# Patient Record
Sex: MALE | Race: WHITE | Employment: OTHER | URBAN - METROPOLITAN AREA
[De-identification: names, ages, dates, MRNs, and addresses within clinical notes are randomized per-mention and may not be internally consistent; named-entity substitution may affect disease eponyms.]

---

## 2017-02-20 RX ORDER — FINASTERIDE 5 MG/1
TABLET, FILM COATED ORAL
Qty: 30 TABLET | Refills: 1 | Status: SHIPPED | OUTPATIENT
Start: 2017-02-20 | End: 2017-05-17

## 2017-02-20 RX ORDER — SIMVASTATIN 20 MG
20 TABLET ORAL NIGHTLY
Qty: 30 TABLET | Refills: 1 | Status: SHIPPED | OUTPATIENT
Start: 2017-02-20 | End: 2017-03-27

## 2017-02-20 RX ORDER — FUROSEMIDE 40 MG/1
TABLET ORAL
Qty: 30 TABLET | Refills: 1 | Status: SHIPPED | OUTPATIENT
Start: 2017-02-20 | End: 2017-05-19

## 2017-02-20 NOTE — TELEPHONE ENCOUNTER
From: Elda He  To: Diann Arrington MD  Sent: 2/20/2017 9:35 AM CST  Subject: Medication Renewal Request    Original authorizing provider: Thersia Gaucher, MD Valeta Lobos would like a refill of the following medications:  simvastatin 20 MG Oral Tab [Ma

## 2017-03-16 ENCOUNTER — HOSPITAL ENCOUNTER (INPATIENT)
Facility: HOSPITAL | Age: 77
LOS: 4 days | Discharge: HOME HEALTH | DRG: 603 | End: 2017-03-20
Attending: EMERGENCY MEDICINE | Admitting: HOSPITALIST
Payer: MEDICARE

## 2017-03-16 ENCOUNTER — TELEPHONE (OUTPATIENT)
Dept: NEPHROLOGY | Facility: CLINIC | Age: 77
End: 2017-03-16

## 2017-03-16 ENCOUNTER — APPOINTMENT (OUTPATIENT)
Dept: ULTRASOUND IMAGING | Facility: HOSPITAL | Age: 77
DRG: 603 | End: 2017-03-16
Attending: EMERGENCY MEDICINE
Payer: MEDICARE

## 2017-03-16 ENCOUNTER — HOSPITAL ENCOUNTER (OUTPATIENT)
Age: 77
Discharge: ANOTHER HOSPITAL | DRG: 603 | End: 2017-03-16
Attending: EMERGENCY MEDICINE
Payer: MEDICARE

## 2017-03-16 VITALS
TEMPERATURE: 99 F | RESPIRATION RATE: 20 BRPM | OXYGEN SATURATION: 95 % | WEIGHT: 245 LBS | BODY MASS INDEX: 36.29 KG/M2 | HEART RATE: 68 BPM | HEIGHT: 69 IN | SYSTOLIC BLOOD PRESSURE: 144 MMHG | DIASTOLIC BLOOD PRESSURE: 75 MMHG

## 2017-03-16 DIAGNOSIS — L03.116 CELLULITIS OF LEFT LOWER EXTREMITY: Primary | ICD-10-CM

## 2017-03-16 LAB
ANION GAP SERPL CALC-SCNC: 9 MMOL/L (ref 0–18)
BASOPHILS # BLD: 0.1 K/UL (ref 0–0.2)
BASOPHILS NFR BLD: 1 %
BUN SERPL-MCNC: 19 MG/DL (ref 8–20)
BUN/CREAT SERPL: 23.5 (ref 10–20)
CALCIUM SERPL-MCNC: 9.5 MG/DL (ref 8.5–10.5)
CHLORIDE SERPL-SCNC: 98 MMOL/L (ref 95–110)
CO2 SERPL-SCNC: 31 MMOL/L (ref 22–32)
CREAT SERPL-MCNC: 0.81 MG/DL (ref 0.5–1.5)
EOSINOPHIL # BLD: 0.3 K/UL (ref 0–0.7)
EOSINOPHIL NFR BLD: 2 %
ERYTHROCYTE [DISTWIDTH] IN BLOOD BY AUTOMATED COUNT: 18.2 % (ref 11–15)
GLUCOSE BLDC GLUCOMTR-MCNC: 112 MG/DL (ref 70–99)
GLUCOSE SERPL-MCNC: 111 MG/DL (ref 70–99)
HCT VFR BLD AUTO: 46.4 % (ref 41–52)
HGB BLD-MCNC: 14.4 G/DL (ref 13.5–17.5)
LYMPHOCYTES # BLD: 2.5 K/UL (ref 1–4)
LYMPHOCYTES NFR BLD: 21 %
MCH RBC QN AUTO: 18.8 PG (ref 27–32)
MCHC RBC AUTO-ENTMCNC: 31 G/DL (ref 32–37)
MCV RBC AUTO: 60.7 FL (ref 80–100)
MONOCYTES # BLD: 1 K/UL (ref 0–1)
MONOCYTES NFR BLD: 8 %
NEUTROPHILS # BLD AUTO: 8.3 K/UL (ref 1.8–7.7)
NEUTROPHILS NFR BLD: 68 %
OSMOLALITY UR CALC.SUM OF ELEC: 289 MOSM/KG (ref 275–295)
PLATELET # BLD AUTO: 192 K/UL (ref 140–400)
PMV BLD AUTO: 9.6 FL (ref 7.4–10.3)
POTASSIUM SERPL-SCNC: 4.2 MMOL/L (ref 3.3–5.1)
RBC # BLD AUTO: 7.64 M/UL (ref 4.5–5.9)
SODIUM SERPL-SCNC: 138 MMOL/L (ref 136–144)
WBC # BLD AUTO: 12.5 K/UL (ref 4–11)

## 2017-03-16 PROCEDURE — 99223 1ST HOSP IP/OBS HIGH 75: CPT | Performed by: HOSPITALIST

## 2017-03-16 PROCEDURE — 93971 EXTREMITY STUDY: CPT

## 2017-03-16 PROCEDURE — 87070 CULTURE OTHR SPECIMN AEROBIC: CPT | Performed by: EMERGENCY MEDICINE

## 2017-03-16 PROCEDURE — 87205 SMEAR GRAM STAIN: CPT | Performed by: EMERGENCY MEDICINE

## 2017-03-16 PROCEDURE — 99215 OFFICE O/P EST HI 40 MIN: CPT

## 2017-03-16 RX ORDER — HEPARIN SODIUM 5000 [USP'U]/ML
5000 INJECTION, SOLUTION INTRAVENOUS; SUBCUTANEOUS EVERY 12 HOURS
Status: DISCONTINUED | OUTPATIENT
Start: 2017-03-16 | End: 2017-03-20

## 2017-03-16 RX ORDER — POLYETHYLENE GLYCOL 3350 17 G/17G
17 POWDER, FOR SOLUTION ORAL DAILY PRN
Status: DISCONTINUED | OUTPATIENT
Start: 2017-03-16 | End: 2017-03-20

## 2017-03-16 RX ORDER — ACETAMINOPHEN 325 MG/1
650 TABLET ORAL EVERY 6 HOURS PRN
Status: DISCONTINUED | OUTPATIENT
Start: 2017-03-16 | End: 2017-03-20

## 2017-03-16 RX ORDER — ATORVASTATIN CALCIUM 20 MG/1
20 TABLET, FILM COATED ORAL NIGHTLY
Status: DISCONTINUED | OUTPATIENT
Start: 2017-03-16 | End: 2017-03-20

## 2017-03-16 RX ORDER — FUROSEMIDE 40 MG/1
40 TABLET ORAL DAILY
Status: DISCONTINUED | OUTPATIENT
Start: 2017-03-17 | End: 2017-03-20

## 2017-03-16 RX ORDER — LISINOPRIL AND HYDROCHLOROTHIAZIDE 12.5; 1 MG/1; MG/1
1 TABLET ORAL
Status: DISCONTINUED | OUTPATIENT
Start: 2017-03-16 | End: 2017-03-16 | Stop reason: SDUPTHER

## 2017-03-16 RX ORDER — FINASTERIDE 5 MG/1
5 TABLET, FILM COATED ORAL DAILY
Status: DISCONTINUED | OUTPATIENT
Start: 2017-03-17 | End: 2017-03-20

## 2017-03-16 RX ORDER — DOCUSATE SODIUM 100 MG/1
100 CAPSULE, LIQUID FILLED ORAL 2 TIMES DAILY
Status: DISCONTINUED | OUTPATIENT
Start: 2017-03-16 | End: 2017-03-20

## 2017-03-16 RX ORDER — SODIUM PHOSPHATE, DIBASIC AND SODIUM PHOSPHATE, MONOBASIC 7; 19 G/133ML; G/133ML
1 ENEMA RECTAL ONCE AS NEEDED
Status: ACTIVE | OUTPATIENT
Start: 2017-03-16 | End: 2017-03-16

## 2017-03-16 RX ORDER — BISACODYL 10 MG
10 SUPPOSITORY, RECTAL RECTAL
Status: DISCONTINUED | OUTPATIENT
Start: 2017-03-16 | End: 2017-03-20

## 2017-03-16 RX ORDER — ONDANSETRON 2 MG/ML
4 INJECTION INTRAMUSCULAR; INTRAVENOUS EVERY 6 HOURS PRN
Status: DISCONTINUED | OUTPATIENT
Start: 2017-03-16 | End: 2017-03-20

## 2017-03-16 NOTE — ED NOTES
Pt states he fell an d scraped his left anterior lower leg approx. 10 days ago and noticed increasing swelling and redness since the injury. Pt states he attempted to use neosporin to affected area for itching, with no relief.  Pt stated he was \"scratching

## 2017-03-16 NOTE — ED INITIAL ASSESSMENT (HPI)
C/o swelling itching Lt leg.  Multiple sores noted on the left leg  Pt claims that he has been scratching his leg for aweek

## 2017-03-16 NOTE — ED PROVIDER NOTES
Patient Seen in: [facility] AND [facility] Emergency Department    History   Patient presents with:  Cellulitis (integumentary, infectious)    Stated Complaint: cellulitis l leg x 1 week sent from [facility]    HPI    Patient has history of swelling to his legs pain, no nausea, no vomiting    Positive for stated complaint: cellulitis l leg x 1 week sent from [address] are as noted in HPI. Constitutional and vital signs reviewed. All other systems reviewed and negative except as noted above. about this  He reports that his father was allergic to penicillin and that he may have had a reaction as a child but he has no memory of this and is not sure if he actually had one and if so what it was.   Based on this it seems unlikely he truly has a peni specified.     Medications Prescribed:  Current Discharge Medication List        Present on Admission  Date Reviewed: 11/8/2016          ICD-10-CM Noted POA    Cellulitis of left lower extremity L03.116 3/16/2017 Unknown

## 2017-03-16 NOTE — TELEPHONE ENCOUNTER
Pt believes he has Severe Left Leg Infection -very sore, very painful, and oozing with liquid. Pls call - aware [facility] is not in office. Thank you.

## 2017-03-16 NOTE — ED PROVIDER NOTES
Patient Seen in: [facility] [address] [place] [address]    History   Patient presents with:  Lower Extremity Injury (musculoskeletal)    Stated Complaint: leg ulcer    HPI    The patient is a 64-year-old male with past history of hypertension, non- occasional      Review of Systems    Positive for stated complaint: leg ulcer  Other systems are as noted in HPI. Constitutional and vital signs reviewed. All other systems reviewed and negative except as noted above.     PSFH elements reviewed from t patient's left lower leg swelling and redness include cellulitis versus unlikely possibility of DVT. 's discussed with the patient that he should go to the emergency room now for laboratory workup as well as evaluation of left lower extremity swelling.

## 2017-03-16 NOTE — H&P
[facility]    PATIENT'S NAME: Jahaira Morrow   ATTENDING PHYSICIAN: Gilmar Tinsley MD   PATIENT ACCOUNT#:   [id number]    LOCATION:  [place] [address]  MEDICAL RECORD #:   [id number]       YOB: 1940  ADMISSION DATE:       03/16/2017 systems is negative. PHYSICAL EXAMINATION:    GENERAL:  Alert and oriented to time, place, and person. No acute distress. VITAL SIGNS:  Temperature 98.2, pulse 63, respiratory rate 22, blood pressure 100/49, pulse oximetry 96% on room air.   HEENT:

## 2017-03-16 NOTE — TELEPHONE ENCOUNTER
Patient contacted. Advised that leg must be looked at today and directed him to go to Urgent Care. He will do as directed. Encounter routed to Dr. Vivi Castillo (on call) to inform.

## 2017-03-16 NOTE — ED INITIAL ASSESSMENT (HPI)
Pt sent over from im care for evaluation of possible cellulitis left left with swelling, redness, chronic discoloration of skin and 2 open wound areas  Pt denies fevers

## 2017-03-17 LAB
ANION GAP SERPL CALC-SCNC: 6 MMOL/L (ref 0–18)
BASOPHILS # BLD: 0.1 K/UL (ref 0–0.2)
BASOPHILS NFR BLD: 1 %
BUN SERPL-MCNC: 18 MG/DL (ref 8–20)
BUN/CREAT SERPL: 25.4 (ref 10–20)
CALCIUM SERPL-MCNC: 8.9 MG/DL (ref 8.5–10.5)
CHLORIDE SERPL-SCNC: 101 MMOL/L (ref 95–110)
CO2 SERPL-SCNC: 32 MMOL/L (ref 22–32)
CREAT SERPL-MCNC: 0.71 MG/DL (ref 0.5–1.5)
EOSINOPHIL # BLD: 0.3 K/UL (ref 0–0.7)
EOSINOPHIL NFR BLD: 3 %
ERYTHROCYTE [DISTWIDTH] IN BLOOD BY AUTOMATED COUNT: 17.7 % (ref 11–15)
GLUCOSE SERPL-MCNC: 98 MG/DL (ref 70–99)
HCT VFR BLD AUTO: 39.9 % (ref 41–52)
HGB BLD-MCNC: 12.6 G/DL (ref 13.5–17.5)
LYMPHOCYTES # BLD: 2.3 K/UL (ref 1–4)
LYMPHOCYTES NFR BLD: 24 %
MCH RBC QN AUTO: 19 PG (ref 27–32)
MCHC RBC AUTO-ENTMCNC: 31.5 G/DL (ref 32–37)
MCV RBC AUTO: 60.3 FL (ref 80–100)
MONOCYTES # BLD: 0.8 K/UL (ref 0–1)
MONOCYTES NFR BLD: 9 %
NEUTROPHILS # BLD AUTO: 6.1 K/UL (ref 1.8–7.7)
NEUTROPHILS NFR BLD: 64 %
OSMOLALITY UR CALC.SUM OF ELEC: 290 MOSM/KG (ref 275–295)
PLATELET # BLD AUTO: 169 K/UL (ref 140–400)
PMV BLD AUTO: 10.8 FL (ref 7.4–10.3)
POTASSIUM SERPL-SCNC: 3.8 MMOL/L (ref 3.3–5.1)
RBC # BLD AUTO: 6.62 M/UL (ref 4.5–5.9)
SODIUM SERPL-SCNC: 139 MMOL/L (ref 136–144)
WBC # BLD AUTO: 9.6 K/UL (ref 4–11)

## 2017-03-17 PROCEDURE — 99233 SBSQ HOSP IP/OBS HIGH 50: CPT | Performed by: HOSPITALIST

## 2017-03-17 RX ORDER — CLINDAMYCIN PHOSPHATE 600 MG/50ML
600 INJECTION INTRAVENOUS EVERY 8 HOURS
Status: DISCONTINUED | OUTPATIENT
Start: 2017-03-17 | End: 2017-03-20

## 2017-03-17 RX ORDER — POTASSIUM CHLORIDE 20 MEQ/1
40 TABLET, EXTENDED RELEASE ORAL ONCE
Status: COMPLETED | OUTPATIENT
Start: 2017-03-17 | End: 2017-03-17

## 2017-03-17 NOTE — WOUND PROGRESS NOTE
WOUND CARE NOTE      PLAN   Recommendations:  Turn schedules  Heels elevated using pillows, heel wedge or heel boots to offload heels  To prevent sliding: decrease head of bed and elevate foot of bed as medical condition tolerates  Glucose control to hel

## 2017-03-17 NOTE — PROGRESS NOTES
[facility]    Progress Note    Rory Gongora Patient Status:  Inpatient    3/6/1940 MRN [id number]   Location [facility] 5SW/SE Attending Krissy Wilson MD   Hosp Day # 1 PCP Tanesha Sutton MD       Subjective:   Rory Gongora is a(n Results  Component Value Date   WBC 9.6 03/17/2017   HGB 12.6* 03/17/2017   HCT 39.9* 03/17/2017    03/17/2017   CREATSERUM 0.71 03/17/2017   BUN 18 03/17/2017    03/17/2017   K 3.8 03/17/2017    03/17/2017   CO2 32 03/17/2017   GLU 98 0

## 2017-03-17 NOTE — CONSULTS
[facility]    Report of Consultation    Michelle Drilling Patient Status:  Inpatient    3/6/1940 MRN [id number]   Location [facility] 5SW/SE Attending Kendall Antoine MD   Hosp Day # 1 PCP Jany Motta MD     Date of Admission:  3/16 (VANCOCIN) 1,250 mg in sodium chloride 0.9 % 500 mL IVPB 15 mg/kg (Adjusted) Intravenous Q24H   finasteride (PROSCAR) tab 5 mg 5 mg Oral Daily   furosemide (LASIX) tab 40 mg 40 mg Oral Daily   metoprolol Tartrate (LOPRESSOR) tab 25 mg 25 mg Oral BID   Ator 12/03/2016   TSH 2.20 06/15/2012   PSA 0.7 11/08/2016         Imaging:  Us Venous Doppler Leg Left - Diag Img (emz=[id number])    3/16/2017  CONCLUSION: No evidence of left lower extremity DVT.             Impression:       77 y/o male with h/o chronic BLE venou

## 2017-03-17 NOTE — PLAN OF CARE
Problem: Patient/Family Goals  Goal: Patient/Family Long Term Goal  Patient’s Long Term Goal: to go home    Interventions:  - See additional Care Plan goals for specific interventions   Outcome: Not Progressing  Admitted today for cellulitis  Goal: Patient strengthening/mobility  - Encourage toileting schedule  Outcome: Progressing  Patient ambulates self to the bathroom. Educated on fall risk prevention strategies. Bed in low and locked position. Encouraged to call for help to the bathroom if needed.      Pr

## 2017-03-17 NOTE — PLAN OF CARE
Problem: Patient/Family Goals  Goal: Patient/Family Long Term Goal  Patient’s Long Term Goal: to go home    Interventions:  - iv antibiotics   - safety  - labs   - wound care  Outcome: Progressing  Patient iv abx given   Goal: Patient/Family [address] to assist with strengthening/mobility  - Encourage toileting schedule   Outcome: Progressing  Safety plan reinforced, patient ambulating self, call light and belongings within reach, frequent rounding done    Problem: SKIN/TISSUE INTEGRITY - ADULT  Goal: I

## 2017-03-17 NOTE — PROGRESS NOTES
Patient med rec not reconciled. RN elizabethged Dr. Jeb Moran at 1436. Also needs to be clarified about iv zosyn in MD notes stated will start zosyn and vanco. Vanco ordered but not zosyn. All needs met.      md called back will reconcile meds and zosyn not to be

## 2017-03-18 LAB — POTASSIUM SERPL-SCNC: 4.2 MMOL/L (ref 3.3–5.1)

## 2017-03-18 PROCEDURE — 99233 SBSQ HOSP IP/OBS HIGH 50: CPT | Performed by: HOSPITALIST

## 2017-03-18 NOTE — PROGRESS NOTES
[facility]    Progress Note    Lanette Landon Patient Status:  Inpatient    3/6/1940 MRN [id number]   Location [facility] 5SW/SE Attending Beltran Lucio MD   Hosp Day # 2 PCP Colleen Harper MD       Subjective:   Lanette Landon is a(n Lab Results  Component Value Date   WBC 9.6 03/17/2017   HGB 12.6* 03/17/2017   HCT 39.9* 03/17/2017    03/17/2017   CREATSERUM 0.71 03/17/2017   BUN 18 03/17/2017    03/17/2017   K 4.2 03/18/2017    03/17/2017   CO2 32 03/17/2017

## 2017-03-18 NOTE — CONSULTS
[facility]    Report of Consultation    Linda Adair Patient Status:  Inpatient    3/6/1940 MRN [id number]   Location [facility] 5SW/SE Attending Kaila Cano MD   Hosp Day # 2 PCP Gisela Yarbrough MD     Date of Admission:  3/16 sodium (COLACE) cap 100 mg 100 mg Oral BID   PEG 3350 (MIRALAX) powder packet 17 g 17 g Oral Daily PRN   magnesium hydroxide (MILK OF MAGNESIA) 400 MG/5ML suspension 30 mL 30 mL Oral Daily PRN   bisacodyl (DULCOLAX) rectal suppository 10 mg 10 mg Rectal Da Results  Component Value Date   WBC 9.6 03/17/2017   HGB 12.6* 03/17/2017   HCT 39.9* 03/17/2017    03/17/2017   CREATSERUM 0.71 03/17/2017   BUN 18 03/17/2017    03/17/2017   K 4.2 03/18/2017    03/17/2017   CO2 32 03/17/2017   GLU 98 0

## 2017-03-18 NOTE — PLAN OF CARE
Problem: PAIN - ADULT  Goal: Verbalizes/displays adequate comfort level or patient’s stated pain goal  INTERVENTIONS:  - Encourage pt to monitor pain and request assistance  - Assess pain using appropriate pain scale  - Administer analgesics based on type dressing/incision, wound bed, drain sites and surrounding tissue  - Implement wound care per orders  - Initiate isolation precautions as appropriate  - Initiate Pressure Ulcer prevention bundle as indicated   Outcome: Salo Funes,

## 2017-03-19 LAB
ANION GAP SERPL CALC-SCNC: 4 MMOL/L (ref 0–18)
BASOPHILS # BLD: 0.1 K/UL (ref 0–0.2)
BASOPHILS NFR BLD: 1 %
BUN SERPL-MCNC: 16 MG/DL (ref 8–20)
BUN/CREAT SERPL: 23.2 (ref 10–20)
CALCIUM SERPL-MCNC: 9.2 MG/DL (ref 8.5–10.5)
CHLORIDE SERPL-SCNC: 100 MMOL/L (ref 95–110)
CO2 SERPL-SCNC: 34 MMOL/L (ref 22–32)
CREAT SERPL-MCNC: 0.69 MG/DL (ref 0.5–1.5)
EOSINOPHIL # BLD: 0.4 K/UL (ref 0–0.7)
EOSINOPHIL NFR BLD: 4 %
ERYTHROCYTE [DISTWIDTH] IN BLOOD BY AUTOMATED COUNT: 17 % (ref 11–15)
GLUCOSE SERPL-MCNC: 101 MG/DL (ref 70–99)
HCT VFR BLD AUTO: 40.8 % (ref 41–52)
HGB BLD-MCNC: 13 G/DL (ref 13.5–17.5)
LYMPHOCYTES # BLD: 2.3 K/UL (ref 1–4)
LYMPHOCYTES NFR BLD: 23 %
MAGNESIUM SERPL-MCNC: 2.1 MG/DL (ref 1.8–2.5)
MCH RBC QN AUTO: 18.9 PG (ref 27–32)
MCHC RBC AUTO-ENTMCNC: 31.7 G/DL (ref 32–37)
MCV RBC AUTO: 59.6 FL (ref 80–100)
MONOCYTES # BLD: 0.9 K/UL (ref 0–1)
MONOCYTES NFR BLD: 9 %
NEUTROPHILS # BLD AUTO: 6 K/UL (ref 1.8–7.7)
NEUTROPHILS NFR BLD: 63 %
OSMOLALITY UR CALC.SUM OF ELEC: 287 MOSM/KG (ref 275–295)
PLATELET # BLD AUTO: 178 K/UL (ref 140–400)
PMV BLD AUTO: 9.8 FL (ref 7.4–10.3)
POTASSIUM SERPL-SCNC: 4.3 MMOL/L (ref 3.3–5.1)
RBC # BLD AUTO: 6.85 M/UL (ref 4.5–5.9)
SODIUM SERPL-SCNC: 138 MMOL/L (ref 136–144)
VANCOMYCIN TROUGH SERPL-MCNC: <3.5 MCG/ML (ref 10–20)
WBC # BLD AUTO: 9.6 K/UL (ref 4–11)

## 2017-03-19 PROCEDURE — 99233 SBSQ HOSP IP/OBS HIGH 50: CPT | Performed by: HOSPITALIST

## 2017-03-19 RX ORDER — 0.9 % SODIUM CHLORIDE 0.9 %
VIAL (ML) INJECTION
Status: COMPLETED
Start: 2017-03-19 | End: 2017-03-19

## 2017-03-19 NOTE — PROGRESS NOTES
[facility]    Progress Note    Maryann Odomtutu Patient Status:  Inpatient    3/6/1940 MRN [id number]   Location [facility] 5SW/SE Attending Bhavna Wood MD   Hosp Day # 3 PCP Gonzalez Jarquin MD       Subjective:   Sharlotte Boxer is a(n Results:       Lab Results  Component Value Date   WBC 9.6 03/19/2017   HGB 13.0* 03/19/2017   HCT 40.8* 03/19/2017    03/19/2017   CREATSERUM 0.69 03/19/2017   BUN 16 03/19/2017    03/19/2017   K 4.3 03/19/2017    03/19/2017

## 2017-03-19 NOTE — PLAN OF CARE
Problem: PAIN - ADULT  Goal: Verbalizes/displays adequate comfort level or patient’s stated pain goal  INTERVENTIONS:  - Encourage pt to monitor pain and request assistance  - Assess pain using appropriate pain scale  - Administer analgesics based on type integrated in the patient’s plan of care  Interventions:  - What would you like us to know as we care for you?   wanting to go home but want the infection to be done  - Provide timely, complete, and accurate information to patient/family  - Incorporate pat

## 2017-03-19 NOTE — PROGRESS NOTES
[facility] dosing service    Follow-up Pharmacokinetic Consult for Vancomycin Dosing     Esmer Reddy is a 76year old male admitted on 3/16 who is being treated for cellulitis. Patient is on day 4 of Vancomycin 1.25 gm IV Q 24 hours.   Goal trough is 10- Radiology:none    Based on the above:    1. Increase Vancomycin at 1.25 gm IVPB Q 18 hours (based on Trough of <3.5 ug/mL, pharmacokinetics, 87 kg weight and renal function)    2. Pharmacy will re-check Vancomycin trough levels prior to 3rd dose.   G

## 2017-03-20 VITALS
WEIGHT: 245 LBS | RESPIRATION RATE: 20 BRPM | TEMPERATURE: 99 F | HEIGHT: 69 IN | HEART RATE: 75 BPM | SYSTOLIC BLOOD PRESSURE: 109 MMHG | OXYGEN SATURATION: 96 % | BODY MASS INDEX: 36.29 KG/M2 | DIASTOLIC BLOOD PRESSURE: 50 MMHG

## 2017-03-20 LAB
ANION GAP SERPL CALC-SCNC: 7 MMOL/L (ref 0–18)
BASOPHILS # BLD: 0.1 K/UL (ref 0–0.2)
BASOPHILS NFR BLD: 1 %
BUN SERPL-MCNC: 13 MG/DL (ref 8–20)
BUN/CREAT SERPL: 21 (ref 10–20)
CALCIUM SERPL-MCNC: 8.9 MG/DL (ref 8.5–10.5)
CHLORIDE SERPL-SCNC: 102 MMOL/L (ref 95–110)
CO2 SERPL-SCNC: 29 MMOL/L (ref 22–32)
CREAT SERPL-MCNC: 0.62 MG/DL (ref 0.5–1.5)
EOSINOPHIL # BLD: 0.4 K/UL (ref 0–0.7)
EOSINOPHIL NFR BLD: 3 %
ERYTHROCYTE [DISTWIDTH] IN BLOOD BY AUTOMATED COUNT: 17.5 % (ref 11–15)
GLUCOSE SERPL-MCNC: 106 MG/DL (ref 70–99)
HCT VFR BLD AUTO: 39.1 % (ref 41–52)
HGB BLD-MCNC: 12.4 G/DL (ref 13.5–17.5)
LYMPHOCYTES # BLD: 1.9 K/UL (ref 1–4)
LYMPHOCYTES NFR BLD: 16 %
MAGNESIUM SERPL-MCNC: 2.1 MG/DL (ref 1.8–2.5)
MCH RBC QN AUTO: 18.9 PG (ref 27–32)
MCHC RBC AUTO-ENTMCNC: 31.8 G/DL (ref 32–37)
MCV RBC AUTO: 59.5 FL (ref 80–100)
MONOCYTES # BLD: 1.1 K/UL (ref 0–1)
MONOCYTES NFR BLD: 10 %
NEUTROPHILS # BLD AUTO: 8.1 K/UL (ref 1.8–7.7)
NEUTROPHILS NFR BLD: 70 %
OSMOLALITY UR CALC.SUM OF ELEC: 287 MOSM/KG (ref 275–295)
PLATELET # BLD AUTO: 163 K/UL (ref 140–400)
PMV BLD AUTO: 9.8 FL (ref 7.4–10.3)
POTASSIUM SERPL-SCNC: 4 MMOL/L (ref 3.3–5.1)
RBC # BLD AUTO: 6.58 M/UL (ref 4.5–5.9)
SODIUM SERPL-SCNC: 138 MMOL/L (ref 136–144)
WBC # BLD AUTO: 11.6 K/UL (ref 4–11)

## 2017-03-20 PROCEDURE — 99239 HOSP IP/OBS DSCHRG MGMT >30: CPT | Performed by: HOSPITALIST

## 2017-03-20 RX ORDER — LEVOFLOXACIN 500 MG/1
500 TABLET, FILM COATED ORAL DAILY
Qty: 5 TABLET | Refills: 0 | Status: SHIPPED | OUTPATIENT
Start: 2017-03-20 | End: 2017-03-29 | Stop reason: ALTCHOICE

## 2017-03-20 RX ORDER — 0.9 % SODIUM CHLORIDE 0.9 %
10 VIAL (ML) INJECTION AS NEEDED
Status: DISCONTINUED | OUTPATIENT
Start: 2017-03-20 | End: 2017-03-20

## 2017-03-20 RX ORDER — CLINDAMYCIN HYDROCHLORIDE 300 MG/1
300 CAPSULE ORAL 3 TIMES DAILY
Qty: 18 CAPSULE | Refills: 0 | Status: SHIPPED | OUTPATIENT
Start: 2017-03-20 | End: 2017-03-26

## 2017-03-20 RX ORDER — POLYETHYLENE GLYCOL 3350 17 G/17G
17 POWDER, FOR SOLUTION ORAL DAILY PRN
Qty: 30 EACH | Refills: 0 | Status: SHIPPED | OUTPATIENT
Start: 2017-03-20 | End: 2020-07-29 | Stop reason: ALTCHOICE

## 2017-03-20 NOTE — HOME CARE LIAISON
MET WITH PTNT TO DISCUSS HOME HEALTH SERVICES AND COVERAGE CRITERIA. PTNT AGREEABLE TO [address] [place] [address]. PTNT GIVEN RESIDENTIAL BROCHURE AND CONTACT INFORMATION. Adriano RN FOR WOUND CARE.   PTNT ADMITTED TO [address] [place] [address] 3/16/17

## 2017-03-20 NOTE — PLAN OF CARE
Problem: Patient/Family Goals  Goal: Patient/Family Long Term Goal  Patient’s Long Term Goal: to go home    Interventions:  - iv antibiotics   - safety  - labs   - wound care   Outcome: Progressing  Pt will probably be discharged today or tomorrow.   Goal: including physical limitations  - Instruct pt to call for assistance with activity based on assessment  - Modify environment to reduce risk of injury  - Provide assistive devices as appropriate  - Consider OT/PT consult to assist with strengthening/mobilit

## 2017-03-20 NOTE — CM/SW NOTE
Met with patient at bedside and asked about discharge plans. Patient states when he is dc'd he will go home with his wife. patient is agreeable for [facility] for education on wound care and dressing changes.  Patient agreeable for [facility]

## 2017-03-20 NOTE — PLAN OF CARE
Problem: Patient/Family Goals  Goal: Patient/Family Long Term Goal  Patient’s Long Term Goal: to go home    Interventions:  - iv antibiotics   - safety  - labs   - wound care   Outcome: Progressing  Compliant with medications/plan of care.  Wound care as or appropriate  - Consider OT/PT consult to assist with strengthening/mobility  - Encourage toileting schedule   Outcome: Progressing  Pt calls appropriately for help. Purposeful hourly rounding by nursing staff.      Problem: SKIN/TISSUE INTEGRITY - ADULT  Go

## 2017-03-20 NOTE — DISCHARGE SUMMARY
[facility]  Discharge Summary    Melisa Sumner Patient Status:  Inpatient    3/6/1940 MRN [id number]   Location [facility] 5SW/SE Attending Kassie Prater MD   [address] Day # 4 PCP Rajani Davis MD     Date of Admission: 3/16/2017    Date of week        Follow up Labs: n/a     History of Present Illness:   Per Dr. Marlon Sheffield ADMISSION:  Left lower extremity cellulitis.     HISTORY OF PRESENT ILLNESS:  The patient is a 18-year-old,  male who noted a small abrasion on the anter home health care services    [id number]  3/20/2017  1:18 PM    Timespent> 30 mins

## 2017-03-20 NOTE — PROGRESS NOTES
INFECTIOUS DISEASE PROGRESS NOTE    [place] Patient Status:  Inpatient    3/6/1940 MRN [id number]   Location [facility] 5SW/SE Attending Dary Farmer MD   Hosp Day # 4 PCP Ata Vernon MD     Subjective:  Patient seen and examined, note d/w Dr [facility]  [phone]

## 2017-03-24 ENCOUNTER — TELEPHONE (OUTPATIENT)
Dept: NEPHROLOGY | Facility: CLINIC | Age: 77
End: 2017-03-24

## 2017-03-27 RX ORDER — SIMVASTATIN 20 MG
20 TABLET ORAL NIGHTLY
Qty: 90 TABLET | Refills: 1 | Status: SHIPPED | OUTPATIENT
Start: 2017-03-27 | End: 2017-04-26

## 2017-03-27 NOTE — TELEPHONE ENCOUNTER
From: Mae Caceres  To: Leda Ramos MD  Sent: 3/26/2017 11:17 PM CDT  Subject: Medication Renewal Request    Original authorizing provider: MD Mae Briseno would like a refill of the following medications:  simvastatin 20 MG Oral Tab [M

## 2017-03-29 ENCOUNTER — OFFICE VISIT (OUTPATIENT)
Dept: NEPHROLOGY | Facility: CLINIC | Age: 77
End: 2017-03-29

## 2017-03-29 VITALS
HEIGHT: 69 IN | WEIGHT: 241 LBS | SYSTOLIC BLOOD PRESSURE: 110 MMHG | DIASTOLIC BLOOD PRESSURE: 68 MMHG | HEART RATE: 60 BPM | BODY MASS INDEX: 35.7 KG/M2

## 2017-03-29 DIAGNOSIS — I10 ESSENTIAL HYPERTENSION: Primary | ICD-10-CM

## 2017-03-29 DIAGNOSIS — E78.00 HYPERCHOLESTEROLEMIA: ICD-10-CM

## 2017-03-29 DIAGNOSIS — L03.116 CELLULITIS OF LEFT LOWER EXTREMITY: ICD-10-CM

## 2017-03-29 PROCEDURE — 99214 OFFICE O/P EST MOD 30 MIN: CPT | Performed by: INTERNAL MEDICINE

## 2017-03-29 PROCEDURE — G0463 HOSPITAL OUTPT CLINIC VISIT: HCPCS | Performed by: INTERNAL MEDICINE

## 2017-03-30 NOTE — PROGRESS NOTES
HPI:    Patient ID: Sharlotte Boxer is a 76year old male.     HPI Comments: 66-year-old male with a history of hypertension, hypercholesterolemia, borderline blood sugars, obesity, DJD, early coronary artery disease, BPH and elevated PSA followed by urology, ch Allergies:  Clarithromycin              Comment:Other reaction(s): Unknown  Penicillins                 Comment:Other reaction(s): Rash   PHYSICAL EXAM:   Physical Exam    Constitutional: He is oriented to person, place, and time.  He appears well-devel

## 2017-04-26 RX ORDER — SIMVASTATIN 20 MG
20 TABLET ORAL NIGHTLY
Qty: 90 TABLET | Refills: 0 | Status: SHIPPED | OUTPATIENT
Start: 2017-04-26 | End: 2017-07-21

## 2017-04-26 NOTE — TELEPHONE ENCOUNTER
LOV 3/29/17. Last lipid panel was done 12/3/16. Prescription refilled per Dr. Param Sood written protocol in his absence.

## 2017-04-26 NOTE — TELEPHONE ENCOUNTER
From: Sharlotte Boxer  To: Reina Vanessa MD  Sent: 4/25/2017 11:40 PM CDT  Subject: Medication Renewal Request    Original authorizing provider: Marnee Lauber, MD Sharlotte Boxer would like a refill of the following medications:  simvastatin 20 MG Oral Tab [M

## 2017-05-09 ENCOUNTER — OFFICE VISIT (OUTPATIENT)
Dept: SURGERY | Facility: CLINIC | Age: 77
End: 2017-05-09

## 2017-05-09 ENCOUNTER — APPOINTMENT (OUTPATIENT)
Dept: LAB | Facility: HOSPITAL | Age: 77
End: 2017-05-09
Attending: UROLOGY
Payer: MEDICARE

## 2017-05-09 VITALS
TEMPERATURE: 98 F | HEIGHT: 69 IN | WEIGHT: 245 LBS | HEART RATE: 56 BPM | SYSTOLIC BLOOD PRESSURE: 110 MMHG | RESPIRATION RATE: 16 BRPM | DIASTOLIC BLOOD PRESSURE: 70 MMHG | BODY MASS INDEX: 36.29 KG/M2

## 2017-05-09 DIAGNOSIS — R97.20 ELEVATED PSA: Primary | ICD-10-CM

## 2017-05-09 DIAGNOSIS — R97.20 ELEVATED PSA: ICD-10-CM

## 2017-05-09 PROCEDURE — 36415 COLL VENOUS BLD VENIPUNCTURE: CPT

## 2017-05-09 PROCEDURE — G0463 HOSPITAL OUTPT CLINIC VISIT: HCPCS | Performed by: UROLOGY

## 2017-05-09 PROCEDURE — 84153 ASSAY OF PSA TOTAL: CPT

## 2017-05-09 PROCEDURE — 99214 OFFICE O/P EST MOD 30 MIN: CPT | Performed by: UROLOGY

## 2017-05-09 NOTE — PROGRESS NOTES
[address] Patient Status:  Outpatient    3/6/1940 MRN [id number]   Location [facility] Attending Erika Arana.  [address] Day #  PCP MD Esmer Schultz is a 76year old male 90 tablet Rfl: 0   METOPROLOL TARTRATE 25 MG Oral Tab TAKE ONE TABLET BY MOUTH TWO TIMES A DAY Disp: 60 tablet Rfl: 5   betamethasone valerate 0.1 % External Cream Apply 1 Application topically 2 (two) times daily.  Disp: 45 g Rfl: 1   PEG 3350 Oral Powd Pa [de-identified]      ASSESSMENT AND PLAN:  Case summary: Patient is a 49-year-old white male that we are following on a routine 6 month basis for BPH with obstruction that includes medical therapy with 5 alpha reductase inhibitors in the form of Proscar with improvemen in complete agreement I answered all the patient's questions spent 30 minutes with patient and well over half time in face-to-face discussion of further evaluation and therapy          Waldo Emanuel MD  5/9/2017

## 2017-05-11 RX ORDER — LISINOPRIL AND HYDROCHLOROTHIAZIDE 12.5; 1 MG/1; MG/1
1 TABLET ORAL
Qty: 30 TABLET | Refills: 5 | Status: SHIPPED | OUTPATIENT
Start: 2017-05-11 | End: 2017-10-30

## 2017-05-11 NOTE — TELEPHONE ENCOUNTER
From: Lavelle Polk  To: Marquis Robert MD  Sent: 5/11/2017 10:15 AM CDT  Subject: Medication Renewal Request    Original authorizing provider: MD Lavelle Lozoya would like a refill of the following medications:  Lisinopril-Hydrochlorothiazid

## 2017-05-18 RX ORDER — FINASTERIDE 5 MG/1
TABLET, FILM COATED ORAL
Qty: 30 TABLET | Refills: 5 | Status: SHIPPED | OUTPATIENT
Start: 2017-05-18 | End: 2017-11-05

## 2017-05-19 RX ORDER — FUROSEMIDE 40 MG/1
TABLET ORAL
Qty: 30 TABLET | Refills: 5 | Status: SHIPPED | OUTPATIENT
Start: 2017-05-19 | End: 2017-11-05

## 2017-05-19 NOTE — TELEPHONE ENCOUNTER
From: Nayeli Burch  To: Herman Pruett MD  Sent: 5/18/2017 11:59 PM CDT  Subject: Medication Renewal Request    Original authorizing provider: MD Nayeli Magaña would like a refill of the following medications:  furosemide 40 MG Oral Tab [Ma

## 2017-07-21 RX ORDER — SIMVASTATIN 20 MG
20 TABLET ORAL NIGHTLY
Qty: 90 TABLET | Refills: 0 | Status: SHIPPED | OUTPATIENT
Start: 2017-07-21 | End: 2017-10-20

## 2017-10-20 DIAGNOSIS — I10 ESSENTIAL HYPERTENSION, BENIGN: Primary | ICD-10-CM

## 2017-10-20 DIAGNOSIS — E78.00 PURE HYPERCHOLESTEROLEMIA: ICD-10-CM

## 2017-10-20 RX ORDER — SIMVASTATIN 20 MG
TABLET ORAL
Qty: 90 TABLET | Refills: 0 | Status: SHIPPED | OUTPATIENT
Start: 2017-10-20 | End: 2018-01-15

## 2017-10-27 ENCOUNTER — TELEPHONE (OUTPATIENT)
Dept: NEPHROLOGY | Facility: CLINIC | Age: 77
End: 2017-10-27

## 2017-10-27 ENCOUNTER — HOSPITAL ENCOUNTER (OUTPATIENT)
Age: 77
Discharge: HOME | End: 2017-10-27
Attending: PEDIATRICS
Payer: MEDICARE

## 2017-10-27 VITALS
BODY MASS INDEX: 37.03 KG/M2 | RESPIRATION RATE: 16 BRPM | OXYGEN SATURATION: 95 % | HEIGHT: 69 IN | TEMPERATURE: 98 F | HEART RATE: 83 BPM | SYSTOLIC BLOOD PRESSURE: 133 MMHG | WEIGHT: 250 LBS | DIASTOLIC BLOOD PRESSURE: 80 MMHG

## 2017-10-27 DIAGNOSIS — L03.115 CELLULITIS OF RIGHT FOOT: Primary | ICD-10-CM

## 2017-10-27 DIAGNOSIS — I87.2 VENOUS STASIS DERMATITIS OF BOTH LOWER EXTREMITIES: ICD-10-CM

## 2017-10-27 PROCEDURE — 99214 OFFICE O/P EST MOD 30 MIN: CPT

## 2017-10-27 PROCEDURE — 99213 OFFICE O/P EST LOW 20 MIN: CPT

## 2017-10-27 RX ORDER — CLINDAMYCIN HYDROCHLORIDE 300 MG/1
300 CAPSULE ORAL 3 TIMES DAILY
Qty: 30 CAPSULE | Refills: 0 | Status: SHIPPED | OUTPATIENT
Start: 2017-10-27 | End: 2017-11-06

## 2017-10-27 NOTE — ED PROVIDER NOTES
Patient Seen in: [facility] In [address] [place] [address]    History   Patient presents with:  Cellulitis (integumentary, infectious)    Stated Complaint: Rt Foot Swelling    HPI    HPI: Fadi Summers is a 76year old male who presents with redness and Father      Per NG: pneumonia ( cause of death)       Smoking status: Former Smoker                                                              Packs/day: 1.50      Years: 20.00        Quit date: 6/1/1977  Smokeless tobacco: Never Used Shola Feliciano, [address] [place] [address]  [phone]    Schedule an appointment as soon as possible for a visit in 3 days      Roselyn Hey, Traci Angelucci, JOHNNY  [address]  [address] [place] [address]  [phone]    Schedule an appointmen

## 2017-10-27 NOTE — ED INITIAL ASSESSMENT (HPI)
Does own toenails and now right foot red swollen painful. Thick un evenly cut toenails with callous on toes and feel large bunion 1st mt region. Discoloration of skin up to mid shin areaUnMobile Digital Media. Faint pedal pulse  Right lower extrem. Warm swollen 2+.

## 2017-10-27 NOTE — TELEPHONE ENCOUNTER
See if Dr. Scott Bo will see pt. Was in ER with infection. Otherwise can refer to wherever his insurance will cover.

## 2017-10-27 NOTE — TELEPHONE ENCOUNTER
Will anyone from Podiatry be able to see patient? If so can you call him to schedul an appointment? Thanks.

## 2017-10-27 NOTE — ED NOTES
Encouraged to see podiatrist for toe nail trims and foot care.  Elevate foot, fill and finish po meds call your pcp and make appointment for follow up care in 3 days go to the emergency department for increased swelling pain redness fever open sore new or w

## 2017-10-30 RX ORDER — LISINOPRIL AND HYDROCHLOROTHIAZIDE 12.5; 1 MG/1; MG/1
TABLET ORAL
Qty: 30 TABLET | Refills: 2 | Status: SHIPPED | OUTPATIENT
Start: 2017-10-30 | End: 2018-01-25

## 2017-11-06 ENCOUNTER — OFFICE VISIT (OUTPATIENT)
Dept: SURGERY | Facility: CLINIC | Age: 77
End: 2017-11-06

## 2017-11-06 ENCOUNTER — APPOINTMENT (OUTPATIENT)
Dept: LAB | Facility: HOSPITAL | Age: 77
End: 2017-11-06
Attending: UROLOGY
Payer: MEDICARE

## 2017-11-06 VITALS
WEIGHT: 250 LBS | HEIGHT: 69 IN | BODY MASS INDEX: 37.03 KG/M2 | DIASTOLIC BLOOD PRESSURE: 68 MMHG | TEMPERATURE: 98 F | SYSTOLIC BLOOD PRESSURE: 130 MMHG

## 2017-11-06 DIAGNOSIS — R97.20 ELEVATED PSA: ICD-10-CM

## 2017-11-06 DIAGNOSIS — R97.20 ELEVATED PSA: Primary | ICD-10-CM

## 2017-11-06 PROCEDURE — 84153 ASSAY OF PSA TOTAL: CPT

## 2017-11-06 PROCEDURE — 36415 COLL VENOUS BLD VENIPUNCTURE: CPT

## 2017-11-06 PROCEDURE — 99214 OFFICE O/P EST MOD 30 MIN: CPT | Performed by: UROLOGY

## 2017-11-06 PROCEDURE — G0463 HOSPITAL OUTPT CLINIC VISIT: HCPCS | Performed by: UROLOGY

## 2017-11-06 RX ORDER — FINASTERIDE 5 MG/1
TABLET, FILM COATED ORAL
Qty: 30 TABLET | Refills: 0 | Status: SHIPPED | OUTPATIENT
Start: 2017-11-06 | End: 2018-01-22

## 2017-11-06 RX ORDER — FUROSEMIDE 40 MG/1
TABLET ORAL
Qty: 30 TABLET | Refills: 0 | Status: SHIPPED | OUTPATIENT
Start: 2017-11-06 | End: 2017-12-30

## 2017-11-06 NOTE — PROGRESS NOTES
[address] Patient Status:  Outpatient    3/6/1940 MRN [id number]   Location [address] [place] [address] Attending Tylor Ross.   [address] Day # 0 PCP Parminder Ritchie MD       James Hardy is a 76year old mal PAIN) 650 MG Oral Tab CR Take  by mouth.  Disp:  Rfl:        LISINOPRIL-HYDROCHLOROTHIAZIDE 10-12.5 MG Oral Tab TAKE ONE TABLET BY MOUTH ONE TIME DAILY  Disp: 30 tablet Rfl: 2   METOPROLOL TARTRATE 25 MG Oral Tab TAKE 1 TABLET BY MOUTH TWO TIMES A DAY Disp: age of [de-identified] and then stop PSAs after that which would mean probably one more PSA in 6 months and then stop      ASSESSMENT AND PLAN:  Case summary: Patient is a 64-year-old white male we are following on a routine six-month basis for BPH with obstruction ino discussion of further evaluation and therapy. As always patient will follow-up in 6 months          Waldo Shaw MD  11/6/2017

## 2017-11-20 ENCOUNTER — LAB ENCOUNTER (OUTPATIENT)
Dept: LAB | Age: 77
End: 2017-11-20
Attending: INTERNAL MEDICINE
Payer: MEDICARE

## 2017-11-20 DIAGNOSIS — E78.00 PURE HYPERCHOLESTEROLEMIA: ICD-10-CM

## 2017-11-20 DIAGNOSIS — I10 ESSENTIAL HYPERTENSION, BENIGN: ICD-10-CM

## 2017-11-20 PROCEDURE — 36415 COLL VENOUS BLD VENIPUNCTURE: CPT

## 2017-11-20 PROCEDURE — 80061 LIPID PANEL: CPT

## 2017-11-20 PROCEDURE — 85025 COMPLETE CBC W/AUTO DIFF WBC: CPT

## 2017-11-20 PROCEDURE — 80053 COMPREHEN METABOLIC PANEL: CPT

## 2017-11-20 PROCEDURE — 81001 URINALYSIS AUTO W/SCOPE: CPT

## 2017-11-22 ENCOUNTER — TELEPHONE (OUTPATIENT)
Dept: NEPHROLOGY | Facility: CLINIC | Age: 77
End: 2017-11-22

## 2017-11-22 ENCOUNTER — APPOINTMENT (OUTPATIENT)
Dept: LAB | Age: 77
End: 2017-11-22
Attending: INTERNAL MEDICINE
Payer: MEDICARE

## 2017-11-22 DIAGNOSIS — R82.90 ABNORMAL URINALYSIS: ICD-10-CM

## 2017-11-22 DIAGNOSIS — R82.90 ABNORMAL URINALYSIS: Primary | ICD-10-CM

## 2017-11-22 PROCEDURE — 87086 URINE CULTURE/COLONY COUNT: CPT

## 2017-11-22 PROCEDURE — 87077 CULTURE AEROBIC IDENTIFY: CPT

## 2017-11-22 PROCEDURE — 87186 SC STD MICRODIL/AGAR DIL: CPT

## 2017-11-22 NOTE — TELEPHONE ENCOUNTER
Spoke with Bhargavi Chase and informed him of below results. He denies symptoms but agrees to return to lab for culture. Order placed and he will follow up as scheduled.

## 2017-11-26 ENCOUNTER — TELEPHONE (OUTPATIENT)
Dept: NEPHROLOGY | Facility: CLINIC | Age: 77
End: 2017-11-26

## 2017-11-27 RX ORDER — NITROFURANTOIN 25; 75 MG/1; MG/1
100 CAPSULE ORAL 2 TIMES DAILY
Qty: 14 CAPSULE | Refills: 0 | Status: SHIPPED | OUTPATIENT
Start: 2017-11-27 | End: 2017-12-04

## 2017-11-27 NOTE — TELEPHONE ENCOUNTER
Contacted pt. Notified him his urine was positive for a UTI. Advised him [facility] will start him on antibiotic to take twice daily for 7 days. Advised him to call office if symptoms do not resolve.  He stated understanding and also has a regular follow up schedu

## 2017-11-28 ENCOUNTER — OFFICE VISIT (OUTPATIENT)
Dept: NEPHROLOGY | Facility: CLINIC | Age: 77
End: 2017-11-28

## 2017-11-28 VITALS
HEIGHT: 69 IN | SYSTOLIC BLOOD PRESSURE: 126 MMHG | HEART RATE: 60 BPM | DIASTOLIC BLOOD PRESSURE: 70 MMHG | BODY MASS INDEX: 38.18 KG/M2 | WEIGHT: 257.81 LBS

## 2017-11-28 DIAGNOSIS — N30.00 ACUTE CYSTITIS WITHOUT HEMATURIA: ICD-10-CM

## 2017-11-28 DIAGNOSIS — E78.00 HYPERCHOLESTEROLEMIA: ICD-10-CM

## 2017-11-28 DIAGNOSIS — I10 ESSENTIAL HYPERTENSION: Primary | ICD-10-CM

## 2017-11-28 PROCEDURE — 99215 OFFICE O/P EST HI 40 MIN: CPT | Performed by: INTERNAL MEDICINE

## 2017-11-28 PROCEDURE — G0463 HOSPITAL OUTPT CLINIC VISIT: HCPCS | Performed by: INTERNAL MEDICINE

## 2017-11-28 RX ORDER — NABUMETONE 750 MG/1
TABLET, FILM COATED ORAL
Refills: 0 | COMMUNITY
Start: 2017-10-31 | End: 2021-08-02 | Stop reason: ALTCHOICE

## 2017-11-30 NOTE — PATIENT INSTRUCTIONS
Let me know if you urinary tract symptoms do not resolve completely. Let me know if you think you are having another attack of gout.

## 2017-11-30 NOTE — PROGRESS NOTES
[facility]  Nephrology Daily Progress Note    Chasity Stanley  [id number]  76year old  Patient presents with:  UTI: follow      HPI:   Chasity Stanley is a 76year old male.   77-year-old male with a history of hypertension, hypercholesterolemia, borderline blo bruising  Integumentary:  Negative for pruritus and rash  Musculoskeletal:  Negative for bone/joint symptoms  Neurological:  Negative for gait disturbance  Psychiatric:  Negative for inappropriate interaction and psychiatric symptoms  Respiratory:  Negativ 4.1   CL 98   CO2 33*   BUN 21*   CREATSERUM 0.78   CA 9.1   ALB 3.7   BUNCREA 26.9*   ANIONGAP 7   OSMOCALC 289   GFRNAA >60   GFRAA >60       Imaging        Medications:    Current Outpatient Prescriptions:   •  Nitrofurantoin Monohyd Macro 100 MG Oral C Other labs on November 20, 2017 were acceptable. Fasting blood sugar 100. Lipid was under adequate control. Last PSA was 0.6. Patient will let me know if urinary tract symptoms do not resolve completely. Hopefully he will lose some weight.   Patient ve

## 2018-01-02 RX ORDER — FUROSEMIDE 40 MG/1
TABLET ORAL
Qty: 30 TABLET | Refills: 0 | OUTPATIENT
Start: 2018-01-02

## 2018-01-02 RX ORDER — FUROSEMIDE 40 MG/1
TABLET ORAL
Qty: 30 TABLET | Refills: 5 | Status: SHIPPED | OUTPATIENT
Start: 2018-01-02 | End: 2018-01-22

## 2018-01-02 NOTE — TELEPHONE ENCOUNTER
From: Vickie Carpenter  Sent: 12/30/2017 12:11 PM CST  Subject: Medication Renewal Request    Vickie Carpenter would like a refill of the following medications:     FUROSEMIDE 40 MG Oral Tab Yas Ruggiero MD]    Preferred pharmacy: [address] [place] [address]/[place], [place] -

## 2018-01-15 RX ORDER — SIMVASTATIN 20 MG
TABLET ORAL
Qty: 90 TABLET | Refills: 1 | Status: SHIPPED | OUTPATIENT
Start: 2018-01-15 | End: 2018-07-11

## 2018-01-22 RX ORDER — FINASTERIDE 5 MG/1
5 TABLET, FILM COATED ORAL
Qty: 30 TABLET | Refills: 5 | Status: SHIPPED | OUTPATIENT
Start: 2018-01-22 | End: 2018-07-03

## 2018-01-22 RX ORDER — FUROSEMIDE 40 MG/1
TABLET ORAL
Qty: 30 TABLET | Refills: 5 | Status: SHIPPED | OUTPATIENT
Start: 2018-01-22 | End: 2018-12-13

## 2018-01-22 NOTE — TELEPHONE ENCOUNTER
From: Michelle Siu  Sent: 1/22/2018 10:17 AM CST  Subject: Medication Renewal Request    Michelle Siu would like a refill of the following medications:     FINASTERIDE 5 MG Oral Tab Aaron Alcantara MD]     furosemide 40 MG Oral Tab Aaron Alcantara MD]    Pre

## 2018-01-25 RX ORDER — LISINOPRIL AND HYDROCHLOROTHIAZIDE 12.5; 1 MG/1; MG/1
TABLET ORAL
Qty: 30 TABLET | Refills: 5 | Status: SHIPPED | OUTPATIENT
Start: 2018-01-25 | End: 2018-07-19

## 2018-02-05 NOTE — PROGRESS NOTES
[facility] dosing service    Initial Pharmacokinetic Consult for Vancomycin Dosing     Felicia Thompson is a 76year old male admitted on 3/16/2017 who is being treated for cellulitis. Pharmacy has been asked to dose Vancomycin by Dr. Rickey Calloway.     He is aller Spot Size: 2 x 2 cm Fluence Units: J/cm2 Fluence #2 (J/Cm2 Or Mj/Cm2): 500 Post-Care Instructions: I reviewed with the patient in detail post-care instructions. Patient should stay away from the sun and wear sun protection until treated areas are fully healed. Mode: MED Location #1: Bilateral Arms/elbows/nails Location #3: Scalp Total Square Area In Cm2 (Required For Proper Billing- Whole Numbers Only Please): 360 Detail Level: Generalized Location #4: Back/Side Consent: Written consent obtained, risks reviewed including but not limited to crusting, scabbing, blistering, scarring, darker or lighter pigmentary change, incidental hair removal, bruising, and/or incomplete removal. Total Pulses (Optional): 183 Device Serial Number (Optional): [id number] Treatment Number: 11 Location #2: Bilateral knees/feet/toes

## 2018-02-23 ENCOUNTER — TELEPHONE (OUTPATIENT)
Dept: NEPHROLOGY | Facility: CLINIC | Age: 78
End: 2018-02-23

## 2018-02-23 NOTE — TELEPHONE ENCOUNTER
Pt wants to know how does he go about having  fill out forms for placard without making an appointment. Going to be expiring soon pt states he has about 4 weeks to get this done.  Please call thank you

## 2018-02-23 NOTE — TELEPHONE ENCOUNTER
Patient contacted. He will drop off the form. He has completed his portion and is asking when completed if we will mail it to the [employer]. Advised we can do this.

## 2018-03-02 ENCOUNTER — TELEPHONE (OUTPATIENT)
Dept: NEPHROLOGY | Facility: CLINIC | Age: 78
End: 2018-03-02

## 2018-05-07 ENCOUNTER — OFFICE VISIT (OUTPATIENT)
Dept: SURGERY | Facility: CLINIC | Age: 78
End: 2018-05-07

## 2018-05-07 ENCOUNTER — APPOINTMENT (OUTPATIENT)
Dept: LAB | Facility: HOSPITAL | Age: 78
End: 2018-05-07
Attending: UROLOGY
Payer: MEDICARE

## 2018-05-07 VITALS
BODY MASS INDEX: 39.4 KG/M2 | WEIGHT: 260 LBS | SYSTOLIC BLOOD PRESSURE: 118 MMHG | DIASTOLIC BLOOD PRESSURE: 62 MMHG | TEMPERATURE: 98 F | HEIGHT: 68 IN

## 2018-05-07 DIAGNOSIS — N30.00 ACUTE CYSTITIS WITHOUT HEMATURIA: ICD-10-CM

## 2018-05-07 DIAGNOSIS — R97.20 ELEVATED PSA: ICD-10-CM

## 2018-05-07 DIAGNOSIS — R97.20 ELEVATED PSA: Primary | ICD-10-CM

## 2018-05-07 PROCEDURE — G0463 HOSPITAL OUTPT CLINIC VISIT: HCPCS | Performed by: UROLOGY

## 2018-05-07 PROCEDURE — 84153 ASSAY OF PSA TOTAL: CPT

## 2018-05-07 PROCEDURE — 36415 COLL VENOUS BLD VENIPUNCTURE: CPT

## 2018-05-07 PROCEDURE — 87186 SC STD MICRODIL/AGAR DIL: CPT

## 2018-05-07 PROCEDURE — 81001 URINALYSIS AUTO W/SCOPE: CPT

## 2018-05-07 PROCEDURE — 99214 OFFICE O/P EST MOD 30 MIN: CPT | Performed by: UROLOGY

## 2018-05-07 PROCEDURE — 87086 URINE CULTURE/COLONY COUNT: CPT

## 2018-05-07 NOTE — PROGRESS NOTES
[address] Patient Status:  Outpatient    3/6/1940 MRN [id number]   Location [facility] Attending Isaiah Garibay.  [address] Day # 0 PCP MD Jackie Medellinynda Josestewart is a [de-identified]year old mal MOUTH TWICE DAILY  Disp: 60 tablet Rfl: 5   LISINOPRIL-HYDROCHLOROTHIAZIDE 10-12.5 MG Oral Tab TAKE ONE TABLET BY MOUTH ONE TIME DAILY  Disp: 30 tablet Rfl: 5   finasteride 5 MG Oral Tab Take 1 tablet (5 mg total) by mouth once daily.  Disp: 30 tablet Rfl: test.  Also urinalysis and culture ordered reason for this was recent positive urine culture November 22, 2017 diagnosed by primary care physician found to be Klebsiella pansensitive except for ampicillin treated with Macrobid proven sensitive no dysuria b treated without clearance and then he had Klebsiella November 2017 by primary care physician treated with [place] proven sensitive because of this we do want to do a urinalysis and culture today although he has no symptoms of UTI.   Patient is now [de-identified] I wou

## 2018-05-08 ENCOUNTER — TELEPHONE (OUTPATIENT)
Dept: SURGERY | Facility: CLINIC | Age: 78
End: 2018-05-08

## 2018-05-08 NOTE — TELEPHONE ENCOUNTER
Patient contacted. Patient is aware of his urinalysis, PSA results and verbalized understanding. Instructed patient to call tomorrow for final urine culture results. Patient agreed, and verbalized understanding.

## 2018-05-08 NOTE — TELEPHONE ENCOUNTER
----- Message from Eldon Montemayor MD sent at 5/7/2018 11:21 AM CDT -----  Please contact patient with a mildly abnormal urinalysis showing small amount of white blood cells await results of culture for further treatment if necessary

## 2018-05-09 ENCOUNTER — TELEPHONE (OUTPATIENT)
Dept: SURGERY | Facility: CLINIC | Age: 78
End: 2018-05-09

## 2018-05-09 RX ORDER — CIPROFLOXACIN 500 MG/1
500 TABLET, FILM COATED ORAL 2 TIMES DAILY
Qty: 20 TABLET | Refills: 0 | Status: SHIPPED | OUTPATIENT
Start: 2018-05-09 | End: 2018-08-31 | Stop reason: ALTCHOICE

## 2018-05-09 NOTE — TELEPHONE ENCOUNTER
Patient contacted. Patient is aware of his urine culture result, Dr. Edvin Bowman message/orders below and verbalized understanding.   Medication sent to patient's [place].

## 2018-05-09 NOTE — TELEPHONE ENCOUNTER
----- Message from Chani Rodriguez MD sent at 5/9/2018  7:07 AM CDT -----  Please contact patient with positive urine culture result.   Appropriate treatment would be Cipro 500 mg 1 p.o. twice daily #20, one refill this prescription can be called into [facility]

## 2018-07-03 DIAGNOSIS — E78.00 PURE HYPERCHOLESTEROLEMIA: Primary | ICD-10-CM

## 2018-07-03 RX ORDER — FINASTERIDE 5 MG/1
5 TABLET, FILM COATED ORAL DAILY
Qty: 30 TABLET | Refills: 0 | Status: SHIPPED | OUTPATIENT
Start: 2018-07-03 | End: 2018-08-22

## 2018-07-11 RX ORDER — SIMVASTATIN 20 MG
TABLET ORAL
Qty: 90 TABLET | Refills: 0 | Status: SHIPPED | OUTPATIENT
Start: 2018-07-11 | End: 2018-09-18

## 2018-07-11 NOTE — TELEPHONE ENCOUNTER
LOV 11/28/17. Last lipid panel was done on 11/20/17. No upcoming appointment. Refill pended and routed to Dr. Ellis Dan to approve. Missed order letter mailed to patient.

## 2018-07-19 RX ORDER — LISINOPRIL AND HYDROCHLOROTHIAZIDE 12.5; 1 MG/1; MG/1
1 TABLET ORAL DAILY
Qty: 30 TABLET | Refills: 0 | Status: SHIPPED | OUTPATIENT
Start: 2018-07-19 | End: 2018-09-18

## 2018-07-19 NOTE — TELEPHONE ENCOUNTER
Contacted pt. He states he's had a lot going on and just hasn't had a chance to get to the lab yet. He will try to go next week and then will schedule appt with MKK.

## 2018-07-19 NOTE — TELEPHONE ENCOUNTER
LOV 11/28/17. No upcoming appointment has been scheduled. Refills pended and routed to Dr. Mirlande Smith to approve.

## 2018-08-01 ENCOUNTER — APPOINTMENT (OUTPATIENT)
Dept: LAB | Age: 78
End: 2018-08-01
Attending: INTERNAL MEDICINE
Payer: MEDICARE

## 2018-08-01 DIAGNOSIS — E78.00 PURE HYPERCHOLESTEROLEMIA: ICD-10-CM

## 2018-08-01 LAB
ALBUMIN SERPL BCP-MCNC: 3.8 G/DL (ref 3.5–4.8)
ALBUMIN/GLOB SERPL: 1.4 {RATIO} (ref 1–2)
ALP SERPL-CCNC: 56 U/L (ref 32–100)
ALT SERPL-CCNC: 14 U/L (ref 17–63)
ANION GAP SERPL CALC-SCNC: 7 MMOL/L (ref 0–18)
AST SERPL-CCNC: 24 U/L (ref 15–41)
BILIRUB SERPL-MCNC: 0.7 MG/DL (ref 0.3–1.2)
BUN SERPL-MCNC: 16 MG/DL (ref 8–20)
BUN/CREAT SERPL: 21.6 (ref 10–20)
CALCIUM SERPL-MCNC: 9.1 MG/DL (ref 8.5–10.5)
CHLORIDE SERPL-SCNC: 100 MMOL/L (ref 95–110)
CHOLEST SERPL-MCNC: 136 MG/DL (ref 110–200)
CO2 SERPL-SCNC: 30 MMOL/L (ref 22–32)
CREAT SERPL-MCNC: 0.74 MG/DL (ref 0.5–1.5)
GLOBULIN PLAS-MCNC: 2.8 G/DL (ref 2.5–3.7)
GLUCOSE SERPL-MCNC: 110 MG/DL (ref 70–99)
HDLC SERPL-MCNC: 33 MG/DL
LDLC SERPL CALC-MCNC: 49 MG/DL (ref 0–99)
NONHDLC SERPL-MCNC: 103 MG/DL
OSMOLALITY UR CALC.SUM OF ELEC: 286 MOSM/KG (ref 275–295)
PATIENT FASTING: YES
POTASSIUM SERPL-SCNC: 3.9 MMOL/L (ref 3.3–5.1)
PROT SERPL-MCNC: 6.6 G/DL (ref 5.9–8.4)
SODIUM SERPL-SCNC: 137 MMOL/L (ref 136–144)
TRIGL SERPL-MCNC: 269 MG/DL (ref 1–149)

## 2018-08-01 PROCEDURE — 36415 COLL VENOUS BLD VENIPUNCTURE: CPT

## 2018-08-01 PROCEDURE — 80061 LIPID PANEL: CPT

## 2018-08-01 PROCEDURE — 80053 COMPREHEN METABOLIC PANEL: CPT

## 2018-08-22 RX ORDER — FINASTERIDE 5 MG/1
TABLET, FILM COATED ORAL
Qty: 30 TABLET | Refills: 0 | Status: SHIPPED | OUTPATIENT
Start: 2018-08-22 | End: 2018-09-19

## 2018-08-22 NOTE — TELEPHONE ENCOUNTER
LOV 11/28/17. Follow up appointment with Dr. Ward Todd is scheduled for 8/31/18. Refill pended for 1 month and routed to Dr. Ward Todd.

## 2018-08-31 ENCOUNTER — OFFICE VISIT (OUTPATIENT)
Dept: NEPHROLOGY | Facility: CLINIC | Age: 78
End: 2018-08-31
Payer: MEDICARE

## 2018-08-31 VITALS
BODY MASS INDEX: 41.2 KG/M2 | HEIGHT: 69 IN | HEART RATE: 65 BPM | SYSTOLIC BLOOD PRESSURE: 111 MMHG | DIASTOLIC BLOOD PRESSURE: 67 MMHG | WEIGHT: 278.19 LBS

## 2018-08-31 DIAGNOSIS — E78.00 HYPERCHOLESTEROLEMIA: ICD-10-CM

## 2018-08-31 DIAGNOSIS — M17.0 PRIMARY OSTEOARTHRITIS OF BOTH KNEES: ICD-10-CM

## 2018-08-31 DIAGNOSIS — I10 ESSENTIAL HYPERTENSION: Primary | ICD-10-CM

## 2018-08-31 PROCEDURE — 99214 OFFICE O/P EST MOD 30 MIN: CPT | Performed by: INTERNAL MEDICINE

## 2018-08-31 PROCEDURE — G0463 HOSPITAL OUTPT CLINIC VISIT: HCPCS | Performed by: INTERNAL MEDICINE

## 2018-09-01 NOTE — PROGRESS NOTES
[facility]  Nephrology Daily Progress Note    Sy Marshall  [id number]  [de-identified]year old  Patient presents with:  Hypertension: follow up      HPI:   Sy Marshall is a [de-identified]year old male.   57-year-old male with a history of hypertension, hypercholesterolemia, slade -       VITALS: WEIGHT ONLY 11/28/2017 5/7/2018 8/31/2018   Weight 257 lbs 13 oz 260 lbs 278 lbs 3 oz       Constitutional: appears well hydrated alert and responsive no acute distress noted  Neck/Thyroid: neck is supple without adenopathy  Lymphatic: no a tablet, Rfl: 5  •  Acetaminophen ER (TYLENOL ARTHRITIS PAIN) 650 MG Oral Tab CR, Take  by mouth., Disp: , Rfl:   •  Nabumetone 750 MG Oral Tab, , Disp: , Rfl: 0  •  betamethasone valerate 0.1 % External Cream, Apply 1 Application topically 2 (two) times da

## 2018-09-18 RX ORDER — SIMVASTATIN 20 MG
TABLET ORAL
Qty: 90 TABLET | Refills: 1 | Status: SHIPPED | OUTPATIENT
Start: 2018-09-18 | End: 2019-03-18

## 2018-09-18 RX ORDER — LISINOPRIL AND HYDROCHLOROTHIAZIDE 12.5; 1 MG/1; MG/1
1 TABLET ORAL DAILY
Qty: 90 TABLET | Refills: 1 | Status: SHIPPED | OUTPATIENT
Start: 2018-09-18 | End: 2019-03-05

## 2018-09-19 RX ORDER — FINASTERIDE 5 MG/1
TABLET, FILM COATED ORAL
Qty: 30 TABLET | Refills: 5 | Status: SHIPPED | OUTPATIENT
Start: 2018-09-19 | End: 2019-03-19

## 2018-09-19 RX ORDER — FINASTERIDE 5 MG/1
TABLET, FILM COATED ORAL
Qty: 30 TABLET | Refills: 0 | Status: SHIPPED | OUTPATIENT
Start: 2018-09-19 | End: 2018-09-19

## 2018-09-19 NOTE — TELEPHONE ENCOUNTER
LOV 8/31/18. Medication refilled per written protocol in Dr. Leon Jenkins absence from the office this week.

## 2018-12-13 RX ORDER — FUROSEMIDE 40 MG/1
TABLET ORAL
Qty: 30 TABLET | Refills: 2 | Status: SHIPPED | OUTPATIENT
Start: 2018-12-13 | End: 2019-03-12

## 2019-02-15 ENCOUNTER — TELEPHONE (OUTPATIENT)
Dept: NEPHROLOGY | Facility: CLINIC | Age: 79
End: 2019-02-15

## 2019-02-15 NOTE — TELEPHONE ENCOUNTER
Contacted pt and notified him of MKK's message below. Offered appt on 2/20 and 2/22 but pt has to take wife to chemo appts on these days. He requested a Tuesday. Scheduled first available Tuesday on 2/26/19.  Pt states he will go to ER if symptoms get bad o

## 2019-02-15 NOTE — TELEPHONE ENCOUNTER
Contacted pt. Stomach pain started 8-10 days ago. It comes and goes; food doesn't seem to aggravate it. Pain is located across the lower half of his abdomen. He describes the pain as an annoying ache. Denies sharp pain.  He has been using Zantac, which [place]

## 2019-02-19 ENCOUNTER — HOSPITAL ENCOUNTER (EMERGENCY)
Facility: HOSPITAL | Age: 79
Discharge: HOME | End: 2019-02-19
Attending: EMERGENCY MEDICINE
Payer: MEDICARE

## 2019-02-19 ENCOUNTER — APPOINTMENT (OUTPATIENT)
Dept: CT IMAGING | Facility: HOSPITAL | Age: 79
End: 2019-02-19
Attending: EMERGENCY MEDICINE
Payer: MEDICARE

## 2019-02-19 VITALS
WEIGHT: 260 LBS | DIASTOLIC BLOOD PRESSURE: 63 MMHG | TEMPERATURE: 99 F | HEIGHT: 69 IN | SYSTOLIC BLOOD PRESSURE: 103 MMHG | OXYGEN SATURATION: 95 % | HEART RATE: 71 BPM | BODY MASS INDEX: 38.51 KG/M2 | RESPIRATION RATE: 18 BRPM

## 2019-02-19 DIAGNOSIS — K29.80 DUODENITIS: Primary | ICD-10-CM

## 2019-02-19 LAB
ALBUMIN SERPL-MCNC: 3.9 G/DL (ref 3.4–5)
ALP LIVER SERPL-CCNC: 63 U/L (ref 45–117)
ALT SERPL-CCNC: 16 U/L (ref 16–61)
ANION GAP SERPL CALC-SCNC: 8 MMOL/L (ref 0–18)
AST SERPL-CCNC: 22 U/L (ref 15–37)
BILIRUB DIRECT SERPL-MCNC: 0.2 MG/DL (ref 0–0.2)
BILIRUB SERPL-MCNC: 0.6 MG/DL (ref 0.1–2)
BILIRUB UR QL: NEGATIVE
BUN BLD-MCNC: 16 MG/DL (ref 7–18)
BUN/CREAT SERPL: 18.6 (ref 10–20)
CALCIUM BLD-MCNC: 9.4 MG/DL (ref 8.5–10.1)
CHLORIDE SERPL-SCNC: 95 MMOL/L (ref 98–107)
CO2 SERPL-SCNC: 32 MMOL/L (ref 21–32)
COLOR UR: YELLOW
CREAT BLD-MCNC: 0.86 MG/DL (ref 0.7–1.3)
GLUCOSE BLD-MCNC: 111 MG/DL (ref 70–99)
GLUCOSE UR-MCNC: NEGATIVE MG/DL
HGB UR QL STRIP.AUTO: NEGATIVE
HYALINE CASTS #/AREA URNS AUTO: 18 /LPF
KETONES UR-MCNC: NEGATIVE MG/DL
M PROTEIN MFR SERPL ELPH: 7.8 G/DL (ref 6.4–8.2)
NITRITE UR QL STRIP.AUTO: NEGATIVE
OSMOLALITY SERPL CALC.SUM OF ELEC: 282 MOSM/KG (ref 275–295)
PH UR: 7 [PH] (ref 5–8)
POTASSIUM SERPL-SCNC: 3.1 MMOL/L (ref 3.5–5.1)
PROT UR-MCNC: 30 MG/DL
RBC #/AREA URNS AUTO: 5 /HPF
SODIUM SERPL-SCNC: 135 MMOL/L (ref 136–145)
SP GR UR STRIP: 1.02 (ref 1–1.03)
TROPONIN I SERPL-MCNC: <0.045 NG/ML (ref ?–0.04)
UROBILINOGEN UR STRIP-ACNC: <2
VIT C UR-MCNC: NEGATIVE MG/DL
WBC #/AREA URNS AUTO: 100 /HPF

## 2019-02-19 PROCEDURE — 80048 BASIC METABOLIC PNL TOTAL CA: CPT | Performed by: EMERGENCY MEDICINE

## 2019-02-19 PROCEDURE — 87086 URINE CULTURE/COLONY COUNT: CPT | Performed by: EMERGENCY MEDICINE

## 2019-02-19 PROCEDURE — 87077 CULTURE AEROBIC IDENTIFY: CPT | Performed by: EMERGENCY MEDICINE

## 2019-02-19 PROCEDURE — 74177 CT ABD & PELVIS W/CONTRAST: CPT | Performed by: EMERGENCY MEDICINE

## 2019-02-19 PROCEDURE — 99285 EMERGENCY DEPT VISIT HI MDM: CPT

## 2019-02-19 PROCEDURE — 80076 HEPATIC FUNCTION PANEL: CPT | Performed by: EMERGENCY MEDICINE

## 2019-02-19 PROCEDURE — 84484 ASSAY OF TROPONIN QUANT: CPT | Performed by: EMERGENCY MEDICINE

## 2019-02-19 PROCEDURE — 93005 ELECTROCARDIOGRAM TRACING: CPT

## 2019-02-19 PROCEDURE — 93010 ELECTROCARDIOGRAM REPORT: CPT | Performed by: EMERGENCY MEDICINE

## 2019-02-19 PROCEDURE — 87186 SC STD MICRODIL/AGAR DIL: CPT | Performed by: EMERGENCY MEDICINE

## 2019-02-19 PROCEDURE — 85060 BLOOD SMEAR INTERPRETATION: CPT | Performed by: EMERGENCY MEDICINE

## 2019-02-19 PROCEDURE — 81001 URINALYSIS AUTO W/SCOPE: CPT | Performed by: EMERGENCY MEDICINE

## 2019-02-19 PROCEDURE — 85025 COMPLETE CBC W/AUTO DIFF WBC: CPT | Performed by: EMERGENCY MEDICINE

## 2019-02-19 PROCEDURE — 36415 COLL VENOUS BLD VENIPUNCTURE: CPT

## 2019-02-19 RX ORDER — OMEPRAZOLE 40 MG/1
40 CAPSULE, DELAYED RELEASE ORAL DAILY
Qty: 30 CAPSULE | Refills: 0 | Status: SHIPPED | OUTPATIENT
Start: 2019-02-19 | End: 2019-03-21

## 2019-02-19 RX ORDER — SUCRALFATE 1 G/1
1 TABLET ORAL
Qty: 40 TABLET | Refills: 0 | Status: SHIPPED | OUTPATIENT
Start: 2019-02-19 | End: 2019-03-01

## 2019-02-20 LAB
BASOPHILS # BLD AUTO: 0.13 X10(3) UL (ref 0–0.2)
BASOPHILS NFR BLD AUTO: 1.1 %
DEPRECATED RDW RBC AUTO: 34.6 FL (ref 35.1–46.3)
EOSINOPHIL # BLD AUTO: 0.14 X10(3) UL (ref 0–0.7)
EOSINOPHIL NFR BLD AUTO: 1.2 %
ERYTHROCYTE [DISTWIDTH] IN BLOOD BY AUTOMATED COUNT: 19.5 % (ref 11–15)
HCT VFR BLD AUTO: 46.7 % (ref 39–53)
HGB BLD-MCNC: 14.4 G/DL (ref 13–17.5)
IMM GRANULOCYTES # BLD AUTO: 0.05 X10(3) UL (ref 0–1)
IMM GRANULOCYTES NFR BLD: 0.4 %
LYMPHOCYTES # BLD AUTO: 2.78 X10(3) UL (ref 1–4)
LYMPHOCYTES NFR BLD AUTO: 24.6 %
MCH RBC QN AUTO: 18.7 PG (ref 26–34)
MCHC RBC AUTO-ENTMCNC: 30.8 G/DL (ref 31–37)
MCV RBC AUTO: 60.6 FL (ref 80–100)
MONOCYTES # BLD AUTO: 1.08 X10(3) UL (ref 0.1–1)
MONOCYTES NFR BLD AUTO: 9.5 %
NEUTROPHILS # BLD AUTO: 7.14 X10 (3) UL (ref 1.5–7.7)
NEUTROPHILS # BLD AUTO: 7.14 X10(3) UL (ref 1.5–7.7)
NEUTROPHILS NFR BLD AUTO: 63.2 %
PLATELET # BLD AUTO: 243 10(3)UL (ref 150–450)
PLATELET MORPHOLOGY: NORMAL
RBC # BLD AUTO: 7.7 X10(6)UL (ref 3.8–5.8)
WBC # BLD AUTO: 11.3 X10(3) UL (ref 4–11)

## 2019-02-20 NOTE — ED PROVIDER NOTES
Patient Seen in: [facility] AND [facility] Emergency Department    History   Patient presents with:  Abdomen/Flank Pain (GI/)    Stated Complaint: stomach pain x10 days    HPI    Patient complains of bilateral lower abdominal pain that began 10 days ago.   Pain 41.7      Smokeless tobacco: Never Used    Alcohol use:  Yes      Alcohol/week: 0.5 oz      Types: 1 Cans of beer per week      Comment: occasional    Drug use: No      Review of Systems    Positive for stated complaint: stomach pain x10 days  Other systems REFLEX - Abnormal; Notable for the following components:    Clarity Urine Hazy (*)     Protein Urine 30  (*)     Leukocyte Esterase Urine Large (*)     Hyaline Casts 18 (*)     WBC Urine 100 (*)     RBC URINE 5 (*)     Bacteria Urine Few (*)     All other on 2/19/2019 at 21:16          Ct Abdomen+pelvis(contrast Only)(cpt=74177)    Result Date: 2/19/2019  CONCLUSION:  Mild inflammation within the second portion of the duodenum May relate to duodenitis, duodenal diverticulitis or mild groove pancreatitis.   D

## 2019-02-21 ENCOUNTER — TELEPHONE (OUTPATIENT)
Dept: GASTROENTEROLOGY | Facility: CLINIC | Age: 79
End: 2019-02-21

## 2019-02-21 NOTE — TELEPHONE ENCOUNTER
We will await Ann's evaluation. The patient's last colonoscopy was in April 2015 with removal of #2 subcentimeter tubular adenomas.

## 2019-02-21 NOTE — TELEPHONE ENCOUNTER
Patient currently 2/10, denies having any current rectal bleeding but did notice blood from hemorrhoid a week ago when he was straining during BM. Last BM was 2 days ago, stool was formed pt didn't strain himself and no rectal bleeding.   No N/V,  fever, c

## 2019-02-21 NOTE — H&P
[address] [facility] [address] Gastroenterology                                                                                                  Clinic History and Physical     Pa disease approximately 30 years ago that was successfully treated.    + Long-standing history of constipation with bowel movements every 2-3 days. Occasional bright red blood on the toilet tissue thought to be related to hemorrhoids.       No anorectal pain (Active prior to today's visit):    Current Outpatient Medications:  Metoprolol Succinate ER 25 MG Oral Tablet 24 Hr Take 25 mg by mouth daily.  Disp:  Rfl:    Sulfamethoxazole-TMP -160 MG Oral Tab per tablet Take 1 tablet by mouth 2 (two) times daily erythema  BEHAVIOR/PSYCH:  negative for psychotic behavior      PHYSICAL EXAM:   Blood pressure 100/65, pulse 80, height 5' 9\" (1.753 m), weight 257 lb (116.6 kg).     Gen: patient appears comfortable and in no acute distress  HEENT: conjunctiva pink, the ago, no excessive NSAIDs or alcohol. Patient is afebrile with mild leukocytosis (11.3), possibly secondary to acute UTI currently being treated with Bactrim. Pancreas demonstrates no ductal dilatation with a WNL hepatic panel.   No overt mass/obstruction n consent: I have discussed the risks (including risk of delayed/missed diagnosis), benefits, and alternatives to upper endoscopy/enteroscopy with the patient [who demonstrated understanding], including but not limited to the risks of bleeding, infection, pa

## 2019-02-22 ENCOUNTER — TELEPHONE (OUTPATIENT)
Dept: NEPHROLOGY | Facility: CLINIC | Age: 79
End: 2019-02-22

## 2019-02-26 ENCOUNTER — OFFICE VISIT (OUTPATIENT)
Dept: NEPHROLOGY | Facility: CLINIC | Age: 79
End: 2019-02-26
Payer: MEDICARE

## 2019-02-26 VITALS — WEIGHT: 257.81 LBS | BODY MASS INDEX: 38.18 KG/M2 | HEIGHT: 69 IN

## 2019-02-26 DIAGNOSIS — I10 ESSENTIAL HYPERTENSION: Primary | ICD-10-CM

## 2019-02-26 DIAGNOSIS — E78.00 HYPERCHOLESTEROLEMIA: ICD-10-CM

## 2019-02-26 DIAGNOSIS — R10.10 PAIN OF UPPER ABDOMEN: ICD-10-CM

## 2019-02-26 PROCEDURE — 99215 OFFICE O/P EST HI 40 MIN: CPT | Performed by: INTERNAL MEDICINE

## 2019-02-26 PROCEDURE — G0463 HOSPITAL OUTPT CLINIC VISIT: HCPCS | Performed by: INTERNAL MEDICINE

## 2019-02-26 RX ORDER — SULFAMETHOXAZOLE AND TRIMETHOPRIM 800; 160 MG/1; MG/1
1 TABLET ORAL 2 TIMES DAILY
Refills: 0 | COMMUNITY
Start: 2019-02-22 | End: 2019-03-05 | Stop reason: ALTCHOICE

## 2019-02-27 NOTE — PROGRESS NOTES
[address] [facility]  Nephrology Daily Progress Note    Chasity Stanley  [id number]  [de-identified]year old  Patient presents with:  ER F/U      HPI:   Chasity Stanley is a [de-identified]year old male.   26-year-old male with a history of hypertension, hypercholesterolemia, borderline blood hermosillo hematuria  Hema/Lymph:  Negative for easy bleeding and easy bruising  Integumentary:  Negative for pruritus and rash  Musculoskeletal:  Negative for bone/joint symptoms  Neurological:  Negative for gait disturbance  Psychiatric:  Negative for inappropriate 111*   NA  135*   K  3.1*   CL  95*   CO2  32.0   BUN  16   CREATSERUM  0.86   CA  9.4   ALB  3.9   BUNCREA  18.6   ANIONGAP  8   OSMOCALC  282   GFRNAA  82   GFRAA  95       Imaging        Medications:    Current Outpatient Medications:   •  Sulfamethoxaz pressure today was actually low. He was really not symptomatic. Repeat was 100/60. He has lost weight and states he has been trying to do. This may account for why his blood pressure has improved. We will therefore discontinue metoprolol.   Check blood

## 2019-02-28 ENCOUNTER — OFFICE VISIT (OUTPATIENT)
Dept: GASTROENTEROLOGY | Facility: CLINIC | Age: 79
End: 2019-02-28
Payer: MEDICARE

## 2019-02-28 VITALS
DIASTOLIC BLOOD PRESSURE: 65 MMHG | SYSTOLIC BLOOD PRESSURE: 100 MMHG | WEIGHT: 257 LBS | BODY MASS INDEX: 38.06 KG/M2 | HEART RATE: 80 BPM | HEIGHT: 69 IN

## 2019-02-28 DIAGNOSIS — R10.10 UPPER ABDOMINAL PAIN: Primary | ICD-10-CM

## 2019-02-28 DIAGNOSIS — K59.00 CONSTIPATION, UNSPECIFIED CONSTIPATION TYPE: ICD-10-CM

## 2019-02-28 DIAGNOSIS — R93.5 ABNORMAL ABDOMINAL CT SCAN: ICD-10-CM

## 2019-02-28 PROCEDURE — 99203 OFFICE O/P NEW LOW 30 MIN: CPT | Performed by: NURSE PRACTITIONER

## 2019-02-28 PROCEDURE — G0463 HOSPITAL OUTPT CLINIC VISIT: HCPCS | Performed by: NURSE PRACTITIONER

## 2019-02-28 RX ORDER — METOPROLOL SUCCINATE 25 MG/1
25 TABLET, EXTENDED RELEASE ORAL DAILY
COMMUNITY
End: 2019-03-05

## 2019-02-28 NOTE — PATIENT INSTRUCTIONS
1.  Keep up good water intake  2. Improve dietary fiber intake and supplement MiraLAX daily for constipation  3. At your earliest convenience, complete blood work at the lab (pancreatic enzyme)  4.   I will follow-up with Dr. Leonides Chauhan for additional r

## 2019-03-05 ENCOUNTER — TELEPHONE (OUTPATIENT)
Dept: NEPHROLOGY | Facility: CLINIC | Age: 79
End: 2019-03-05

## 2019-03-05 NOTE — TELEPHONE ENCOUNTER
Patient contacted. He was told to stop Metoprolol and call with blood pressure readings.  He took the readings at all different times of the day but thought morning readings were the lowest.2/27/19 am 80/55, pm 93/56, 2/28 am 84/50, pm 103/66, 3/2 am 69/46,

## 2019-03-05 NOTE — TELEPHONE ENCOUNTER
Patient contacted. Advised to stop taking Lisinopril Hctz, monitor blood pressure and call in 1 week. He states he does experience some lightheadedness but it goes away after a few minutes.  Not associated with getting up from bed or chair, does happen some

## 2019-03-05 NOTE — TELEPHONE ENCOUNTER
Hold lisinopril HCT. Check blood pressures daily and call in 1 week. Does he feel dizzy or lightheaded? ?

## 2019-03-12 RX ORDER — FUROSEMIDE 40 MG/1
TABLET ORAL
Qty: 30 TABLET | Refills: 5 | Status: SHIPPED | OUTPATIENT
Start: 2019-03-12 | End: 2019-09-04

## 2019-03-13 NOTE — TELEPHONE ENCOUNTER
Pt calling back with BP readings   Without meds    3/6- 104/69     3/7 -105/65    3/8 - 104/64    3/9 - 124/69    3/10 - 135/79    3/11 - 101/63    3/12 - 116/72    3/13 - 120/70

## 2019-03-13 NOTE — TELEPHONE ENCOUNTER
Patient contacted. Dr. Erickson Aas blood pressure advice read to patient.  He is aware to continue present management (no changes in medications or treatment plan)

## 2019-03-18 RX ORDER — SIMVASTATIN 20 MG
TABLET ORAL
Qty: 90 TABLET | Refills: 0 | OUTPATIENT
Start: 2019-03-18

## 2019-03-18 RX ORDER — LISINOPRIL AND HYDROCHLOROTHIAZIDE 12.5; 1 MG/1; MG/1
TABLET ORAL
Qty: 90 TABLET | Refills: 0 | OUTPATIENT
Start: 2019-03-18

## 2019-03-18 NOTE — TELEPHONE ENCOUNTER
LOV 2/26/19. Metoprolol was discontinued at this visit. Lisinopril Hct is not on med list. It has never been refilled by this office. Last lipid panel was done on 8/1/18. Pended and routed to Dr. Fili Garcia.

## 2019-03-18 NOTE — TELEPHONE ENCOUNTER
LMTCB. Metoprolol was marked D/C'd on 2/26/19 by LAMAR as 'therapy completed' and lisinopril-HCTZ was marked D/C'd on 3/5/19 by Enrico Pyle as 'physician directed. '     OV from 2/26/19 says pt's BP was low and therefore LAMAR D/C'd metoprolol.  He was instructed to

## 2019-03-19 RX ORDER — FINASTERIDE 5 MG/1
TABLET, FILM COATED ORAL
Qty: 90 TABLET | Refills: 1 | Status: SHIPPED | OUTPATIENT
Start: 2019-03-19 | End: 2019-09-30

## 2019-03-19 RX ORDER — SIMVASTATIN 20 MG
TABLET ORAL
Qty: 90 TABLET | Refills: 1 | Status: SHIPPED | OUTPATIENT
Start: 2019-03-19 | End: 2019-09-15

## 2019-03-19 NOTE — TELEPHONE ENCOUNTER
Spoke to pt. He confirms that he stopped metoprolol and lisin-HCTZ per MKK's instructions. He had these meds on automatic refills so that's why the requests came through.  He did speak to pharmacy this morning and tell them he's not currently taking these m

## 2019-03-28 ENCOUNTER — PATIENT MESSAGE (OUTPATIENT)
Dept: NEPHROLOGY | Facility: CLINIC | Age: 79
End: 2019-03-28

## 2019-03-28 NOTE — PROGRESS NOTES
My Chart message routed to GI staff to find out about EGD. Patient saw Dominique Loera but has not heard anything after that visit. Please advise.

## 2019-03-28 NOTE — TELEPHONE ENCOUNTER
Blood pressures are creeping up at times. Resume metoprolol tartrate but only 12.5 mg twice daily. Call in a week or 2 with blood pressures and heart rates. Again he saw GI. Is he scheduled for an upper endoscopy? ?

## 2019-03-28 NOTE — TELEPHONE ENCOUNTER
From: Rory Gongora  To: Beny Brown MD  Sent: 3/28/2019 7:37 AM CDT  Subject: Other    Dr Gia Paiz  Here are my latest blood pressure readings  3/58=423/73, 3/76=819/100, 3/73=606/73, 3/51=750/65, 3/45=427/80, 3/83=281/79 & 122/65, 3/97=939/67, 3/47=848/

## 2019-04-01 ENCOUNTER — TELEPHONE (OUTPATIENT)
Dept: GASTROENTEROLOGY | Facility: CLINIC | Age: 79
End: 2019-04-01

## 2019-04-01 NOTE — TELEPHONE ENCOUNTER
Patient notified he has Lipase test to be done, no fasting needed per lab. Patient fully understood all that was stated and had no further questions/concerns.

## 2019-04-02 ENCOUNTER — APPOINTMENT (OUTPATIENT)
Dept: LAB | Age: 79
End: 2019-04-02
Attending: NURSE PRACTITIONER
Payer: MEDICARE

## 2019-04-02 DIAGNOSIS — R93.5 ABNORMAL ABDOMINAL CT SCAN: ICD-10-CM

## 2019-04-02 DIAGNOSIS — R10.10 UPPER ABDOMINAL PAIN: ICD-10-CM

## 2019-04-02 PROCEDURE — 83690 ASSAY OF LIPASE: CPT

## 2019-04-02 PROCEDURE — 36415 COLL VENOUS BLD VENIPUNCTURE: CPT

## 2019-04-08 NOTE — PROGRESS NOTES
Nursing: please extend my apologizes for the delay in getting back w/ the pt. I tried calling the pt earlier today to see how he was feeling but was unable to reach him or leave a message.      Please let the pt know his pancreatic enzymes were normal. I w

## 2019-04-10 ENCOUNTER — TELEPHONE (OUTPATIENT)
Dept: GASTROENTEROLOGY | Facility: CLINIC | Age: 79
End: 2019-04-10

## 2019-04-10 DIAGNOSIS — R93.5 ABNORMAL CT OF THE ABDOMEN: ICD-10-CM

## 2019-04-10 DIAGNOSIS — R10.10 UPPER ABDOMINAL PAIN: Primary | ICD-10-CM

## 2019-04-10 NOTE — TELEPHONE ENCOUNTER
Scheduled for:  EGD - 26664  Provider Name:  Dr. Pablo Shields  Date:  5/28/19  Location:  [facility]  Sedation:  IV  Time:  3:30 pm (pt is aware to arrive at 2:30 pm)  Prep:  NPO after midnight, Prep instructions were given to pt over the phone, pt verbalized understanding

## 2019-04-10 NOTE — TELEPHONE ENCOUNTER
GUILLERMO Amaro routed conversation to [facility] Gi Clinical Staff; Alma Franco Surg/Proc Scheduling 2 days ago      GUILLERMO Amaro 2 days ago         Nursing: please extend my apologizes for the delay in getting back w/ the pt.   I tried calling the pt ear Blood pressures are creeping up at times. Resume metoprolol tartrate but only 12.5 mg twice daily. Call in a week or 2 with blood pressures and heart rates. Again he saw GI. Is he scheduled for an upper endoscopy? ?          Documentation       Mor

## 2019-04-10 NOTE — TELEPHONE ENCOUNTER
Patient was called and notified that [facility] did try to reach out to him but was unable to get a hold of pt, but [facility] wanted the pt to know that his pancreatic enzymes were normal, she and Dr. Melida Lerner reviewed CT scan/lab results and they both agreed to proceed with u

## 2019-04-10 NOTE — TELEPHONE ENCOUNTER
Opened a TE for this and routed to schedulers. [address]- FYI schedulers cant see MySupportAssistant messages so please just route back to RNs --- thank you!

## 2019-04-10 NOTE — TELEPHONE ENCOUNTER
Regarding: RE: Other  Contact: [phone]  ----- Message from GUILLERMO Nettles sent at 4/8/2019  5:16 PM CDT -----       ----- Message from Angelo Amin to Beny Brown MD sent at 3/28/2019  3:32 PM -----   I have not had any response from my rece

## 2019-04-10 NOTE — TELEPHONE ENCOUNTER
Routed to GI Schedulers  GI schedulers- please schedule patient.     Please see OV with [address] from 2/28/19 for orders

## 2019-04-23 RX ORDER — OMEPRAZOLE 40 MG/1
CAPSULE, DELAYED RELEASE ORAL
Qty: 90 CAPSULE | Refills: 1 | Status: SHIPPED | OUTPATIENT
Start: 2019-04-23 | End: 2019-11-03

## 2019-05-23 ENCOUNTER — TELEPHONE (OUTPATIENT)
Dept: GASTROENTEROLOGY | Facility: CLINIC | Age: 79
End: 2019-05-23

## 2019-05-23 ENCOUNTER — PATIENT MESSAGE (OUTPATIENT)
Dept: NEPHROLOGY | Facility: CLINIC | Age: 79
End: 2019-05-23

## 2019-05-23 DIAGNOSIS — R93.5 ABNORMAL CT OF THE ABDOMEN: ICD-10-CM

## 2019-05-23 DIAGNOSIS — R10.10 UPPER ABDOMINAL PAIN: Primary | ICD-10-CM

## 2019-05-23 NOTE — TELEPHONE ENCOUNTER
Can use Aleve up to 2 twice daily. With food. let him know I am not here and if symptoms are bad he should go to urgent care/ER for x-rays.

## 2019-05-23 NOTE — TELEPHONE ENCOUNTER
From: Juju Harris  To: Brianne Agee MD  Sent: 5/23/2019 10:47 AM CDT  Subject: Other    Dr Nelson Hernández I slipped in my home and received a severe ankle sprain.   I did not fall  I am not able to walk on it without pain  Can you subscribe some anti

## 2019-05-23 NOTE — TELEPHONE ENCOUNTER
Rescheduled for:  EGD - 51515  Provider Name:  Dr. Vikki Peck  Date:  FROM - 5/28/19            TO - 7/31/19  Location:  [facility]  Sedation:  IV  Time:  FROM - 3:30 pm         TO - 9:15 am (pt is aware to arrive at 8:15 am)  Prep:  NPO after midnight, Prep instructions

## 2019-07-31 ENCOUNTER — HOSPITAL ENCOUNTER (OUTPATIENT)
Facility: HOSPITAL | Age: 79
Setting detail: HOSPITAL OUTPATIENT SURGERY
Discharge: HOME | End: 2019-07-31
Attending: INTERNAL MEDICINE | Admitting: INTERNAL MEDICINE
Payer: MEDICARE

## 2019-07-31 DIAGNOSIS — R10.10 UPPER ABDOMINAL PAIN: ICD-10-CM

## 2019-07-31 DIAGNOSIS — R93.5 ABNORMAL CT OF THE ABDOMEN: ICD-10-CM

## 2019-07-31 PROCEDURE — G0500 MOD SEDAT ENDO SERVICE >5YRS: HCPCS | Performed by: INTERNAL MEDICINE

## 2019-07-31 PROCEDURE — 43239 EGD BIOPSY SINGLE/MULTIPLE: CPT | Performed by: INTERNAL MEDICINE

## 2019-07-31 PROCEDURE — 0DB68ZX EXCISION OF STOMACH, VIA NATURAL OR ARTIFICIAL OPENING ENDOSCOPIC, DIAGNOSTIC: ICD-10-PCS | Performed by: INTERNAL MEDICINE

## 2019-07-31 RX ORDER — SODIUM CHLORIDE, SODIUM LACTATE, POTASSIUM CHLORIDE, CALCIUM CHLORIDE 600; 310; 30; 20 MG/100ML; MG/100ML; MG/100ML; MG/100ML
INJECTION, SOLUTION INTRAVENOUS CONTINUOUS
Status: DISCONTINUED | OUTPATIENT
Start: 2019-07-31 | End: 2019-07-31

## 2019-07-31 RX ORDER — SODIUM CHLORIDE 0.9 % (FLUSH) 0.9 %
10 SYRINGE (ML) INJECTION AS NEEDED
Status: DISCONTINUED | OUTPATIENT
Start: 2019-07-31 | End: 2019-07-31

## 2019-07-31 RX ORDER — MIDAZOLAM HYDROCHLORIDE 1 MG/ML
1 INJECTION INTRAMUSCULAR; INTRAVENOUS EVERY 5 MIN PRN
Status: DISCONTINUED | OUTPATIENT
Start: 2019-07-31 | End: 2019-07-31

## 2019-07-31 RX ORDER — MIDAZOLAM HYDROCHLORIDE 1 MG/ML
INJECTION INTRAMUSCULAR; INTRAVENOUS
Status: DISCONTINUED | OUTPATIENT
Start: 2019-07-31 | End: 2019-07-31

## 2019-07-31 NOTE — H&P
History & Physical Examination    Patient Name: Navarro Liu  MRN: [id number]  CSN: [id number]  YOB: 1940    Diagnosis: Abnormal CT scan of the duodenum, transient abdominal pain, previous history of chronic peptic ulcer disease secondary to H blood pressure    • High cholesterol    • History of prostate biopsy      Past Surgical History:   Procedure Laterality Date   • ARTHROSCOPY OF JOINT UNLISTED     • TONSILLECTOMY       Family History   Problem Relation Age of Onset   • Pulmonary Disease Fa

## 2019-07-31 NOTE — OPERATIVE REPORT
[facility] Endoscopy Report      Date of Procedure:  07/31/19        Preoperative Diagnosis:  1. Abnormal CT scan of the duodenum  2. Transient abdominal pain  3.   Previous history of chronic peptic ulcer disease secondary to H. pylori (e junction and diaphragmatic impression were at 39 cm. The stomach distended appropriately with insufflated air. There was a large amount of retained vegetable matter and bile-stained fluid in the stomach.   Over 1150 cc of vegetable matter and fluid were s

## 2019-08-01 VITALS
WEIGHT: 255 LBS | SYSTOLIC BLOOD PRESSURE: 120 MMHG | OXYGEN SATURATION: 94 % | HEIGHT: 69 IN | HEART RATE: 75 BPM | DIASTOLIC BLOOD PRESSURE: 63 MMHG | BODY MASS INDEX: 37.77 KG/M2 | RESPIRATION RATE: 16 BRPM

## 2019-08-01 DIAGNOSIS — K30 DELAYED GASTRIC EMPTYING: ICD-10-CM

## 2019-08-01 DIAGNOSIS — K29.80 DUODENITIS: Primary | ICD-10-CM

## 2019-08-01 NOTE — TELEPHONE ENCOUNTER
LOV 2/26/19. RTC in 6 mos (8/2019) No appt scheduled. Refill pended and routed to Dr. Josiane Salter to sign.

## 2019-08-02 RX ORDER — LISINOPRIL AND HYDROCHLOROTHIAZIDE 12.5; 1 MG/1; MG/1
1 TABLET ORAL DAILY
Qty: 30 TABLET | Refills: 0 | Status: SHIPPED | OUTPATIENT
Start: 2019-08-02 | End: 2019-08-29

## 2019-08-07 ENCOUNTER — HOSPITAL ENCOUNTER (OUTPATIENT)
Dept: CT IMAGING | Facility: HOSPITAL | Age: 79
Discharge: HOME | End: 2019-08-07
Attending: INTERNAL MEDICINE
Payer: MEDICARE

## 2019-08-07 DIAGNOSIS — K29.80 DUODENITIS: ICD-10-CM

## 2019-08-07 DIAGNOSIS — K30 DELAYED GASTRIC EMPTYING: ICD-10-CM

## 2019-08-07 LAB — CREAT BLD-MCNC: 0.8 MG/DL (ref 0.7–1.3)

## 2019-08-07 PROCEDURE — 74160 CT ABDOMEN W/CONTRAST: CPT | Performed by: INTERNAL MEDICINE

## 2019-08-07 PROCEDURE — 82565 ASSAY OF CREATININE: CPT

## 2019-08-15 NOTE — TELEPHONE ENCOUNTER
Per chart patient has read instructions: This month do cbc, cmp, lipid panel, U/A and see me in the office. Last read by Liberty Hatchet at 8:27 PM on 8/9/2019.

## 2019-08-29 DIAGNOSIS — I10 ESSENTIAL HYPERTENSION: ICD-10-CM

## 2019-08-29 DIAGNOSIS — E78.00 PURE HYPERCHOLESTEROLEMIA: Primary | ICD-10-CM

## 2019-08-29 RX ORDER — LISINOPRIL AND HYDROCHLOROTHIAZIDE 12.5; 1 MG/1; MG/1
1 TABLET ORAL DAILY
Qty: 30 TABLET | Refills: 0 | Status: SHIPPED | OUTPATIENT
Start: 2019-08-29 | End: 2019-09-30

## 2019-08-29 NOTE — TELEPHONE ENCOUNTER
LOV 2/26/19. RTC in 6 mos (8/2019) No appt scheduled. Refill pended and routed to Dr. Josiane Salter to approve.

## 2019-08-30 NOTE — TELEPHONE ENCOUNTER
Patient contacted. Advised of refill and lab orders. Instructed to fast 12 hours for lab work. Patient will call to make a follow up appointment with Dr. Gia Paiz as requested. Orders entered in [facility]

## 2019-09-04 RX ORDER — FUROSEMIDE 40 MG/1
TABLET ORAL
Qty: 30 TABLET | Refills: 0 | Status: ON HOLD | OUTPATIENT
Start: 2019-09-04 | End: 2019-10-15

## 2019-09-04 NOTE — TELEPHONE ENCOUNTER
LOV 2/26/19. RTC in 6 mos (8/2019) No f/u scheduled. Refill pended and routed to Dr. Wilian Acosta to approve.

## 2019-09-06 NOTE — TELEPHONE ENCOUNTER
Future Appointments   Date Time Provider [place]   9/12/2019  8:45 AM [facility] MRI RM1 (1.5T) [facility] MRI EM [facility]     Pt contacted. Informed of fasting labs and schedule f/u appointment with Dr. Royal Casillas.  Pt verbalized understanding and will have them done

## 2019-09-12 ENCOUNTER — LAB ENCOUNTER (OUTPATIENT)
Dept: LAB | Age: 79
End: 2019-09-12
Attending: INTERNAL MEDICINE
Payer: MEDICARE

## 2019-09-12 ENCOUNTER — HOSPITAL ENCOUNTER (OUTPATIENT)
Dept: MRI IMAGING | Facility: HOSPITAL | Age: 79
Discharge: HOME | End: 2019-09-12
Attending: INTERNAL MEDICINE
Payer: MEDICARE

## 2019-09-12 DIAGNOSIS — R93.2 ABNORMAL CT OF LIVER: ICD-10-CM

## 2019-09-12 DIAGNOSIS — I10 ESSENTIAL HYPERTENSION: ICD-10-CM

## 2019-09-12 DIAGNOSIS — E78.00 PURE HYPERCHOLESTEROLEMIA: ICD-10-CM

## 2019-09-12 LAB
ALBUMIN SERPL-MCNC: 3.8 G/DL (ref 3.4–5)
ALBUMIN/GLOB SERPL: 1.1 {RATIO} (ref 1–2)
ALP LIVER SERPL-CCNC: 83 U/L (ref 45–117)
ALT SERPL-CCNC: 17 U/L (ref 16–61)
ANION GAP SERPL CALC-SCNC: 4 MMOL/L (ref 0–18)
AST SERPL-CCNC: 23 U/L (ref 15–37)
BASOPHILS # BLD AUTO: 0.13 X10(3) UL (ref 0–0.2)
BASOPHILS NFR BLD AUTO: 1.4 %
BILIRUB SERPL-MCNC: 0.4 MG/DL (ref 0.1–2)
BILIRUB UR QL: NEGATIVE
BUN BLD-MCNC: 15 MG/DL (ref 7–18)
BUN/CREAT SERPL: 18.3 (ref 10–20)
CALCIUM BLD-MCNC: 9.4 MG/DL (ref 8.5–10.1)
CHLORIDE SERPL-SCNC: 102 MMOL/L (ref 98–112)
CHOLEST SMN-MCNC: 133 MG/DL (ref ?–200)
CO2 SERPL-SCNC: 34 MMOL/L (ref 21–32)
COLOR UR: YELLOW
CREAT BLD-MCNC: 0.82 MG/DL (ref 0.7–1.3)
DEPRECATED RDW RBC AUTO: 39.2 FL (ref 35.1–46.3)
EOSINOPHIL # BLD AUTO: 0.16 X10(3) UL (ref 0–0.7)
EOSINOPHIL NFR BLD AUTO: 1.7 %
ERYTHROCYTE [DISTWIDTH] IN BLOOD BY AUTOMATED COUNT: 20.8 % (ref 11–15)
GLOBULIN PLAS-MCNC: 3.6 G/DL (ref 2.8–4.4)
GLUCOSE BLD-MCNC: 103 MG/DL (ref 70–99)
GLUCOSE UR-MCNC: NEGATIVE MG/DL
HCT VFR BLD AUTO: 46 % (ref 39–53)
HDLC SERPL-MCNC: 31 MG/DL (ref 40–59)
HGB BLD-MCNC: 14 G/DL (ref 13–17.5)
HGB UR QL STRIP.AUTO: NEGATIVE
IMM GRANULOCYTES # BLD AUTO: 0.02 X10(3) UL (ref 0–1)
IMM GRANULOCYTES NFR BLD: 0.2 %
KETONES UR-MCNC: NEGATIVE MG/DL
LDLC SERPL CALC-MCNC: 44 MG/DL (ref ?–100)
LYMPHOCYTES # BLD AUTO: 1.97 X10(3) UL (ref 1–4)
LYMPHOCYTES NFR BLD AUTO: 20.9 %
M PROTEIN MFR SERPL ELPH: 7.4 G/DL (ref 6.4–8.2)
MCH RBC QN AUTO: 18.6 PG (ref 26–34)
MCHC RBC AUTO-ENTMCNC: 30.4 G/DL (ref 31–37)
MCV RBC AUTO: 61.3 FL (ref 80–100)
MONOCYTES # BLD AUTO: 0.81 X10(3) UL (ref 0.1–1)
MONOCYTES NFR BLD AUTO: 8.6 %
NEUTROPHILS # BLD AUTO: 6.32 X10 (3) UL (ref 1.5–7.7)
NEUTROPHILS # BLD AUTO: 6.32 X10(3) UL (ref 1.5–7.7)
NEUTROPHILS NFR BLD AUTO: 67.2 %
NITRITE UR QL STRIP.AUTO: NEGATIVE
NONHDLC SERPL-MCNC: 102 MG/DL (ref ?–130)
OSMOLALITY SERPL CALC.SUM OF ELEC: 291 MOSM/KG (ref 275–295)
PATIENT FASTING: YES
PATIENT FASTING: YES
PH UR: 6 [PH] (ref 5–8)
PLATELET # BLD AUTO: 246 10(3)UL (ref 150–450)
POTASSIUM SERPL-SCNC: 4 MMOL/L (ref 3.5–5.1)
PROT UR-MCNC: NEGATIVE MG/DL
RBC # BLD AUTO: 7.51 X10(6)UL (ref 3.8–5.8)
RBC #/AREA URNS AUTO: 5 /HPF
SODIUM SERPL-SCNC: 140 MMOL/L (ref 136–145)
SP GR UR STRIP: 1.03 (ref 1–1.03)
TRIGL SERPL-MCNC: 290 MG/DL (ref 30–149)
UROBILINOGEN UR STRIP-ACNC: <2
VIT C UR-MCNC: NEGATIVE MG/DL
VLDLC SERPL CALC-MCNC: 58 MG/DL (ref 0–30)
WBC # BLD AUTO: 9.4 X10(3) UL (ref 4–11)
WBC #/AREA URNS AUTO: 69 /HPF

## 2019-09-12 PROCEDURE — A9575 INJ GADOTERATE MEGLUMI 0.1ML: HCPCS | Performed by: INTERNAL MEDICINE

## 2019-09-12 PROCEDURE — 74183 MRI ABD W/O CNTR FLWD CNTR: CPT | Performed by: INTERNAL MEDICINE

## 2019-09-12 PROCEDURE — 85060 BLOOD SMEAR INTERPRETATION: CPT

## 2019-09-12 PROCEDURE — 80061 LIPID PANEL: CPT

## 2019-09-12 PROCEDURE — 85025 COMPLETE CBC W/AUTO DIFF WBC: CPT

## 2019-09-12 PROCEDURE — 81001 URINALYSIS AUTO W/SCOPE: CPT

## 2019-09-12 PROCEDURE — 36415 COLL VENOUS BLD VENIPUNCTURE: CPT

## 2019-09-12 PROCEDURE — 80053 COMPREHEN METABOLIC PANEL: CPT

## 2019-09-15 ENCOUNTER — TELEPHONE (OUTPATIENT)
Dept: NEPHROLOGY | Facility: CLINIC | Age: 79
End: 2019-09-15

## 2019-09-15 DIAGNOSIS — N39.0 URINARY TRACT INFECTION WITHOUT HEMATURIA, SITE UNSPECIFIED: Primary | ICD-10-CM

## 2019-09-15 NOTE — TELEPHONE ENCOUNTER
Labs look good except urinalysis suggests UTI. Any symptom? .  Do urine C&S. Also see soon for six-month follow-up.

## 2019-09-16 RX ORDER — SIMVASTATIN 20 MG
TABLET ORAL
Qty: 90 TABLET | Refills: 0 | Status: SHIPPED | OUTPATIENT
Start: 2019-09-16 | End: 2019-12-14

## 2019-09-16 NOTE — TELEPHONE ENCOUNTER
Patient contacted. Results message read. Denies any symptoms of UTI and will do Urine C&S. Patient is aware that he needs to schedule a follow up visit. He will call back at another time. Order entered in [place].

## 2019-09-23 NOTE — TELEPHONE ENCOUNTER
LOV 2/26/19. There are no follow up appointments scheduled at this time. Routed to [address] [place].

## 2019-09-28 ENCOUNTER — APPOINTMENT (OUTPATIENT)
Dept: LAB | Age: 79
End: 2019-09-28
Attending: INTERNAL MEDICINE
Payer: MEDICARE

## 2019-09-28 DIAGNOSIS — N39.0 URINARY TRACT INFECTION WITHOUT HEMATURIA, SITE UNSPECIFIED: ICD-10-CM

## 2019-09-28 PROCEDURE — 87077 CULTURE AEROBIC IDENTIFY: CPT

## 2019-09-28 PROCEDURE — 87086 URINE CULTURE/COLONY COUNT: CPT

## 2019-09-28 PROCEDURE — 87186 SC STD MICRODIL/AGAR DIL: CPT

## 2019-09-30 ENCOUNTER — TELEPHONE (OUTPATIENT)
Dept: NEPHROLOGY | Facility: CLINIC | Age: 79
End: 2019-09-30

## 2019-09-30 RX ORDER — LISINOPRIL AND HYDROCHLOROTHIAZIDE 12.5; 1 MG/1; MG/1
TABLET ORAL
Qty: 90 TABLET | Refills: 1 | Status: SHIPPED | OUTPATIENT
Start: 2019-09-30 | End: 2020-03-24

## 2019-09-30 RX ORDER — CIPROFLOXACIN 250 MG/1
250 TABLET, FILM COATED ORAL 2 TIMES DAILY
Qty: 20 TABLET | Refills: 0 | Status: SHIPPED | OUTPATIENT
Start: 2019-09-30 | End: 2019-10-10

## 2019-09-30 RX ORDER — FINASTERIDE 5 MG/1
TABLET, FILM COATED ORAL
Qty: 90 TABLET | Refills: 1 | Status: SHIPPED | OUTPATIENT
Start: 2019-09-30 | End: 2020-03-24

## 2019-09-30 NOTE — TELEPHONE ENCOUNTER
Patient contacted. Results of urine C&S read to patient. He is aware to start Cipro as Dr. Ellis Dan advised. Prescription sent to [facility] verified with patient. He is aware to inform Dr. Ellis Dan if not better.

## 2019-10-16 ENCOUNTER — APPOINTMENT (OUTPATIENT)
Dept: GENERAL RADIOLOGY | Facility: HOSPITAL | Age: 79
End: 2019-10-16
Attending: EMERGENCY MEDICINE
Payer: MEDICARE

## 2019-10-16 ENCOUNTER — APPOINTMENT (OUTPATIENT)
Dept: CT IMAGING | Facility: HOSPITAL | Age: 79
End: 2019-10-16
Attending: EMERGENCY MEDICINE
Payer: MEDICARE

## 2019-10-16 ENCOUNTER — HOSPITAL ENCOUNTER (OUTPATIENT)
Facility: HOSPITAL | Age: 79
Setting detail: OBSERVATION
Discharge: HOME HEALTH | End: 2019-10-19
Attending: EMERGENCY MEDICINE | Admitting: HOSPITALIST
Payer: MEDICARE

## 2019-10-16 DIAGNOSIS — M54.9 INTRACTABLE BACK PAIN: Primary | ICD-10-CM

## 2019-10-16 PROBLEM — M54.16 LUMBAR RADICULOPATHY: Status: ACTIVE | Noted: 2019-10-16

## 2019-10-16 PROCEDURE — 99220 INITIAL OBSERVATION CARE,LEVL III: CPT | Performed by: HOSPITALIST

## 2019-10-16 PROCEDURE — 74176 CT ABD & PELVIS W/O CONTRAST: CPT | Performed by: EMERGENCY MEDICINE

## 2019-10-16 RX ORDER — MORPHINE SULFATE 4 MG/ML
4 INJECTION, SOLUTION INTRAMUSCULAR; INTRAVENOUS EVERY 2 HOUR PRN
Status: DISCONTINUED | OUTPATIENT
Start: 2019-10-16 | End: 2019-10-19

## 2019-10-16 RX ORDER — ONDANSETRON 2 MG/ML
4 INJECTION INTRAMUSCULAR; INTRAVENOUS EVERY 6 HOURS PRN
Status: DISCONTINUED | OUTPATIENT
Start: 2019-10-16 | End: 2019-10-19

## 2019-10-16 RX ORDER — PANTOPRAZOLE SODIUM 40 MG/1
40 TABLET, DELAYED RELEASE ORAL
Status: DISCONTINUED | OUTPATIENT
Start: 2019-10-17 | End: 2019-10-19

## 2019-10-16 RX ORDER — FUROSEMIDE 40 MG/1
40 TABLET ORAL DAILY
Status: DISCONTINUED | OUTPATIENT
Start: 2019-10-16 | End: 2019-10-19

## 2019-10-16 RX ORDER — HYDROCODONE BITARTRATE AND ACETAMINOPHEN 5; 325 MG/1; MG/1
1 TABLET ORAL EVERY 4 HOURS PRN
Status: DISCONTINUED | OUTPATIENT
Start: 2019-10-16 | End: 2019-10-19

## 2019-10-16 RX ORDER — CYCLOBENZAPRINE HCL 5 MG
5 TABLET ORAL 3 TIMES DAILY PRN
Status: DISCONTINUED | OUTPATIENT
Start: 2019-10-16 | End: 2019-10-19

## 2019-10-16 RX ORDER — POLYETHYLENE GLYCOL 3350 17 G/17G
17 POWDER, FOR SOLUTION ORAL DAILY PRN
Status: DISCONTINUED | OUTPATIENT
Start: 2019-10-16 | End: 2019-10-19

## 2019-10-16 RX ORDER — MORPHINE SULFATE 4 MG/ML
4 INJECTION, SOLUTION INTRAMUSCULAR; INTRAVENOUS ONCE
Status: DISCONTINUED | OUTPATIENT
Start: 2019-10-16 | End: 2019-10-16

## 2019-10-16 RX ORDER — FUROSEMIDE 40 MG/1
TABLET ORAL
Qty: 30 TABLET | Refills: 0 | Status: SHIPPED | OUTPATIENT
Start: 2019-10-16 | End: 2019-12-26

## 2019-10-16 RX ORDER — MORPHINE SULFATE 2 MG/ML
2 INJECTION, SOLUTION INTRAMUSCULAR; INTRAVENOUS EVERY 2 HOUR PRN
Status: DISCONTINUED | OUTPATIENT
Start: 2019-10-16 | End: 2019-10-19

## 2019-10-16 RX ORDER — MORPHINE SULFATE 2 MG/ML
1 INJECTION, SOLUTION INTRAMUSCULAR; INTRAVENOUS EVERY 2 HOUR PRN
Status: DISCONTINUED | OUTPATIENT
Start: 2019-10-16 | End: 2019-10-19

## 2019-10-16 RX ORDER — ACETAMINOPHEN 325 MG/1
650 TABLET ORAL EVERY 4 HOURS PRN
Status: DISCONTINUED | OUTPATIENT
Start: 2019-10-16 | End: 2019-10-19

## 2019-10-16 RX ORDER — GABAPENTIN 100 MG/1
100 CAPSULE ORAL 3 TIMES DAILY
Status: DISCONTINUED | OUTPATIENT
Start: 2019-10-16 | End: 2019-10-19

## 2019-10-16 RX ORDER — POTASSIUM CHLORIDE 20 MEQ/1
40 TABLET, EXTENDED RELEASE ORAL ONCE
Status: COMPLETED | OUTPATIENT
Start: 2019-10-16 | End: 2019-10-16

## 2019-10-16 RX ORDER — LISINOPRIL AND HYDROCHLOROTHIAZIDE 12.5; 1 MG/1; MG/1
1 TABLET ORAL
Status: DISCONTINUED | OUTPATIENT
Start: 2019-10-17 | End: 2019-10-16

## 2019-10-16 RX ORDER — KETOROLAC TROMETHAMINE 15 MG/ML
15 INJECTION, SOLUTION INTRAMUSCULAR; INTRAVENOUS ONCE
Status: COMPLETED | OUTPATIENT
Start: 2019-10-16 | End: 2019-10-16

## 2019-10-16 RX ORDER — SODIUM CHLORIDE 0.9 % (FLUSH) 0.9 %
3 SYRINGE (ML) INJECTION AS NEEDED
Status: DISCONTINUED | OUTPATIENT
Start: 2019-10-16 | End: 2019-10-19

## 2019-10-16 RX ORDER — HYDROCODONE BITARTRATE AND ACETAMINOPHEN 5; 325 MG/1; MG/1
2 TABLET ORAL EVERY 4 HOURS PRN
Status: DISCONTINUED | OUTPATIENT
Start: 2019-10-16 | End: 2019-10-19

## 2019-10-16 RX ORDER — MORPHINE SULFATE 4 MG/ML
4 INJECTION, SOLUTION INTRAMUSCULAR; INTRAVENOUS ONCE
Status: COMPLETED | OUTPATIENT
Start: 2019-10-16 | End: 2019-10-16

## 2019-10-16 RX ORDER — FINASTERIDE 5 MG/1
5 TABLET, FILM COATED ORAL
Status: DISCONTINUED | OUTPATIENT
Start: 2019-10-16 | End: 2019-10-19

## 2019-10-16 NOTE — ED NOTES
Orders for admission, patient is aware of plan and ready to go upstairs.  Any questions, please call ED GERARD Garrett at extension [phone]

## 2019-10-16 NOTE — ED INITIAL ASSESSMENT (HPI)
Pt to ED via EMS from home c/o left hip, gluteal pain radiating down to left knee x 4 days. Pt states he has been unable to ambulate due to pain. Denies trauma.

## 2019-10-16 NOTE — H&P
[facility]    PATIENT'S NAME: Maria Antonia Anup ARGUELLO   ATTENDING PHYSICIAN: La Negrete MD   PATIENT ACCOUNT#:   [id number]    LOCATION:  [place] [address]  MEDICAL RECORD #:   [id number]       YOB: 1940  ADMISSION DATE:       10/16/2019 100% on room air. HEENT:  Atraumatic. Oropharynx clear. Dry mucous membranes. Normal hard and soft palate. Eyes:  Anicteric sclerae. Pupils equal, round, reactive. Ears and nose normal.  NECK:  Supple. No lymphadenopathy. Trachea midline.   Full ra

## 2019-10-16 NOTE — TELEPHONE ENCOUNTER
LOV 2/26/19. RTC in 6 mos (8/26/19) F/U scheduled for 10/22/19. Currently in [address] [place] [address]. Refill pended and routed to Dr. Ugo Rivas to approve.

## 2019-10-17 ENCOUNTER — APPOINTMENT (OUTPATIENT)
Dept: MRI IMAGING | Facility: HOSPITAL | Age: 79
End: 2019-10-17
Attending: HOSPITALIST
Payer: MEDICARE

## 2019-10-17 PROCEDURE — 72148 MRI LUMBAR SPINE W/O DYE: CPT | Performed by: HOSPITALIST

## 2019-10-17 PROCEDURE — 99226 SUBSEQUENT OBSERVATION CARE: CPT | Performed by: HOSPITALIST

## 2019-10-17 RX ORDER — POTASSIUM CHLORIDE 20 MEQ/1
40 TABLET, EXTENDED RELEASE ORAL ONCE
Status: COMPLETED | OUTPATIENT
Start: 2019-10-17 | End: 2019-10-17

## 2019-10-17 NOTE — HOME CARE LIAISON
Met with patient at the bedside. Patient is agreeable to [facility]. Residential brochure provided with contact information. All questions addressed and answered.

## 2019-10-17 NOTE — PROGRESS NOTES
[facility] - [facility]  Report of Consultation    Simon Velazquez Patient Status:  Observation    3/6/1940 MRN [id number]   Location [facility] 5SW/SE Attending Rachael Saba MD   Hosp Day # 0 PCP Mireya James MD     Date of Admission:  8 drugs.     Allergies:    Aspirin                 DIZZINESS  Clarithromycin              Comment:Other reaction(s): Unknown  Penicillins                 Comment:Other reaction(s): Rash    Medications:    Current Facility-Administered Medications:   •  [place] joannr, answers questions appropriately   Head: normocephalic, atraumatic  Eyes: anicteric; no injection  Ears: no obvious deformities noted   Nose: externally grossly within normal limits, no unusual discharge or rhinorrhea   Throat: lips grossly within to discussion plan.  Greater than 50% of the time was spent with counseling (nature of discussion centered around pain, therapy, and treatment options), face to face time, time spent reviewing data, obtaining patient information and discussing the care with

## 2019-10-17 NOTE — PLAN OF CARE
Problem: Patient Centered Care  Goal: Patient preferences are identified and integrated in the patient's plan of care  Description  Interventions:  - What would you like us to know as we care for you?  \"I live at home with my wife\"  - Provide timely, co injury  Description  INTERVENTIONS:  - Assess pt frequently for physical needs  - Identify cognitive and physical deficits and behaviors that affect risk of falls.   - [place] fall precautions as indicated by assessment.  - Educate pt/family on patient sa

## 2019-10-17 NOTE — PLAN OF CARE
Problem: Patient Centered Care  Goal: Patient preferences are identified and integrated in the patient's plan of care  Description  Interventions:  - What would you like us to know as we care for you?  \"I live at home with my wife\"  - Provide timely, co - FALL  Goal: Free from fall injury  Description  INTERVENTIONS:  - Assess pt frequently for physical needs  - Identify cognitive and physical deficits and behaviors that affect risk of falls. - [place] fall precautions as indicated by assessment.   - Ed

## 2019-10-17 NOTE — CM/SW NOTE
10/18 - 1022am:  RN informed SW that anticipated discharge today. Harper from [facility] notified.     ----------------  10/17 - 1051am:  Pt has been screened per chart review and during nursing rounds. Pt is from home w/ wife.  Per RN, pt is the main caregiver for

## 2019-10-17 NOTE — PHYSICAL THERAPY NOTE
PT order received and chart reviewed. Plan for MRI today and noted that pain service plan for possible CLAUDIA. Will follow up after and pending result. Nursing made aware.

## 2019-10-18 ENCOUNTER — APPOINTMENT (OUTPATIENT)
Dept: GENERAL RADIOLOGY | Facility: HOSPITAL | Age: 79
End: 2019-10-18
Attending: ANESTHESIOLOGY
Payer: MEDICARE

## 2019-10-18 DIAGNOSIS — M54.50 LUMBAR PAIN: Primary | ICD-10-CM

## 2019-10-18 PROCEDURE — 3E0R33Z INTRODUCTION OF ANTI-INFLAMMATORY INTO SPINAL CANAL, PERCUTANEOUS APPROACH: ICD-10-PCS | Performed by: ANESTHESIOLOGY

## 2019-10-18 RX ORDER — METHYLPREDNISOLONE ACETATE 80 MG/ML
INJECTION, SUSPENSION INTRA-ARTICULAR; INTRALESIONAL; INTRAMUSCULAR; SOFT TISSUE AS NEEDED
Status: DISCONTINUED | OUTPATIENT
Start: 2019-10-18 | End: 2019-10-18 | Stop reason: HOSPADM

## 2019-10-18 NOTE — PROGRESS NOTES
[facility] - [facility]    Progress Note    Lilian Gist Patient Status:  Observation    3/6/1940 MRN [id number]   Location [facility] 5SW/SE Attending Yash Mayberry MD   Hosp Day # 0 PCP Og Polk MD       SUBJECTIVE:    Still with without diverticulitis. 2.5 cm hypodense lesion within the right hepatic lobe is better characterized on MRI liver performed 9/12/2019 and has been characterized as focal fatty change.   Patient is due for follow-up MRI liver study in 6-12 months to shakeel Lumbar radiculopathy      Plan:     Intractable lumbar radiculopathy  - MRI pending  - pain service consulted   - possible CLAUDIA based on MRI results  - pt/ot    HTN  - Cont home meds     HL  - cont outpt follow up    Prophylaxis:   DVT with SCDs    Dispo:

## 2019-10-18 NOTE — PROGRESS NOTES
[facility]  Inpatient Pain Management Progress Note      Patient name: Luci Both 78year old male  : 3/6/1940  MRN: [id number]    Diagnosis: Intractable back pain  (primary encounter diagnosis)    Reason for Consult: left leg pain lisinopril-hydrochlorothiazide (ZESTORETIC 10/12. 5) combination tablet ([facility] only)   Oral Daily     Continuous Infusions:  PRN Meds:. Normal Saline Flush, morphINE sulfate **OR** morphINE sulfate **OR** morphINE sulfate, acetaminophen **OR** HYDROcodone-acet of DJD along the endplates. L3-L4:   Desiccated disc with mild collapse. Marrow changes of DJD adjacent to the endplates. No evidence of herniated disc, central spinal or neural foraminal stenosis. Moderate facet arthrosis.   L4-L5:   Desiccated disc wi

## 2019-10-18 NOTE — PHYSICAL THERAPY NOTE
PHYSICAL THERAPY EVALUATION - INPATIENT     Room Number: 567/567-A  Evaluation Date: 10/18/2019  Type of Evaluation: Initial   Physician Order: PT Eval and Treat    Presenting Problem: severe back pain w/L leg pain to knee, inability to ambulate  Reason f while in hospital    Patient will benefit from continued IP PT services to address these deficits in preparation for discharge.     DISCHARGE RECOMMENDATIONS  PT Discharge Recommendations: 24 hour care/supervision;Home with home health PT;Outpatient PT(prog but does use a sc at times for left knee pain and knee \"locks up\" so he has the cane for this but does not use it regularly. He and his wife share household duties but she has limited endurance and uses supplemental O2.     SUBJECTIVE  \"I don't want to w the patient currently need. ..   -   Moving to and from a bed to a chair (including a wheelchair)?: None   -   Need to walk in hospital room?: A Little   -   Climbing 3-5 steps with a railing?: A Little     AM-PAC Score:  Raw Score: 22   Approx Degree of Im

## 2019-10-18 NOTE — OPERATIVE REPORT
[facility]  Procedure Report    Beny Guardado [id number]     3/6/1940 MRN [id number]   Location [place] [address] PCP Juliet Cazares MD      Date of Procedure:  10/18/2019     Pain Procedure Start Time:  13:10  Pain Proced

## 2019-10-19 VITALS
TEMPERATURE: 98 F | SYSTOLIC BLOOD PRESSURE: 135 MMHG | RESPIRATION RATE: 18 BRPM | OXYGEN SATURATION: 95 % | HEIGHT: 69 IN | HEART RATE: 62 BPM | BODY MASS INDEX: 37.55 KG/M2 | WEIGHT: 253.5 LBS | DIASTOLIC BLOOD PRESSURE: 66 MMHG

## 2019-10-19 PROCEDURE — 99217 OBSERVATION CARE DISCHARGE: CPT | Performed by: HOSPITALIST

## 2019-10-19 RX ORDER — HYDROCODONE BITARTRATE AND ACETAMINOPHEN 5; 325 MG/1; MG/1
1 TABLET ORAL EVERY 4 HOURS PRN
Qty: 20 TABLET | Refills: 0 | Status: SHIPPED | OUTPATIENT
Start: 2019-10-19 | End: 2019-11-20 | Stop reason: ALTCHOICE

## 2019-10-19 RX ORDER — GABAPENTIN 100 MG/1
100 CAPSULE ORAL 3 TIMES DAILY
Qty: 30 CAPSULE | Refills: 0 | Status: SHIPPED | OUTPATIENT
Start: 2019-10-19 | End: 2019-11-15

## 2019-10-19 NOTE — CM/SW NOTE
CM notified Clairtoy Jason from [facility] [place] of d/c for today. / to remain available for support and/or discharge planning.      Skinny Mike RN    Ext [phone]

## 2019-10-19 NOTE — CONSULTS
[facility]  Anesthesiology Pain Management Progress Note      Patient name: Valerio Home 78year old male  : 3/6/1940  MRN: [id number]    Diagnosis: [unfilled]    Reason for Consult: LBP    Current hospital day: Hospital Day: 4    Pain Score

## 2019-10-19 NOTE — PLAN OF CARE
Problem: Patient Centered Care  Goal: Patient preferences are identified and integrated in the patient's plan of care  Description  Interventions:  - What would you like us to know as we care for you?  \"I live at home with my wife\"  - Provide timely, co

## 2019-10-22 ENCOUNTER — TELEPHONE (OUTPATIENT)
Dept: NEPHROLOGY | Facility: CLINIC | Age: 79
End: 2019-10-22

## 2019-10-22 NOTE — TELEPHONE ENCOUNTER
Tami/[facility] physical therapist states that she did PT evaluation today. Pt is declining PT and OT. Pt is doing very well. No need to call unless questions.

## 2019-10-23 ENCOUNTER — TELEPHONE (OUTPATIENT)
Dept: NEPHROLOGY | Facility: CLINIC | Age: 79
End: 2019-10-23

## 2019-11-04 RX ORDER — OMEPRAZOLE 40 MG/1
CAPSULE, DELAYED RELEASE ORAL
Qty: 90 CAPSULE | Refills: 0 | Status: SHIPPED | OUTPATIENT
Start: 2019-11-04 | End: 2020-01-27

## 2019-11-15 ENCOUNTER — OFFICE VISIT (OUTPATIENT)
Dept: NEPHROLOGY | Facility: CLINIC | Age: 79
End: 2019-11-15
Payer: MEDICARE

## 2019-11-15 VITALS
HEIGHT: 69 IN | BODY MASS INDEX: 36.88 KG/M2 | SYSTOLIC BLOOD PRESSURE: 115 MMHG | WEIGHT: 249 LBS | HEART RATE: 82 BPM | DIASTOLIC BLOOD PRESSURE: 61 MMHG

## 2019-11-15 DIAGNOSIS — E78.00 PURE HYPERCHOLESTEROLEMIA: ICD-10-CM

## 2019-11-15 DIAGNOSIS — I10 ESSENTIAL HYPERTENSION: Primary | ICD-10-CM

## 2019-11-15 DIAGNOSIS — M54.16 LUMBAR RADICULOPATHY: ICD-10-CM

## 2019-11-15 PROCEDURE — 1111F DSCHRG MED/CURRENT MED MERGE: CPT | Performed by: INTERNAL MEDICINE

## 2019-11-15 PROCEDURE — 99214 OFFICE O/P EST MOD 30 MIN: CPT | Performed by: INTERNAL MEDICINE

## 2019-11-15 PROCEDURE — G0463 HOSPITAL OUTPT CLINIC VISIT: HCPCS | Performed by: INTERNAL MEDICINE

## 2019-11-15 RX ORDER — GABAPENTIN 100 MG/1
100 CAPSULE ORAL 3 TIMES DAILY
Qty: 90 CAPSULE | Refills: 2 | Status: SHIPPED | OUTPATIENT
Start: 2019-11-15 | End: 2020-02-12

## 2019-11-16 NOTE — PROGRESS NOTES
[facility]  Nephrology Daily Progress Note    Liberty Hatchet  [id number]  78year old  Patient presents with:  Hospital F/U      HPI:   Liberty Hatchet is a 78year old male.   49-year-old male with a history of hypertension, hypercholesterolemia, borderlin Resp 18 - -   Temp 97.5 - -   SpO2 95 - -   Weight - - 249 lbs   Height - - 69   BODY MASS INDEX - 36.77 -       VITALS: WEIGHT ONLY 7/27/2019 10/16/2019 11/15/2019   Weight 255 lbs 253 lbs 8 oz 249 lbs       Constitutional: appears well hydrated alert a (three) times daily. , Disp: 90 capsule, Rfl: 2  •  HYDROcodone-acetaminophen 5-325 MG Oral Tab, Take 1 tablet by mouth every 4 (four) hours as needed. , Disp: 20 tablet, Rfl: 0  •  FUROSEMIDE 40 MG Oral Tab, TAKE ONE TABLET BY MOUTH ONE TIME DAILY.  (hold if Hypercholesterolemia     Cellulitis of left lower extremity     Intractable back pain     Lumbar radiculopathy      His left lumbar radiculopathy is much better status post epidural injection. He will be seen in the pain clinic for follow-up next week.   JOHN

## 2019-11-20 ENCOUNTER — OFFICE VISIT (OUTPATIENT)
Dept: PAIN CLINIC | Facility: HOSPITAL | Age: 79
End: 2019-11-20
Attending: ANESTHESIOLOGY
Payer: MEDICARE

## 2019-11-20 VITALS — SYSTOLIC BLOOD PRESSURE: 114 MMHG | DIASTOLIC BLOOD PRESSURE: 68 MMHG | HEART RATE: 84 BPM

## 2019-11-20 DIAGNOSIS — M54.9 INTRACTABLE BACK PAIN: Primary | ICD-10-CM

## 2019-11-20 PROCEDURE — 99201 HC OUTPT EVAL AND MGNT NEW PT LEVEL 1: CPT

## 2019-11-20 NOTE — PROGRESS NOTES
Patient presents to [facility] ambulatory with cane as a new patient. He was seen in the hospital by pain management and had a lumbar epidural.  He reports 100% pain relief. He is not in any pain today. He took tylenol this morning.   MD Orr in to see dione

## 2019-11-20 NOTE — CHRONIC PAIN
Initial Consultation Note      HISTORY OF PRESENT ILLNESS:  Filemon Tyler is a 78year old old male referred to the pain clinic by Juanita Loomis for LBP which began months ago. The pain radiates into the lower extrmity. No specific inciting event recalled.  Rayo Salvador Comment:Other reaction(s): Unknown  Penicillins                 Comment:Other reaction(s): Rash    SURGICAL HISTORY:  Past Surgical History:   Procedure Laterality Date   • ARTHROSCOPY OF JOINT UNLISTED     • ESOPHAGOGASTRODUODENOSCOPY (EGD) N/A Activity      Alcohol use:  Yes        Alcohol/week: 1.0 standard drinks        Types: 1 Standard drinks or equivalent per week      Drug use: No      Sexual activity: Not on file    Lifestyle      Physical activity:        Days per week: Not on file Flexors 5/5 5/5   Wrist Extensors 5/5 5/5   Intrinsic Hand 5/5 5/5    5/5 5/5     LOWER EXTREMITY      LEFT RIGHT   Iliopsoas 5/5 5/5   Quadriceps 5/5 5/5   Foot DF 5/5 5/5   Foot EHL 5/5 5/5   Gastrocnemius 5/5 5/5     PULSES      LEFT RIGHT   Radial

## 2019-12-03 ENCOUNTER — TELEPHONE (OUTPATIENT)
Dept: NEPHROLOGY | Facility: CLINIC | Age: 79
End: 2019-12-03

## 2019-12-03 NOTE — TELEPHONE ENCOUNTER
Spoke to pharmacist to find out if they know if covered alternative to betamethasone valerate cream. Pharm tech states betamethasone diproprionate cream may be covered.

## 2019-12-03 NOTE — TELEPHONE ENCOUNTER
Current Outpatient Medications   Medication Sig Dispense Refill   • betamethasone valerate 0.1 % External Cream Apply 1 Application topically 2 (two) times daily. 45 g 1     Per pharmacy - this med is not covered under Part D Law, status rejected.   Amos Lacey

## 2019-12-04 RX ORDER — BETAMETHASONE DIPROPIONATE 0.5 MG/G
1 CREAM TOPICAL 2 TIMES DAILY
Qty: 45 G | Refills: 1 | Status: SHIPPED | OUTPATIENT
Start: 2019-12-04 | End: 2020-07-29 | Stop reason: ALTCHOICE

## 2019-12-04 NOTE — TELEPHONE ENCOUNTER
It's $54 for betamethasone valerate. Do you want to send rx for betamethasone diproprionate or keep rx as is?

## 2019-12-17 RX ORDER — SIMVASTATIN 20 MG
TABLET ORAL
Qty: 90 TABLET | Refills: 1 | Status: SHIPPED | OUTPATIENT
Start: 2019-12-17 | End: 2020-06-12

## 2019-12-26 RX ORDER — FUROSEMIDE 40 MG/1
TABLET ORAL
Qty: 30 TABLET | Refills: 5 | Status: SHIPPED | OUTPATIENT
Start: 2019-12-26 | End: 2020-06-18

## 2020-01-27 RX ORDER — OMEPRAZOLE 40 MG/1
CAPSULE, DELAYED RELEASE ORAL
Qty: 90 CAPSULE | Refills: 1 | Status: SHIPPED | OUTPATIENT
Start: 2020-01-27 | End: 2020-07-27

## 2020-02-12 RX ORDER — GABAPENTIN 100 MG/1
CAPSULE ORAL
Qty: 270 CAPSULE | Refills: 0 | Status: SHIPPED | OUTPATIENT
Start: 2020-02-12 | End: 2020-07-27

## 2020-03-24 RX ORDER — LISINOPRIL AND HYDROCHLOROTHIAZIDE 12.5; 1 MG/1; MG/1
TABLET ORAL
Qty: 90 TABLET | Refills: 0 | Status: SHIPPED | OUTPATIENT
Start: 2020-03-24 | End: 2020-06-29

## 2020-03-24 RX ORDER — FINASTERIDE 5 MG/1
TABLET, FILM COATED ORAL
Qty: 90 TABLET | Refills: 0 | Status: SHIPPED | OUTPATIENT
Start: 2020-03-24 | End: 2020-07-14

## 2020-03-29 ENCOUNTER — PATIENT MESSAGE (OUTPATIENT)
Dept: NEPHROLOGY | Facility: CLINIC | Age: 80
End: 2020-03-29

## 2020-03-30 RX ORDER — HYDROCHLOROTHIAZIDE 12.5 MG/1
12.5 TABLET ORAL DAILY
Qty: 90 TABLET | Refills: 0 | Status: SHIPPED | OUTPATIENT
Start: 2020-03-30 | End: 2021-08-02 | Stop reason: ALTCHOICE

## 2020-03-30 RX ORDER — HYDROCHLOROTHIAZIDE 12.5 MG/1
12.5 CAPSULE, GELATIN COATED ORAL DAILY
Refills: 0 | Status: CANCELLED | OUTPATIENT
Start: 2020-03-30

## 2020-03-30 RX ORDER — LISINOPRIL 10 MG/1
10 TABLET ORAL DAILY
Qty: 90 TABLET | Refills: 0 | Status: SHIPPED | OUTPATIENT
Start: 2020-03-30 | End: 2020-07-01

## 2020-03-30 NOTE — TELEPHONE ENCOUNTER
From: Filemon Tyler  To: Rodrigo Samano MD  Sent: 3/29/2020 11:39 PM CDT  Subject: Prescription Question    Dr.  My pharmacist could not fill my prescription for Lisinopril/Hctz 10-12.5 mg tab. They said this drug was on back order and not available now.

## 2020-04-18 NOTE — DISCHARGE SUMMARY
[facility]    Discharge Summary    Gorge Dasilva Patient Status:  Observation    3/6/1940 MRN [id number]   Location [facility] 5SW/SE Attending No att. providers found   Hosp Day # 0 PCP Gio Garcia MD     Date of Admission: pt/ot     HTN  - Cont home meds                  HL  - cont outpt follow up       Discharge Condition: Stable    Discharge Medications:      Discharge Medications      CONTINUE taking these medications      Instructions Prescription details   cyclobenzapri

## 2020-06-12 DIAGNOSIS — E78.00 PURE HYPERCHOLESTEROLEMIA: ICD-10-CM

## 2020-06-12 DIAGNOSIS — I10 ESSENTIAL HYPERTENSION: Primary | ICD-10-CM

## 2020-06-12 RX ORDER — SIMVASTATIN 20 MG
TABLET ORAL
Qty: 90 TABLET | Refills: 0 | Status: SHIPPED | OUTPATIENT
Start: 2020-06-12 | End: 2020-09-10

## 2020-06-12 NOTE — TELEPHONE ENCOUNTER
Last seen 11/15/19. Return to clinic in 6 mos (5/2020) Last lipid panel was done on 9/12/19. Refill pended and routed to Dr. Jay Randolph.

## 2020-06-18 RX ORDER — FUROSEMIDE 40 MG/1
TABLET ORAL
Qty: 30 TABLET | Refills: 0 | Status: SHIPPED | OUTPATIENT
Start: 2020-06-18 | End: 2020-07-23

## 2020-06-18 NOTE — TELEPHONE ENCOUNTER
Last seen 11/15/19. Return to clinic in 6 mos (5/2020) My Chart message sent to patient on 6/12/2020 was read (do labs and make an appointment) Refill(s) pended and routed to Dr. Mirlande Smith.

## 2020-06-29 RX ORDER — LISINOPRIL AND HYDROCHLOROTHIAZIDE 12.5; 1 MG/1; MG/1
TABLET ORAL
Qty: 90 TABLET | Refills: 0 | Status: SHIPPED | OUTPATIENT
Start: 2020-06-29 | End: 2021-08-02 | Stop reason: ALTCHOICE

## 2020-06-29 NOTE — TELEPHONE ENCOUNTER
LOV 11/15/19  No F/U scheduled  See [employer] message from 3/29/20  Patient was prescribed separate prescriptions for lisinopril and hydrochlorothiazide because the combination pill was on back order. Emailed patient to clarify.  Will await response prior

## 2020-06-29 NOTE — TELEPHONE ENCOUNTER
LOV 11/15/19  No F/U scheduled  Separate fills for lisinopril and hydrochlorothiazide were given in march due to pharmacy back order. Confirmed with patient he is now asking for the combination pill again. Pending.

## 2020-07-01 RX ORDER — LISINOPRIL 10 MG/1
TABLET ORAL
Qty: 90 TABLET | Refills: 0 | Status: SHIPPED | OUTPATIENT
Start: 2020-07-01 | End: 2020-09-28

## 2020-07-01 NOTE — TELEPHONE ENCOUNTER
Last seen 11/15/19. Return to clinic in 6 mos (5/2020) No follow up scheduled. Refill(s) pended and routed to Dr. Eliza Quinn.

## 2020-07-08 ENCOUNTER — LAB ENCOUNTER (OUTPATIENT)
Dept: LAB | Age: 80
End: 2020-07-08
Attending: INTERNAL MEDICINE
Payer: MEDICARE

## 2020-07-08 DIAGNOSIS — E78.00 PURE HYPERCHOLESTEROLEMIA: ICD-10-CM

## 2020-07-08 DIAGNOSIS — I10 ESSENTIAL HYPERTENSION: ICD-10-CM

## 2020-07-08 LAB
ALBUMIN SERPL-MCNC: 3.5 G/DL (ref 3.4–5)
ALBUMIN/GLOB SERPL: 1 {RATIO} (ref 1–2)
ALP LIVER SERPL-CCNC: 63 U/L (ref 45–117)
ALT SERPL-CCNC: 20 U/L (ref 16–61)
ANION GAP SERPL CALC-SCNC: 5 MMOL/L (ref 0–18)
AST SERPL-CCNC: 29 U/L (ref 15–37)
BASOPHILS # BLD AUTO: 0.12 X10(3) UL (ref 0–0.2)
BASOPHILS NFR BLD AUTO: 1.5 %
BILIRUB SERPL-MCNC: 0.6 MG/DL (ref 0.1–2)
BUN BLD-MCNC: 18 MG/DL (ref 7–18)
BUN/CREAT SERPL: 22.8 (ref 10–20)
CALCIUM BLD-MCNC: 9.4 MG/DL (ref 8.5–10.1)
CHLORIDE SERPL-SCNC: 99 MMOL/L (ref 98–112)
CHOLEST SMN-MCNC: 127 MG/DL (ref ?–200)
CO2 SERPL-SCNC: 35 MMOL/L (ref 21–32)
CREAT BLD-MCNC: 0.79 MG/DL (ref 0.7–1.3)
DEPRECATED RDW RBC AUTO: 38.2 FL (ref 35.1–46.3)
EOSINOPHIL # BLD AUTO: 0.12 X10(3) UL (ref 0–0.7)
EOSINOPHIL NFR BLD AUTO: 1.5 %
ERYTHROCYTE [DISTWIDTH] IN BLOOD BY AUTOMATED COUNT: 20.1 % (ref 11–15)
GLOBULIN PLAS-MCNC: 3.6 G/DL (ref 2.8–4.4)
GLUCOSE BLD-MCNC: 104 MG/DL (ref 70–99)
HCT VFR BLD AUTO: 45.7 % (ref 39–53)
HDLC SERPL-MCNC: 27 MG/DL (ref 40–59)
HGB BLD-MCNC: 14 G/DL (ref 13–17.5)
IMM GRANULOCYTES # BLD AUTO: 0.02 X10(3) UL (ref 0–1)
IMM GRANULOCYTES NFR BLD: 0.3 %
LDLC SERPL CALC-MCNC: 33 MG/DL (ref ?–100)
LYMPHOCYTES # BLD AUTO: 2.42 X10(3) UL (ref 1–4)
LYMPHOCYTES NFR BLD AUTO: 30.6 %
M PROTEIN MFR SERPL ELPH: 7.1 G/DL (ref 6.4–8.2)
MCH RBC QN AUTO: 19 PG (ref 26–34)
MCHC RBC AUTO-ENTMCNC: 30.6 G/DL (ref 31–37)
MCV RBC AUTO: 61.9 FL (ref 80–100)
MONOCYTES # BLD AUTO: 0.79 X10(3) UL (ref 0.1–1)
MONOCYTES NFR BLD AUTO: 10 %
NEUTROPHILS # BLD AUTO: 4.43 X10 (3) UL (ref 1.5–7.7)
NEUTROPHILS # BLD AUTO: 4.43 X10(3) UL (ref 1.5–7.7)
NEUTROPHILS NFR BLD AUTO: 56.1 %
NONHDLC SERPL-MCNC: 100 MG/DL (ref ?–130)
OSMOLALITY SERPL CALC.SUM OF ELEC: 290 MOSM/KG (ref 275–295)
PATIENT FASTING Y/N/NP: YES
PATIENT FASTING Y/N/NP: YES
PLATELET # BLD AUTO: 219 10(3)UL (ref 150–450)
POTASSIUM SERPL-SCNC: 3.8 MMOL/L (ref 3.5–5.1)
RBC # BLD AUTO: 7.38 X10(6)UL (ref 3.8–5.8)
SODIUM SERPL-SCNC: 139 MMOL/L (ref 136–145)
TRIGL SERPL-MCNC: 337 MG/DL (ref 30–149)
VLDLC SERPL CALC-MCNC: 67 MG/DL (ref 0–30)
WBC # BLD AUTO: 7.9 X10(3) UL (ref 4–11)

## 2020-07-08 PROCEDURE — 80061 LIPID PANEL: CPT

## 2020-07-08 PROCEDURE — 36415 COLL VENOUS BLD VENIPUNCTURE: CPT

## 2020-07-08 PROCEDURE — 85060 BLOOD SMEAR INTERPRETATION: CPT

## 2020-07-08 PROCEDURE — 80053 COMPREHEN METABOLIC PANEL: CPT

## 2020-07-08 PROCEDURE — 85025 COMPLETE CBC W/AUTO DIFF WBC: CPT

## 2020-07-14 RX ORDER — FINASTERIDE 5 MG/1
5 TABLET, FILM COATED ORAL DAILY
Qty: 90 TABLET | Refills: 1 | Status: SHIPPED | OUTPATIENT
Start: 2020-07-14 | End: 2021-01-11

## 2020-07-14 NOTE — TELEPHONE ENCOUNTER
Last seen 11/15/19. Return to clinic in 6 mos (5/2020) Followup scheduled for 7/31/2020. Refill(s) pended and routed to Dr. Savannah To.

## 2020-07-15 RX ORDER — FINASTERIDE 5 MG/1
TABLET, FILM COATED ORAL
Qty: 90 TABLET | Refills: 0 | Status: SHIPPED | OUTPATIENT
Start: 2020-07-15 | End: 2022-01-04

## 2020-07-23 RX ORDER — FUROSEMIDE 40 MG/1
TABLET ORAL
Qty: 30 TABLET | Refills: 0 | Status: SHIPPED | OUTPATIENT
Start: 2020-07-23 | End: 2020-08-20

## 2020-07-23 NOTE — TELEPHONE ENCOUNTER
Last seen 11/15/19. Return to clinic in 6 mos (5/2020) Followup scheduled for 7/31/2020. Refill(s) pended and routed to Dr. Eliza Quinn.

## 2020-07-27 RX ORDER — OMEPRAZOLE 40 MG/1
CAPSULE, DELAYED RELEASE ORAL
Qty: 90 CAPSULE | Refills: 1 | Status: SHIPPED | OUTPATIENT
Start: 2020-07-27 | End: 2021-01-14

## 2020-07-27 RX ORDER — GABAPENTIN 100 MG/1
CAPSULE ORAL
Qty: 270 CAPSULE | Refills: 1 | Status: SHIPPED | OUTPATIENT
Start: 2020-07-27 | End: 2021-01-14

## 2020-07-29 ENCOUNTER — OFFICE VISIT (OUTPATIENT)
Dept: NEPHROLOGY | Facility: CLINIC | Age: 80
End: 2020-07-29
Payer: MEDICARE

## 2020-07-29 VITALS
HEART RATE: 75 BPM | BODY MASS INDEX: 40.97 KG/M2 | DIASTOLIC BLOOD PRESSURE: 55 MMHG | TEMPERATURE: 99 F | HEIGHT: 69 IN | SYSTOLIC BLOOD PRESSURE: 117 MMHG | WEIGHT: 276.63 LBS

## 2020-07-29 DIAGNOSIS — E78.00 PURE HYPERCHOLESTEROLEMIA: ICD-10-CM

## 2020-07-29 DIAGNOSIS — L03.116 CELLULITIS OF LEFT LOWER EXTREMITY: Primary | ICD-10-CM

## 2020-07-29 DIAGNOSIS — I10 ESSENTIAL HYPERTENSION: ICD-10-CM

## 2020-07-29 PROCEDURE — 99214 OFFICE O/P EST MOD 30 MIN: CPT | Performed by: INTERNAL MEDICINE

## 2020-07-29 PROCEDURE — G0463 HOSPITAL OUTPT CLINIC VISIT: HCPCS | Performed by: INTERNAL MEDICINE

## 2020-07-29 RX ORDER — SULFAMETHOXAZOLE AND TRIMETHOPRIM 800; 160 MG/1; MG/1
1 TABLET ORAL 2 TIMES DAILY
Qty: 28 TABLET | Refills: 0 | Status: SHIPPED | OUTPATIENT
Start: 2020-07-29 | End: 2021-03-29 | Stop reason: ALTCHOICE

## 2020-07-29 NOTE — PATIENT INSTRUCTIONS
Call me if you think your infection is getting worse in your left leg. Start antibiotics. Keep your legs elevated as best you can. Minimize excessive ambulation.

## 2020-07-29 NOTE — PROGRESS NOTES
[facility]  Nephrology Daily Progress Note    Refugio Benjamin  [id number]  78year old  Patient presents with:  Edema: Leg leg      HPI:   Refugio Benjamin is a 78year old male.   56-year-old male with a history of hypertension, hypercholesterolemia, borderl 11/15/2019 7/29/2020   Weight 253 lbs 8 oz 249 lbs 276 lbs 10 oz       Constitutional: appears well hydrated alert and responsive no acute distress noted  Neck/Thyroid: neck is supple without adenopathy  Lymphatic: no abnormal cervical, supraclavicular or Disp: 270 capsule, Rfl: 1  •  furosemide 40 MG Oral Tab, TAKE ONE TABLET BY MOUTH ONE TIME DAILY, Disp: 30 tablet, Rfl: 0  •  FINASTERIDE 5 MG Oral Tab, TAKE ONE TABLET BY MOUTH ONE TIME DAILY , Disp: 90 tablet, Rfl: 0  •  finasteride 5 MG Oral Tab, Take 1 hypertension     Hypercholesterolemia     Cellulitis of left lower extremity     Intractable back pain     Lumbar radiculopathy      Patient is developing early cellulitis involving the left lower extremity.   Recommended leg elevation and minimize excess a

## 2020-08-12 ENCOUNTER — TELEPHONE (OUTPATIENT)
Dept: GASTROENTEROLOGY | Facility: CLINIC | Age: 80
End: 2020-08-12

## 2020-08-12 DIAGNOSIS — K76.0 HEPATIC STEATOSIS: ICD-10-CM

## 2020-08-12 DIAGNOSIS — R93.2 ABNORMAL MRI, LIVER: ICD-10-CM

## 2020-08-12 DIAGNOSIS — R16.0 HEPATOMEGALY: Primary | ICD-10-CM

## 2020-08-12 NOTE — TELEPHONE ENCOUNTER
Dr Claudell Public-    Patient is due for recalled 1 year MRI Liver to monitor for stability of hepatic steatosis and hepatomegaly. Please review and sign pended orders.

## 2020-08-12 NOTE — TELEPHONE ENCOUNTER
----- Message from Elaine Ferraro RN sent at 2019 11:31 AM CDT -----  Regardin year MRI of liver recall   MRI recall for 1 year. Last done on 19.  Next due on 20

## 2020-08-12 NOTE — TELEPHONE ENCOUNTER
Patient contacted and given number to central scheduling to make appt for MRI.  He will schedule out 2 weeks to allow time for insurance auth

## 2020-08-18 ENCOUNTER — HOSPITAL ENCOUNTER (OUTPATIENT)
Dept: MRI IMAGING | Age: 80
Discharge: HOME | End: 2020-08-18
Attending: INTERNAL MEDICINE
Payer: MEDICARE

## 2020-08-18 DIAGNOSIS — R16.0 HEPATOMEGALY: ICD-10-CM

## 2020-08-18 DIAGNOSIS — R93.2 ABNORMAL MRI, LIVER: ICD-10-CM

## 2020-08-18 DIAGNOSIS — K76.0 HEPATIC STEATOSIS: ICD-10-CM

## 2020-08-20 RX ORDER — FUROSEMIDE 40 MG/1
TABLET ORAL
Qty: 30 TABLET | Refills: 5 | Status: SHIPPED | OUTPATIENT
Start: 2020-08-20 | End: 2021-02-08

## 2020-09-10 RX ORDER — SIMVASTATIN 20 MG
TABLET ORAL
Qty: 90 TABLET | Refills: 1 | Status: SHIPPED | OUTPATIENT
Start: 2020-09-10 | End: 2021-03-08

## 2020-09-10 NOTE — TELEPHONE ENCOUNTER
Refill request.  LOV 7/29/2020. Lipid panel: 7/8/2020. RTC in 10 days. No F/U apt. Lipid panel 7/8/2020.   Routed to Dr. Mirlande Smith

## 2020-09-17 ENCOUNTER — TELEPHONE (OUTPATIENT)
Dept: GASTROENTEROLOGY | Facility: CLINIC | Age: 80
End: 2020-09-17

## 2020-09-17 NOTE — TELEPHONE ENCOUNTER
I spoke with Erik Anaya, he was unable to complete previous MRI. He needs a larger machine.      He will need to go to [facility] location in [facility]     He was given the contact information below    Call [phone] option #1

## 2020-09-28 RX ORDER — LISINOPRIL 10 MG/1
TABLET ORAL
Qty: 90 TABLET | Refills: 1 | Status: SHIPPED | OUTPATIENT
Start: 2020-09-28 | End: 2021-03-24

## 2021-01-11 RX ORDER — FINASTERIDE 5 MG/1
5 TABLET, FILM COATED ORAL DAILY
Qty: 90 TABLET | Refills: 0 | Status: SHIPPED | OUTPATIENT
Start: 2021-01-11 | End: 2021-08-02 | Stop reason: ALTCHOICE

## 2021-01-11 NOTE — TELEPHONE ENCOUNTER
Last seen 7/29/2020. Return to clinic in 10 days (no follow up scheduled) Refill(s) pended and routed to Dr. Jaylon Carreno.

## 2021-01-14 DIAGNOSIS — E78.00 PURE HYPERCHOLESTEROLEMIA: Primary | ICD-10-CM

## 2021-01-14 RX ORDER — GABAPENTIN 100 MG/1
CAPSULE ORAL
Qty: 270 CAPSULE | Refills: 0 | Status: SHIPPED | OUTPATIENT
Start: 2021-01-14 | End: 2021-04-14

## 2021-01-14 RX ORDER — OMEPRAZOLE 40 MG/1
CAPSULE, DELAYED RELEASE ORAL
Qty: 90 CAPSULE | Refills: 0 | Status: SHIPPED | OUTPATIENT
Start: 2021-01-14 | End: 2021-04-14

## 2021-01-14 NOTE — TELEPHONE ENCOUNTER
Last seen 7/29/2020. Return to clinic in 10 days. No follow up scheduled. Refill(s) pended and routed to Dr. Mirlande Smith.

## 2021-01-23 ENCOUNTER — LAB ENCOUNTER (OUTPATIENT)
Dept: LAB | Age: 81
End: 2021-01-23
Attending: INTERNAL MEDICINE
Payer: MEDICARE

## 2021-01-23 DIAGNOSIS — E78.00 PURE HYPERCHOLESTEROLEMIA: ICD-10-CM

## 2021-01-23 LAB
ALBUMIN SERPL-MCNC: 3.4 G/DL (ref 3.4–5)
ALBUMIN/GLOB SERPL: 0.9 {RATIO} (ref 1–2)
ALP LIVER SERPL-CCNC: 80 U/L
ALT SERPL-CCNC: 21 U/L
ANION GAP SERPL CALC-SCNC: 1 MMOL/L (ref 0–18)
AST SERPL-CCNC: 23 U/L (ref 15–37)
BILIRUB SERPL-MCNC: 0.6 MG/DL (ref 0.1–2)
BILIRUB UR QL: NEGATIVE
BUN BLD-MCNC: 13 MG/DL (ref 7–18)
BUN/CREAT SERPL: 16.3 (ref 10–20)
CALCIUM BLD-MCNC: 9.2 MG/DL (ref 8.5–10.1)
CHLORIDE SERPL-SCNC: 105 MMOL/L (ref 98–112)
CHOLEST SMN-MCNC: 142 MG/DL (ref ?–200)
CLARITY UR: CLEAR
CO2 SERPL-SCNC: 34 MMOL/L (ref 21–32)
COLOR UR: YELLOW
CREAT BLD-MCNC: 0.8 MG/DL
GLOBULIN PLAS-MCNC: 3.6 G/DL (ref 2.8–4.4)
GLUCOSE BLD-MCNC: 108 MG/DL (ref 70–99)
GLUCOSE UR-MCNC: NEGATIVE MG/DL
HDLC SERPL-MCNC: 34 MG/DL (ref 40–59)
HGB UR QL STRIP.AUTO: NEGATIVE
KETONES UR-MCNC: NEGATIVE MG/DL
LDLC SERPL CALC-MCNC: 55 MG/DL (ref ?–100)
LEUKOCYTE ESTERASE UR QL STRIP.AUTO: NEGATIVE
M PROTEIN MFR SERPL ELPH: 7 G/DL (ref 6.4–8.2)
NITRITE UR QL STRIP.AUTO: NEGATIVE
NONHDLC SERPL-MCNC: 108 MG/DL (ref ?–130)
OSMOLALITY SERPL CALC.SUM OF ELEC: 291 MOSM/KG (ref 275–295)
PATIENT FASTING Y/N/NP: YES
PATIENT FASTING Y/N/NP: YES
PH UR: 5 [PH] (ref 5–8)
POTASSIUM SERPL-SCNC: 4.3 MMOL/L (ref 3.5–5.1)
PROT UR-MCNC: NEGATIVE MG/DL
SODIUM SERPL-SCNC: 140 MMOL/L (ref 136–145)
SP GR UR STRIP: 1.03 (ref 1–1.03)
TRIGL SERPL-MCNC: 263 MG/DL (ref 30–149)
UROBILINOGEN UR STRIP-ACNC: <2
VLDLC SERPL CALC-MCNC: 53 MG/DL (ref 0–30)

## 2021-01-23 PROCEDURE — 81003 URINALYSIS AUTO W/O SCOPE: CPT

## 2021-01-23 PROCEDURE — 80053 COMPREHEN METABOLIC PANEL: CPT

## 2021-01-23 PROCEDURE — 36415 COLL VENOUS BLD VENIPUNCTURE: CPT

## 2021-01-23 PROCEDURE — 80061 LIPID PANEL: CPT

## 2021-02-08 RX ORDER — FUROSEMIDE 40 MG/1
TABLET ORAL
Qty: 30 TABLET | Refills: 0 | Status: SHIPPED | OUTPATIENT
Start: 2021-02-08 | End: 2021-03-22

## 2021-03-03 DIAGNOSIS — Z23 NEED FOR VACCINATION: ICD-10-CM

## 2021-03-04 NOTE — TELEPHONE ENCOUNTER
Rx request for Metoprolol Tartrate 25 mg, please review and sign off if appropriate. Thank you.     Last office visit: 7/29/20  Future appt: 3/29/21  Last Refill: 1/27/21

## 2021-03-04 NOTE — TELEPHONE ENCOUNTER
Form for Handicapped parking placard completed and mailed to the [email or web address] in stamped envelope provided by patient. Copy sent to scan. Copy mailed to patient for his records. Calm

## 2021-03-08 RX ORDER — SIMVASTATIN 20 MG
TABLET ORAL
Qty: 30 TABLET | Refills: 0 | Status: SHIPPED | OUTPATIENT
Start: 2021-03-08 | End: 2021-05-29

## 2021-03-22 ENCOUNTER — PATIENT MESSAGE (OUTPATIENT)
Dept: NEPHROLOGY | Facility: CLINIC | Age: 81
End: 2021-03-22

## 2021-03-22 RX ORDER — SULFAMETHOXAZOLE AND TRIMETHOPRIM 800; 160 MG/1; MG/1
1 TABLET ORAL 2 TIMES DAILY
Qty: 20 TABLET | Refills: 0 | Status: SHIPPED | OUTPATIENT
Start: 2021-03-22 | End: 2021-04-01

## 2021-03-22 RX ORDER — FUROSEMIDE 40 MG/1
TABLET ORAL
Qty: 30 TABLET | Refills: 0 | Status: SHIPPED | OUTPATIENT
Start: 2021-03-22 | End: 2021-04-23

## 2021-03-22 NOTE — TELEPHONE ENCOUNTER
Spoke with patient. Discussed below message. Verbalized understanding. Abx order placed. No questions at this time.

## 2021-03-22 NOTE — TELEPHONE ENCOUNTER
Can start Bactrim DS 1 twice daily x10 days but if symptoms worsen he needs to go to urgent care or the ER. Follow-up as scheduled.

## 2021-03-22 NOTE — TELEPHONE ENCOUNTER
From: Katherine Balderas  To: Teja Andrade MD  Sent: 3/22/2021 10:23 AM CDT  Subject: Non-Urgent Medical Question    I think I may have a another infection in my right leg this time. My leg is sensitive to touch and itch's a lot.  My upper leg has grown bigger

## 2021-03-24 RX ORDER — LISINOPRIL 10 MG/1
TABLET ORAL
Qty: 30 TABLET | Refills: 0 | Status: SHIPPED | OUTPATIENT
Start: 2021-03-24 | End: 2021-07-23

## 2021-03-29 ENCOUNTER — OFFICE VISIT (OUTPATIENT)
Dept: NEPHROLOGY | Facility: CLINIC | Age: 81
End: 2021-03-29
Payer: MEDICARE

## 2021-03-29 VITALS
WEIGHT: 277 LBS | HEART RATE: 65 BPM | DIASTOLIC BLOOD PRESSURE: 60 MMHG | HEIGHT: 69 IN | BODY MASS INDEX: 41.03 KG/M2 | SYSTOLIC BLOOD PRESSURE: 120 MMHG

## 2021-03-29 DIAGNOSIS — M17.0 PRIMARY OSTEOARTHRITIS OF BOTH KNEES: ICD-10-CM

## 2021-03-29 DIAGNOSIS — L03.116 CELLULITIS OF LEFT LOWER EXTREMITY: ICD-10-CM

## 2021-03-29 DIAGNOSIS — I10 ESSENTIAL HYPERTENSION: Primary | ICD-10-CM

## 2021-03-29 DIAGNOSIS — E78.00 PURE HYPERCHOLESTEROLEMIA: ICD-10-CM

## 2021-03-29 PROCEDURE — 99214 OFFICE O/P EST MOD 30 MIN: CPT | Performed by: INTERNAL MEDICINE

## 2021-03-30 NOTE — PATIENT INSTRUCTIONS
X-ray her knees. We will then probably refer you to orthopedics. Continue to work on diet, exercise and weight loss.

## 2021-03-30 NOTE — PROGRESS NOTES
[facility]  Nephrology Daily Progress Note    Refugio Benjamin  [id number]  78year old  Patient presents with: Follow - Up: follow up       HPI:   Refugio Benjamin is a 78year old male.   25-year-old male with a history of hypertension, hypercholesterolemia -   SpO2 - - -   Weight 276 lbs 10 oz - 277 lbs   Height 69 - 69   BODY MASS INDEX 40.85 40.91 -       VITALS: WEIGHT ONLY 11/15/2019 7/29/2020 3/29/2021   Weight 249 lbs 276 lbs 10 oz 277 lbs       Constitutional: appears well hydrated alert and responsiv Oral Capsule Delayed Release, TAKE ONE CAPSULE BY MOUTH ONE TIME DAILY , Disp: 90 capsule, Rfl: 0  •  FINASTERIDE 5 MG Oral Tab, TAKE ONE TABLET BY MOUTH ONE TIME DAILY , Disp: 90 tablet, Rfl: 0  •  betamethasone valerate 0.1 % External Cream, Apply 1 Appl hypertension     Hypercholesterolemia     Cellulitis of left lower extremity     Intractable back pain     Lumbar radiculopathy      Overall the patient is doing reasonably well. Really needs to work on diet, exercise and weight loss.   May be a candidate

## 2021-04-08 RX ORDER — FINASTERIDE 5 MG/1
5 TABLET, FILM COATED ORAL DAILY
Qty: 90 TABLET | Refills: 1 | Status: SHIPPED | OUTPATIENT
Start: 2021-04-08 | End: 2021-08-02 | Stop reason: ALTCHOICE

## 2021-04-14 RX ORDER — GABAPENTIN 100 MG/1
CAPSULE ORAL
Qty: 270 CAPSULE | Refills: 1 | Status: SHIPPED | OUTPATIENT
Start: 2021-04-14 | End: 2021-10-18

## 2021-04-14 RX ORDER — OMEPRAZOLE 40 MG/1
CAPSULE, DELAYED RELEASE ORAL
Qty: 90 CAPSULE | Refills: 1 | Status: SHIPPED | OUTPATIENT
Start: 2021-04-14 | End: 2021-10-18

## 2021-04-23 RX ORDER — FUROSEMIDE 40 MG/1
TABLET ORAL
Qty: 90 TABLET | Refills: 1 | Status: SHIPPED | OUTPATIENT
Start: 2021-04-23 | End: 2021-10-18

## 2021-06-01 RX ORDER — SIMVASTATIN 20 MG
20 TABLET ORAL EVERY EVENING
Qty: 30 TABLET | Refills: 3 | Status: SHIPPED | OUTPATIENT
Start: 2021-06-01 | End: 2021-10-08

## 2021-06-15 ENCOUNTER — HOSPITAL ENCOUNTER (OUTPATIENT)
Dept: GENERAL RADIOLOGY | Age: 81
Discharge: HOME | End: 2021-06-15
Attending: INTERNAL MEDICINE
Payer: MEDICARE

## 2021-06-15 ENCOUNTER — TELEPHONE (OUTPATIENT)
Dept: NEPHROLOGY | Facility: CLINIC | Age: 81
End: 2021-06-15

## 2021-06-15 DIAGNOSIS — M17.0 PRIMARY OSTEOARTHRITIS OF BOTH KNEES: ICD-10-CM

## 2021-06-15 PROCEDURE — 73564 X-RAY EXAM KNEE 4 OR MORE: CPT | Performed by: INTERNAL MEDICINE

## 2021-06-15 NOTE — TELEPHONE ENCOUNTER
X-rays of the knees shows moderate to severe arthritis.   Refer to orthopedics for further work-up and evaluation

## 2021-07-12 ENCOUNTER — OFFICE VISIT (OUTPATIENT)
Dept: ORTHOPEDICS CLINIC | Facility: CLINIC | Age: 81
End: 2021-07-12
Payer: MEDICARE

## 2021-07-12 VITALS
HEIGHT: 69 IN | HEART RATE: 70 BPM | BODY MASS INDEX: 39.25 KG/M2 | WEIGHT: 265 LBS | DIASTOLIC BLOOD PRESSURE: 76 MMHG | SYSTOLIC BLOOD PRESSURE: 145 MMHG

## 2021-07-12 DIAGNOSIS — M17.0 PRIMARY OSTEOARTHRITIS OF BOTH KNEES: Primary | ICD-10-CM

## 2021-07-12 PROCEDURE — 99204 OFFICE O/P NEW MOD 45 MIN: CPT | Performed by: ORTHOPAEDIC SURGERY

## 2021-07-12 PROCEDURE — 20610 DRAIN/INJ JOINT/BURSA W/O US: CPT | Performed by: PHYSICIAN ASSISTANT

## 2021-07-12 RX ORDER — TRIAMCINOLONE ACETONIDE 40 MG/ML
40 INJECTION, SUSPENSION INTRA-ARTICULAR; INTRAMUSCULAR ONCE
Status: COMPLETED | OUTPATIENT
Start: 2021-07-12 | End: 2021-07-12

## 2021-07-12 RX ADMIN — TRIAMCINOLONE ACETONIDE 40 MG: 40 INJECTION, SUSPENSION INTRA-ARTICULAR; INTRAMUSCULAR at 10:57:00

## 2021-07-12 NOTE — PROGRESS NOTES
NURSING INTAKE COMMENTS: Patient presents with:  Consult: Bilateral knee pain, Xrays in EPIC, denies any pain at the moment      HPI: This 78year old male presents today with complaints of bilateral knee pain.   He has no pain with sitting, but has severe appointment to be seen if not better. 20 tablet 0   • Acetaminophen ER (TYLENOL ARTHRITIS PAIN) 650 MG Oral Tab CR Take  by mouth.      • LISINOPRIL-HYDROCHLOROTHIAZIDE 10-12.5 MG Oral Tab TAKE ONE TABLET BY MOUTH ONE TIME DAILY  (Patient not taking: Report no numbness or tingling, no weakness or balance disorder  PSYCHE: no depression or anxiety  HEMATOLOGIC: no hx of blood dyscrasia  ENDOCRINE: no thyroid or diabetes issues  ALL/ASTHMA: no new hx of severe allergy or asthma    Physical Examination:    Ht 5' VIEWS), RIGHT (VVD=01782)    Result Date: 6/15/2021  PROCEDURE: XR KNEE, COMPLETE (4 OR MORE VIEWS), RIGHT (MKG=98116)  COMPARISON: [address], X FEMUR RT, 10/13/2006, 2:17 PM.  [address], X KNEE RT, 9/27/2006, 8:27 AM.  IN also provided and the patient had ample time to review it. Under sterile preparation, the left knee was injected with 40 mg of Kenalog and 4 cc 0.5% marcaine. The patient tolerated the procedure well.       Assessment: He has end-stage osteoarthritis of b

## 2021-07-12 NOTE — PROCEDURES
Per verbal order damari up 4ml of 0.5% Marcaine and 1 ml of Kenalog 40 ml for cortisone injection of Left knee.

## 2021-07-23 RX ORDER — LISINOPRIL 10 MG/1
TABLET ORAL
Qty: 30 TABLET | Refills: 1 | Status: SHIPPED | OUTPATIENT
Start: 2021-07-23 | End: 2021-09-30

## 2021-07-28 ENCOUNTER — APPOINTMENT (OUTPATIENT)
Dept: GENERAL RADIOLOGY | Facility: HOSPITAL | Age: 81
End: 2021-07-28
Attending: EMERGENCY MEDICINE
Payer: MEDICARE

## 2021-07-28 ENCOUNTER — HOSPITAL ENCOUNTER (EMERGENCY)
Facility: HOSPITAL | Age: 81
Discharge: HOME | End: 2021-07-28
Attending: EMERGENCY MEDICINE
Payer: MEDICARE

## 2021-07-28 VITALS
SYSTOLIC BLOOD PRESSURE: 117 MMHG | RESPIRATION RATE: 18 BRPM | HEART RATE: 68 BPM | TEMPERATURE: 98 F | OXYGEN SATURATION: 94 % | DIASTOLIC BLOOD PRESSURE: 72 MMHG

## 2021-07-28 DIAGNOSIS — M54.9 INTRACTABLE BACK PAIN: Primary | ICD-10-CM

## 2021-07-28 PROCEDURE — 72100 X-RAY EXAM L-S SPINE 2/3 VWS: CPT | Performed by: EMERGENCY MEDICINE

## 2021-07-28 PROCEDURE — 99283 EMERGENCY DEPT VISIT LOW MDM: CPT

## 2021-07-28 PROCEDURE — 96372 THER/PROPH/DIAG INJ SC/IM: CPT

## 2021-07-28 RX ORDER — KETOROLAC TROMETHAMINE 30 MG/ML
30 INJECTION, SOLUTION INTRAMUSCULAR; INTRAVENOUS ONCE
Status: COMPLETED | OUTPATIENT
Start: 2021-07-28 | End: 2021-07-28

## 2021-07-28 RX ORDER — TRAMADOL HYDROCHLORIDE 50 MG/1
TABLET ORAL EVERY 6 HOURS PRN
Qty: 10 TABLET | Refills: 0 | Status: SHIPPED | OUTPATIENT
Start: 2021-07-28 | End: 2021-08-04

## 2021-07-28 RX ORDER — METHYLPREDNISOLONE 4 MG/1
TABLET ORAL
Qty: 1 EACH | Refills: 0 | Status: SHIPPED | OUTPATIENT
Start: 2021-07-28 | End: 2021-08-02

## 2021-07-28 NOTE — ED QUICK NOTES
Patient admitted from home with acute on chronic back pain. Patient has had back pain x 2 years. He received a cortisone shot x 1 month ago and believes that the relief has caused him to ambulate more, which exacerbated the pain.

## 2021-07-28 NOTE — ED PROVIDER NOTES
Patient Seen in: [facility] AND [facility] Emergency Department      History   Patient presents with:  Back Pain    Stated Complaint:     HPI/Subjective:   HPI    Patient is an 27-year-old man with history as listed below.   He complains of low back pain for the well-nourished. HEENT:   Head: Normocephalic and atraumatic.    Right Ear: External ear normal.   Left Ear: External ear normal.   Nose: Nose normal.   Mouth/Throat: Oropharynx is clear and moist.   Eyes: Conjunctivae and EOM are normal. Pupils are equal, Disposition:  Discharge  7/28/2021 11:09 am    Follow-up:  Kenisha Houser MD  [facility]  [phone]    Schedule an appointment as soon as possible for a visit in 3 days      We recommend that you schedule follow up ca

## 2021-07-28 NOTE — ED QUICK NOTES
Ambulated patient in hallway. Ambulated with assistance complaining of lower back pain. Dr Mckay Winter notified.

## 2021-07-28 NOTE — ED INITIAL ASSESSMENT (HPI)
Patient complains of acute on chronic back pain. Patient has had back pain x 2 years, but recently had a cortisone shot and thinks the acute pain has something to do with increased activity.

## 2021-07-29 ENCOUNTER — TELEPHONE (OUTPATIENT)
Dept: NEPHROLOGY | Facility: CLINIC | Age: 81
End: 2021-07-29

## 2021-07-29 DIAGNOSIS — M54.50 ACUTE MIDLINE LOW BACK PAIN WITHOUT SCIATICA: Primary | ICD-10-CM

## 2021-07-29 NOTE — TELEPHONE ENCOUNTER
pt. wants to know if it is ok with Dr Fili Garcia, for the pt. to make an appt to see Dr Wen Reese for ER f/up for back issues?

## 2021-07-30 NOTE — TELEPHONE ENCOUNTER
Patient was seen in 2019 by Dr. Bassem Allen and had an epidural injection. He thinks he will probably need another one but was told by Dr. Viki Ho office that they need an order to see patient.  Not sure why since patient has Medicare Part B and should be abl

## 2021-07-30 NOTE — TELEPHONE ENCOUNTER
Patient was seen in the ER for back pain and was told to follow up with PCP but he is asking if he can see Dr. Mikki Ogden instead.  (Anesthesiologist-pain management)

## 2021-07-30 NOTE — TELEPHONE ENCOUNTER
Message was sent to Dr. Khadijah Leong and we are waiting for his answer. We will call the patient as soon as we know what Dr. Khadijah Leong says. normal PMI/regular rate and rhythm

## 2021-07-30 NOTE — TELEPHONE ENCOUNTER
Pt called in to follow up on this. He states it is urgent. Pt was also recently discharged form the hospital due to this issue.  Please follow up

## 2021-08-02 ENCOUNTER — OFFICE VISIT (OUTPATIENT)
Dept: NEPHROLOGY | Facility: CLINIC | Age: 81
End: 2021-08-02
Payer: MEDICARE

## 2021-08-02 ENCOUNTER — TELEPHONE (OUTPATIENT)
Dept: NEPHROLOGY | Facility: CLINIC | Age: 81
End: 2021-08-02

## 2021-08-02 VITALS
HEART RATE: 62 BPM | HEIGHT: 69 IN | SYSTOLIC BLOOD PRESSURE: 120 MMHG | WEIGHT: 261 LBS | DIASTOLIC BLOOD PRESSURE: 72 MMHG | BODY MASS INDEX: 38.66 KG/M2

## 2021-08-02 DIAGNOSIS — M54.16 LUMBAR RADICULOPATHY: Primary | ICD-10-CM

## 2021-08-02 DIAGNOSIS — I10 ESSENTIAL HYPERTENSION: ICD-10-CM

## 2021-08-02 DIAGNOSIS — E78.00 PURE HYPERCHOLESTEROLEMIA: ICD-10-CM

## 2021-08-02 PROCEDURE — 99214 OFFICE O/P EST MOD 30 MIN: CPT | Performed by: INTERNAL MEDICINE

## 2021-08-02 RX ORDER — METHYLPREDNISOLONE 4 MG/1
TABLET ORAL
Qty: 1 EACH | Refills: 0 | Status: SHIPPED | OUTPATIENT
Start: 2021-08-02 | End: 2021-09-09

## 2021-08-02 NOTE — PROGRESS NOTES
[facility]  Nephrology Daily Progress Note    Beny Guardado  [id number]  78year old  Patient presents with: Follow - Up: er follow up, back pain       HPI:   Beny Guardado is a 78year old male.   75-year-old male with a history of hypertension, hyperc 120/72   Ortho BP - - -   BP Location - - -   Patient Position - - -   Cuff Size - - -   Pulse 68 - 62   Orthostatic Pulse - - -   Resp 18 - -   Temp - - -   SpO2 94 - -   Weight - - 261 lbs   Height - - 69   BODY MASS INDEX - 38.54 -       VITALS: WEIGHT 3  •  FUROSEMIDE 40 MG Oral Tab, TAKE ONE TABLET BY MOUTH ONE TIME DAILY , Disp: 90 tablet, Rfl: 1  •  OMEPRAZOLE 40 MG Oral Capsule Delayed Release, TAKE ONE CAPSULE BY MOUTH ONE TIME DAILY , Disp: 90 capsule, Rfl: 1  •  GABAPENTIN 100 MG Oral Cap, TAKE O antigen (PSA)     Essential hypertension     Hypercholesterolemia     Cellulitis of left lower extremity     Intractable back pain     Lumbar radiculopathy      Patient has had a flareup of his lower back pain. Responding somewhat to a dose Medrol pack.

## 2021-08-02 NOTE — TELEPHONE ENCOUNTER
Tell the patient he is due for follow-up laboratory studies. Please have him do. I ordered.   Fast for 12 hours

## 2021-08-02 NOTE — PATIENT INSTRUCTIONS
Let me know if your back pain does not improve. Please do follow-up laboratory studies as ordered. Fast for 12 hours.

## 2021-08-03 ENCOUNTER — PATIENT MESSAGE (OUTPATIENT)
Dept: NEPHROLOGY | Facility: CLINIC | Age: 81
End: 2021-08-03

## 2021-08-03 NOTE — TELEPHONE ENCOUNTER
Pt states that he needs a referral entered into Epic for appt with Dr. Markos Roper. Pt was not able to schedule without referral.  Please call.

## 2021-08-03 NOTE — TELEPHONE ENCOUNTER
Spoke with patient, discussed below message. Dr. Evi Conroy, pt requesting referral for Dr. Solomon Schumacher. States he needs one to make an apt. Referral pended if appropriate. Thank you!

## 2021-08-03 NOTE — TELEPHONE ENCOUNTER
From: Abdullahi Friedman  To: Juliane Marino MD  Sent: 8/3/2021 1:47 PM CDT  Subject: Referral Request    Please enter a referral to Dr Roman Woodward in the epic for me per their request so I can make a appointment with him.   They will not let me make an appointment

## 2021-08-13 ENCOUNTER — LAB ENCOUNTER (OUTPATIENT)
Dept: LAB | Age: 81
End: 2021-08-13
Attending: INTERNAL MEDICINE
Payer: MEDICARE

## 2021-08-13 DIAGNOSIS — I10 ESSENTIAL HYPERTENSION: ICD-10-CM

## 2021-08-13 DIAGNOSIS — E78.00 PURE HYPERCHOLESTEROLEMIA: ICD-10-CM

## 2021-08-13 LAB
ALBUMIN SERPL-MCNC: 3.3 G/DL (ref 3.4–5)
ALBUMIN/GLOB SERPL: 1 {RATIO} (ref 1–2)
ALP LIVER SERPL-CCNC: 68 U/L
ALT SERPL-CCNC: 17 U/L
ANION GAP SERPL CALC-SCNC: 4 MMOL/L (ref 0–18)
AST SERPL-CCNC: 13 U/L (ref 15–37)
BASOPHILS # BLD AUTO: 0.13 X10(3) UL (ref 0–0.2)
BASOPHILS NFR BLD AUTO: 1.2 %
BILIRUB SERPL-MCNC: 0.8 MG/DL (ref 0.1–2)
BUN BLD-MCNC: 16 MG/DL (ref 7–18)
BUN/CREAT SERPL: 22.5 (ref 10–20)
CALCIUM BLD-MCNC: 8.8 MG/DL (ref 8.5–10.1)
CHLORIDE SERPL-SCNC: 102 MMOL/L (ref 98–112)
CHOLEST SMN-MCNC: 150 MG/DL (ref ?–200)
CO2 SERPL-SCNC: 33 MMOL/L (ref 21–32)
CREAT BLD-MCNC: 0.71 MG/DL
DEPRECATED RDW RBC AUTO: 38.4 FL (ref 35.1–46.3)
EOSINOPHIL # BLD AUTO: 0.19 X10(3) UL (ref 0–0.7)
EOSINOPHIL NFR BLD AUTO: 1.8 %
ERYTHROCYTE [DISTWIDTH] IN BLOOD BY AUTOMATED COUNT: 20.5 % (ref 11–15)
GLOBULIN PLAS-MCNC: 3.3 G/DL (ref 2.8–4.4)
GLUCOSE BLD-MCNC: 97 MG/DL (ref 70–99)
HCT VFR BLD AUTO: 46.4 %
HDLC SERPL-MCNC: 40 MG/DL (ref 40–59)
HGB BLD-MCNC: 14.1 G/DL
IMM GRANULOCYTES # BLD AUTO: 0.06 X10(3) UL (ref 0–1)
IMM GRANULOCYTES NFR BLD: 0.6 %
LDLC SERPL CALC-MCNC: 79 MG/DL (ref ?–100)
LYMPHOCYTES # BLD AUTO: 2.57 X10(3) UL (ref 1–4)
LYMPHOCYTES NFR BLD AUTO: 24.3 %
M PROTEIN MFR SERPL ELPH: 6.6 G/DL (ref 6.4–8.2)
MCH RBC QN AUTO: 18.7 PG (ref 26–34)
MCHC RBC AUTO-ENTMCNC: 30.4 G/DL (ref 31–37)
MCV RBC AUTO: 61.4 FL
MONOCYTES # BLD AUTO: 0.76 X10(3) UL (ref 0.1–1)
MONOCYTES NFR BLD AUTO: 7.2 %
NEUTROPHILS # BLD AUTO: 6.85 X10 (3) UL (ref 1.5–7.7)
NEUTROPHILS # BLD AUTO: 6.85 X10(3) UL (ref 1.5–7.7)
NEUTROPHILS NFR BLD AUTO: 64.9 %
NONHDLC SERPL-MCNC: 110 MG/DL (ref ?–130)
OSMOLALITY SERPL CALC.SUM OF ELEC: 289 MOSM/KG (ref 275–295)
PATIENT FASTING Y/N/NP: YES
PATIENT FASTING Y/N/NP: YES
PLATELET # BLD AUTO: 227 10(3)UL (ref 150–450)
POTASSIUM SERPL-SCNC: 4 MMOL/L (ref 3.5–5.1)
RBC # BLD AUTO: 7.56 X10(6)UL
SODIUM SERPL-SCNC: 139 MMOL/L (ref 136–145)
TRIGL SERPL-MCNC: 179 MG/DL (ref 30–149)
VLDLC SERPL CALC-MCNC: 28 MG/DL (ref 0–30)
WBC # BLD AUTO: 10.6 X10(3) UL (ref 4–11)

## 2021-08-13 PROCEDURE — 36415 COLL VENOUS BLD VENIPUNCTURE: CPT

## 2021-08-13 PROCEDURE — 85060 BLOOD SMEAR INTERPRETATION: CPT

## 2021-08-13 PROCEDURE — 80053 COMPREHEN METABOLIC PANEL: CPT

## 2021-08-13 PROCEDURE — 80061 LIPID PANEL: CPT

## 2021-08-13 PROCEDURE — 85025 COMPLETE CBC W/AUTO DIFF WBC: CPT

## 2021-08-23 ENCOUNTER — OFFICE VISIT (OUTPATIENT)
Dept: ORTHOPEDICS CLINIC | Facility: CLINIC | Age: 81
End: 2021-08-23
Payer: MEDICARE

## 2021-08-23 VITALS
BODY MASS INDEX: 38.8 KG/M2 | HEIGHT: 69 IN | WEIGHT: 262 LBS | SYSTOLIC BLOOD PRESSURE: 122 MMHG | HEART RATE: 59 BPM | DIASTOLIC BLOOD PRESSURE: 72 MMHG

## 2021-08-23 DIAGNOSIS — M17.11 PRIMARY OSTEOARTHRITIS OF RIGHT KNEE: Primary | ICD-10-CM

## 2021-08-23 PROCEDURE — 20610 DRAIN/INJ JOINT/BURSA W/O US: CPT | Performed by: ORTHOPAEDIC SURGERY

## 2021-08-23 RX ORDER — TRIAMCINOLONE ACETONIDE 40 MG/ML
40 INJECTION, SUSPENSION INTRA-ARTICULAR; INTRAMUSCULAR ONCE
Status: COMPLETED | OUTPATIENT
Start: 2021-08-23 | End: 2021-08-23

## 2021-08-23 RX ADMIN — TRIAMCINOLONE ACETONIDE 40 MG: 40 INJECTION, SUSPENSION INTRA-ARTICULAR; INTRAMUSCULAR at 17:01:00

## 2021-08-23 NOTE — PROGRESS NOTES
Per verbal order from Dr. Maria Eugenia Olson, draw up and 4ml of 0.5% Marcaine and 1ml of Kenalog 40 for injection into right knee}Deann URENA RN  Patient provided education handout for cortisone injection. Patient left office prior to obtaining post injection vitals.

## 2021-08-23 NOTE — PROGRESS NOTES
NURSING INTAKE COMMENTS: Patient presents with:  Knee Pain: Would like R knee injection. Left knee was done at last offie visit with good relief. HPI: This 78year old male presents today for follow-up on his arthritis of his knees.   We saw him abou Acetaminophen ER (TYLENOL ARTHRITIS PAIN) 650 MG Oral Tab CR Take  by mouth.      • methylPREDNISolone (MEDROL) 4 MG Oral Tablet Therapy Pack Dosepack: take as directed (Patient not taking: Reported on 8/23/2021 ) 1 each 0       Aspirin                 Kadie Mara kg)   BMI 38.69 kg/m²   Constitutional: appears well hydrated, alert and responsive, no acute distress noted  Extremities: Small palpable effusion. No redness or warmth. The rest of his exam is unchanged.       Imaging: XR LUMBAR SPINE (MIN 2 VIEWS) (CPT= 08/13/2021    CREATSERUM 0.71 08/13/2021    GFRNAA 87 08/13/2021    GFRAA 100 08/13/2021        Assessment and Plan:  Diagnoses and all orders for this visit:    Primary osteoarthritis of right knee  -     triamcinolone acetonide (KENALOG-40) 40 MG/ML inje

## 2021-08-30 RX ORDER — METHYLPREDNISOLONE 4 MG/1
TABLET ORAL
Qty: 1 EACH | Refills: 0 | OUTPATIENT
Start: 2021-08-30

## 2021-08-30 NOTE — TELEPHONE ENCOUNTER
fyi - Spoke with patient recommended to visit Immediate Care for the back pain. Patient agrees to go.

## 2021-09-07 NOTE — TELEPHONE ENCOUNTER
LOV  8/2/21  No RTC  No F/U    Let message to patient to call back for clarification he is in this medication.

## 2021-09-08 RX ORDER — METHYLPREDNISOLONE 4 MG/1
TABLET ORAL
Qty: 1 EACH | Refills: 0 | OUTPATIENT
Start: 2021-09-08

## 2021-09-09 ENCOUNTER — OFFICE VISIT (OUTPATIENT)
Dept: PAIN CLINIC | Facility: HOSPITAL | Age: 81
End: 2021-09-09
Attending: INTERNAL MEDICINE
Payer: MEDICARE

## 2021-09-09 VITALS
BODY MASS INDEX: 38.8 KG/M2 | SYSTOLIC BLOOD PRESSURE: 116 MMHG | WEIGHT: 262 LBS | RESPIRATION RATE: 18 BRPM | HEART RATE: 66 BPM | HEIGHT: 69 IN | DIASTOLIC BLOOD PRESSURE: 73 MMHG

## 2021-09-09 DIAGNOSIS — M48.061 NEUROFORAMINAL STENOSIS OF LUMBAR SPINE: ICD-10-CM

## 2021-09-09 DIAGNOSIS — M54.10 BILATERAL RADICULAR PAIN: Primary | ICD-10-CM

## 2021-09-09 DIAGNOSIS — M43.16 SPONDYLOLISTHESIS AT L4-L5 LEVEL: ICD-10-CM

## 2021-09-09 DIAGNOSIS — M47.26 OSTEOARTHRITIS OF SPINE WITH RADICULOPATHY, LUMBAR REGION: ICD-10-CM

## 2021-09-09 PROCEDURE — 99211 OFF/OP EST MAY X REQ PHY/QHP: CPT

## 2021-09-09 NOTE — CHRONIC PAIN
Follow-up Note    HISTORY OF PRESENT ILLNESS:  Rikki White is a 78year old old male, originally referred to the pain clinic by Dr. Shobha Presley, returns to the clinic forBilateral radicular pain  (primary encounter diagnosis)  Spondylolisthesis at L4-L5 level OMEPRAZOLE 40 MG Oral Capsule Delayed Release TAKE ONE CAPSULE BY MOUTH ONE TIME DAILY  90 capsule 1   • GABAPENTIN 100 MG Oral Cap TAKE ONE CAPSULE BY MOUTH THREE TIMES DAILY  270 capsule 1   • metoprolol Tartrate 25 MG Oral Tab TAKE 1/2 TABLET BY MOUTH 2 Sciatica of right side        SURGICAL HISTORY:  Past Surgical History:   Procedure Laterality Date   • ARTHROSCOPY OF JOINT UNLISTED     • HC INJ EPIDURAL STEROID LUMBAR OR SACRAL W IMG GUIDANCE     • TONSILLECTOMY         FAMILY HISTORY:  Family History     Lack of Transportation (Non-Medical):   Physical Activity:       Days of Exercise per Week:       Minutes of Exercise per Session:   Stress:       Feeling of Stress :   Social Connections:       Frequency of Communication with Friends and Family:     severe multilevel degenerative disc changes.       2. Mild anterior wedge deformities at T11 and T12, new since 2019, age indeterminate, could be chronic.  Correlate for any acute lower thoracic back pain.       3. Mild-to-moderate multilevel facet arthrop disc or significant central spinal stenosis.                Dictated by (CST): Laurie Alcala MD on 10/17/2019 at 16:55       Approved by (CST): Laurie Alcala MD on 10/17/2019 at 17:02 [id number] [facility] 23 minutes    Nelda Palacios MD  9/9/2021  Anesthesia Chronic Pain Service

## 2021-09-09 NOTE — PROGRESS NOTES
9/9/2021-Presents ambulatory to [facility] to re establish care; Pt last seen 11/20/2019-had received a back injection during admission /c 100% relif;   Today states he is c/o  BILATERAL BUTTOCK PAIN RADIATING UP INTO THE LOWER BACK;  Recent ER visit for intracta

## 2021-09-15 ENCOUNTER — DOCUMENTATION ONLY (OUTPATIENT)
Dept: PAIN CLINIC | Facility: HOSPITAL | Age: 81
End: 2021-09-15

## 2021-09-15 NOTE — PROGRESS NOTES
Procedure code 88541--1209/27/2021 APPROVED  Follow up 10/11/2021 at 10:20 am       Medicare--- No PA needed per pt's insurance   Order form faxed to the surgery center. ,

## 2021-09-27 ENCOUNTER — SURGERY CENTER DOCUMENTATION (OUTPATIENT)
Dept: SURGERY | Age: 81
End: 2021-09-27

## 2021-09-27 NOTE — PROCEDURES
[address]            Patient: Constance Baumann  MR #: [id number]  : 3/6/1940        Operative Report        Date of procedure: 2021    Preoperative diagnosis: Spinal stenosis bilateral radiculopathy    Postop diagnosis:Spinal howard

## 2021-09-29 NOTE — TELEPHONE ENCOUNTER
LOV 2/26/19. RTC 6 months. High Dose Vitamin A Pregnancy And Lactation Text: High dose vitamin A therapy is contraindicated during pregnancy and breast feeding.

## 2021-09-30 RX ORDER — LISINOPRIL 10 MG/1
TABLET ORAL
Qty: 90 TABLET | Refills: 1 | Status: SHIPPED | OUTPATIENT
Start: 2021-09-30

## 2021-10-08 RX ORDER — SIMVASTATIN 20 MG
20 TABLET ORAL EVERY EVENING
Qty: 90 TABLET | Refills: 1 | Status: SHIPPED | OUTPATIENT
Start: 2021-10-08 | End: 2021-12-31 | Stop reason: ALTCHOICE

## 2021-10-11 ENCOUNTER — OFFICE VISIT (OUTPATIENT)
Dept: PAIN CLINIC | Facility: HOSPITAL | Age: 81
End: 2021-10-11
Attending: NURSE PRACTITIONER
Payer: MEDICARE

## 2021-10-11 ENCOUNTER — DOCUMENTATION ONLY (OUTPATIENT)
Dept: PAIN CLINIC | Facility: HOSPITAL | Age: 81
End: 2021-10-11

## 2021-10-11 VITALS — DIASTOLIC BLOOD PRESSURE: 72 MMHG | SYSTOLIC BLOOD PRESSURE: 128 MMHG | HEART RATE: 63 BPM | OXYGEN SATURATION: 94 %

## 2021-10-11 DIAGNOSIS — M54.10 BILATERAL RADICULAR PAIN: Primary | ICD-10-CM

## 2021-10-11 DIAGNOSIS — M47.26 OSTEOARTHRITIS OF SPINE WITH RADICULOPATHY, LUMBAR REGION: ICD-10-CM

## 2021-10-11 DIAGNOSIS — M48.061 NEUROFORAMINAL STENOSIS OF LUMBAR SPINE: ICD-10-CM

## 2021-10-11 DIAGNOSIS — M54.9 INTRACTABLE BACK PAIN: ICD-10-CM

## 2021-10-11 DIAGNOSIS — M43.16 SPONDYLOLISTHESIS AT L4-L5 LEVEL: ICD-10-CM

## 2021-10-11 PROCEDURE — 99211 OFF/OP EST MAY X REQ PHY/QHP: CPT

## 2021-10-11 NOTE — PROGRESS NOTES
Procedure code 57327--40/20/2021 APPROVED      Medicare-- No PA needed per pt's insurance  Order form faxed to the surgery center and confirmation rcv'd

## 2021-10-11 NOTE — CHRONIC PAIN
Follow-up Note  HISTORY OF PRESENT ILLNESS:  Sarah Nguyen is a 78year old old male, originally referred to the pain clinic by  No ref.  provider found, returns to the clinic forBilateral radicular pain  (primary encounter diagnosis)  Spondylolisthesis a daily as needed (Pain). Please make an appointment to be seen if not better. 20 tablet 0   • Acetaminophen ER (TYLENOL ARTHRITIS PAIN) 650 MG Oral Tab CR Take  by mouth.         REVIEW OF SYSTEMS:   Bowel/Bladder Incontinence: none  Coughing/sneezing/strain Occupational History      Not on file    Tobacco Use      Smoking status: Former Smoker        Packs/day: 1.50        Years: 20.00        Pack years: 27        Quit date: 1988        Years since quittin.3      Smokeless tobacco: Never Used    Vapi Status: Not on file  Intimate Partner Violence:       Fear of Current or Ex-Partner: Not on file      Emotionally Abused: Not on file      Physically Abused: Not on file      Sexually Abused: Not on file  Housing Stability:       Unable to Pay for Housing     3. Mild-to-moderate multilevel facet arthropathy, worst at the lower lumbar levels.                 Dictated by (CST): Janice Pedro MD on 7/28/2021 at 10:00 AM       Finalized by (CST): Janice Pedro MD on 7/28/2021 at 10:06 AM             PROCEDURE             [place] PHYSICIAN MONITORING PROGRAM REVIEWED  Yes    ASSESSMENT:   Filemon Tyler is a 78year old  male, with Bilateral radicular pain  (primary encounter diagnosis)  Spondylolisthesis at L4-L5 level  Osteoarthritis of spine with radiculopathy, soo

## 2021-10-18 RX ORDER — GABAPENTIN 100 MG/1
100 CAPSULE ORAL 3 TIMES DAILY
Qty: 270 CAPSULE | Refills: 1 | Status: SHIPPED | OUTPATIENT
Start: 2021-10-18 | End: 2022-02-04

## 2021-10-18 RX ORDER — OMEPRAZOLE 40 MG/1
40 CAPSULE, DELAYED RELEASE ORAL DAILY
Qty: 90 CAPSULE | Refills: 1 | Status: SHIPPED | OUTPATIENT
Start: 2021-10-18 | End: 2022-05-18

## 2021-10-18 RX ORDER — FUROSEMIDE 40 MG/1
TABLET ORAL
Qty: 90 TABLET | Refills: 1 | Status: SHIPPED | OUTPATIENT
Start: 2021-10-18 | End: 2022-04-19

## 2021-10-20 ENCOUNTER — SURGERY CENTER DOCUMENTATION (OUTPATIENT)
Dept: SURGERY | Age: 81
End: 2021-10-20

## 2021-10-20 NOTE — PROCEDURES
Winston Evans.  Operative Report  10/20/2021     Candido Mckeon Patient Status:  No patient class for patient encounter    3/6/1940 MRN [id number]   Location [address] [place] [address] Attending No att. providers found     PCP

## 2021-10-25 ENCOUNTER — OFFICE VISIT (OUTPATIENT)
Dept: ORTHOPEDICS CLINIC | Facility: CLINIC | Age: 81
End: 2021-10-25
Payer: MEDICARE

## 2021-10-25 VITALS — HEART RATE: 60 BPM | DIASTOLIC BLOOD PRESSURE: 68 MMHG | SYSTOLIC BLOOD PRESSURE: 115 MMHG

## 2021-10-25 DIAGNOSIS — M17.0 PRIMARY OSTEOARTHRITIS OF BOTH KNEES: Primary | ICD-10-CM

## 2021-10-25 PROCEDURE — 20610 DRAIN/INJ JOINT/BURSA W/O US: CPT | Performed by: ORTHOPAEDIC SURGERY

## 2021-10-25 PROCEDURE — 99213 OFFICE O/P EST LOW 20 MIN: CPT | Performed by: ORTHOPAEDIC SURGERY

## 2021-10-25 NOTE — PROGRESS NOTES
NURSING INTAKE COMMENTS: Patient presents with:   Follow - Up: Pt had bilateral knee injections 1 mo apart, left knee then right knee, reports injections lasted almost 2 wks, pain 7/10      HPI: This 78year old male presents today with complaints of bilate not taking: Reported on 10/25/2021) 45 g 1       Aspirin                 DIZZINESS  Clarithromycin              Comment:Other reaction(s): Unknown  Penicillins                 Comment:Other reaction(s): Rash  Family History   Problem Relation Age of Onset 14.1 08/13/2021    .0 08/13/2021      Lab Results   Component Value Date    GLU 97 08/13/2021    BUN 16 08/13/2021    CREATSERUM 0.71 08/13/2021    GFRNAA 87 08/13/2021    GFRAA 100 08/13/2021        Assessment and Plan:  Diagnoses and all orders fo

## 2021-10-25 NOTE — PROCEDURES
Per Dr. Burgos [place] verbal instruction, Ivelisse for right knee injection.   Patient had left prior to obtaining post-injection vitals

## 2021-12-31 ENCOUNTER — OFFICE VISIT (OUTPATIENT)
Dept: ORTHOPEDICS CLINIC | Facility: CLINIC | Age: 81
End: 2021-12-31
Payer: MEDICARE

## 2021-12-31 DIAGNOSIS — M17.12 PRIMARY OSTEOARTHRITIS OF LEFT KNEE: Primary | ICD-10-CM

## 2021-12-31 PROCEDURE — 20610 DRAIN/INJ JOINT/BURSA W/O US: CPT | Performed by: ORTHOPAEDIC SURGERY

## 2021-12-31 PROCEDURE — 99213 OFFICE O/P EST LOW 20 MIN: CPT | Performed by: ORTHOPAEDIC SURGERY

## 2021-12-31 NOTE — PROGRESS NOTES
NURSING INTAKE COMMENTS: Patient presents with: Follow - Up: Durolane injections of Left knee       HPI: This 78year old male presents today with complaints of left knee pain.   He had marked improvement of his right knee pain following a Durolane injecti Age of Onset   • Pulmonary Disease Father         Per NG: pneumonia ( cause of death)       Social History    Occupational History      Not on file    Tobacco Use      Smoking status: Former Smoker        Packs/day: 1.50        Years: 20.00        Pack yea 08/13/2021    GFRAA 100 08/13/2021        Assessment and Plan:  Diagnoses and all orders for this visit:    Primary osteoarthritis of left knee  -     DRAIN/INJECT LARGE JOINT/BURSA  -     sodium hyaluronate (DUROLANE) 60 mg        Assessment: Jeramy Medina

## 2022-01-03 VITALS
DIASTOLIC BLOOD PRESSURE: 70 MMHG | HEIGHT: 69 IN | BODY MASS INDEX: 34.21 KG/M2 | WEIGHT: 231 LBS | SYSTOLIC BLOOD PRESSURE: 122 MMHG | HEART RATE: 62 BPM

## 2022-01-04 RX ORDER — FINASTERIDE 5 MG/1
TABLET, FILM COATED ORAL
Qty: 90 TABLET | Refills: 0 | Status: SHIPPED | OUTPATIENT
Start: 2022-01-04

## 2022-02-04 ENCOUNTER — OFFICE VISIT (OUTPATIENT)
Dept: INTERNAL MEDICINE CLINIC | Facility: CLINIC | Age: 82
End: 2022-02-04
Payer: MEDICARE

## 2022-02-04 VITALS
OXYGEN SATURATION: 97 % | WEIGHT: 226 LBS | SYSTOLIC BLOOD PRESSURE: 136 MMHG | RESPIRATION RATE: 14 BRPM | BODY MASS INDEX: 33.47 KG/M2 | DIASTOLIC BLOOD PRESSURE: 75 MMHG | HEART RATE: 68 BPM | HEIGHT: 69 IN

## 2022-02-04 DIAGNOSIS — I21.4 NSTEMI (NON-ST ELEVATED MYOCARDIAL INFARCTION) (HCC): ICD-10-CM

## 2022-02-04 DIAGNOSIS — I10 ESSENTIAL HYPERTENSION: Primary | ICD-10-CM

## 2022-02-04 DIAGNOSIS — E78.00 HYPERCHOLESTEROLEMIA: ICD-10-CM

## 2022-02-04 DIAGNOSIS — Z23 NEED FOR PNEUMOCOCCAL VACCINATION: ICD-10-CM

## 2022-02-04 PROBLEM — I25.10 CORONARY ARTERY DISEASE INVOLVING NATIVE CORONARY ARTERY: Status: ACTIVE | Noted: 2022-02-04

## 2022-02-04 PROBLEM — L03.116 CELLULITIS OF LEFT LOWER EXTREMITY: Status: RESOLVED | Noted: 2017-03-16 | Resolved: 2022-02-04

## 2022-02-04 PROCEDURE — G0009 ADMIN PNEUMOCOCCAL VACCINE: HCPCS | Performed by: INTERNAL MEDICINE

## 2022-02-04 PROCEDURE — 90732 PPSV23 VACC 2 YRS+ SUBQ/IM: CPT | Performed by: INTERNAL MEDICINE

## 2022-02-04 PROCEDURE — 99203 OFFICE O/P NEW LOW 30 MIN: CPT | Performed by: INTERNAL MEDICINE

## 2022-02-04 RX ORDER — ATORVASTATIN CALCIUM 80 MG/1
80 TABLET, FILM COATED ORAL DAILY
COMMUNITY
Start: 2022-02-03

## 2022-02-04 RX ORDER — POTASSIUM CHLORIDE 20 MEQ/1
20 TABLET, EXTENDED RELEASE ORAL DAILY
COMMUNITY

## 2022-02-04 RX ORDER — ACETAMINOPHEN 500 MG
1000 TABLET ORAL 2 TIMES DAILY
COMMUNITY

## 2022-02-04 RX ORDER — CLOPIDOGREL BISULFATE 75 MG/1
75 TABLET ORAL DAILY
COMMUNITY
Start: 2022-01-20

## 2022-02-04 RX ORDER — ATORVASTATIN CALCIUM 40 MG/1
40 TABLET, FILM COATED ORAL NIGHTLY
COMMUNITY
End: 2022-04-01

## 2022-02-04 RX ORDER — GABAPENTIN 100 MG/1
100 CAPSULE ORAL 2 TIMES DAILY
COMMUNITY

## 2022-02-04 RX ORDER — ASPIRIN 81 MG/1
81 TABLET ORAL DAILY
COMMUNITY

## 2022-03-22 RX ORDER — METHYLPREDNISOLONE 4 MG/1
TABLET ORAL
Qty: 21 TABLET | Refills: 0 | OUTPATIENT
Start: 2022-03-22

## 2022-04-01 ENCOUNTER — OFFICE VISIT (OUTPATIENT)
Dept: INTERNAL MEDICINE CLINIC | Facility: CLINIC | Age: 82
End: 2022-04-01
Payer: MEDICARE

## 2022-04-01 VITALS
HEIGHT: 69 IN | BODY MASS INDEX: 33.12 KG/M2 | DIASTOLIC BLOOD PRESSURE: 58 MMHG | HEART RATE: 79 BPM | SYSTOLIC BLOOD PRESSURE: 123 MMHG | WEIGHT: 223.63 LBS | RESPIRATION RATE: 16 BRPM

## 2022-04-01 DIAGNOSIS — I25.10 CORONARY ARTERY DISEASE INVOLVING NATIVE CORONARY ARTERY OF NATIVE HEART WITHOUT ANGINA PECTORIS: Primary | ICD-10-CM

## 2022-04-01 DIAGNOSIS — I10 ESSENTIAL HYPERTENSION: ICD-10-CM

## 2022-04-01 DIAGNOSIS — E78.00 HYPERCHOLESTEROLEMIA: ICD-10-CM

## 2022-04-01 DIAGNOSIS — K21.9 GASTROESOPHAGEAL REFLUX DISEASE WITHOUT ESOPHAGITIS: ICD-10-CM

## 2022-04-01 PROCEDURE — 99214 OFFICE O/P EST MOD 30 MIN: CPT | Performed by: INTERNAL MEDICINE

## 2022-04-01 RX ORDER — FINASTERIDE 5 MG/1
5 TABLET, FILM COATED ORAL DAILY
Qty: 90 TABLET | Refills: 1 | Status: SHIPPED | OUTPATIENT
Start: 2022-04-01

## 2022-04-01 RX ORDER — LISINOPRIL 10 MG/1
10 TABLET ORAL DAILY
Qty: 90 TABLET | Refills: 1 | Status: SHIPPED | OUTPATIENT
Start: 2022-04-01

## 2022-04-19 RX ORDER — FUROSEMIDE 40 MG/1
TABLET ORAL
Qty: 90 TABLET | Refills: 1 | Status: SHIPPED | OUTPATIENT
Start: 2022-04-19

## 2022-04-19 NOTE — TELEPHONE ENCOUNTER
Refill passed per [facility], [facility] protocol    Requested Prescriptions   Pending Prescriptions Disp Refills    furosemide 40 MG Oral Tab 90 tablet 3     Sig: TAKE ONE TABLET BY MOUTH ONE TIME DAILY        Hypertensive Medications Protocol Passed - 4/19/2022  1:33 PM        Passed - CMP or BMP in past 12 months        Passed - Appointment in past 6 or next 3 months        Passed - GFR Non- > 50     Lab Results   Component Value Date    GFRNAA 87 08/13/2021                           Recent Outpatient Visits              2 weeks ago Coronary artery disease involving native coronary artery of native heart without angina pectoris    Darius Mathew MD    Office Visit    2 months ago Essential hypertension    [facility], [facility], Darius RAO MD    Office Visit    3 months ago Primary osteoarthritis of left knee    [facility] for Geoffjoe Montelongo MD    Office Visit    5 months ago Primary osteoarthritis of both knees    [facility], [address], Esteban Montelongo MD    Office Visit    6 months ago Bilateral radicular pain    [facility] GUILLERMO Finn    Office Visit

## 2022-05-18 RX ORDER — OMEPRAZOLE 40 MG/1
40 CAPSULE, DELAYED RELEASE ORAL 2 TIMES DAILY
Qty: 180 CAPSULE | Refills: 1 | Status: SHIPPED | OUTPATIENT
Start: 2022-05-18

## 2022-05-18 NOTE — TELEPHONE ENCOUNTER
Dr. Pasquale Hull:     Patient would like you to manage prescription. Patient currently on omeprazole 40mg BID. As of February. 2/2022 previously given at [facility].

## 2022-08-31 ENCOUNTER — OFFICE VISIT (OUTPATIENT)
Dept: INTERNAL MEDICINE CLINIC | Facility: CLINIC | Age: 82
End: 2022-08-31
Payer: MEDICARE

## 2022-08-31 VITALS
DIASTOLIC BLOOD PRESSURE: 70 MMHG | BODY MASS INDEX: 31.99 KG/M2 | HEART RATE: 62 BPM | WEIGHT: 216 LBS | RESPIRATION RATE: 14 BRPM | HEIGHT: 69 IN | SYSTOLIC BLOOD PRESSURE: 124 MMHG | OXYGEN SATURATION: 98 %

## 2022-08-31 DIAGNOSIS — E78.00 HYPERCHOLESTEROLEMIA: ICD-10-CM

## 2022-08-31 DIAGNOSIS — K21.9 GASTROESOPHAGEAL REFLUX DISEASE WITHOUT ESOPHAGITIS: ICD-10-CM

## 2022-08-31 DIAGNOSIS — I25.10 CORONARY ARTERY DISEASE INVOLVING NATIVE CORONARY ARTERY OF NATIVE HEART WITHOUT ANGINA PECTORIS: Primary | ICD-10-CM

## 2022-08-31 DIAGNOSIS — I10 ESSENTIAL HYPERTENSION: ICD-10-CM

## 2022-08-31 PROCEDURE — 99214 OFFICE O/P EST MOD 30 MIN: CPT | Performed by: INTERNAL MEDICINE

## 2022-08-31 PROCEDURE — 1126F AMNT PAIN NOTED NONE PRSNT: CPT | Performed by: INTERNAL MEDICINE

## 2022-08-31 RX ORDER — POTASSIUM CHLORIDE 20 MEQ/1
20 TABLET, EXTENDED RELEASE ORAL DAILY
Qty: 90 TABLET | Refills: 1 | Status: SHIPPED | OUTPATIENT
Start: 2022-08-31

## 2022-09-01 ENCOUNTER — LAB ENCOUNTER (OUTPATIENT)
Dept: LAB | Age: 82
End: 2022-09-01
Attending: INTERNAL MEDICINE
Payer: MEDICARE

## 2022-09-01 DIAGNOSIS — I10 ESSENTIAL HYPERTENSION: ICD-10-CM

## 2022-09-01 DIAGNOSIS — E78.00 HYPERCHOLESTEROLEMIA: ICD-10-CM

## 2022-09-01 DIAGNOSIS — I25.10 CORONARY ARTERY DISEASE INVOLVING NATIVE CORONARY ARTERY OF NATIVE HEART WITHOUT ANGINA PECTORIS: ICD-10-CM

## 2022-09-01 LAB
ALBUMIN SERPL-MCNC: 3.6 G/DL (ref 3.4–5)
ALBUMIN/GLOB SERPL: 1.2 {RATIO} (ref 1–2)
ALP LIVER SERPL-CCNC: 71 U/L
ALT SERPL-CCNC: 14 U/L
ANION GAP SERPL CALC-SCNC: 6 MMOL/L (ref 0–18)
AST SERPL-CCNC: 16 U/L (ref 15–37)
BILIRUB SERPL-MCNC: 0.6 MG/DL (ref 0.1–2)
BUN BLD-MCNC: 19 MG/DL (ref 7–18)
BUN/CREAT SERPL: 33.3 (ref 10–20)
CALCIUM BLD-MCNC: 9 MG/DL (ref 8.5–10.1)
CHLORIDE SERPL-SCNC: 104 MMOL/L (ref 98–112)
CHOLEST SERPL-MCNC: 111 MG/DL (ref ?–200)
CO2 SERPL-SCNC: 32 MMOL/L (ref 21–32)
CREAT BLD-MCNC: 0.57 MG/DL
DEPRECATED RDW RBC AUTO: 38 FL (ref 35.1–46.3)
ERYTHROCYTE [DISTWIDTH] IN BLOOD BY AUTOMATED COUNT: 19.1 % (ref 11–15)
FASTING PATIENT LIPID ANSWER: YES
FASTING STATUS PATIENT QL REPORTED: YES
GFR SERPLBLD BASED ON 1.73 SQ M-ARVRAT: 97 ML/MIN/1.73M2 (ref 60–?)
GLOBULIN PLAS-MCNC: 2.9 G/DL (ref 2.8–4.4)
GLUCOSE BLD-MCNC: 82 MG/DL (ref 70–99)
HCT VFR BLD AUTO: 38.8 %
HDLC SERPL-MCNC: 33 MG/DL (ref 40–59)
HGB BLD-MCNC: 11.9 G/DL
LDLC SERPL CALC-MCNC: 54 MG/DL (ref ?–100)
MCH RBC QN AUTO: 18.9 PG (ref 26–34)
MCHC RBC AUTO-ENTMCNC: 30.7 G/DL (ref 31–37)
MCV RBC AUTO: 61.6 FL
NONHDLC SERPL-MCNC: 78 MG/DL (ref ?–130)
OSMOLALITY SERPL CALC.SUM OF ELEC: 295 MOSM/KG (ref 275–295)
PLATELET # BLD AUTO: 202 10(3)UL (ref 150–450)
POTASSIUM SERPL-SCNC: 3.7 MMOL/L (ref 3.5–5.1)
PROT SERPL-MCNC: 6.5 G/DL (ref 6.4–8.2)
RBC # BLD AUTO: 6.3 X10(6)UL
SODIUM SERPL-SCNC: 142 MMOL/L (ref 136–145)
TRIGL SERPL-MCNC: 136 MG/DL (ref 30–149)
TSI SER-ACNC: 2.44 MIU/ML (ref 0.36–3.74)
VLDLC SERPL CALC-MCNC: 20 MG/DL (ref 0–30)
WBC # BLD AUTO: 8.2 X10(3) UL (ref 4–11)

## 2022-09-01 PROCEDURE — 80053 COMPREHEN METABOLIC PANEL: CPT

## 2022-09-01 PROCEDURE — 36415 COLL VENOUS BLD VENIPUNCTURE: CPT

## 2022-09-01 PROCEDURE — 80061 LIPID PANEL: CPT

## 2022-09-01 PROCEDURE — 85060 BLOOD SMEAR INTERPRETATION: CPT

## 2022-09-01 PROCEDURE — 84443 ASSAY THYROID STIM HORMONE: CPT

## 2022-09-01 PROCEDURE — 85027 COMPLETE CBC AUTOMATED: CPT

## 2022-09-28 RX ORDER — LISINOPRIL 10 MG/1
10 TABLET ORAL DAILY
Qty: 90 TABLET | Refills: 1 | Status: SHIPPED | OUTPATIENT
Start: 2022-09-28

## 2022-09-28 RX ORDER — FINASTERIDE 5 MG/1
5 TABLET, FILM COATED ORAL DAILY
Qty: 90 TABLET | Refills: 1 | Status: SHIPPED | OUTPATIENT
Start: 2022-09-28

## 2022-09-28 NOTE — TELEPHONE ENCOUNTER
Refill passed per [address] protocol.    Requested Prescriptions   Pending Prescriptions Disp Refills    FINASTERIDE 5 MG Oral Tab [Pharmacy Med Name: Finasteride 5 Mg Tab Auro] 90 tablet 0     Sig: TAKE ONE TABLET BY MOUTH ONE TIME DAILY        Genitourinary Medications Passed - 9/28/2022  5:55 PM        Passed - Patient does not have pulmonary hypertension on problem list        Passed - In person appointment or virtual visit in the past 12 mos or appointment in next 3 mos       Recent Outpatient Visits              4 weeks ago Coronary artery disease involving native coronary artery of native heart without angina pectoris    [address], [address] Rod Neighbors, MD    Office Visit    6 months ago Coronary artery disease involving native coronary artery of native heart without angina pectoris    Henrietta Combs MD    Office Visit    7 months ago Essential hypertension    [address], [address] Henri[address] Quiñones MD    Office Visit    9 months ago Primary osteoarthritis of left knee    [facility] Salo Kang Peter Chalk, MD    Office Visit    11 months ago Primary osteoarthritis of both knees    [facility] for Bony Sanchez MD    Office Visit     Future Appointments         Provider Department Appt Notes    In 1 month Celena Partida MD [address], [address]                   LISINOPRIL 10 MG Oral Tab [Pharmacy Med Name: Lisinopril 10 Mg Tab Solc] 90 tablet 0     Sig: TAKE ONE TABLET BY MOUTH ONE TIME DAILY        Hypertensive Medications Protocol Passed - 9/28/2022  5:55 PM        Passed - In person appointment in the past 12 or next 3 months       Recent Outpatient Visits              4 weeks ago Coronary artery disease involving native coronary artery of native heart without angina pectoris    [address] Sonya Hanna MD    Office Visit    6 months ago Coronary artery disease involving native coronary artery of native heart without angina pectoris    Sonya Miller MD    Office Visit    7 months ago Essential hypertension    Sonya Miller MD    Office Visit    9 months ago Primary osteoarthritis of left knee    [facility], [address], Tri Palumbo MD    Office Visit    11 months ago Primary osteoarthritis of both knees    [facility], [address], Tri Palumbo MD    Office Visit     Future Appointments         Provider Department Appt Notes    In 1 month Prema Dolan MD [address], [address] [facility] [place] [place]                Passed - Last BP reading less than 140/90     BP Readings from Last 1 Encounters:  08/31/22 : 124/70                Passed - CMP or BMP in past 6 months     Recent Results (from the past 4392 hour(s))   COMP METABOLIC PANEL (14)    Collection Time: 09/01/22  8:01 AM   Result Value Ref Range    Glucose 82 70 - 99 mg/dL    Sodium 142 136 - 145 mmol/L    Potassium 3.7 3.5 - 5.1 mmol/L    Chloride 104 98 - 112 mmol/L    CO2 32.0 21.0 - 32.0 mmol/L    Anion Gap 6 0 - 18 mmol/L    BUN 19 (H) 7 - 18 mg/dL    Creatinine 0.57 (L) 0.70 - 1.30 mg/dL    BUN/CREA Ratio 33.3 (H) 10.0 - 20.0    Calcium, Total 9.0 8.5 - 10.1 mg/dL    Calculated Osmolality 295 275 - 295 mOsm/kg    eGFR-Cr 97 >=60 mL/min/1.73m2    ALT 14 (L) 16 - 61 U/L    AST 16 15 - 37 U/L    Alkaline Phosphatase 71 45 - 117 U/L    Bilirubin, Total 0.6 0.1 - 2.0 mg/dL    Total Protein 6.5 6.4 - 8.2 g/dL    Albumin 3.6 3.4 - 5.0 g/dL    Globulin  2.9 2.8 - 4.4 g/dL    A/G Ratio 1.2 1.0 - 2.0    Patient Fasting for CMP?  Yes      *Note: Due to a large number of results and/or encounters for the requested time period, some results have not been displayed. A complete set of results can be found in Results Review.                  Passed - In person appointment or virtual visit in the past 6 months       Recent Outpatient Visits              4 weeks ago Coronary artery disease involving native coronary artery of native heart without angina pectoris    [facility] Darius Qui[facility]ones [facility]    Office Visit    6 months ago Coronary artery disease involving native coronary artery of native heart without angina pectoris    Darius Crowell MD    Office Visit    7 months ago Essential hypertension    [facility] Darius Quiñones MD    Office Visit    9 months ago Primary osteoarthritis of left knee    [employer] for Cheryle Shaggy, MD    Office Visit    11 months ago Primary osteoarthritis of both knees    [employer] for Nilo Tolbert MD    Office Visit     Future Appointments         Provider Department Appt Notes    In 1 month Natalie Birmingham MD [place] [address] or [facility] > 50     GFR Evaluation  EGFRCR: 97 , resulted on 9/1/2022               METOPROLOL TARTRATE 25 MG Oral Tab [Pharmacy Med Name: Metoprolol Tartrate 25 Mg Tab Auro] 90 tablet 0     Sig: TAKE ONE TABLET BY MOUTH ONE TIME DAILY        Hypertensive Medications Protocol Passed - 9/28/2022  5:55 PM        Passed - In person appointment in the past 12 or next 3 months       Recent Outpatient Visits              4 weeks ago Coronary artery disease involving native coronary artery of native heart without angina pectoris    [facility] Darius Quiñones MD    Office Visit    6 months ago Coronary artery disease involving native coronary artery of native heart without angina pectoris    [facility], [address] Qui[address]ones MD    Office Visit    7 months ago Essential hypertension    [place] [address] Sena MD    Office Visit    9 months ago Primary osteoarthritis of left knee    [facility], [address], Yusef Palumbo MD    Office Visit    11 months ago Primary osteoarthritis of both knees    [facility], [address], Yusef Palumbo MD    Office Visit     Future Appointments         Provider Department Appt Notes    In 1 month Maria E Mike MD [address] [place], [address]                Passed - Last BP reading less than 140/90     BP Readings from Last 1 Encounters:  08/31/22 : 124/70                Passed - CMP or BMP in past 6 months     Recent Results (from the past 4392 hour(s))   COMP METABOLIC PANEL (14)    Collection Time: 09/01/22  8:01 AM   Result Value Ref Range    Glucose 82 70 - 99 mg/dL    Sodium 142 136 - 145 mmol/L    Potassium 3.7 3.5 - 5.1 mmol/L    Chloride 104 98 - 112 mmol/L    CO2 32.0 21.0 - 32.0 mmol/L    Anion Gap 6 0 - 18 mmol/L    BUN 19 (H) 7 - 18 mg/dL    Creatinine 0.57 (L) 0.70 - 1.30 mg/dL    BUN/CREA Ratio 33.3 (H) 10.0 - 20.0    Calcium, Total 9.0 8.5 - 10.1 mg/dL    Calculated Osmolality 295 275 - 295 mOsm/kg    eGFR-Cr 97 >=60 mL/min/1.73m2    ALT 14 (L) 16 - 61 U/L    AST 16 15 - 37 U/L    Alkaline Phosphatase 71 45 - 117 U/L    Bilirubin, Total 0.6 0.1 - 2.0 mg/dL    Total Protein 6.5 6.4 - 8.2 g/dL    Albumin 3.6 3.4 - 5.0 g/dL    Globulin  2.9 2.8 - 4.4 g/dL    A/G Ratio 1.2 1.0 - 2.0    Patient Fasting for CMP? Yes      *Note: Due to a large number of results and/or encounters for the requested time period, some results have not been displayed. A complete set of results can be found in Results Review.                  Passed - In person appointment or virtual visit in the past 6 months       Recent Outpatient Visits 4 weeks ago Coronary artery disease involving native coronary artery of native heart without angina pectoris    [facility], [address], Daphine Holstein, MD    Office Visit    6 months ago Coronary artery disease involving native coronary artery of native heart without angina pectoris    Cathrine Brazil, Daphine Holstein, MD    Office Visit    7 months ago Essential hypertension    [facility], [address], Daphine Holstein, MD    Office Visit    9 months ago Primary osteoarthritis of left knee    [facility] [facility], [address], Shweta Hector MD    Office Visit    11 months ago Primary osteoarthritis of both knees    [facility] for Emogene BootsBessy MD    Office Visit     Future Appointments         Provider Department Appt Notes    In 1 month MD Aziza Rai, [facility] or [facility] > 50     GFR Evaluation  EGFRCR: 97 , resulted on 9/1/2022                  Recent Outpatient Visits              4 weeks ago Coronary artery disease involving native coronary artery of native heart without angina pectoris    [facility], [address], Daphine Holstein, MD    Office Visit    6 months ago Coronary artery disease involving native coronary artery of native heart without angina pectoris    Maritza [place], Daphine Holstein, MD    Office Visit    7 months ago Essential hypertension    Cathrine Brazil, Daphine Holstein, MD    Office Visit    9 months ago Primary osteoarthritis of left knee    [facility] [place] [facility] for Emogene BootsBessy MD    Office Visit    11 months ago Primary osteoarthritis of both knees    [facility] [place] [facility] for Emogene BootsBessy MD    Office Visit Future Appointments         Provider Department Appt Notes    In 1 month Jessica Marquez MD [address]

## 2022-10-13 RX ORDER — FUROSEMIDE 40 MG/1
TABLET ORAL
Qty: 90 TABLET | Refills: 1 | Status: SHIPPED | OUTPATIENT
Start: 2022-10-13 | End: 2023-04-06

## 2022-10-14 NOTE — TELEPHONE ENCOUNTER
Ref[place] passed per [place] [facility] protocol.     Requested Prescriptions   Pending Prescriptions Disp Refills    FUROSEMIDE 40 MG Oral Tab [Pharmacy Med Name: Furosemide 40 Mg Tab Solc] 90 tablet 0     Sig: TAKE ONE TABLET BY MOUTH ONE TIME DAILY        Hypertensive Medications Protocol Passed - 10/13/2022  1:30 AM        Passed - In person appointment in the past 12 or next 3 months       Recent Outpatient Visits              1 month ago Coronary artery disease involving native coronary artery of native heart without angina pectoris    [place] [employer] [place] [facility], [address], Mesha Stuart MD    Office Visit    6 months ago Coronary artery disease involving native coronary artery of native heart without angina pectoris    [address], Elly Chin MD    Office Visit    8 months ago Essential hypertension    [facility], [address], Elly Chin MD    Office Visit    9 months ago Primary osteoarthritis of left knee    [facility] for Alphchristina Alanis MD    Office Visit    11 months ago Primary osteoarthritis of both knees    [facility] [address], Deon Trejo MD    Office Visit     Future Appointments         Provider Department Appt Notes    In 3 weeks Kenia Plasencia MD [place] [employer] [place], [place]     In 1 month Evan Sandoval MD [facility], [address], [place] Knee pain, shot               Passed - Last BP reading less than 140/90     BP Readings from Last 1 Encounters:  08/31/22 : 124/70                Passed - CMP or BMP in past 6 months     Recent Results (from the past 4392 hour(s))   COMP METABOLIC PANEL (14)    Collection Time: 09/01/22  8:01 AM   Result Value Ref Range    Glucose 82 70 - 99 mg/dL    Sodium 142 136 - 145 mmol/L    Potassium 3.7 3.5 - 5.1 mmol/L    Chloride 104 98 - 112 mmol/L    CO2 32.0 21.0 - 32.0 mmol/L    Anion Gap 6 0 - 18 mmol/L    BUN 19 (H) 7 - 18 mg/dL    Creatinine 0.57 (L) 0.70 - 1.30 mg/dL    BUN/CREA Ratio 33.3 (H) 10.0 - 20.0    Calcium, Total 9.0 8.5 - 10.1 mg/dL    Calculated Osmolality 295 275 - 295 mOsm/kg    eGFR-Cr 97 >=60 mL/min/1.73m2    ALT 14 (L) 16 - 61 U/L    AST 16 15 - 37 U/L    Alkaline Phosphatase 71 45 - 117 U/L    Bilirubin, Total 0.6 0.1 - 2.0 mg/dL    Total Protein 6.5 6.4 - 8.2 g/dL    Albumin 3.6 3.4 - 5.0 g/dL    Globulin  2.9 2.8 - 4.4 g/dL    A/G Ratio 1.2 1.0 - 2.0    Patient Fasting for CMP? Yes      *Note: Due to a large number of results and/or encounters for the requested time period, some results have not been displayed. A complete set of results can be found in Results Review.                  Passed - In person appointment or virtual visit in the past 6 months       Recent Outpatient Visits              1 month ago Coronary artery disease involving native coronary artery of native heart without angina pectoris    [facility], [address] Ank[address] Quiñones MD    Office Visit    6 months ago Coronary artery disease involving native coronary artery of native heart without angina pectoris    Ankita Cook MD    Office Visit    8 months ago Essential hypertension    [facility], Ankita Hurt MD    Office Visit    9 months ago Primary osteoarthritis of left knee    [facility] [place] [facility] for Elissa Strange MD    Office Visit    11 months ago Primary osteoarthritis of both knees    [facility] for Rolin Collet, Wilver Paniagua MD    Office Visit     Future Appointments         Provider Department Appt Notes    In 3 weeks MD Rosalee Gonzalez [place]     In 1 month Tunde Martinez MD [facility], [address], [place] Knee pain, shot               Passed - EGFRCR or GFRNAA > 50     GFR Evaluation  EGFRCR: 97 , resulted on 9/1/2022                  Future Appointments         Provider Department Appt Notes    In 3 weeks Tanisha Felix MD [address]     In 1 month Niyah Fitch MD [facility] [place] [facility], [address], [place] Knee pain, shot            Recent Outpatient Visits              1 month ago Coronary artery disease involving native coronary artery of native heart without angina pectoris    [address] [place] [address], [address] [facility] [place] [facility]    Office Visit    6 months ago Coronary artery disease involving native coronary artery of native heart without angina pectoris    Kirsty Jackson MD    Office Visit    8 months ago Essential hypertension    Kirsty Jackson MD    Office Visit    9 months ago Primary osteoarthritis of left knee    [facility] [place] [facility] Ana Matute MD    Office Visit    11 months ago Primary osteoarthritis of both knees    [facility] [place] [facility] Veronika Araujo MD    Office Visit

## 2022-11-01 RX ORDER — ATORVASTATIN CALCIUM 80 MG/1
80 TABLET, FILM COATED ORAL DAILY
Qty: 90 TABLET | Refills: 1 | Status: SHIPPED | OUTPATIENT
Start: 2022-11-01

## 2022-11-01 NOTE — TELEPHONE ENCOUNTER
Refill passed per [address] [place] protocol. Requested Prescriptions   Pending Prescriptions Disp Refills    atorvastatin 80 MG Oral Tab 90 tablet 1     Sig: Take 1 tablet (80 mg total) by mouth in the morning.        Cholesterol Medication Protocol Passed - 11/1/2022  2:54 PM        Passed - ALT in past 12 months        Passed - LDL in past 12 months        Passed - Last ALT < 80     Lab Results   Component Value Date    ALT 14 (L) 09/01/2022             Passed - Last LDL < 130     Lab Results   Component Value Date    LDL 54 09/01/2022             Passed - In person appointment or virtual visit in the past 12 mos or appointment in next 3 mos     Recent Outpatient Visits              2 months ago Coronary artery disease involving native coronary artery of native heart without angina pectoris    [address], [address] Darius Quiñones MD    Office Visit    7 months ago Coronary artery disease involving native coronary artery of native heart without angina pectoris    Darius Chatterjee MD    Office Visit    9 months ago Essential hypertension    [address] Wagoner, MD    Office Visit    10 months ago Primary osteoarthritis of left knee    [facility] [place] [facility] for Tonia Sun, Balwinder Vanessa MD    Office Visit    1 year ago Primary osteoarthritis of both knees    [facility] [place] [facility] for Salo Huizar Deyanne Moan, MD    Office Visit          Future Appointments         Provider Department Appt Notes    In 1 week Cali Brown MD [address]     In 2 weeks Connor Biswas MD [facility] [place] [facility], [address], [place] Knee pain, shot                    Recent Outpatient Visits              2 months ago Coronary artery disease involving native coronary artery of native heart without angina pectoris Dottie Stafford, Zane Sharma MD    Office Visit    7 months ago Coronary artery disease involving native coronary artery of native heart without angina pectoris    Dottie Stafford, Phil Woodruff MD    Office Visit    9 months ago Essential hypertension    [facility], [address] MD    Office Visit    10 months ago Primary osteoarthritis of left knee    [facility] [place] [facility] for Angelo Veliz MD    Office Visit    1 year ago Primary osteoarthritis of both knees    [facility] [place] [facility] for Nestor Alvarado MD    Office Visit            Future Appointments         Provider Department Appt Notes    In 1 week Wesley Johnston MD [facility], [place]     In 2 weeks Emily Campos MD [facility] [place] [facility], [address], [place] Knee pain, shot

## 2022-11-09 ENCOUNTER — OFFICE VISIT (OUTPATIENT)
Dept: INTERNAL MEDICINE CLINIC | Facility: CLINIC | Age: 82
End: 2022-11-09
Payer: MEDICARE

## 2022-11-09 VITALS
DIASTOLIC BLOOD PRESSURE: 84 MMHG | OXYGEN SATURATION: 96 % | WEIGHT: 217 LBS | HEIGHT: 69 IN | RESPIRATION RATE: 14 BRPM | BODY MASS INDEX: 32.14 KG/M2 | HEART RATE: 60 BPM | SYSTOLIC BLOOD PRESSURE: 132 MMHG

## 2022-11-09 DIAGNOSIS — E78.00 HYPERCHOLESTEROLEMIA: ICD-10-CM

## 2022-11-09 DIAGNOSIS — I25.10 CORONARY ARTERY DISEASE INVOLVING NATIVE CORONARY ARTERY OF NATIVE HEART WITHOUT ANGINA PECTORIS: Primary | ICD-10-CM

## 2022-11-09 DIAGNOSIS — M54.16 LUMBAR RADICULOPATHY: ICD-10-CM

## 2022-11-09 DIAGNOSIS — I10 ESSENTIAL HYPERTENSION: ICD-10-CM

## 2022-11-09 DIAGNOSIS — M17.11 PRIMARY OSTEOARTHRITIS OF RIGHT KNEE: ICD-10-CM

## 2022-11-09 DIAGNOSIS — K21.9 GASTROESOPHAGEAL REFLUX DISEASE WITHOUT ESOPHAGITIS: ICD-10-CM

## 2022-11-09 DIAGNOSIS — N40.1 BENIGN PROSTATIC HYPERPLASIA WITH LOWER URINARY TRACT SYMPTOMS, SYMPTOM DETAILS UNSPECIFIED: ICD-10-CM

## 2022-11-09 PROBLEM — I21.4 NSTEMI (NON-ST ELEVATED MYOCARDIAL INFARCTION) (HCC): Status: RESOLVED | Noted: 2022-02-04 | Resolved: 2022-11-09

## 2022-11-09 PROBLEM — M54.9 INTRACTABLE BACK PAIN: Status: RESOLVED | Noted: 2019-10-16 | Resolved: 2022-11-09

## 2022-11-09 PROCEDURE — 1126F AMNT PAIN NOTED NONE PRSNT: CPT | Performed by: INTERNAL MEDICINE

## 2022-11-09 PROCEDURE — G0439 PPPS, SUBSEQ VISIT: HCPCS | Performed by: INTERNAL MEDICINE

## 2022-11-09 RX ORDER — GABAPENTIN 100 MG/1
100 CAPSULE ORAL 2 TIMES DAILY
Qty: 180 CAPSULE | Refills: 3 | Status: SHIPPED | OUTPATIENT
Start: 2022-11-09

## 2022-11-09 NOTE — TELEPHONE ENCOUNTER
Lisa Mirza I received a high alert interaction when trying to approve medication. Please review and override if you approve.     Refill pass per protocol    Requested Prescriptions   Pending Prescriptions Disp Refills    OMEPRAZOLE 40 MG Oral Capsule Delayed Release [Pharmacy Med Name: Omeprazole Dr 40 Mg Cap Nort] 180 capsule 0     Sig: TAKE ONE CAPSULE BY MOUTH TWICE DAILY       Gastrointestional Medication Protocol Passed - 11/9/2022  9:51 AM        Passed - In person appointment or virtual visit in the past 12 mos or appointment in next 3 mos     Recent Outpatient Visits              2 months ago Coronary artery disease involving native coronary artery of native heart without angina pectoris    [facility], [address] Dea Molder, MD    Office Visit    7 months ago Coronary artery disease involving native coronary artery of native heart without angina pectoris    Bessy Brown MD    Office Visit    9 months ago Essential hypertension    [address], [address] MD    Office Visit    10 months ago Primary osteoarthritis of left knee    [facility] for Raquel Mckeon MD    Office Visit    1 year ago Primary osteoarthritis of both knees    [facility], [address], [facility] Garrett Sanon MD    Office Visit          Future Appointments         Provider Department Appt Notes    Today Yefri Pickens MD [address], [address]    In 1 week Elena Gaffney MD [facility], [address], [place] Knee pain, shot

## 2022-11-10 RX ORDER — OMEPRAZOLE 40 MG/1
40 CAPSULE, DELAYED RELEASE ORAL 2 TIMES DAILY
Qty: 180 CAPSULE | Refills: 1 | Status: SHIPPED | OUTPATIENT
Start: 2022-11-10

## 2022-11-16 ENCOUNTER — HOSPITAL ENCOUNTER (OUTPATIENT)
Dept: GENERAL RADIOLOGY | Facility: HOSPITAL | Age: 82
Discharge: HOME | End: 2022-11-16
Attending: ORTHOPAEDIC SURGERY
Payer: MEDICARE

## 2022-11-16 ENCOUNTER — OFFICE VISIT (OUTPATIENT)
Dept: ORTHOPEDICS CLINIC | Facility: CLINIC | Age: 82
End: 2022-11-16
Payer: MEDICARE

## 2022-11-16 DIAGNOSIS — M25.562 LEFT KNEE PAIN, UNSPECIFIED CHRONICITY: ICD-10-CM

## 2022-11-16 DIAGNOSIS — M17.12 PRIMARY OSTEOARTHRITIS OF LEFT KNEE: ICD-10-CM

## 2022-11-16 DIAGNOSIS — M25.562 LEFT KNEE PAIN, UNSPECIFIED CHRONICITY: Primary | ICD-10-CM

## 2022-11-16 PROCEDURE — 1125F AMNT PAIN NOTED PAIN PRSNT: CPT | Performed by: ORTHOPAEDIC SURGERY

## 2022-11-16 PROCEDURE — 73562 X-RAY EXAM OF KNEE 3: CPT | Performed by: ORTHOPAEDIC SURGERY

## 2022-11-16 PROCEDURE — 99213 OFFICE O/P EST LOW 20 MIN: CPT | Performed by: ORTHOPAEDIC SURGERY

## 2022-11-16 NOTE — PROCEDURES
Per verbal order from Dr. Goyo Bueno, draw up and 4ml of 0.5% Marcaine and 1ml of Kenalog 40 for injection into the left knee.

## 2022-11-30 ENCOUNTER — OFFICE VISIT (OUTPATIENT)
Dept: ORTHOPEDICS CLINIC | Facility: CLINIC | Age: 82
End: 2022-11-30
Payer: MEDICARE

## 2022-11-30 VITALS
BODY MASS INDEX: 31.1 KG/M2 | HEART RATE: 65 BPM | SYSTOLIC BLOOD PRESSURE: 128 MMHG | WEIGHT: 210 LBS | HEIGHT: 69 IN | DIASTOLIC BLOOD PRESSURE: 57 MMHG | RESPIRATION RATE: 16 BRPM

## 2022-11-30 DIAGNOSIS — M17.0 PRIMARY OSTEOARTHRITIS OF BOTH KNEES: ICD-10-CM

## 2022-11-30 DIAGNOSIS — M17.11 PRIMARY OSTEOARTHRITIS OF RIGHT KNEE: Primary | ICD-10-CM

## 2022-11-30 DIAGNOSIS — M17.12 PRIMARY OSTEOARTHRITIS OF LEFT KNEE: ICD-10-CM

## 2022-11-30 PROCEDURE — 1125F AMNT PAIN NOTED PAIN PRSNT: CPT | Performed by: PHYSICIAN ASSISTANT

## 2022-11-30 PROCEDURE — 20610 DRAIN/INJ JOINT/BURSA W/O US: CPT | Performed by: PHYSICIAN ASSISTANT

## 2022-11-30 RX ORDER — TRIAMCINOLONE ACETONIDE 40 MG/ML
40 INJECTION, SUSPENSION INTRA-ARTICULAR; INTRAMUSCULAR ONCE
Status: COMPLETED | OUTPATIENT
Start: 2022-11-30 | End: 2022-11-30

## 2022-11-30 RX ADMIN — TRIAMCINOLONE ACETONIDE 40 MG: 40 INJECTION, SUSPENSION INTRA-ARTICULAR; INTRAMUSCULAR at 11:45:00

## 2022-11-30 NOTE — PROGRESS NOTES
Per verbal order from Dr. Ryan Bermudez, draw up and 4ml of 0.5% Marcaine and 1ml of Kenalog 40 for injection into right knee. Katheen Phalen, RN  Patient provided education handout for cortisone injection.

## 2022-12-09 ENCOUNTER — TELEPHONE (OUTPATIENT)
Dept: ORTHOPEDICS CLINIC | Facility: CLINIC | Age: 82
End: 2022-12-09

## 2022-12-09 NOTE — TELEPHONE ENCOUNTER
[facility], we have surgery dates available in March 2023.  Patient had a cortisone injection at [address] [place] [address], so March is the earliest.  3/14; 3/17; 3/21; 3/28 ar available till Monday 2/12/22 afternoon

## 2022-12-21 NOTE — CM/SW NOTE
Met with patient at bedside to explain the BPCI/Medicare program. Patient agreed with phone follow up for 3 months from [facility] after discharge from [address] [place] [address]. Patient was enrolled under DRG   603    . BPCI/Medicare letter and brochure provided.  Patient yes

## 2022-12-30 ENCOUNTER — HOSPITAL ENCOUNTER (OUTPATIENT)
Dept: MRI IMAGING | Facility: HOSPITAL | Age: 82
Discharge: HOME | End: 2022-12-30
Attending: PHYSICIAN ASSISTANT
Payer: MEDICARE

## 2022-12-30 DIAGNOSIS — M17.12 PRIMARY OSTEOARTHRITIS OF LEFT KNEE: ICD-10-CM

## 2022-12-30 PROCEDURE — 73721 MRI JNT OF LWR EXTRE W/O DYE: CPT | Performed by: PHYSICIAN ASSISTANT

## 2023-02-15 ENCOUNTER — LAB ENCOUNTER (OUTPATIENT)
Dept: LAB | Age: 83
End: 2023-02-15
Attending: INTERNAL MEDICINE
Payer: MEDICARE

## 2023-02-15 ENCOUNTER — HOSPITAL ENCOUNTER (OUTPATIENT)
Dept: GENERAL RADIOLOGY | Age: 83
Discharge: HOME | End: 2023-02-15
Attending: INTERNAL MEDICINE
Payer: MEDICARE

## 2023-02-15 ENCOUNTER — OFFICE VISIT (OUTPATIENT)
Dept: INTERNAL MEDICINE CLINIC | Facility: CLINIC | Age: 83
End: 2023-02-15

## 2023-02-15 ENCOUNTER — EKG ENCOUNTER (OUTPATIENT)
Dept: LAB | Age: 83
End: 2023-02-15
Attending: INTERNAL MEDICINE
Payer: MEDICARE

## 2023-02-15 VITALS
WEIGHT: 209 LBS | RESPIRATION RATE: 14 BRPM | OXYGEN SATURATION: 97 % | DIASTOLIC BLOOD PRESSURE: 68 MMHG | HEIGHT: 69 IN | BODY MASS INDEX: 30.96 KG/M2 | HEART RATE: 58 BPM | SYSTOLIC BLOOD PRESSURE: 110 MMHG

## 2023-02-15 DIAGNOSIS — Z01.818 PREOPERATIVE CLEARANCE: ICD-10-CM

## 2023-02-15 DIAGNOSIS — I10 ESSENTIAL HYPERTENSION: ICD-10-CM

## 2023-02-15 DIAGNOSIS — M17.0 PRIMARY OSTEOARTHRITIS OF BOTH KNEES: ICD-10-CM

## 2023-02-15 DIAGNOSIS — E78.00 HYPERCHOLESTEROLEMIA: ICD-10-CM

## 2023-02-15 DIAGNOSIS — I25.10 CORONARY ARTERY DISEASE INVOLVING NATIVE CORONARY ARTERY OF NATIVE HEART WITHOUT ANGINA PECTORIS: ICD-10-CM

## 2023-02-15 DIAGNOSIS — Z01.818 PREOPERATIVE CLEARANCE: Primary | ICD-10-CM

## 2023-02-15 LAB
ALBUMIN SERPL-MCNC: 3.5 G/DL (ref 3.4–5)
ALBUMIN/GLOB SERPL: 1.1 {RATIO} (ref 1–2)
ALP LIVER SERPL-CCNC: 68 U/L
ALT SERPL-CCNC: 19 U/L
ANION GAP SERPL CALC-SCNC: 6 MMOL/L (ref 0–18)
AST SERPL-CCNC: 16 U/L (ref 15–37)
ATRIAL RATE: 65 BPM
BILIRUB SERPL-MCNC: 0.5 MG/DL (ref 0.1–2)
BUN BLD-MCNC: 21 MG/DL (ref 7–18)
BUN/CREAT SERPL: 29.2 (ref 10–20)
CALCIUM BLD-MCNC: 8.8 MG/DL (ref 8.5–10.1)
CHLORIDE SERPL-SCNC: 105 MMOL/L (ref 98–112)
CO2 SERPL-SCNC: 33 MMOL/L (ref 21–32)
CREAT BLD-MCNC: 0.72 MG/DL
DEPRECATED RDW RBC AUTO: 37.4 FL (ref 35.1–46.3)
ERYTHROCYTE [DISTWIDTH] IN BLOOD BY AUTOMATED COUNT: 19.4 % (ref 11–15)
FASTING STATUS PATIENT QL REPORTED: NO
GFR SERPLBLD BASED ON 1.73 SQ M-ARVRAT: 90 ML/MIN/1.73M2 (ref 60–?)
GLOBULIN PLAS-MCNC: 3.1 G/DL (ref 2.8–4.4)
GLUCOSE BLD-MCNC: 107 MG/DL (ref 70–99)
HCT VFR BLD AUTO: 39.9 %
HGB BLD-MCNC: 12.6 G/DL
MCH RBC QN AUTO: 19.3 PG (ref 26–34)
MCHC RBC AUTO-ENTMCNC: 31.6 G/DL (ref 31–37)
MCV RBC AUTO: 61 FL
OSMOLALITY SERPL CALC.SUM OF ELEC: 301 MOSM/KG (ref 275–295)
P AXIS: 31 DEGREES
P-R INTERVAL: 156 MS
PLATELET # BLD AUTO: 209 10(3)UL (ref 150–450)
POTASSIUM SERPL-SCNC: 3.9 MMOL/L (ref 3.5–5.1)
PROT SERPL-MCNC: 6.6 G/DL (ref 6.4–8.2)
Q-T INTERVAL: 406 MS
QRS DURATION: 112 MS
QTC CALCULATION (BEZET): 422 MS
R AXIS: -26 DEGREES
RBC # BLD AUTO: 6.54 X10(6)UL
SODIUM SERPL-SCNC: 144 MMOL/L (ref 136–145)
T AXIS: 60 DEGREES
VENTRICULAR RATE: 65 BPM
WBC # BLD AUTO: 9.8 X10(3) UL (ref 4–11)

## 2023-02-15 PROCEDURE — 1126F AMNT PAIN NOTED NONE PRSNT: CPT | Performed by: INTERNAL MEDICINE

## 2023-02-15 PROCEDURE — 93005 ELECTROCARDIOGRAM TRACING: CPT

## 2023-02-15 PROCEDURE — 99214 OFFICE O/P EST MOD 30 MIN: CPT | Performed by: INTERNAL MEDICINE

## 2023-02-15 PROCEDURE — 81015 MICROSCOPIC EXAM OF URINE: CPT

## 2023-02-15 PROCEDURE — 71046 X-RAY EXAM CHEST 2 VIEWS: CPT | Performed by: INTERNAL MEDICINE

## 2023-02-15 PROCEDURE — 87081 CULTURE SCREEN ONLY: CPT

## 2023-02-15 PROCEDURE — 85027 COMPLETE CBC AUTOMATED: CPT

## 2023-02-15 PROCEDURE — 93010 ELECTROCARDIOGRAM REPORT: CPT | Performed by: INTERNAL MEDICINE

## 2023-02-15 PROCEDURE — 36415 COLL VENOUS BLD VENIPUNCTURE: CPT

## 2023-02-15 PROCEDURE — 80053 COMPREHEN METABOLIC PANEL: CPT

## 2023-02-20 RX ORDER — POTASSIUM CHLORIDE 20 MEQ/1
TABLET, EXTENDED RELEASE ORAL
Qty: 90 TABLET | Refills: 0 | Status: SHIPPED | OUTPATIENT
Start: 2023-02-20

## 2023-03-06 ENCOUNTER — TELEPHONE (OUTPATIENT)
Dept: ORTHOPEDICS CLINIC | Facility: CLINIC | Age: 83
End: 2023-03-06

## 2023-03-06 NOTE — TELEPHONE ENCOUNTER
Patient wants to know arrival time for his surgery on 3/14/2023. Patient was the nurse to know that dental report was faxed over to our office today.

## 2023-03-08 NOTE — TELEPHONE ENCOUNTER
Spoke with patient to inform him that our pre admission team will give him a call to give him instructions on what time to come in for his surgery.

## 2023-03-11 ENCOUNTER — LAB ENCOUNTER (OUTPATIENT)
Dept: LAB | Age: 83
End: 2023-03-11
Attending: ORTHOPAEDIC SURGERY
Payer: MEDICARE

## 2023-03-11 DIAGNOSIS — Z01.818 PRE-OP TESTING: ICD-10-CM

## 2023-03-11 LAB
ANTIBODY SCREEN: NEGATIVE
APTT PPP: 34.6 SECONDS (ref 23.3–35.6)
INR BLD: 1.07 (ref 0.85–1.16)
PROTHROMBIN TIME: 13.8 SECONDS (ref 11.6–14.8)
RH BLOOD TYPE: POSITIVE
RH BLOOD TYPE: POSITIVE
SARS-COV-2 RNA RESP QL NAA+PROBE: NOT DETECTED

## 2023-03-11 PROCEDURE — 86900 BLOOD TYPING SEROLOGIC ABO: CPT

## 2023-03-11 PROCEDURE — 85610 PROTHROMBIN TIME: CPT

## 2023-03-11 PROCEDURE — 86901 BLOOD TYPING SEROLOGIC RH(D): CPT

## 2023-03-11 PROCEDURE — 86850 RBC ANTIBODY SCREEN: CPT

## 2023-03-11 PROCEDURE — 36415 COLL VENOUS BLD VENIPUNCTURE: CPT

## 2023-03-11 PROCEDURE — 85730 THROMBOPLASTIN TIME PARTIAL: CPT

## 2023-03-14 ENCOUNTER — HOSPITAL ENCOUNTER (INPATIENT)
Facility: HOSPITAL | Age: 83
LOS: 9 days | Discharge: NURSING, REHAB OR LONG-TERM CARE | End: 2023-03-23
Attending: ORTHOPAEDIC SURGERY | Admitting: ORTHOPAEDIC SURGERY
Payer: MEDICARE

## 2023-03-14 ENCOUNTER — APPOINTMENT (OUTPATIENT)
Dept: GENERAL RADIOLOGY | Facility: HOSPITAL | Age: 83
End: 2023-03-14
Attending: PHYSICIAN ASSISTANT
Payer: MEDICARE

## 2023-03-14 ENCOUNTER — ANESTHESIA EVENT (OUTPATIENT)
Dept: SURGERY | Facility: HOSPITAL | Age: 83
End: 2023-03-14
Payer: MEDICARE

## 2023-03-14 ENCOUNTER — ANESTHESIA (OUTPATIENT)
Dept: SURGERY | Facility: HOSPITAL | Age: 83
End: 2023-03-14
Payer: MEDICARE

## 2023-03-14 DIAGNOSIS — Z01.818 PRE-OP TESTING: Primary | ICD-10-CM

## 2023-03-14 DIAGNOSIS — M17.12 PRIMARY OSTEOARTHRITIS OF LEFT KNEE: ICD-10-CM

## 2023-03-14 LAB
HCT VFR BLD AUTO: 36.9 %
HGB BLD-MCNC: 11.4 G/DL

## 2023-03-14 PROCEDURE — 99233 SBSQ HOSP IP/OBS HIGH 50: CPT | Performed by: INTERNAL MEDICINE

## 2023-03-14 PROCEDURE — 0SRD0J9 REPLACEMENT OF LEFT KNEE JOINT WITH SYNTHETIC SUBSTITUTE, CEMENTED, OPEN APPROACH: ICD-10-PCS | Performed by: ORTHOPAEDIC SURGERY

## 2023-03-14 PROCEDURE — 51700 IRRIGATION OF BLADDER: CPT | Performed by: UROLOGY

## 2023-03-14 PROCEDURE — 73560 X-RAY EXAM OF KNEE 1 OR 2: CPT | Performed by: PHYSICIAN ASSISTANT

## 2023-03-14 PROCEDURE — 99203 OFFICE O/P NEW LOW 30 MIN: CPT | Performed by: UROLOGY

## 2023-03-14 DEVICE — REFOBACIN BC R 1X40 US: Type: IMPLANTABLE DEVICE | Site: KNEE | Status: FUNCTIONAL

## 2023-03-14 DEVICE — FEMUR SPHERE CEMENTED LEFT, SIZE 5
Type: IMPLANTABLE DEVICE | Site: KNEE | Status: FUNCTIONAL
Brand: GMK SPHERE TOTAL KNEE SYSTEM

## 2023-03-14 DEVICE — FIXED TIBIAL TRAY CEMENTED LEFT, SIZE 5
Type: IMPLANTABLE DEVICE | Site: KNEE | Status: FUNCTIONAL
Brand: GMK PRIMARY TOTAL KNEE SYSTEM

## 2023-03-14 DEVICE — RESURFACING PATELLA SIZE 4
Type: IMPLANTABLE DEVICE | Site: KNEE | Status: FUNCTIONAL
Brand: GMK PRIMARY TOTAL KNEE SYSTEM

## 2023-03-14 DEVICE — TIBIAL INSERT FIXED SPHERE FLEX #5/10 MM R
Type: IMPLANTABLE DEVICE | Site: KNEE | Status: FUNCTIONAL
Brand: GMK SPHERE TOTAL KNEE SYSTEM

## 2023-03-14 RX ORDER — CEFAZOLIN SODIUM/WATER 2 G/20 ML
2 SYRINGE (ML) INTRAVENOUS ONCE
Status: COMPLETED | OUTPATIENT
Start: 2023-03-14 | End: 2023-03-14

## 2023-03-14 RX ORDER — MELATONIN
325
Status: DISCONTINUED | OUTPATIENT
Start: 2023-03-15 | End: 2023-03-23

## 2023-03-14 RX ORDER — TRANEXAMIC ACID 650 MG/1
1300 TABLET ORAL ONCE
Status: COMPLETED | OUTPATIENT
Start: 2023-03-14 | End: 2023-03-14

## 2023-03-14 RX ORDER — LIDOCAINE HYDROCHLORIDE 10 MG/ML
INJECTION, SOLUTION EPIDURAL; INFILTRATION; INTRACAUDAL; PERINEURAL AS NEEDED
Status: DISCONTINUED | OUTPATIENT
Start: 2023-03-14 | End: 2023-03-14 | Stop reason: SURG

## 2023-03-14 RX ORDER — HYDROCODONE BITARTRATE AND ACETAMINOPHEN 7.5; 325 MG/1; MG/1
2 TABLET ORAL EVERY 6 HOURS PRN
Status: ACTIVE | OUTPATIENT
Start: 2023-03-14 | End: 2023-03-15

## 2023-03-14 RX ORDER — HYDROMORPHONE HYDROCHLORIDE 1 MG/ML
0.6 INJECTION, SOLUTION INTRAMUSCULAR; INTRAVENOUS; SUBCUTANEOUS EVERY 5 MIN PRN
Status: DISCONTINUED | OUTPATIENT
Start: 2023-03-14 | End: 2023-03-14 | Stop reason: HOSPADM

## 2023-03-14 RX ORDER — HYDROMORPHONE HYDROCHLORIDE 1 MG/ML
0.2 INJECTION, SOLUTION INTRAMUSCULAR; INTRAVENOUS; SUBCUTANEOUS EVERY 5 MIN PRN
Status: DISCONTINUED | OUTPATIENT
Start: 2023-03-14 | End: 2023-03-14 | Stop reason: HOSPADM

## 2023-03-14 RX ORDER — DIPHENHYDRAMINE HYDROCHLORIDE 50 MG/ML
12.5 INJECTION INTRAMUSCULAR; INTRAVENOUS EVERY 4 HOURS PRN
Status: DISCONTINUED | OUTPATIENT
Start: 2023-03-14 | End: 2023-03-23

## 2023-03-14 RX ORDER — BUPIVACAINE HYDROCHLORIDE 7.5 MG/ML
INJECTION, SOLUTION INTRASPINAL AS NEEDED
Status: DISCONTINUED | OUTPATIENT
Start: 2023-03-14 | End: 2023-03-14 | Stop reason: SURG

## 2023-03-14 RX ORDER — GABAPENTIN 100 MG/1
100 CAPSULE ORAL 2 TIMES DAILY
Status: DISCONTINUED | OUTPATIENT
Start: 2023-03-14 | End: 2023-03-23

## 2023-03-14 RX ORDER — FAMOTIDINE 10 MG/ML
20 INJECTION, SOLUTION INTRAVENOUS 2 TIMES DAILY
Status: DISCONTINUED | OUTPATIENT
Start: 2023-03-14 | End: 2023-03-23

## 2023-03-14 RX ORDER — ATORVASTATIN CALCIUM 40 MG/1
80 TABLET, FILM COATED ORAL DAILY
Status: DISCONTINUED | OUTPATIENT
Start: 2023-03-15 | End: 2023-03-23

## 2023-03-14 RX ORDER — DOCUSATE SODIUM 100 MG/1
100 CAPSULE, LIQUID FILLED ORAL 2 TIMES DAILY
Status: DISCONTINUED | OUTPATIENT
Start: 2023-03-14 | End: 2023-03-23

## 2023-03-14 RX ORDER — HYDROMORPHONE HYDROCHLORIDE 1 MG/ML
0.2 INJECTION, SOLUTION INTRAMUSCULAR; INTRAVENOUS; SUBCUTANEOUS EVERY 2 HOUR PRN
Status: DISCONTINUED | OUTPATIENT
Start: 2023-03-14 | End: 2023-03-23

## 2023-03-14 RX ORDER — DIPHENHYDRAMINE HYDROCHLORIDE 50 MG/ML
12.5 INJECTION INTRAMUSCULAR; INTRAVENOUS EVERY 4 HOURS PRN
Status: ACTIVE | OUTPATIENT
Start: 2023-03-14 | End: 2023-03-15

## 2023-03-14 RX ORDER — DIPHENHYDRAMINE HCL 25 MG
25 CAPSULE ORAL EVERY 4 HOURS PRN
Status: DISCONTINUED | OUTPATIENT
Start: 2023-03-14 | End: 2023-03-23

## 2023-03-14 RX ORDER — LISINOPRIL 10 MG/1
10 TABLET ORAL DAILY
Status: DISCONTINUED | OUTPATIENT
Start: 2023-03-15 | End: 2023-03-21

## 2023-03-14 RX ORDER — HALOPERIDOL 5 MG/ML
0.5 INJECTION INTRAMUSCULAR ONCE AS NEEDED
Status: ACTIVE | OUTPATIENT
Start: 2023-03-14 | End: 2023-03-14

## 2023-03-14 RX ORDER — POTASSIUM CHLORIDE 20 MEQ/1
20 TABLET, EXTENDED RELEASE ORAL DAILY
Status: DISCONTINUED | OUTPATIENT
Start: 2023-03-15 | End: 2023-03-23

## 2023-03-14 RX ORDER — MORPHINE SULFATE 4 MG/ML
4 INJECTION, SOLUTION INTRAMUSCULAR; INTRAVENOUS EVERY 10 MIN PRN
Status: DISCONTINUED | OUTPATIENT
Start: 2023-03-14 | End: 2023-03-14 | Stop reason: HOSPADM

## 2023-03-14 RX ORDER — MORPHINE SULFATE 1 MG/ML
INJECTION, SOLUTION EPIDURAL; INTRATHECAL; INTRAVENOUS AS NEEDED
Status: DISCONTINUED | OUTPATIENT
Start: 2023-03-14 | End: 2023-03-14 | Stop reason: SURG

## 2023-03-14 RX ORDER — ACETAMINOPHEN 325 MG/1
650 TABLET ORAL EVERY 6 HOURS PRN
Status: ACTIVE | OUTPATIENT
Start: 2023-03-14 | End: 2023-03-15

## 2023-03-14 RX ORDER — SODIUM PHOSPHATE, DIBASIC AND SODIUM PHOSPHATE, MONOBASIC 7; 19 G/133ML; G/133ML
1 ENEMA RECTAL ONCE AS NEEDED
Status: DISCONTINUED | OUTPATIENT
Start: 2023-03-14 | End: 2023-03-19 | Stop reason: DRUGHIGH

## 2023-03-14 RX ORDER — DIPHENHYDRAMINE HCL 25 MG
25 CAPSULE ORAL EVERY 4 HOURS PRN
Status: ACTIVE | OUTPATIENT
Start: 2023-03-14 | End: 2023-03-15

## 2023-03-14 RX ORDER — DIPHENHYDRAMINE HYDROCHLORIDE 50 MG/ML
25 INJECTION INTRAMUSCULAR; INTRAVENOUS ONCE AS NEEDED
Status: ACTIVE | OUTPATIENT
Start: 2023-03-14 | End: 2023-03-14

## 2023-03-14 RX ORDER — PROCHLORPERAZINE EDISYLATE 5 MG/ML
5 INJECTION INTRAMUSCULAR; INTRAVENOUS ONCE AS NEEDED
Status: ACTIVE | OUTPATIENT
Start: 2023-03-14 | End: 2023-03-14

## 2023-03-14 RX ORDER — HYDROMORPHONE HYDROCHLORIDE 1 MG/ML
0.4 INJECTION, SOLUTION INTRAMUSCULAR; INTRAVENOUS; SUBCUTANEOUS
Status: ACTIVE | OUTPATIENT
Start: 2023-03-14 | End: 2023-03-15

## 2023-03-14 RX ORDER — LABETALOL HYDROCHLORIDE 5 MG/ML
5 INJECTION, SOLUTION INTRAVENOUS EVERY 5 MIN PRN
Status: DISCONTINUED | OUTPATIENT
Start: 2023-03-14 | End: 2023-03-14 | Stop reason: HOSPADM

## 2023-03-14 RX ORDER — ONDANSETRON 2 MG/ML
4 INJECTION INTRAMUSCULAR; INTRAVENOUS EVERY 6 HOURS PRN
Status: DISCONTINUED | OUTPATIENT
Start: 2023-03-14 | End: 2023-03-14 | Stop reason: HOSPADM

## 2023-03-14 RX ORDER — NALOXONE HYDROCHLORIDE 0.4 MG/ML
0.08 INJECTION, SOLUTION INTRAMUSCULAR; INTRAVENOUS; SUBCUTANEOUS
Status: ACTIVE | OUTPATIENT
Start: 2023-03-14 | End: 2023-03-15

## 2023-03-14 RX ORDER — ONDANSETRON 2 MG/ML
INJECTION INTRAMUSCULAR; INTRAVENOUS AS NEEDED
Status: DISCONTINUED | OUTPATIENT
Start: 2023-03-14 | End: 2023-03-14 | Stop reason: SURG

## 2023-03-14 RX ORDER — CEFAZOLIN SODIUM/WATER 2 G/20 ML
2 SYRINGE (ML) INTRAVENOUS EVERY 8 HOURS
Status: COMPLETED | OUTPATIENT
Start: 2023-03-14 | End: 2023-03-15

## 2023-03-14 RX ORDER — NALBUPHINE HCL 10 MG/ML
2.5 AMPUL (ML) INJECTION EVERY 4 HOURS PRN
Status: ACTIVE | OUTPATIENT
Start: 2023-03-14 | End: 2023-03-15

## 2023-03-14 RX ORDER — PHENYLEPHRINE HCL 10 MG/ML
VIAL (ML) INJECTION AS NEEDED
Status: DISCONTINUED | OUTPATIENT
Start: 2023-03-14 | End: 2023-03-14 | Stop reason: SURG

## 2023-03-14 RX ORDER — CELECOXIB 200 MG/1
400 CAPSULE ORAL ONCE
Status: COMPLETED | OUTPATIENT
Start: 2023-03-14 | End: 2023-03-14

## 2023-03-14 RX ORDER — HYDROMORPHONE HYDROCHLORIDE 1 MG/ML
0.4 INJECTION, SOLUTION INTRAMUSCULAR; INTRAVENOUS; SUBCUTANEOUS EVERY 2 HOUR PRN
Status: DISCONTINUED | OUTPATIENT
Start: 2023-03-14 | End: 2023-03-23

## 2023-03-14 RX ORDER — NALOXONE HYDROCHLORIDE 0.4 MG/ML
80 INJECTION, SOLUTION INTRAMUSCULAR; INTRAVENOUS; SUBCUTANEOUS AS NEEDED
Status: DISCONTINUED | OUTPATIENT
Start: 2023-03-14 | End: 2023-03-14 | Stop reason: HOSPADM

## 2023-03-14 RX ORDER — METOCLOPRAMIDE HYDROCHLORIDE 5 MG/ML
10 INJECTION INTRAMUSCULAR; INTRAVENOUS EVERY 8 HOURS PRN
Status: DISCONTINUED | OUTPATIENT
Start: 2023-03-14 | End: 2023-03-14 | Stop reason: HOSPADM

## 2023-03-14 RX ORDER — FAMOTIDINE 20 MG/1
20 TABLET, FILM COATED ORAL 2 TIMES DAILY
Status: DISCONTINUED | OUTPATIENT
Start: 2023-03-14 | End: 2023-03-23

## 2023-03-14 RX ORDER — HYDROCODONE BITARTRATE AND ACETAMINOPHEN 7.5; 325 MG/1; MG/1
1 TABLET ORAL EVERY 6 HOURS PRN
Status: DISPENSED | OUTPATIENT
Start: 2023-03-14 | End: 2023-03-15

## 2023-03-14 RX ORDER — ASPIRIN 81 MG/1
81 TABLET ORAL 2 TIMES DAILY
Status: DISCONTINUED | OUTPATIENT
Start: 2023-03-14 | End: 2023-03-18

## 2023-03-14 RX ORDER — SODIUM CHLORIDE, SODIUM LACTATE, POTASSIUM CHLORIDE, CALCIUM CHLORIDE 600; 310; 30; 20 MG/100ML; MG/100ML; MG/100ML; MG/100ML
INJECTION, SOLUTION INTRAVENOUS CONTINUOUS
Status: DISCONTINUED | OUTPATIENT
Start: 2023-03-14 | End: 2023-03-16

## 2023-03-14 RX ORDER — EPHEDRINE SULFATE 50 MG/ML
INJECTION INTRAVENOUS AS NEEDED
Status: DISCONTINUED | OUTPATIENT
Start: 2023-03-14 | End: 2023-03-14 | Stop reason: SURG

## 2023-03-14 RX ORDER — VANCOMYCIN HYDROCHLORIDE
15 ONCE
Status: COMPLETED | OUTPATIENT
Start: 2023-03-14 | End: 2023-03-14

## 2023-03-14 RX ORDER — FAMOTIDINE 20 MG/1
20 TABLET, FILM COATED ORAL ONCE
Status: DISCONTINUED | OUTPATIENT
Start: 2023-03-14 | End: 2023-03-14 | Stop reason: HOSPADM

## 2023-03-14 RX ORDER — MORPHINE SULFATE 4 MG/ML
2 INJECTION, SOLUTION INTRAMUSCULAR; INTRAVENOUS EVERY 10 MIN PRN
Status: DISCONTINUED | OUTPATIENT
Start: 2023-03-14 | End: 2023-03-14 | Stop reason: HOSPADM

## 2023-03-14 RX ORDER — ACETAMINOPHEN 500 MG
1000 TABLET ORAL ONCE
Status: DISCONTINUED | OUTPATIENT
Start: 2023-03-14 | End: 2023-03-14 | Stop reason: HOSPADM

## 2023-03-14 RX ORDER — HYDROMORPHONE HYDROCHLORIDE 1 MG/ML
0.4 INJECTION, SOLUTION INTRAMUSCULAR; INTRAVENOUS; SUBCUTANEOUS EVERY 5 MIN PRN
Status: DISCONTINUED | OUTPATIENT
Start: 2023-03-14 | End: 2023-03-14 | Stop reason: HOSPADM

## 2023-03-14 RX ORDER — BISACODYL 10 MG
10 SUPPOSITORY, RECTAL RECTAL
Status: DISCONTINUED | OUTPATIENT
Start: 2023-03-14 | End: 2023-03-23

## 2023-03-14 RX ORDER — SENNOSIDES 8.6 MG
17.2 TABLET ORAL NIGHTLY
Status: DISCONTINUED | OUTPATIENT
Start: 2023-03-14 | End: 2023-03-23

## 2023-03-14 RX ORDER — HYDROMORPHONE HYDROCHLORIDE 1 MG/ML
0.6 INJECTION, SOLUTION INTRAMUSCULAR; INTRAVENOUS; SUBCUTANEOUS
Status: ACTIVE | OUTPATIENT
Start: 2023-03-14 | End: 2023-03-15

## 2023-03-14 RX ORDER — DEXAMETHASONE SODIUM PHOSPHATE 4 MG/ML
VIAL (ML) INJECTION AS NEEDED
Status: DISCONTINUED | OUTPATIENT
Start: 2023-03-14 | End: 2023-03-14 | Stop reason: SURG

## 2023-03-14 RX ORDER — SODIUM CHLORIDE, SODIUM LACTATE, POTASSIUM CHLORIDE, CALCIUM CHLORIDE 600; 310; 30; 20 MG/100ML; MG/100ML; MG/100ML; MG/100ML
INJECTION, SOLUTION INTRAVENOUS CONTINUOUS
Status: DISCONTINUED | OUTPATIENT
Start: 2023-03-14 | End: 2023-03-14 | Stop reason: HOSPADM

## 2023-03-14 RX ORDER — METOCLOPRAMIDE 10 MG/1
10 TABLET ORAL ONCE
Status: COMPLETED | OUTPATIENT
Start: 2023-03-14 | End: 2023-03-14

## 2023-03-14 RX ORDER — ONDANSETRON 2 MG/ML
4 INJECTION INTRAMUSCULAR; INTRAVENOUS EVERY 6 HOURS PRN
Status: DISCONTINUED | OUTPATIENT
Start: 2023-03-14 | End: 2023-03-23

## 2023-03-14 RX ORDER — HYDROCODONE BITARTRATE AND ACETAMINOPHEN 5; 325 MG/1; MG/1
1 TABLET ORAL EVERY 4 HOURS PRN
Status: DISCONTINUED | OUTPATIENT
Start: 2023-03-14 | End: 2023-03-23

## 2023-03-14 RX ORDER — ASPIRIN 325 MG
325 TABLET, DELAYED RELEASE (ENTERIC COATED) ORAL 2 TIMES DAILY
Status: CANCELLED | OUTPATIENT
Start: 2023-03-14

## 2023-03-14 RX ORDER — SODIUM CHLORIDE 9 MG/ML
INJECTION, SOLUTION INTRAVENOUS CONTINUOUS
Status: DISCONTINUED | OUTPATIENT
Start: 2023-03-14 | End: 2023-03-16

## 2023-03-14 RX ORDER — POLYETHYLENE GLYCOL 3350 17 G/17G
17 POWDER, FOR SOLUTION ORAL DAILY PRN
Status: DISCONTINUED | OUTPATIENT
Start: 2023-03-14 | End: 2023-03-23

## 2023-03-14 RX ORDER — FINASTERIDE 5 MG/1
5 TABLET, FILM COATED ORAL DAILY
Status: DISCONTINUED | OUTPATIENT
Start: 2023-03-15 | End: 2023-03-23

## 2023-03-14 RX ORDER — FUROSEMIDE 40 MG/1
40 TABLET ORAL DAILY
Status: DISCONTINUED | OUTPATIENT
Start: 2023-03-15 | End: 2023-03-23

## 2023-03-14 RX ORDER — ONDANSETRON 2 MG/ML
4 INJECTION INTRAMUSCULAR; INTRAVENOUS ONCE AS NEEDED
Status: ACTIVE | OUTPATIENT
Start: 2023-03-14 | End: 2023-03-14

## 2023-03-14 RX ORDER — MORPHINE SULFATE 10 MG/ML
6 INJECTION, SOLUTION INTRAMUSCULAR; INTRAVENOUS EVERY 10 MIN PRN
Status: DISCONTINUED | OUTPATIENT
Start: 2023-03-14 | End: 2023-03-14 | Stop reason: HOSPADM

## 2023-03-14 RX ORDER — BUPIVACAINE HYDROCHLORIDE AND EPINEPHRINE 5; 5 MG/ML; UG/ML
INJECTION, SOLUTION PERINEURAL AS NEEDED
Status: DISCONTINUED | OUTPATIENT
Start: 2023-03-14 | End: 2023-03-14 | Stop reason: HOSPADM

## 2023-03-14 RX ADMIN — BUPIVACAINE HYDROCHLORIDE 1.4 ML: 7.5 INJECTION, SOLUTION INTRASPINAL at 10:19:00

## 2023-03-14 RX ADMIN — MORPHINE SULFATE 0.3 MG: 1 INJECTION, SOLUTION EPIDURAL; INTRATHECAL; INTRAVENOUS at 10:19:00

## 2023-03-14 RX ADMIN — SODIUM CHLORIDE, SODIUM LACTATE, POTASSIUM CHLORIDE, CALCIUM CHLORIDE: 600; 310; 30; 20 INJECTION, SOLUTION INTRAVENOUS at 10:10:00

## 2023-03-14 RX ADMIN — LIDOCAINE HYDROCHLORIDE 50 MG: 10 INJECTION, SOLUTION EPIDURAL; INFILTRATION; INTRACAUDAL; PERINEURAL at 10:20:00

## 2023-03-14 RX ADMIN — SODIUM CHLORIDE, SODIUM LACTATE, POTASSIUM CHLORIDE, CALCIUM CHLORIDE: 600; 310; 30; 20 INJECTION, SOLUTION INTRAVENOUS at 12:09:00

## 2023-03-14 RX ADMIN — DEXAMETHASONE SODIUM PHOSPHATE 4 MG: 4 MG/ML VIAL (ML) INJECTION at 10:20:00

## 2023-03-14 RX ADMIN — EPHEDRINE SULFATE 5 MG: 50 INJECTION INTRAVENOUS at 10:48:00

## 2023-03-14 RX ADMIN — ONDANSETRON 4 MG: 2 INJECTION INTRAMUSCULAR; INTRAVENOUS at 10:20:00

## 2023-03-14 RX ADMIN — LIDOCAINE HYDROCHLORIDE 3 ML: 10 INJECTION, SOLUTION EPIDURAL; INFILTRATION; INTRACAUDAL; PERINEURAL at 10:18:00

## 2023-03-14 RX ADMIN — PHENYLEPHRINE HCL 100 MCG: 10 MG/ML VIAL (ML) INJECTION at 10:48:00

## 2023-03-14 RX ADMIN — VANCOMYCIN HYDROCHLORIDE 1.5 G: at 10:10:00

## 2023-03-14 RX ADMIN — SODIUM CHLORIDE, SODIUM LACTATE, POTASSIUM CHLORIDE, CALCIUM CHLORIDE: 600; 310; 30; 20 INJECTION, SOLUTION INTRAVENOUS at 12:31:00

## 2023-03-14 RX ADMIN — CEFAZOLIN SODIUM/WATER 2 G: 2 G/20 ML SYRINGE (ML) INTRAVENOUS at 10:30:00

## 2023-03-14 NOTE — INTERVAL H&P NOTE
Pre-op Diagnosis: Primary osteoarthritis of left knee [M17.12]    The above referenced H&P was reviewed by Fina Bejarano MD on 3/14/2023, the patient was examined and no significant changes have occurred in the patient's condition since the H&P was performed. I discussed with the patient and/or legal representative the potential benefits, risks and side effects of this procedure; the likelihood of the patient achieving goals; and potential problems that might occur during recuperation. I discussed reasonable alternatives to the procedure, including risks, benefits and side effects related to the alternatives and risks related to not receiving this procedure. We will proceed with procedure as planned.

## 2023-03-14 NOTE — PLAN OF CARE
Patient is A&OX4, RA, Patient tolerating diet well. Denies pain. Patient voiding via levi, put in by Dr. Queen Wright. Patient plan to go home once clearances have been met. Bed in lowest position and call light within reach.   Problem: Patient Centered Care  Goal: Patient preferences are identified and integrated in the patient's plan of care  Description: Interventions:  - What would you like us to know as we care for you?   - Provide timely, complete, and accurate information to patient/family  - Incorporate patient and family knowledge, values, beliefs, and cultural backgrounds into the planning and delivery of care  - Encourage patient/family to participate in care and decision-making at the level they choose  - Honor patient and family perspectives and choices  Outcome: Progressing

## 2023-03-14 NOTE — BRIEF OP NOTE
Pre-Operative Diagnosis: Primary osteoarthritis of left knee [M17.12]     Post-Operative Diagnosis: Primary osteoarthritis of left knee [M17.12]      Procedure Performed:   Left total knee arthroplasty    Surgeon(s) and Role:     * Chalo Merritt MD - Primary    Assistant(s):  Surgical Assistant.: Roberto Phillips  PA: Chris Quick PA-C     Surgical Findings: OA     Specimen: path     Estimated Blood Loss: Blood Output: 50 mL (3/14/2023 11:48 AM)      Dictation Number:  [id number]    Corey Clarke MD  3/14/2023  11:55 AM

## 2023-03-14 NOTE — OPERATIVE REPORT
[facility]    PATIENT'S NAME: Bassam ARGUELLO   ATTENDING PHYSICIAN: Bettina Vu MD   OPERATING PHYSICIAN: Bettina Vu MD   PATIENT ACCOUNT#:   [id number]    LOCATION:  [facility] [address] PACU [address] [place] [address]  MEDICAL RECORD #:   [id number]       YOB: 1940  ADMISSION DATE:       03/14/2023      OPERATION DATE:  03/14/2023    OPERATIVE REPORT    PREOPERATIVE DIAGNOSIS:  Osteoarthritis, left knee. POSTOPERATIVE DIAGNOSIS:  Osteoarthritis, left knee. PROCEDURE:  Left total knee arthroplasty with Medacta MRI navigated patient specific instruments. ASSISTANT:  Ziyad Anne PA-C    COMPONENTS USED:  Medacta sphere size 5 femur, size 5 tibia, size 4 patella and a 10 mm spacer. ESTIMATED BLOOD LOSS:  50 mL. COMPLICATIONS:  None. INDICATIONS:  The patient is an 78-year-old male patient with advanced arthritis of the left knee. It has not responded to appropriate conservative management. After discussion of the options for treatment, the patient requested the above procedure. Our discussion included alternative treatments, and also included, but was not limited to, the potential risk of anesthetic complications, infection, DVT or PE, bleeding complications, periprosthetic fracture or extensor mechanism failure, potential need for revision surgery, nerve or vascular injury, unanticipated perioperative orthopedic or medical complications, etc.  Written consent was obtained. OPERATIVE TECHNIQUE:  The patient was brought to the operating room and given appropriate anesthesia. Prior to making an incision, a time out was performed by the surgical team.  A tourniquet was placed, and the knee was prepped and draped in the usual sterile fashion. After exsanguination with an Esmarch bandage, the tourniquet was inflated with the knee fully flexed. A longitudinal incision was made from just superior to the superomedial corner of the patella to the tibial tubercle.   A midvastus approach to the femur was used. The patellar cut was made first, and a patella protector was placed over the cut surface of the patella which was then subluxed into the lateral gutter. The anterior and posterior cruciate ligaments were excised, and the femoral template was placed over the distal femur and seated well. It was pinned anteriorly, and the distal reference holes were drilled. The distal femoral cutting guide was then attached to the anterior pins, and the distal femoral cut was made. The thickness of the distal femoral bone resection was measured and compared to the templated values. The pins were then translated into the anterior reference holes, and the distal femoral cutting guide was applied. The distal femoral guide was centered appropriately, pinned in place, and the distal femoral finishing cuts were made. Next, the meniscal remnants were excised, and the tibial guide was seated. The extramedullary tibial alignment guide was applied, and alignment was checked. The tibial template was then pinned into place and exchanged for the tibial cutting guide. Alignment of the cutting guide was rechecked with the extramedullary alignment guide. Hohmann retractor was placed behind the tibia to protect the neurovascular structures, and Hohmann retractors were placed medially and laterally to protect the collateral ligaments and the patellar tendon. The tibial cut was made and the tibial bone resection was taken and compared to the templated values. Overall alignment was then checked with the extramedullary alignment guide. The spacer block was used, and the overall alignment, flexion and extension gaps were checked and were excellent. The guide was used to size the patella. The holes were drilled for the patella. The trial components were then inserted. The knee came to full extension and balanced well with varus and valgus stress with symmetrical flexion and extension gaps with the spacer. The tibial guide was then pinned into place and preparations were made for the stemmed portion of the tibial component. The trochlear recess was cut for the femur. The trial components were then removed, and the bone was prepared with pulsatile lavage. All three components were cemented in place using contemporary technique. Excess cement was removed, and trials again were performed. Stability and alignment were the same as with the provisional components. The actual polyethylene spacer was locked into place in the tibial tray. The tourniquet was deflated and hemostasis was achieved. The wound was closed in routine fashion. Sponge and instrument counts were correct at the end of the procedure. Estimated blood loss was 50 mL. Routine specimens were sent to Pathology. There were no complications. The patient was stable under the care of the anesthesiologist at the completion of the procedure.      Dictated By Ceefrino Wolf MD  d: 03/14/2023 11:55:41  t: 03/14/2023 12:27:28  Job [id number]  JSM/

## 2023-03-14 NOTE — ANESTHESIA PROCEDURE NOTES
Spinal Block    Date/Time: 3/14/2023 10:18 AM    Performed by: Roxanne Estevez MD  Authorized by: Roxanne Estevez MD      General Information and Staff    Start Time:  3/14/2023 10:18 AM  End Time:  3/14/2023 10:19 AM  Anesthesiologist:  Roxanne Estevez MD  Performed by:   Anesthesiologist  Patient Location:  OR  Preanesthetic Checklist: patient identified, IV checked, risks and benefits discussed, monitors and equipment checked, pre-op evaluation, timeout performed, anesthesia consent and sterile technique used      Procedure Details    Patient Position:  Sitting  Prep: ChloraPrep and patient draped    Monitoring:  Cardiac monitor, heart rate and continuous pulse ox  Approach:  Midline  Location:  L2-3  Injection Technique:  Single-shot    Needle    Needle Type:  Pencil-tip  Needle Gauge:  24 G  Needle Length:  3.5 in    Assessment    Sensory Level:  T10  Events: clear CSF, CSF aspirated, well tolerated and blood negative      Additional Comments

## 2023-03-15 ENCOUNTER — APPOINTMENT (OUTPATIENT)
Dept: GENERAL RADIOLOGY | Facility: HOSPITAL | Age: 83
End: 2023-03-15
Attending: INTERNAL MEDICINE
Payer: MEDICARE

## 2023-03-15 LAB
ANION GAP SERPL CALC-SCNC: 5 MMOL/L (ref 0–18)
ATRIAL RATE: 62 BPM
BILIRUB UR QL: NEGATIVE
BUN BLD-MCNC: 22 MG/DL (ref 7–18)
BUN/CREAT SERPL: 37.3 (ref 10–20)
CALCIUM BLD-MCNC: 8 MG/DL (ref 8.5–10.1)
CHLORIDE SERPL-SCNC: 104 MMOL/L (ref 98–112)
CO2 SERPL-SCNC: 32 MMOL/L (ref 21–32)
CREAT BLD-MCNC: 0.59 MG/DL
DEPRECATED RDW RBC AUTO: 36.2 FL (ref 35.1–46.3)
ERYTHROCYTE [DISTWIDTH] IN BLOOD BY AUTOMATED COUNT: 17.3 % (ref 11–15)
GFR SERPLBLD BASED ON 1.73 SQ M-ARVRAT: 95 ML/MIN/1.73M2 (ref 60–?)
GLUCOSE BLD-MCNC: 120 MG/DL (ref 70–99)
GLUCOSE UR-MCNC: NEGATIVE MG/DL
HCT VFR BLD AUTO: 30.2 %
HCT VFR BLD AUTO: 30.9 %
HGB BLD-MCNC: 9.4 G/DL
HGB BLD-MCNC: 9.8 G/DL
KETONES UR-MCNC: NEGATIVE MG/DL
MAGNESIUM SERPL-MCNC: 1.2 MG/DL (ref 1.6–2.6)
MCH RBC QN AUTO: 19.1 PG (ref 26–34)
MCHC RBC AUTO-ENTMCNC: 31.7 G/DL (ref 31–37)
MCV RBC AUTO: 60.1 FL
NITRITE UR QL STRIP.AUTO: NEGATIVE
OSMOLALITY SERPL CALC.SUM OF ELEC: 297 MOSM/KG (ref 275–295)
P AXIS: 8 DEGREES
P-R INTERVAL: 128 MS
PH UR: 5 [PH] (ref 5–8)
PLATELET # BLD AUTO: 155 10(3)UL (ref 150–450)
POTASSIUM SERPL-SCNC: 4.6 MMOL/L (ref 3.5–5.1)
Q-T INTERVAL: 410 MS
QRS DURATION: 116 MS
QTC CALCULATION (BEZET): 416 MS
R AXIS: -20 DEGREES
RBC # BLD AUTO: 5.14 X10(6)UL
RBC #/AREA URNS AUTO: >10 /HPF
RBC #/AREA URNS AUTO: >10 /HPF
SODIUM SERPL-SCNC: 141 MMOL/L (ref 136–145)
SP GR UR STRIP: 1.01 (ref 1–1.03)
T AXIS: 83 DEGREES
UROBILINOGEN UR STRIP-ACNC: 0.2
VENTRICULAR RATE: 62 BPM
WBC # BLD AUTO: 17.4 X10(3) UL (ref 4–11)

## 2023-03-15 PROCEDURE — 99231 SBSQ HOSP IP/OBS SF/LOW 25: CPT

## 2023-03-15 PROCEDURE — 71045 X-RAY EXAM CHEST 1 VIEW: CPT | Performed by: INTERNAL MEDICINE

## 2023-03-15 PROCEDURE — 99233 SBSQ HOSP IP/OBS HIGH 50: CPT | Performed by: INTERNAL MEDICINE

## 2023-03-15 RX ORDER — MAGNESIUM OXIDE 400 MG/1
800 TABLET ORAL ONCE
Status: COMPLETED | OUTPATIENT
Start: 2023-03-15 | End: 2023-03-15

## 2023-03-15 RX ORDER — NITROFURANTOIN 25; 75 MG/1; MG/1
100 CAPSULE ORAL 2 TIMES DAILY
Status: DISCONTINUED | OUTPATIENT
Start: 2023-03-15 | End: 2023-03-20

## 2023-03-15 NOTE — PLAN OF CARE
Patient is alert and oriented. Hearing impaired. Gel ice as needed. Coude levi in place, to maintain per order, for 5 days. Urine in blood, urology aware per day shift nurse. IV fluids running. Dressing in place. Norco given for pain management. Call light within reach. Frequent rounding done.      Problem: Patient Centered Care  Goal: Patient preferences are identified and integrated in the patient's plan of care  Description: Interventions:  - What would you like us to know as we care for you?  - Provide timely, complete, and accurate information to patient/family  - Incorporate patient and family knowledge, values, beliefs, and cultural backgrounds into the planning and delivery of care  - Encourage patient/family to participate in care and decision-making at the level they choose  - Honor patient and family perspectives and choices  Outcome: Progressing

## 2023-03-15 NOTE — OCCUPATIONAL THERAPY NOTE
Per nurse, pt is not apporpriate to participate in OT eval this date with medical issues related to HR and low BP. Cardiology consult pending. Plan to reschedule OT eval for tomorrow, 3/16/23 as appropriate.

## 2023-03-15 NOTE — PLAN OF CARE
Patient is alert and oriented. Hearing impaired. Coude levi in place, to maintain per order, for 5 days. Gel ice as needed. IV fluids running. Norco being given PRN to manage pain. Pt magnesium was replaced after lab result of 1.2. Dressing in place . Call light within reach.       Problem: Patient Centered Care  Goal: Patient preferences are identified and integrated in the patient's plan of care  Description: Interventions:  - What would you like us to know as we care for you?   - Provide timely, complete, and accurate information to patient/family  - Incorporate patient and family knowledge, values, beliefs, and cultural backgrounds into the planning and delivery of care  - Encourage patient/family to participate in care and decision-making at the level they choose  - Honor patient and family perspectives and choices  Outcome: Progressing     Problem: Patient/Family Goals  Goal: Patient/Family Long Term Goal  Description: Patient's Long Term Goal: To ambulate without any pain    Interventions:  - Medications to manage pain  -Ice gel   - See additional Care Plan goals for specific interventions  Outcome: Progressing  Goal: Patient/Family Short Term Goal  Description: Patient's Short Term Goal: To be pain free    Interventions:   - Pain medications  -Ice gel  - See additional Care Plan goals for specific interventions  Outcome: Progressing

## 2023-03-15 NOTE — CM/SW NOTE
Department  notified of request for [address] referrals started. Assigned CM/SW to follow up with pt/family on further discharge planning.      Shruti Rodrigues   March 15, 2023   09:21

## 2023-03-15 NOTE — CM/SW NOTE
CM requested department  [facility]) to initiate [facility] referral for [facility] and [address]. [phone]: PT/OT recommending AR. Amina Herrera liaison reviewed pt and agrees with recommendation. PMR consult order obtained from Dr. Ana Nelson.   Order entered. Consult pending. SW/CM will continue to follow. Renetta Mitchell.  Leyla Mccollum RN, BSN  Nurse   [phone]

## 2023-03-16 LAB
ANION GAP SERPL CALC-SCNC: 5 MMOL/L (ref 0–18)
BASOPHILS # BLD AUTO: 0.08 X10(3) UL (ref 0–0.2)
BASOPHILS NFR BLD AUTO: 0.7 %
BUN BLD-MCNC: 15 MG/DL (ref 7–18)
BUN/CREAT SERPL: 32.6 (ref 10–20)
CALCIUM BLD-MCNC: 7.2 MG/DL (ref 8.5–10.1)
CHLORIDE SERPL-SCNC: 107 MMOL/L (ref 98–112)
CO2 SERPL-SCNC: 29 MMOL/L (ref 21–32)
CREAT BLD-MCNC: 0.46 MG/DL
DEPRECATED RDW RBC AUTO: 35.8 FL (ref 35.1–46.3)
EOSINOPHIL # BLD AUTO: 0.05 X10(3) UL (ref 0–0.7)
EOSINOPHIL NFR BLD AUTO: 0.4 %
ERYTHROCYTE [DISTWIDTH] IN BLOOD BY AUTOMATED COUNT: 17 % (ref 11–15)
GFR SERPLBLD BASED ON 1.73 SQ M-ARVRAT: 103 ML/MIN/1.73M2 (ref 60–?)
GLUCOSE BLD-MCNC: 113 MG/DL (ref 70–99)
HCT VFR BLD AUTO: 26.3 %
HGB BLD-MCNC: 8.5 G/DL
IMM GRANULOCYTES # BLD AUTO: 0.14 X10(3) UL (ref 0–1)
IMM GRANULOCYTES NFR BLD: 1.2 %
LYMPHOCYTES # BLD AUTO: 1.6 X10(3) UL (ref 1–4)
LYMPHOCYTES NFR BLD AUTO: 13.9 %
MAGNESIUM SERPL-MCNC: 1.1 MG/DL (ref 1.6–2.6)
MCH RBC QN AUTO: 19.1 PG (ref 26–34)
MCHC RBC AUTO-ENTMCNC: 32.3 G/DL (ref 31–37)
MCV RBC AUTO: 59.2 FL
MONOCYTES # BLD AUTO: 1.11 X10(3) UL (ref 0.1–1)
MONOCYTES NFR BLD AUTO: 9.7 %
NEUTROPHILS # BLD AUTO: 8.52 X10 (3) UL (ref 1.5–7.7)
NEUTROPHILS # BLD AUTO: 8.52 X10(3) UL (ref 1.5–7.7)
NEUTROPHILS NFR BLD AUTO: 74.1 %
OSMOLALITY SERPL CALC.SUM OF ELEC: 294 MOSM/KG (ref 275–295)
PLATELET # BLD AUTO: 119 10(3)UL (ref 150–450)
POTASSIUM SERPL-SCNC: 3.3 MMOL/L (ref 3.5–5.1)
POTASSIUM SERPL-SCNC: 4.3 MMOL/L (ref 3.5–5.1)
RBC # BLD AUTO: 4.44 X10(6)UL
SODIUM SERPL-SCNC: 141 MMOL/L (ref 136–145)
WBC # BLD AUTO: 11.5 X10(3) UL (ref 4–11)

## 2023-03-16 PROCEDURE — 99231 SBSQ HOSP IP/OBS SF/LOW 25: CPT

## 2023-03-16 PROCEDURE — 99232 SBSQ HOSP IP/OBS MODERATE 35: CPT | Performed by: INTERNAL MEDICINE

## 2023-03-16 RX ORDER — MAGNESIUM OXIDE 400 MG/1
800 TABLET ORAL ONCE
Status: COMPLETED | OUTPATIENT
Start: 2023-03-16 | End: 2023-03-16

## 2023-03-16 RX ORDER — POTASSIUM CHLORIDE 20 MEQ/1
40 TABLET, EXTENDED RELEASE ORAL EVERY 4 HOURS
Status: COMPLETED | OUTPATIENT
Start: 2023-03-16 | End: 2023-03-16

## 2023-03-16 NOTE — PLAN OF CARE
Alert and oriented x4. Post op day 2. Room air. Coude levi maintained for 5 days per orders. Dressing in place. Gel ice as needed. Saline locked. Norco given for pain management. Call light and belongings within reach. Plan is acute rehab. Problem: Patient Centered Care  Goal: Patient preferences are identified and integrated in the patient's plan of care  Description: Interventions:  - What would you like us to know as we care for you?  I like to be aware of what my treatment plans are   - Provide timely, complete, and accurate information to patient/family  - Incorporate patient and family knowledge, values, beliefs, and cultural backgrounds into the planning and delivery of care  - Encourage patient/family to participate in care and decision-making at the level they choose  - Honor patient and family perspectives and choices  Outcome: Progressing     Problem: Patient/Family Goals  Goal: Patient/Family Long Term Goal  Description: Patient's Long Term Goal: To ambulate independently     Interventions:  - Pt working with PT/OT   - See additional Care Plan goals for specific interventions  Outcome: Progressing  Goal: Patient/Family Short Term Goal  Description: Patient's Short Term Goal: To be free of pain    Interventions:   - Pain medications  - See additional Care Plan goals for specific interventions  Outcome: Progressing

## 2023-03-16 NOTE — PLAN OF CARE
Patient is alert and oriented. On 1 liter of O2. On tele with calls of PSVT and Dr. Marquise Quinones aware, ordered BMP and Mag for a.m blood work. Melatonin also ordered by Dr. Marquise Quinones and given to patient. Coude levi maintained. Dressing in place. Gel ice as needed. IV fluids running. Norco given for pain management. Call light within reach. Frequent rounding done.      Problem: Patient Centered Care  Goal: Patient preferences are identified and integrated in the patient's plan of care  Description: Interventions:  - What would you like us to know as we care for you?   - Provide timely, complete, and accurate information to patient/family  - Incorporate patient and family knowledge, values, beliefs, and cultural backgrounds into the planning and delivery of care  - Encourage patient/family to participate in care and decision-making at the level they choose  - Honor patient and family perspectives and choices  Outcome: Progressing

## 2023-03-17 LAB
DEPRECATED RDW RBC AUTO: 35.1 FL (ref 35.1–46.3)
ERYTHROCYTE [DISTWIDTH] IN BLOOD BY AUTOMATED COUNT: 16.9 % (ref 11–15)
HCT VFR BLD AUTO: 26 %
HGB BLD-MCNC: 8.3 G/DL
MAGNESIUM SERPL-MCNC: 1.5 MG/DL (ref 1.6–2.6)
MCH RBC QN AUTO: 18.9 PG (ref 26–34)
MCHC RBC AUTO-ENTMCNC: 31.9 G/DL (ref 31–37)
MCV RBC AUTO: 59.2 FL
PLATELET # BLD AUTO: 130 10(3)UL (ref 150–450)
RBC # BLD AUTO: 4.39 X10(6)UL
WBC # BLD AUTO: 10.4 X10(3) UL (ref 4–11)

## 2023-03-17 PROCEDURE — 99232 SBSQ HOSP IP/OBS MODERATE 35: CPT | Performed by: INTERNAL MEDICINE

## 2023-03-17 RX ORDER — MAGNESIUM OXIDE 400 MG/1
800 TABLET ORAL ONCE
Status: COMPLETED | OUTPATIENT
Start: 2023-03-17 | End: 2023-03-17

## 2023-03-17 RX ORDER — METOPROLOL SUCCINATE 25 MG/1
25 TABLET, EXTENDED RELEASE ORAL
Status: DISCONTINUED | OUTPATIENT
Start: 2023-03-18 | End: 2023-03-23

## 2023-03-17 NOTE — PLAN OF CARE
Patient has been ambulating 1 assist w/walker. Pain is being managed w/ Norco. Is tolerating general diet. Has levi in place, urine color improving. Has not been able to have bowel movement, milk of magnesia to be given this afternoon. Patient has been tachy throughout the day at rest. Cardiology team notified, pt is to dc w/ anticoagulation that is TBD. Has SCDs and aspirin for dvt prophylaxis. Plan is for pt to dc to rehab possibly tomorrow. Problem: Patient Centered Care  Goal: Patient preferences are identified and integrated in the patient's plan of care  Description: Interventions:  - What would you like us to know as we care for you?  I am ready to go   - Provide timely, complete, and accurate information to patient/family  - Incorporate patient and family knowledge, values, beliefs, and cultural backgrounds into the planning and delivery of care  - Encourage patient/family to participate in care and decision-making at the level they choose  - Honor patient and family perspectives and choices  Outcome: Progressing     Problem: Patient/Family Goals  Goal: Patient/Family Long Term Goal  Description: Patient's Long Term Goal: To be able to ambulate w/ pain level <5    Interventions:  - Pain medication  - ambulation  - walker   - rehab   - See additional Care Plan goals for specific interventions  Outcome: Progressing  Goal: Patient/Family Short Term Goal  Description: Patient's Short Term Goal: to go home     Interventions:   - follow plan of care  - See additional Care Plan goals for specific interventions  Outcome: Progressing     Problem: PAIN - ADULT  Goal: Verbalizes/displays adequate comfort level or patient's stated pain goal  Description: INTERVENTIONS:  - Encourage pt to monitor pain and request assistance  - Assess pain using appropriate pain scale  - Administer analgesics based on type and severity of pain and evaluate response  - Implement non-pharmacological measures as appropriate and evaluate response  - Consider cultural and social influences on pain and pain management  - Manage/alleviate anxiety  - Utilize distraction and/or relaxation techniques  - Monitor for opioid side effects  - Notify MD/LIP if interventions unsuccessful or patient reports new pain  - Anticipate increased pain with activity and pre-medicate as appropriate  Outcome: Progressing     Problem: RISK FOR INFECTION - ADULT  Goal: Absence of fever/infection during anticipated neutropenic period  Description: INTERVENTIONS  - Monitor WBC  - Administer growth factors as ordered  - Implement neutropenic guidelines  Outcome: Progressing     Problem: SAFETY ADULT - FALL  Goal: Free from fall injury  Description: INTERVENTIONS:  - Assess pt frequently for physical needs  - Identify cognitive and physical deficits and behaviors that affect risk of falls.   - [place] fall precautions as indicated by assessment.  - Educate pt/family on patient safety including physical limitations  - Instruct pt to call for assistance with activity based on assessment  - Modify environment to reduce risk of injury  - Provide assistive devices as appropriate  - Consider OT/PT consult to assist with strengthening/mobility  - Encourage toileting schedule  Outcome: Progressing     Problem: DISCHARGE PLANNING  Goal: Discharge to home or other facility with appropriate resources  Description: INTERVENTIONS:  - Identify barriers to discharge w/pt and caregiver  - Include patient/family/discharge partner in discharge planning  - Arrange for needed discharge resources and transportation as appropriate  - Identify discharge learning needs (meds, wound care, etc)  - Arrange for interpreters to assist at discharge as needed  - Consider post-discharge preferences of patient/family/discharge partner  - Complete POLST form as appropriate  - Assess patient's ability to be responsible for managing their own health  - Refer to Case Management Department for coordinating discharge planning if the patient needs post-hospital services based on physician/LIP order or complex needs related to functional status, cognitive ability or social support system  Outcome: Progressing

## 2023-03-17 NOTE — CM/SW NOTE
03/17/23 1000   CM/ Referral Data   Referral Source Physician   Reason for Referral Discharge planning   Informant Patient   Medical Hx   Does patient have an established PCP? Yes   Significant Past Medical/Mental Health Hx Lt total knee Dr. Ryan Bermudez. Patient Info   Patient's Current Mental Status at Time of Assessment Alert;Oriented   Patient's [address]   Patient Status Prior to Admission   Independent with ADLs and Mobility Yes   CM met with pt to discuss dc plan and PMR recommendation for AR. Pt agrees to AR on dc. Requesting to rehab at [address] [place] [address] spoke with Nova Cooper at [facility] she is reviewing clinical and aware that pt is stable for dc today. 1400: [facility] awaiting Dr. Jason Bo final recommendation for anticoagulant on dc to determine final acceptance and bed. Rapid Covid needed prior to dc. Plan: [facility] pending Bed. / to remain available for support and/or discharge planning. John Galarza.  Charlene Mendenhall RN, BSN  Nurse   [phone]

## 2023-03-17 NOTE — PLAN OF CARE
Patient is alert and oriented. On tele with no calls. Coude levi in place with output. Saline locked. Norco given for pain management. Dressing in place. Gel ice applied as needed. SCD's on. Call light within reach. Frequent rounding done.      Problem: Patient Centered Care  Goal: Patient preferences are identified and integrated in the patient's plan of care  Description: Interventions:  - What would you like us to know as we care for you?   - Provide timely, complete, and accurate information to patient/family  - Incorporate patient and family knowledge, values, beliefs, and cultural backgrounds into the planning and delivery of care  - Encourage patient/family to participate in care and decision-making at the level they choose  - Honor patient and family perspectives and choices  Outcome: Progressing

## 2023-03-18 LAB
MAGNESIUM SERPL-MCNC: 2 MG/DL (ref 1.6–2.6)
SARS-COV-2 RNA RESP QL NAA+PROBE: NOT DETECTED

## 2023-03-18 RX ORDER — METOPROLOL SUCCINATE 25 MG/1
25 TABLET, EXTENDED RELEASE ORAL
Qty: 90 TABLET | Refills: 3 | Status: SHIPPED | OUTPATIENT
Start: 2023-03-19

## 2023-03-18 RX ORDER — NITROFURANTOIN 25; 75 MG/1; MG/1
100 CAPSULE ORAL 2 TIMES DAILY
Qty: 4 CAPSULE | Refills: 0 | Status: SHIPPED | OUTPATIENT
Start: 2023-03-18 | End: 2023-03-21

## 2023-03-18 RX ORDER — MELATONIN
325
Qty: 90 TABLET | Refills: 3 | Status: SHIPPED | OUTPATIENT
Start: 2023-03-19

## 2023-03-18 NOTE — PLAN OF CARE
Patient is alert and oriented. RA. Tele in place. Matt in place, patent. Up x1 with walker. Declining pain medication, utilizing gel ice packs for pain management. Dressing in place to left knee, CDI. Call light within reach, bed alarm active.   Problem: Patient Centered Care  Goal: Patient preferences are identified and integrated in the patient's plan of care  Description: Interventions:  - Provide timely, complete, and accurate information to patient/family  - Incorporate patient and family knowledge, values, beliefs, and cultural backgrounds into the planning and delivery of care  - Encourage patient/family to participate in care and decision-making at the level they choose  - Honor patient and family perspectives and choices  Outcome: Progressing     Problem: PAIN - ADULT  Goal: Verbalizes/displays adequate comfort level or patient's stated pain goal  Description: INTERVENTIONS:  - Encourage pt to monitor pain and request assistance  - Assess pain using appropriate pain scale  - Administer analgesics based on type and severity of pain and evaluate response  - Implement non-pharmacological measures as appropriate and evaluate response  - Consider cultural and social influences on pain and pain management  - Manage/alleviate anxiety  - Utilize distraction and/or relaxation techniques  - Monitor for opioid side effects  - Notify MD/LIP if interventions unsuccessful or patient reports new pain  - Anticipate increased pain with activity and pre-medicate as appropriate  Outcome: Progressing     Problem: RISK FOR INFECTION - ADULT  Goal: Absence of fever/infection during anticipated neutropenic period  Description: INTERVENTIONS  - Monitor WBC  - Administer growth factors as ordered  - Implement neutropenic guidelines  Outcome: Progressing     Problem: SAFETY ADULT - FALL  Goal: Free from fall injury  Description: INTERVENTIONS:  - Assess pt frequently for physical needs  - Identify cognitive and physical deficits and behaviors that affect risk of falls.   - [place] fall precautions as indicated by assessment.  - Educate pt/family on patient safety including physical limitations  - Instruct pt to call for assistance with activity based on assessment  - Modify environment to reduce risk of injury  - Provide assistive devices as appropriate  - Consider OT/PT consult to assist with strengthening/mobility  - Encourage toileting schedule  Outcome: Progressing     Problem: DISCHARGE PLANNING  Goal: Discharge to home or other facility with appropriate resources  Description: INTERVENTIONS:  - Identify barriers to discharge w/pt and caregiver  - Include patient/family/discharge partner in discharge planning  - Arrange for needed discharge resources and transportation as appropriate  - Identify discharge learning needs (meds, wound care, etc)  - Arrange for interpreters to assist at discharge as needed  - Consider post-discharge preferences of patient/family/discharge partner  - Complete POLST form as appropriate  - Assess patient's ability to be responsible for managing their own health  - Refer to Case Management Department for coordinating discharge planning if the patient needs post-hospital services based on physician/LIP order or complex needs related to functional status, cognitive ability or social support system  Outcome: Progressing

## 2023-03-18 NOTE — CM/SW NOTE
03/18/23 1200   Discharge disposition   Expected discharge disposition Rehab [address] Provider   ([facility] -inpatient rehab)   Discharge transportation [facility]   Pt discussed during nursing rounds. Pt is stable for dc today. MD dc order entered. Dr. Alba Brochure note entered. Not further anticoagulant at this time. Pt wants to follow up with cardiologist at [facility]. Note attached via Aidin to referral.   Spoke with Olga Solo in intake she will run clinical over with her physican and return call.     1400: Rapid Covid done, (-), results attached to Aidin. Per Olga Solo request.    MAR, and updated clinical requested and attached. 1500: spoke with Olga Solo, last BM day of admission 3/14, PMR  At [facility] requesting pt have BM prior to dc. DC today on hold. MC on will call for tomorrow 3/19. RN A  Plan:  [facility] tomorrow if he has a BM. / to remain available for support and/or discharge planning. Brit Wright.  Dusty Peabody RN, BSN  Nurse   [phone]

## 2023-03-18 NOTE — PLAN OF CARE
Post-op day #4 L TKA. Aquacel dressing in place to L knee, CDI. Monitoring vital signs- stable at this time. No acute changes noted throughout shift. Tolerating diet and voiding via levi catheter. Will discharge with catheter and have voiding trial at rehab. SCDs and eliquis for DVT prophylaxis. Switched from aspirin to eliquis per cardiology recommendation. Pain medication provided as needed. Up with standby assist and a walker. Encouraged frequent ambulation and use of incentive spirometer. Fall precautions maintained- bed alarm on, bed locked in lowest position, call light and personal belongings within reach. Frequent rounding by nursing staff. Plan is discharge to [facility] inpatient rehab, but unable to discharge until has a BM.      Problem: Patient Centered Care  Goal: Patient preferences are identified and integrated in the patient's plan of care  Description: Interventions:  - What would you like us to know as we care for you?   - Provide timely, complete, and accurate information to patient/family  - Incorporate patient and family knowledge, values, beliefs, and cultural backgrounds into the planning and delivery of care  - Encourage patient/family to participate in care and decision-making at the level they choose  - Honor patient and family perspectives and choices  Outcome: Progressing     Problem: PAIN - ADULT  Goal: Verbalizes/displays adequate comfort level or patient's stated pain goal  Description: INTERVENTIONS:  - Encourage pt to monitor pain and request assistance  - Assess pain using appropriate pain scale  - Administer analgesics based on type and severity of pain and evaluate response  - Implement non-pharmacological measures as appropriate and evaluate response  - Consider cultural and social influences on pain and pain management  - Manage/alleviate anxiety  - Utilize distraction and/or relaxation techniques  - Monitor for opioid side effects  - Notify MD/LIP if interventions unsuccessful or patient reports new pain  - Anticipate increased pain with activity and pre-medicate as appropriate  Outcome: Progressing     Problem: RISK FOR INFECTION - ADULT  Goal: Absence of fever/infection during anticipated neutropenic period  Description: INTERVENTIONS  - Monitor WBC  - Administer growth factors as ordered  - Implement neutropenic guidelines  Outcome: Progressing     Problem: SAFETY ADULT - FALL  Goal: Free from fall injury  Description: INTERVENTIONS:  - Assess pt frequently for physical needs  - Identify cognitive and physical deficits and behaviors that affect risk of falls.   - [place] fall precautions as indicated by assessment.  - Educate pt/family on patient safety including physical limitations  - Instruct pt to call for assistance with activity based on assessment  - Modify environment to reduce risk of injury  - Provide assistive devices as appropriate  - Consider OT/PT consult to assist with strengthening/mobility  - Encourage toileting schedule  Outcome: Progressing     Problem: DISCHARGE PLANNING  Goal: Discharge to home or other facility with appropriate resources  Description: INTERVENTIONS:  - Identify barriers to discharge w/pt and caregiver  - Include patient/family/discharge partner in discharge planning  - Arrange for needed discharge resources and transportation as appropriate  - Identify discharge learning needs (meds, wound care, etc)  - Arrange for interpreters to assist at discharge as needed  - Consider post-discharge preferences of patient/family/discharge partner  - Complete POLST form as appropriate  - Assess patient's ability to be responsible for managing their own health  - Refer to Case Management Department for coordinating discharge planning if the patient needs post-hospital services based on physician/LIP order or complex needs related to functional status, cognitive ability or social support system  Outcome: Progressing

## 2023-03-19 PROCEDURE — 99233 SBSQ HOSP IP/OBS HIGH 50: CPT | Performed by: INTERNAL MEDICINE

## 2023-03-19 RX ORDER — ACETAMINOPHEN 325 MG/1
650 TABLET ORAL EVERY 6 HOURS PRN
Status: DISCONTINUED | OUTPATIENT
Start: 2023-03-19 | End: 2023-03-23

## 2023-03-19 RX ORDER — SODIUM PHOSPHATE, DIBASIC AND SODIUM PHOSPHATE, MONOBASIC 7; 19 G/133ML; G/133ML
1 ENEMA RECTAL ONCE AS NEEDED
Status: ACTIVE | OUTPATIENT
Start: 2023-03-19 | End: 2023-03-19

## 2023-03-19 NOTE — PLAN OF CARE
Problem: Patient Centered Care  Goal: Patient preferences are identified and integrated in the patient's plan of care  Description: Interventions:  - What would you like us to know as we care for you?   - Provide timely, complete, and accurate information to patient/family  - Incorporate patient and family knowledge, values, beliefs, and cultural backgrounds into the planning and delivery of care  - Encourage patient/family to participate in care and decision-making at the level they choose  - Honor patient and family perspectives and choices  Outcome: Progressing     Problem: PAIN - ADULT  Goal: Verbalizes/displays adequate comfort level or patient's stated pain goal  Description: INTERVENTIONS:  - Encourage pt to monitor pain and request assistance  - Assess pain using appropriate pain scale  - Administer analgesics based on type and severity of pain and evaluate response  - Implement non-pharmacological measures as appropriate and evaluate response  - Consider cultural and social influences on pain and pain management  - Manage/alleviate anxiety  - Utilize distraction and/or relaxation techniques  - Monitor for opioid side effects  - Notify MD/LIP if interventions unsuccessful or patient reports new pain  - Anticipate increased pain with activity and pre-medicate as appropriate  Outcome: Progressing     Problem: RISK FOR INFECTION - ADULT  Goal: Absence of fever/infection during anticipated neutropenic period  Description: INTERVENTIONS  - Monitor WBC  - Administer growth factors as ordered  - Implement neutropenic guidelines  Outcome: Progressing     Problem: SAFETY ADULT - FALL  Goal: Free from fall injury  Description: INTERVENTIONS:  - Assess pt frequently for physical needs  - Identify cognitive and physical deficits and behaviors that affect risk of falls.   - [place] fall precautions as indicated by assessment.  - Educate pt/family on patient safety including physical limitations  - Instruct pt to call for assistance with activity based on assessment  - Modify environment to reduce risk of injury  - Provide assistive devices as appropriate  - Consider OT/PT consult to assist with strengthening/mobility  - Encourage toileting schedule  Outcome: Progressing     Problem: DISCHARGE PLANNING  Goal: Discharge to home or other facility with appropriate resources  Description: INTERVENTIONS:  - Identify barriers to discharge w/pt and caregiver  - Include patient/family/discharge partner in discharge planning  - Arrange for needed discharge resources and transportation as appropriate  - Identify discharge learning needs (meds, wound care, etc)  - Arrange for interpreters to assist at discharge as needed  - Consider post-discharge preferences of patient/family/discharge partner  - Complete POLST form as appropriate  - Assess patient's ability to be responsible for managing their own health  - Refer to Case Management Department for coordinating discharge planning if the patient needs post-hospital services based on physician/LIP order or complex needs related to functional status, cognitive ability or social support system  Outcome: Progressing    Patient ambulates 1 person standby assist with walker. Declined pain during shift. SCDs and eliquis for DVT prophylaxis. Patient voiding with levi. Patient will discharge with levi and will have void trial at rehab. Surgical dressing clean, dry, and intact. No acute changes. Vitals signs as charted. Patient in lowest position, bed alarm on, call light within reach, using appropriately. [facility], but unable to discharge until he has a BM.

## 2023-03-19 NOTE — SIGNIFICANT EVENT
Significant Event - Fall Note    Date/Time of Fall: March 19, 2023 at 1305 [address] completed: Yes    Description of patient fall:     Patient fell from: Standing     Activity when fall occurred: Ambulating without assistance or assistive devices and Ambulating with assistance or assistive devices     Where did fall occur: Hallway     Was the fall assisted: Assisted to floor    Who witnessed the fall: Staff    Patient narrative of fall: [address] PT.     Staff narrative of fall: Pt. Was walking with PT and RN in Patient's room heard pt ask if patient was ok. Was able to assist easing patient to the floor for safety.      Name of Provider notified of fall: Amie MOSQUERA     Family notification: Patient declined    Factors contributing to fall:     Physical: Weakness/fatigue     Psychological: lightheaded      Environmental: Equipment     Medications received in the past 8 hours:   Medication(s) Administered in past 8 Hours from 03/19/2023 0625 to 03/19/2023 1425     Date/Time Order Dose Route Action Action by Comments    03/19/2023 1013 CDT acetaminophen (Tylenol) tab 650 mg 650 mg Oral Given Robbin August RN --    03/19/2023 0755 CDT apixaban (Eliquis) tab 2.5 mg 2.5 mg Oral Given Robbin August RN --    03/19/2023 0757 CDT atorvastatin (Lipitor) tab 80 mg 80 mg Oral Given Robbin August RN --    03/19/2023 0757 CDT docusate sodium (Colace) cap 100 mg 100 mg Oral Given Robbin August RN --    03/19/2023 0756 CDT famotidine (Pepcid) tab 20 mg 20 mg Oral Given Robbin August, GERARD --    03/19/2023 0758 CDT ferrous sulfate DR tab 325 mg 325 mg Oral Given Robbin August, GERARD --    03/19/2023 0758 CDT finasteride (Proscar) tab 5 mg 5 mg Oral Given Robbin August RN --    03/19/2023 0801 CDT furosemide (Lasix) tab 40 mg 40 mg Oral Given Robbin August, GERARD --    03/19/2023 0803 CDT gabapentin (Neurontin) cap 100 mg 100 mg Oral Given Robbin August RN -- 03/19/2023 0755 CDT lisinopril (Zestril) tab 10 mg 10 mg Oral Given Nell Vasquez RN --    03/19/2023 1316 CDT magnesium hydroxide (Milk of Magnesia) 400 MG/5ML oral suspension 30 mL 30 mL Oral Given Nell Vasquez RN --    03/19/2023 0758 CDT nitrofurantoin monohydrate macro (Macrobid) cap 100 mg 100 mg Oral Given Nell Vasquez RN --    03/19/2023 0755 CDT polyethylene glycol (PEG 3350) (Miralax) 17 g oral packet 17 g 17 g Oral Given Nell Vasquez RN --    03/19/2023 0758 CDT potassium chloride (K-Dur) tab 20 mEq 20 mEq Oral Given Nell Vasquez RN --          Was patient identified as high fall risk prior to fall:                  Score: 70             What interventions were in place prior to fall: Assistive devices (wheelchair, commode, walker, gait belt), Bed alarm, Bed in lowest position, Call light within reach, Chair alarm, [place] footwear, Patient/family involved in fall prevention plan, Personal items within reach, Initiated PT/OT therapy and Rounding    Interventions post fall: Assistive devices (wheelchair, commode, walker, gait belt), Bed alarm, Bed in lowest position, Call light within reach, Chair alarm, [place] footwear, Patient/family involved in fall prevention plan, Personal items within reach, Initiated PT/OT therapy and Rounding    Additional comments: Per MD no additional imaging required and Cardiology also notified

## 2023-03-19 NOTE — PLAN OF CARE
Post-op day 5. Dressing in place to left knee. No acute changes noted throughout shift. SL and On RA. Tolerating diet and has first BM today after surgery SCD and Eliquis for DVT prophylaxis. Pain controlled by Tylenol PRN. Up with standby assist and a walker. Encouraged frequent ambulation and use of incentive spirometer. Fall precautions maintained- bed alarm on bed locked in lowest position, call light and personal belongings within reach, non-skid socks in place to bilateral feet. Frequent rounding by nursing staff. Plan is for discharge to Rehab tomorrow.       Problem: Patient Centered Care  Goal: Patient preferences are identified and integrated in the patient's plan of care  Description: Interventions:  - What would you like us to know as we care for you?   - Provide timely, complete, and accurate information to patient/family  - Incorporate patient and family knowledge, values, beliefs, and cultural backgrounds into the planning and delivery of care  - Encourage patient/family to participate in care and decision-making at the level they choose  - Honor patient and family perspectives and choices  3/19/2023 1820 by Mervin Stuart RN  Outcome: Progressing  3/19/2023 1739 by Mervin Stuart RN  Outcome: Progressing     Problem: Patient/Family Goals  Goal: Patient/Family Long Term Goal  Description: Patient's Long Term Goal:     Interventions:  -   - See additional Care Plan goals for specific interventions  3/19/2023 1820 by Mervin Stuart RN  Outcome: Progressing  3/19/2023 1739 by Mervin Stuart RN  Outcome: Progressing     Problem: PAIN - ADULT  Goal: Verbalizes/displays adequate comfort level or patient's stated pain goal  Description: INTERVENTIONS:  - Encourage pt to monitor pain and request assistance  - Assess pain using appropriate pain scale  - Administer analgesics based on type and severity of pain and evaluate response  - Implement non-pharmacological measures as appropriate and evaluate response  - Consider cultural and social influences on pain and pain management  - Manage/alleviate anxiety  - Utilize distraction and/or relaxation techniques  - Monitor for opioid side effects  - Notify MD/LIP if interventions unsuccessful or patient reports new pain  - Anticipate increased pain with activity and pre-medicate as appropriate  3/19/2023 1820 by Ba Youssef RN  Outcome: Progressing  3/19/2023 1739 by Ba Youssef RN  Outcome: Progressing     Problem: RISK FOR INFECTION - ADULT  Goal: Absence of fever/infection during anticipated neutropenic period  Description: INTERVENTIONS  - Monitor WBC  - Administer growth factors as ordered  - Implement neutropenic guidelines  3/19/2023 1820 by Ba Youssef RN  Outcome: Progressing  3/19/2023 1739 by Ba Youssef RN  Outcome: Progressing     Problem: SAFETY ADULT - FALL  Goal: Free from fall injury  Description: INTERVENTIONS:  - Assess pt frequently for physical needs  - Identify cognitive and physical deficits and behaviors that affect risk of falls.   - [place] fall precautions as indicated by assessment.  - Educate pt/family on patient safety including physical limitations  - Instruct pt to call for assistance with activity based on assessment  - Modify environment to reduce risk of injury  - Provide assistive devices as appropriate  - Consider OT/PT consult to assist with strengthening/mobility  - Encourage toileting schedule  3/19/2023 1820 by Ba Youssef RN  Outcome: Progressing  3/19/2023 1739 by Ba Youssef RN  Outcome: Progressing     Problem: DISCHARGE PLANNING  Goal: Discharge to home or other facility with appropriate resources  Description: INTERVENTIONS:  - Identify barriers to discharge w/pt and caregiver  - Include patient/family/discharge partner in discharge planning  - Arrange for needed discharge resources and transportation as appropriate  - Identify discharge learning needs (meds, wound care, etc)  - Arrange for interpreters to assist at discharge as needed  - Consider post-discharge preferences of patient/family/discharge partner  - Complete POLST form as appropriate  - Assess patient's ability to be responsible for managing their own health  - Refer to Case Management Department for coordinating discharge planning if the patient needs post-hospital services based on physician/LIP order or complex needs related to functional status, cognitive ability or social support system  3/19/2023 1820 by Oleg Esparza, RN  Outcome: Progressing  3/19/2023 1739 by Oleg Esparza, RN  Outcome: Progressing

## 2023-03-19 NOTE — CM/SW NOTE
CM working with Javier Gallagher and Buddy Deras at [facility] to facilitate dc. Pt will need to have a BM prior to dc. Pt has been given multiple laxities without a successful BM. Pt is afraid he will have diarrhea if we give him a suppository or enema. Spoke with Buddy Deras at [facility], plan for [employer]. Plan: [facility] once pt has a BM. / to remain available for support and/or discharge planning. Edy .  Leilani Mccray RN, BSN  Nurse   [phone]\

## 2023-03-20 LAB
BASOPHILS # BLD AUTO: 0.11 X10(3) UL (ref 0–0.2)
BASOPHILS NFR BLD AUTO: 0.9 %
DEPRECATED RDW RBC AUTO: 32.6 FL (ref 35.1–46.3)
EOSINOPHIL # BLD AUTO: 0.28 X10(3) UL (ref 0–0.7)
EOSINOPHIL NFR BLD AUTO: 2.2 %
ERYTHROCYTE [DISTWIDTH] IN BLOOD BY AUTOMATED COUNT: 15.8 % (ref 11–15)
HCT VFR BLD AUTO: 27.3 %
HGB BLD-MCNC: 8.7 G/DL
IMM GRANULOCYTES # BLD AUTO: 0.06 X10(3) UL (ref 0–1)
IMM GRANULOCYTES NFR BLD: 0.5 %
LYMPHOCYTES # BLD AUTO: 1.62 X10(3) UL (ref 1–4)
LYMPHOCYTES NFR BLD AUTO: 12.7 %
MCH RBC QN AUTO: 18.9 PG (ref 26–34)
MCHC RBC AUTO-ENTMCNC: 31.9 G/DL (ref 31–37)
MCV RBC AUTO: 59.2 FL
MONOCYTES # BLD AUTO: 1.87 X10(3) UL (ref 0.1–1)
MONOCYTES NFR BLD AUTO: 14.7 %
NEUTROPHILS # BLD AUTO: 8.8 X10 (3) UL (ref 1.5–7.7)
NEUTROPHILS # BLD AUTO: 8.8 X10(3) UL (ref 1.5–7.7)
NEUTROPHILS NFR BLD AUTO: 69 %
PLATELET # BLD AUTO: 283 10(3)UL (ref 150–450)
RBC # BLD AUTO: 4.61 X10(6)UL
WBC # BLD AUTO: 12.7 X10(3) UL (ref 4–11)

## 2023-03-20 PROCEDURE — 51703 INSERT BLADDER CATH COMPLEX: CPT | Performed by: NURSE PRACTITIONER

## 2023-03-20 PROCEDURE — 99232 SBSQ HOSP IP/OBS MODERATE 35: CPT | Performed by: NURSE PRACTITIONER

## 2023-03-20 PROCEDURE — 99232 SBSQ HOSP IP/OBS MODERATE 35: CPT | Performed by: INTERNAL MEDICINE

## 2023-03-20 RX ORDER — LIDOCAINE HYDROCHLORIDE 20 MG/ML
10 JELLY TOPICAL ONCE
Status: COMPLETED | OUTPATIENT
Start: 2023-03-20 | End: 2023-03-20

## 2023-03-20 NOTE — OCCUPATIONAL THERAPY NOTE
Pt politely declining OOB activity at this time, anticipating the return of his urologist. Pt agreeable to re attempting later today. Nurse aware. Addendum: Pt continuing to decline OOB activity at 1430.

## 2023-03-20 NOTE — PLAN OF CARE
Problem: Patient Centered Care  Goal: Patient preferences are identified and integrated in the patient's plan of care  Description: Interventions:  - What would you like us to know as we care for you?   - Provide timely, complete, and accurate information to patient/family  - Incorporate patient and family knowledge, values, beliefs, and cultural backgrounds into the planning and delivery of care  - Encourage patient/family to participate in care and decision-making at the level they choose  - Honor patient and family perspectives and choices  Outcome: Progressing     Problem: PAIN - ADULT  Goal: Verbalizes/displays adequate comfort level or patient's stated pain goal  Description: INTERVENTIONS:  - Encourage pt to monitor pain and request assistance  - Assess pain using appropriate pain scale  - Administer analgesics based on type and severity of pain and evaluate response  - Implement non-pharmacological measures as appropriate and evaluate response  - Consider cultural and social influences on pain and pain management  - Manage/alleviate anxiety  - Utilize distraction and/or relaxation techniques  - Monitor for opioid side effects  - Notify MD/LIP if interventions unsuccessful or patient reports new pain  - Anticipate increased pain with activity and pre-medicate as appropriate  Outcome: Progressing     Problem: RISK FOR INFECTION - ADULT  Goal: Absence of fever/infection during anticipated neutropenic period  Description: INTERVENTIONS  - Monitor WBC  - Administer growth factors as ordered  - Implement neutropenic guidelines  Outcome: Progressing     Problem: SAFETY ADULT - FALL  Goal: Free from fall injury  Description: INTERVENTIONS:  - Assess pt frequently for physical needs  - Identify cognitive and physical deficits and behaviors that affect risk of falls.   - [place] fall precautions as indicated by assessment.  - Educate pt/family on patient safety including physical limitations  - Instruct pt to call for assistance with activity based on assessment  - Modify environment to reduce risk of injury  - Provide assistive devices as appropriate  - Consider OT/PT consult to assist with strengthening/mobility  - Encourage toileting schedule  Outcome: Progressing     Problem: DISCHARGE PLANNING  Goal: Discharge to home or other facility with appropriate resources  Description: INTERVENTIONS:  - Identify barriers to discharge w/pt and caregiver  - Include patient/family/discharge partner in discharge planning  - Arrange for needed discharge resources and transportation as appropriate  - Identify discharge learning needs (meds, wound care, etc)  - Arrange for interpreters to assist at discharge as needed  - Consider post-discharge preferences of patient/family/discharge partner  - Complete POLST form as appropriate  - Assess patient's ability to be responsible for managing their own health  - Refer to Case Management Department for coordinating discharge planning if the patient needs post-hospital services based on physician/LIP order or complex needs related to functional status, cognitive ability or social support system  Outcome: Progressing    Patient ambulates 1 person standby assist with walker. Declined pain during shift. SCDs and eliquis for DVT prophylaxis. Patient voiding with levi, will have void trial in rehab. Levi output was cherry red with clots, informed medical and urology. Irrigated catheter per urology note on 3/14/23. Surgical dressing clean, dry, and intact. No acute changes. Vitals signs as charted. Patient in lowest position, bed alarm on, call light within reach, using appropriately. Plan for discharge is  community rehab.

## 2023-03-20 NOTE — PLAN OF CARE
Problem: PAIN - ADULT  Goal: Verbalizes/displays adequate comfort level or patient's stated pain goal  Description: INTERVENTIONS:  - Encourage pt to monitor pain and request assistance  - Assess pain using appropriate pain scale  - Administer analgesics based on type and severity of pain and evaluate response  - Implement non-pharmacological measures as appropriate and evaluate response  - Consider cultural and social influences on pain and pain management  - Manage/alleviate anxiety  - Utilize distraction and/or relaxation techniques  - Monitor for opioid side effects  - Notify MD/LIP if interventions unsuccessful or patient reports new pain  - Anticipate increased pain with activity and pre-medicate as appropriate  Outcome: Progressing     Problem: RISK FOR INFECTION - ADULT  Goal: Absence of fever/infection during anticipated neutropenic period  Description: INTERVENTIONS  - Monitor WBC  - Administer growth factors as ordered  - Implement neutropenic guidelines  Outcome: Progressing     Problem: SAFETY ADULT - FALL  Goal: Free from fall injury  Description: INTERVENTIONS:  - Assess pt frequently for physical needs  - Identify cognitive and physical deficits and behaviors that affect risk of falls.   - [place] fall precautions as indicated by assessment.  - Educate pt/family on patient safety including physical limitations  - Instruct pt to call for assistance with activity based on assessment  - Modify environment to reduce risk of injury  - Provide assistive devices as appropriate  - Consider OT/PT consult to assist with strengthening/mobility  - Encourage toileting schedule  Outcome: Progressing     Problem: DISCHARGE PLANNING  Goal: Discharge to home or other facility with appropriate resources  Description: INTERVENTIONS:  - Identify barriers to discharge w/pt and caregiver  - Include patient/family/discharge partner in discharge planning  - Arrange for needed discharge resources and transportation as appropriate  - Identify discharge learning needs (meds, wound care, etc)  - Arrange for interpreters to assist at discharge as needed  - Consider post-discharge preferences of patient/family/discharge partner  - Complete POLST form as appropriate  - Assess patient's ability to be responsible for managing their own health  - Refer to Case Management Department for coordinating discharge planning if the patient needs post-hospital services based on physician/LIP order or complex needs related to functional status, cognitive ability or social support system  Outcome: Progressing    Patient is A&OX4, encourage incentive spirometer, pain control, out of bed and ambulation TID, up to chair. Surgical dressing CDI. Call light within reach. At 0800 Dr. Trinidad Ruiz notified of hematuria in levi, pelvic discomfort, and BP 97/56. MD Trinidad Ruiz states hold lisinopril, lasix and Eliquis. MD also states to discontinue Macrobid. At 0800, MD Hoff and urology team notified about hematuria, pelvic pain and that levi was irrigated and bag was changed per night shift. Urology states will see patient shortly. Urology plan to replace levi with 20 coude french 3 way. Discharge plan pending. Continue to monitor pt.     1030: Per Urology, replaced levi with a 16 french and irrigated, urine is now pink tinged and clot is gone. Urology states will come back in several hours and discuss plan for discharge for pt and clearance. Continue to monitor pt.     1400: Per Timbo Molina, patient will not discharge today. Still pending urology clearance as well. Continue to monitor pt.     1545: Per Dr Trinidad Ruiz, hold eliquis today 3-20 and tomorrow 3-21. Pt is cleared from urology pending [facility]J acceptance. Pending new note from physical therapy post syncopal episode on 3-19. Surgical dressing changed. BLE edema and discoloration noted.

## 2023-03-21 ENCOUNTER — APPOINTMENT (OUTPATIENT)
Dept: ULTRASOUND IMAGING | Facility: HOSPITAL | Age: 83
End: 2023-03-21
Attending: INTERNAL MEDICINE
Payer: MEDICARE

## 2023-03-21 PROCEDURE — 99232 SBSQ HOSP IP/OBS MODERATE 35: CPT | Performed by: INTERNAL MEDICINE

## 2023-03-21 PROCEDURE — 93971 EXTREMITY STUDY: CPT | Performed by: INTERNAL MEDICINE

## 2023-03-21 RX ORDER — AMIODARONE HYDROCHLORIDE 200 MG/1
200 TABLET ORAL 2 TIMES DAILY WITH MEALS
Qty: 60 TABLET | Refills: 3 | Status: SHIPPED | OUTPATIENT
Start: 2023-03-21

## 2023-03-21 RX ORDER — CLINDAMYCIN PHOSPHATE 600 MG/50ML
600 INJECTION INTRAVENOUS EVERY 8 HOURS
Status: DISCONTINUED | OUTPATIENT
Start: 2023-03-21 | End: 2023-03-23

## 2023-03-21 RX ORDER — AMIODARONE HYDROCHLORIDE 200 MG/1
200 TABLET ORAL 2 TIMES DAILY WITH MEALS
Status: DISCONTINUED | OUTPATIENT
Start: 2023-03-21 | End: 2023-03-23

## 2023-03-21 NOTE — CM/SW NOTE
Discussed during DC rounds, pt's leg is red and swollen - warm to touch. Wasn't able to work much with therapy - will need PT notes with pt moving out of bed     From [facility] Admissions\"Concerns: PT note 3/19: Pt passed out while amb. 3/20: PT note-exercises in bed only ; No OOB activities-including amb noted. Need OOB PT note -including transfers and amb, as well as, MD note addressing 3/19: syncopal episode-cause. Not even mentioned in 3/20 IM note. Did cardiology f/u? Will need Cardiology note and MD to address Syncope episode     Pt is not ready for DC today, once concerns are addressed with a new PT note with pt out of bed, MD note on syncope, and cardiology note should be able to discharge - pending [address] [place] [address] agreement     PLAN: DC to [facility] pending med clear - and above concerns    Kaykay Pearce, LSW, MSW ext.  [phone]

## 2023-03-21 NOTE — PLAN OF CARE
Alert and oriented x4. Left total knee. POD 7. Tele. 1 assist walker. Elaquis on hold. SCD in place. Matt dark red colored Urology aware. Silver mepilex and staples. Plan for [facility] once medically cleared. Problem: Patient Centered Care  Goal: Patient preferences are identified and integrated in the patient's plan of care  Description: Interventions:  - What would you like us to know as we care for you?  Home with family  - Provide timely, complete, and accurate information to patient/family  - Incorporate patient and family knowledge, values, beliefs, and cultural backgrounds into the planning and delivery of care  - Encourage patient/family to participate in care and decision-making at the level they choose  - Honor patient and family perspectives and choices  Outcome: Progressing     Problem: Patient/Family Goals  Goal: Patient/Family Long Term Goal  Description: Patient's Long Term Goal: safe ambulation    Interventions:  - calling for assistance out of the bed  - See additional Care Plan goals for specific interventions  Outcome: Progressing  Goal: Patient/Family Short Term Goal  Description: Patient's Short Term Goal: pain management    Interventions:   - pain administration  - See additional Care Plan goals for specific interventions  Outcome: Progressing     Problem: PAIN - ADULT  Goal: Verbalizes/displays adequate comfort level or patient's stated pain goal  Description: INTERVENTIONS:  - Encourage pt to monitor pain and request assistance  - Assess pain using appropriate pain scale  - Administer analgesics based on type and severity of pain and evaluate response  - Implement non-pharmacological measures as appropriate and evaluate response  - Consider cultural and social influences on pain and pain management  - Manage/alleviate anxiety  - Utilize distraction and/or relaxation techniques  - Monitor for opioid side effects  - Notify MD/LIP if interventions unsuccessful or patient reports new pain  - Anticipate increased pain with activity and pre-medicate as appropriate  Outcome: Progressing     Problem: RISK FOR INFECTION - ADULT  Goal: Absence of fever/infection during anticipated neutropenic period  Description: INTERVENTIONS  - Monitor WBC  - Administer growth factors as ordered  - Implement neutropenic guidelines  Outcome: Progressing     Problem: SAFETY ADULT - FALL  Goal: Free from fall injury  Description: INTERVENTIONS:  - Assess pt frequently for physical needs  - Identify cognitive and physical deficits and behaviors that affect risk of falls.   - [place] fall precautions as indicated by assessment.  - Educate pt/family on patient safety including physical limitations  - Instruct pt to call for assistance with activity based on assessment  - Modify environment to reduce risk of injury  - Provide assistive devices as appropriate  - Consider OT/PT consult to assist with strengthening/mobility  - Encourage toileting schedule  Outcome: Progressing     Problem: DISCHARGE PLANNING  Goal: Discharge to home or other facility with appropriate resources  Description: INTERVENTIONS:  - Identify barriers to discharge w/pt and caregiver  - Include patient/family/discharge partner in discharge planning  - Arrange for needed discharge resources and transportation as appropriate  - Identify discharge learning needs (meds, wound care, etc)  - Arrange for interpreters to assist at discharge as needed  - Consider post-discharge preferences of patient/family/discharge partner  - Complete POLST form as appropriate  - Assess patient's ability to be responsible for managing their own health  - Refer to Case Management Department for coordinating discharge planning if the patient needs post-hospital services based on physician/LIP order or complex needs related to functional status, cognitive ability or social support system  Outcome: Progressing

## 2023-03-21 NOTE — PLAN OF CARE
Alert and oriented x4. Post op day 7 of left total knee,mepilex in place. Matt in place. Dark/jorden red output, urology aware. 1 assist with walker. Cardiology on consult, new orders noted. Eliquis on hold. SCD in place to right leg. Pt c/o left calf pain, BL US doppler ordered, results noted. Plan for [facility] once medically cleared, pt will discharge with Matt. Void trial at rehab in 1 week. Problem: Patient Centered Care  Goal: Patient preferences are identified and integrated in the patient's plan of care  Description: Interventions:  - What would you like us to know as we care for you?  I like to be updated on the plan of care  - Provide timely, complete, and accurate information to patient/family  - Incorporate patient and family knowledge, values, beliefs, and cultural backgrounds into the planning and delivery of care  - Encourage patient/family to participate in care and decision-making at the level they choose  - Honor patient and family perspectives and choices  Outcome: Progressing     Problem: Patient/Family Goals  Goal: Patient/Family Long Term Goal  Description: Patient's Long Term Goal: Discharge to rehab    Interventions:  - Follow MD orders  - PT  - See additional Care Plan goals for specific interventions  Outcome: Progressing  Goal: Patient/Family Short Term Goal  Description: Patient's Short Term Goal: Pain management    Interventions:   - Follow MD orders  - Take pain medications  - Ice gel  - See additional Care Plan goals for specific interventions  Outcome: Progressing     Problem: PAIN - ADULT  Goal: Verbalizes/displays adequate comfort level or patient's stated pain goal  Description: INTERVENTIONS:  - Encourage pt to monitor pain and request assistance  - Assess pain using appropriate pain scale  - Administer analgesics based on type and severity of pain and evaluate response  - Implement non-pharmacological measures as appropriate and evaluate response  - Consider cultural and social influences on pain and pain management  - Manage/alleviate anxiety  - Utilize distraction and/or relaxation techniques  - Monitor for opioid side effects  - Notify MD/LIP if interventions unsuccessful or patient reports new pain  - Anticipate increased pain with activity and pre-medicate as appropriate  Outcome: Progressing     Problem: RISK FOR INFECTION - ADULT  Goal: Absence of fever/infection during anticipated neutropenic period  Description: INTERVENTIONS  - Monitor WBC  - Administer growth factors as ordered  - Implement neutropenic guidelines  Outcome: Progressing     Problem: SAFETY ADULT - FALL  Goal: Free from fall injury  Description: INTERVENTIONS:  - Assess pt frequently for physical needs  - Identify cognitive and physical deficits and behaviors that affect risk of falls.   - [place] fall precautions as indicated by assessment.  - Educate pt/family on patient safety including physical limitations  - Instruct pt to call for assistance with activity based on assessment  - Modify environment to reduce risk of injury  - Provide assistive devices as appropriate  - Consider OT/PT consult to assist with strengthening/mobility  - Encourage toileting schedule  Outcome: Progressing     Problem: DISCHARGE PLANNING  Goal: Discharge to home or other facility with appropriate resources  Description: INTERVENTIONS:  - Identify barriers to discharge w/pt and caregiver  - Include patient/family/discharge partner in discharge planning  - Arrange for needed discharge resources and transportation as appropriate  - Identify discharge learning needs (meds, wound care, etc)  - Arrange for interpreters to assist at discharge as needed  - Consider post-discharge preferences of patient/family/discharge partner  - Complete POLST form as appropriate  - Assess patient's ability to be responsible for managing their own health  - Refer to Case Management Department for coordinating discharge planning if the patient needs post-hospital services based on physician/LIP order or complex needs related to functional status, cognitive ability or social support system  Outcome: Progressing

## 2023-03-22 LAB
ANION GAP SERPL CALC-SCNC: 8 MMOL/L (ref 0–18)
BASOPHILS # BLD AUTO: 0.15 X10(3) UL (ref 0–0.2)
BASOPHILS NFR BLD AUTO: 1 %
BUN BLD-MCNC: 17 MG/DL (ref 7–18)
BUN/CREAT SERPL: 27.9 (ref 10–20)
CALCIUM BLD-MCNC: 8.5 MG/DL (ref 8.5–10.1)
CHLORIDE SERPL-SCNC: 95 MMOL/L (ref 98–112)
CO2 SERPL-SCNC: 28 MMOL/L (ref 21–32)
CREAT BLD-MCNC: 0.61 MG/DL
DEPRECATED RDW RBC AUTO: 31.7 FL (ref 35.1–46.3)
EOSINOPHIL # BLD AUTO: 0.37 X10(3) UL (ref 0–0.7)
EOSINOPHIL NFR BLD AUTO: 2.6 %
ERYTHROCYTE [DISTWIDTH] IN BLOOD BY AUTOMATED COUNT: 15.8 % (ref 11–15)
GFR SERPLBLD BASED ON 1.73 SQ M-ARVRAT: 94 ML/MIN/1.73M2 (ref 60–?)
GLUCOSE BLD-MCNC: 130 MG/DL (ref 70–99)
HCT VFR BLD AUTO: 24.8 %
HGB BLD-MCNC: 8.1 G/DL
IMM GRANULOCYTES # BLD AUTO: 0.2 X10(3) UL (ref 0–1)
IMM GRANULOCYTES NFR BLD: 1.4 %
LYMPHOCYTES # BLD AUTO: 1.71 X10(3) UL (ref 1–4)
LYMPHOCYTES NFR BLD AUTO: 11.9 %
MCH RBC QN AUTO: 19.1 PG (ref 26–34)
MCHC RBC AUTO-ENTMCNC: 32.7 G/DL (ref 31–37)
MCV RBC AUTO: 58.5 FL
MONOCYTES # BLD AUTO: 1.67 X10(3) UL (ref 0.1–1)
MONOCYTES NFR BLD AUTO: 11.6 %
NEUTROPHILS # BLD AUTO: 10.25 X10 (3) UL (ref 1.5–7.7)
NEUTROPHILS # BLD AUTO: 10.25 X10(3) UL (ref 1.5–7.7)
NEUTROPHILS NFR BLD AUTO: 71.5 %
OSMOLALITY SERPL CALC.SUM OF ELEC: 275 MOSM/KG (ref 275–295)
PLATELET # BLD AUTO: 406 10(3)UL (ref 150–450)
POTASSIUM SERPL-SCNC: 4.8 MMOL/L (ref 3.5–5.1)
RBC # BLD AUTO: 4.24 X10(6)UL
SODIUM SERPL-SCNC: 131 MMOL/L (ref 136–145)
WBC # BLD AUTO: 14.4 X10(3) UL (ref 4–11)

## 2023-03-22 PROCEDURE — 99232 SBSQ HOSP IP/OBS MODERATE 35: CPT | Performed by: INTERNAL MEDICINE

## 2023-03-22 RX ORDER — CEFADROXIL 500 MG/1
500 CAPSULE ORAL 2 TIMES DAILY
Qty: 10 CAPSULE | Refills: 0 | Status: SHIPPED | OUTPATIENT
Start: 2023-03-22 | End: 2023-03-23

## 2023-03-22 NOTE — DISCHARGE INSTRUCTIONS
Resume eliquis in rehab in 3 days if  urine is clear  If persistent to see urology here before resuming eliquis   Voiding trial in 5-7 days once more mobile.   Check CBC and basic metabolic panel in 3 days    Follow-up with Dr. Peggy Guy 1 week postdischarge from rehab  Follow-up with orthopedics as instructed by Ortho team  Follow-up with Urology

## 2023-03-22 NOTE — PLAN OF CARE
Post-op day #8. Dressing in place to left knee. Monitoring vital signs- stable at this time. No acute changes noted throughout shift. Receiving Clindamycin IV. Levi cath intact and hematuria present. Oral fluids encouraged. Eliquis on hold. SCDs for DVT prophylaxis. Denies pain. Up with standby assist and a walker. Encouraged frequent use of incentive spirometer. LLE swollen and red. CMS intact. Thigh high TEDs placed to RLE. Fall precautions maintained- bed alarm on, bed at lowest position, call light and personal belongings within reach, non-skid socks in place to bilateral feet. Frequent rounding by nursing staff. Plan for ID consult. On-call MD updated on continued hematuria with levi catheter, CBC/BMP ordered for this AM.    Problem: Patient Centered Care  Goal: Patient preferences are identified and integrated in the patient's plan of care  Description: Interventions:  - What would you like us to know as we care for you?  I live at home alone  - Provide timely, complete, and accurate information to patient/family  - Incorporate patient and family knowledge, values, beliefs, and cultural backgrounds into the planning and delivery of care  - Encourage patient/family to participate in care and decision-making at the level they choose  - Honor patient and family perspectives and choices  Outcome: Progressing     Problem: Patient/Family Goals  Goal: Patient/Family Long Term Goal  Description: Patient's Long Term Goal: go home    Interventions:  - pt/ot  -monitor for s/sx of infection  -ID consult    - See additional Care Plan goals for specific interventions  Outcome: Progressing  Goal: Patient/Family Short Term Goal  Description: Patient's Short Term Goal: gain strength and better gait    Interventions:   - pt/ot  -ROM in bed  -use of walker  - See additional Care Plan goals for specific interventions  Outcome: Progressing     Problem: PAIN - ADULT  Goal: Verbalizes/displays adequate comfort level or patient's stated pain goal  Description: INTERVENTIONS:  - Encourage pt to monitor pain and request assistance  - Assess pain using appropriate pain scale  - Administer analgesics based on type and severity of pain and evaluate response  - Implement non-pharmacological measures as appropriate and evaluate response  - Consider cultural and social influences on pain and pain management  - Manage/alleviate anxiety  - Utilize distraction and/or relaxation techniques  - Monitor for opioid side effects  - Notify MD/LIP if interventions unsuccessful or patient reports new pain  - Anticipate increased pain with activity and pre-medicate as appropriate  Outcome: Progressing     Problem: RISK FOR INFECTION - ADULT  Goal: Absence of fever/infection during anticipated neutropenic period  Description: INTERVENTIONS  - Monitor WBC  - Administer growth factors as ordered  - Implement neutropenic guidelines  Outcome: Progressing     Problem: SAFETY ADULT - FALL  Goal: Free from fall injury  Description: INTERVENTIONS:  - Assess pt frequently for physical needs  - Identify cognitive and physical deficits and behaviors that affect risk of falls.   - [place] fall precautions as indicated by assessment.  - Educate pt/family on patient safety including physical limitations  - Instruct pt to call for assistance with activity based on assessment  - Modify environment to reduce risk of injury  - Provide assistive devices as appropriate  - Consider OT/PT consult to assist with strengthening/mobility  - Encourage toileting schedule  Outcome: Progressing     Problem: DISCHARGE PLANNING  Goal: Discharge to home or other facility with appropriate resources  Description: INTERVENTIONS:  - Identify barriers to discharge w/pt and caregiver  - Include patient/family/discharge partner in discharge planning  - Arrange for needed discharge resources and transportation as appropriate  - Identify discharge learning needs (meds, wound care, etc)  - Arrange for interpreters to assist at discharge as needed  - Consider post-discharge preferences of patient/family/discharge partner  - Complete POLST form as appropriate  - Assess patient's ability to be responsible for managing their own health  - Refer to Case Management Department for coordinating discharge planning if the patient needs post-hospital services based on physician/LIP order or complex needs related to functional status, cognitive ability or social support system  Outcome: Progressing

## 2023-03-22 NOTE — CM/SW NOTE
Updated clinicals provided to [facility]. Waiting on ID consult. SW/CM will continue to follow. Plan: [facility] when medically cleared.      Fernando AgudeloPiedmont [facility] Medical Center ext [phone]

## 2023-03-22 NOTE — PLAN OF CARE
Patient alert and oriented. Post-op day #8. Dressing in place to left knee. VSS. Receiving IV abx per MD order for LLE cellulitis. Tolerating diet. Levi in place, bloody output recorded. Irrigated by urology this AM. Room air. SCDs and Teds for DVT prophylaxis. Eliquis on hold. Pt denies any pain medication at this time. Up with standby assist and a walker. Encouraged frequent ambulation and use of incentive spirometer. Fall precautions maintained. Frequent rounding by nursing staff. Plan is [place], will discharge with levi and oral antibiotics.     Problem: Patient Centered Care  Goal: Patient preferences are identified and integrated in the patient's plan of care  Description: Interventions:  - What would you like us to know as we care for you?   - Provide timely, complete, and accurate information to patient/family  - Incorporate patient and family knowledge, values, beliefs, and cultural backgrounds into the planning and delivery of care  - Encourage patient/family to participate in care and decision-making at the level they choose  - Honor patient and family perspectives and choices  Outcome: Progressing     Problem: Patient/Family Goals  Goal: Patient/Family Long Term Goal  Description: Patient's Long Term Goal: to discharge    Interventions:  - follow md orders  -monitor labs  -discharge planning  -update pt and family on plan of care  - See additional Care Plan goals for specific interventions  Outcome: Progressing  Goal: Patient/Family Short Term Goal  Description: Patient's Short Term Goal: pain management    Interventions:   - follow md orders  -take pain medication as needed  -non-pharmacologic therapy  -PT  - See additional Care Plan goals for specific interventions  Outcome: Progressing     Problem: PAIN - ADULT  Goal: Verbalizes/displays adequate comfort level or patient's stated pain goal  Description: INTERVENTIONS:  - Encourage pt to monitor pain and request assistance  - Assess pain using appropriate pain scale  - Administer analgesics based on type and severity of pain and evaluate response  - Implement non-pharmacological measures as appropriate and evaluate response  - Consider cultural and social influences on pain and pain management  - Manage/alleviate anxiety  - Utilize distraction and/or relaxation techniques  - Monitor for opioid side effects  - Notify MD/LIP if interventions unsuccessful or patient reports new pain  - Anticipate increased pain with activity and pre-medicate as appropriate  Outcome: Progressing     Problem: RISK FOR INFECTION - ADULT  Goal: Absence of fever/infection during anticipated neutropenic period  Description: INTERVENTIONS  - Monitor WBC  - Administer growth factors as ordered  - Implement neutropenic guidelines  Outcome: Progressing     Problem: SAFETY ADULT - FALL  Goal: Free from fall injury  Description: INTERVENTIONS:  - Assess pt frequently for physical needs  - Identify cognitive and physical deficits and behaviors that affect risk of falls.   - [place] fall precautions as indicated by assessment.  - Educate pt/family on patient safety including physical limitations  - Instruct pt to call for assistance with activity based on assessment  - Modify environment to reduce risk of injury  - Provide assistive devices as appropriate  - Consider OT/PT consult to assist with strengthening/mobility  - Encourage toileting schedule  Outcome: Progressing     Problem: DISCHARGE PLANNING  Goal: Discharge to home or other facility with appropriate resources  Description: INTERVENTIONS:  - Identify barriers to discharge w/pt and caregiver  - Include patient/family/discharge partner in discharge planning  - Arrange for needed discharge resources and transportation as appropriate  - Identify discharge learning needs (meds, wound care, etc)  - Arrange for interpreters to assist at discharge as needed  - Consider post-discharge preferences of patient/family/discharge partner  - Complete POLST form as appropriate  - Assess patient's ability to be responsible for managing their own health  - Refer to Case Management Department for coordinating discharge planning if the patient needs post-hospital services based on physician/LIP order or complex needs related to functional status, cognitive ability or social support system  Outcome: Progressing

## 2023-03-23 VITALS
DIASTOLIC BLOOD PRESSURE: 82 MMHG | BODY MASS INDEX: 31.75 KG/M2 | SYSTOLIC BLOOD PRESSURE: 124 MMHG | TEMPERATURE: 98 F | RESPIRATION RATE: 18 BRPM | OXYGEN SATURATION: 96 % | HEIGHT: 69 IN | HEART RATE: 96 BPM | WEIGHT: 214.38 LBS

## 2023-03-23 LAB — SARS-COV-2 RNA RESP QL NAA+PROBE: NOT DETECTED

## 2023-03-23 PROCEDURE — 99239 HOSP IP/OBS DSCHRG MGMT >30: CPT | Performed by: INTERNAL MEDICINE

## 2023-03-23 RX ORDER — CEFADROXIL 500 MG/1
500 CAPSULE ORAL 2 TIMES DAILY
Qty: 10 CAPSULE | Refills: 0 | Status: SHIPPED | OUTPATIENT
Start: 2023-03-23 | End: 2023-03-28

## 2023-03-23 NOTE — CM/SW NOTE
03/18/23 1200   Discharge disposition   Expected discharge disposition Rehab [address] Provider   [facility]   Discharge transportation [facility]   The pt. Is scheduled to discharge to [address] [facility],[address]. Rehab today 3/23 at 1p, via [address]. The pt. Is aware of discharge and medicar costs. The pt. Will notify his brother himself of the discharge. [facility] will call the pt's nurse directly for report.     Charge RN if needed [phone]    PCS is complete in Epic- RN to print with [address], [address] [facility]

## 2023-03-23 NOTE — PLAN OF CARE
Post-op day #9. Dressing in place to left knee. Thigh high TEDs in place. Pt complains of pain to left leg. Norco provided for pain as needed. Monitoring vital signs- stable at this time. Receiving Clindamycin IV. Matt cath intact and hematuria present. Oral fluids encouraged. Eliquis on hold. SCDs for DVT prophylaxis. Up with standby assist and a walker. Encouraged frequent use of incentive spirometer. LLE swollen and red. CMS intact. Fall precautions maintained- bed alarm on, bed at lowest position, call light and personal belongings within reach, non-skid socks in place to bilateral feet. Frequent rounding by nursing staff. Plan for  community rehab. Problem: Patient Centered Care  Goal: Patient preferences are identified and integrated in the patient's plan of care  Description: Interventions:  - What would you like us to know as we care for you?  I live at home alone  - Provide timely, complete, and accurate information to patient/family  - Incorporate patient and family knowledge, values, beliefs, and cultural backgrounds into the planning and delivery of care  - Encourage patient/family to participate in care and decision-making at the level they choose  - Honor patient and family perspectives and choices  Outcome: Progressing     Problem: Patient/Family Goals  Goal: Patient/Family Long Term Goal  Description: Patient's Long Term Goal: go home    Interventions:  - pt/ot  -monitor for s/sx of infection  -ID consult    - See additional Care Plan goals for specific interventions  Outcome: Progressing  Goal: Patient/Family Short Term Goal  Description: Patient's Short Term Goal: gain strength and better gait    Interventions:   - pt/ot  -ROM in bed  -use of walker  - See additional Care Plan goals for specific interventions  Outcome: Progressing     Problem: PAIN - ADULT  Goal: Verbalizes/displays adequate comfort level or patient's stated pain goal  Description: INTERVENTIONS:  - Encourage pt to monitor pain and request assistance  - Assess pain using appropriate pain scale  - Administer analgesics based on type and severity of pain and evaluate response  - Implement non-pharmacological measures as appropriate and evaluate response  - Consider cultural and social influences on pain and pain management  - Manage/alleviate anxiety  - Utilize distraction and/or relaxation techniques  - Monitor for opioid side effects  - Notify MD/LIP if interventions unsuccessful or patient reports new pain  - Anticipate increased pain with activity and pre-medicate as appropriate  Outcome: Progressing     Problem: RISK FOR INFECTION - ADULT  Goal: Absence of fever/infection during anticipated neutropenic period  Description: INTERVENTIONS  - Monitor WBC  - Administer growth factors as ordered  - Implement neutropenic guidelines  Outcome: Progressing     Problem: SAFETY ADULT - FALL  Goal: Free from fall injury  Description: INTERVENTIONS:  - Assess pt frequently for physical needs  - Identify cognitive and physical deficits and behaviors that affect risk of falls.   - [place] fall precautions as indicated by assessment.  - Educate pt/family on patient safety including physical limitations  - Instruct pt to call for assistance with activity based on assessment  - Modify environment to reduce risk of injury  - Provide assistive devices as appropriate  - Consider OT/PT consult to assist with strengthening/mobility  - Encourage toileting schedule  Outcome: Progressing     Problem: DISCHARGE PLANNING  Goal: Discharge to home or other facility with appropriate resources  Description: INTERVENTIONS:  - Identify barriers to discharge w/pt and caregiver  - Include patient/family/discharge partner in discharge planning  - Arrange for needed discharge resources and transportation as appropriate  - Identify discharge learning needs (meds, wound care, etc)  - Arrange for interpreters to assist at discharge as needed  - Consider post-discharge preferences of patient/family/discharge partner  - Complete POLST form as appropriate  - Assess patient's ability to be responsible for managing their own health  - Refer to Case Management Department for coordinating discharge planning if the patient needs post-hospital services based on physician/LIP order or complex needs related to functional status, cognitive ability or social support system  Outcome: Progressing

## 2023-03-23 NOTE — PLAN OF CARE
POD+9 left knee dressing changed prior to discharge. Cleared by Janett Espinosa for discharge. Leaving with levi void trail at Rehab. Pain well controlled.  Scheduled  1 pm.      Problem: Patient Centered Care  Goal: Patient preferences are identified and integrated in the patient's plan of care  Description: Interventions:  - What would you like us to know as we care for you?   - Provide timely, complete, and accurate information to patient/family  - Incorporate patient and family knowledge, values, beliefs, and cultural backgrounds into the planning and delivery of care  - Encourage patient/family to participate in care and decision-making at the level they choose  - Honor patient and family perspectives and choices  Outcome: Adequate for Discharge     Problem: Patient/Family Goals  Goal: Patient/Family Long Term Goal  Description: Patient's Long Term Goal: to discharge    Interventions:  - follow md orders  -monitor labs  -discharge planning  -update pt and family on plan of care  - See additional Care Plan goals for specific interventions  Outcome: Adequate for Discharge  Goal: Patient/Family Short Term Goal  Description: Patient's Short Term Goal: pain management    Interventions:   - follow md orders  -take pain medication as needed  -non-pharmacologic therapy  -PT  - See additional Care Plan goals for specific interventions  Outcome: Adequate for Discharge     Problem: PAIN - ADULT  Goal: Verbalizes/displays adequate comfort level or patient's stated pain goal  Description: INTERVENTIONS:  - Encourage pt to monitor pain and request assistance  - Assess pain using appropriate pain scale  - Administer analgesics based on type and severity of pain and evaluate response  - Implement non-pharmacological measures as appropriate and evaluate response  - Consider cultural and social influences on pain and pain management  - Manage/alleviate anxiety  - Utilize distraction and/or relaxation techniques  - Monitor for opioid side effects  - Notify MD/LIP if interventions unsuccessful or patient reports new pain  - Anticipate increased pain with activity and pre-medicate as appropriate  Outcome: Adequate for Discharge     Problem: RISK FOR INFECTION - ADULT  Goal: Absence of fever/infection during anticipated neutropenic period  Description: INTERVENTIONS  - Monitor WBC  - Administer growth factors as ordered  - Implement neutropenic guidelines  Outcome: Adequate for Discharge     Problem: SAFETY ADULT - FALL  Goal: Free from fall injury  Description: INTERVENTIONS:  - Assess pt frequently for physical needs  - Identify cognitive and physical deficits and behaviors that affect risk of falls.   - [place] fall precautions as indicated by assessment.  - Educate pt/family on patient safety including physical limitations  - Instruct pt to call for assistance with activity based on assessment  - Modify environment to reduce risk of injury  - Provide assistive devices as appropriate  - Consider OT/PT consult to assist with strengthening/mobility  - Encourage toileting schedule  Outcome: Adequate for Discharge     Problem: DISCHARGE PLANNING  Goal: Discharge to home or other facility with appropriate resources  Description: INTERVENTIONS:  - Identify barriers to discharge w/pt and caregiver  - Include patient/family/discharge partner in discharge planning  - Arrange for needed discharge resources and transportation as appropriate  - Identify discharge learning needs (meds, wound care, etc)  - Arrange for interpreters to assist at discharge as needed  - Consider post-discharge preferences of patient/family/discharge partner  - Complete POLST form as appropriate  - Assess patient's ability to be responsible for managing their own health  - Refer to Case Management Department for coordinating discharge planning if the patient needs post-hospital services based on physician/LIP order or complex needs related to functional status, cognitive ability or social support system  Outcome: Adequate for Discharge

## 2023-03-27 ENCOUNTER — MED REC SCAN ONLY (OUTPATIENT)
Dept: INTERNAL MEDICINE CLINIC | Facility: CLINIC | Age: 83
End: 2023-03-27

## 2023-03-28 ENCOUNTER — TELEPHONE (OUTPATIENT)
Dept: INTERNAL MEDICINE CLINIC | Facility: CLINIC | Age: 83
End: 2023-03-28

## 2023-03-28 NOTE — TELEPHONE ENCOUNTER
I do not understand the meaning of follow-up.   If it means this patient needs a follow-up appointment to see me and the answer is yes   if it means  That do I sign off on the home health papers the answer is yes

## 2023-03-28 NOTE — TELEPHONE ENCOUNTER
Josh Guzman  [phone]    Will Dr Sage Javeir followup pt's home health after release from [facility]. Confidential voicemail.

## 2023-04-03 NOTE — TELEPHONE ENCOUNTER
Mary Mays  [phone] states she admitted patient to home health yesterday 4-2-23 and states patient is aware he needs a follow-up appt with Dr Anant Morales.

## 2023-04-07 ENCOUNTER — MED REC SCAN ONLY (OUTPATIENT)
Dept: INTERNAL MEDICINE CLINIC | Facility: CLINIC | Age: 83
End: 2023-04-07

## 2023-04-10 ENCOUNTER — OFFICE VISIT (OUTPATIENT)
Dept: ORTHOPEDICS CLINIC | Facility: CLINIC | Age: 83
End: 2023-04-10

## 2023-04-10 ENCOUNTER — HOSPITAL ENCOUNTER (OUTPATIENT)
Dept: GENERAL RADIOLOGY | Facility: HOSPITAL | Age: 83
Discharge: HOME | End: 2023-04-10
Attending: PHYSICIAN ASSISTANT
Payer: MEDICARE

## 2023-04-10 DIAGNOSIS — Z47.89 ORTHOPEDIC AFTERCARE: Primary | ICD-10-CM

## 2023-04-10 DIAGNOSIS — Z47.89 ORTHOPEDIC AFTERCARE: ICD-10-CM

## 2023-04-10 PROCEDURE — 73562 X-RAY EXAM OF KNEE 3: CPT | Performed by: PHYSICIAN ASSISTANT

## 2023-04-10 PROCEDURE — 1111F DSCHRG MED/CURRENT MED MERGE: CPT | Performed by: PHYSICIAN ASSISTANT

## 2023-04-10 PROCEDURE — 1125F AMNT PAIN NOTED PAIN PRSNT: CPT | Performed by: PHYSICIAN ASSISTANT

## 2023-04-10 PROCEDURE — 99024 POSTOP FOLLOW-UP VISIT: CPT | Performed by: PHYSICIAN ASSISTANT

## 2023-04-10 RX ORDER — PANTOPRAZOLE SODIUM 40 MG/1
TABLET, DELAYED RELEASE ORAL
COMMUNITY
Start: 2023-04-02

## 2023-04-10 RX ORDER — AMIODARONE HYDROCHLORIDE 400 MG/1
TABLET ORAL
COMMUNITY
Start: 2023-04-03 | End: 2023-04-10

## 2023-04-10 RX ORDER — ERGOCALCIFEROL 1.25 MG/1
CAPSULE ORAL
COMMUNITY

## 2023-04-10 RX ORDER — ERYTHROMYCIN 5 MG/G
OINTMENT OPHTHALMIC
COMMUNITY
Start: 2023-03-31

## 2023-04-12 ENCOUNTER — OFFICE VISIT (OUTPATIENT)
Dept: INTERNAL MEDICINE CLINIC | Facility: CLINIC | Age: 83
End: 2023-04-12

## 2023-04-12 VITALS
DIASTOLIC BLOOD PRESSURE: 70 MMHG | HEART RATE: 54 BPM | BODY MASS INDEX: 31.99 KG/M2 | SYSTOLIC BLOOD PRESSURE: 118 MMHG | OXYGEN SATURATION: 97 % | RESPIRATION RATE: 14 BRPM | HEIGHT: 69 IN | WEIGHT: 216 LBS

## 2023-04-12 DIAGNOSIS — I10 HYPERTENSION, UNSPECIFIED TYPE: ICD-10-CM

## 2023-04-12 DIAGNOSIS — I48.91 ATRIAL FIBRILLATION, UNSPECIFIED TYPE (HCC): Primary | ICD-10-CM

## 2023-04-12 DIAGNOSIS — M17.12 OSTEOARTHRITIS OF LEFT KNEE, UNSPECIFIED OSTEOARTHRITIS TYPE: ICD-10-CM

## 2023-04-12 PROCEDURE — 1126F AMNT PAIN NOTED NONE PRSNT: CPT | Performed by: INTERNAL MEDICINE

## 2023-04-12 PROCEDURE — 99214 OFFICE O/P EST MOD 30 MIN: CPT | Performed by: INTERNAL MEDICINE

## 2023-04-12 PROCEDURE — 1111F DSCHRG MED/CURRENT MED MERGE: CPT | Performed by: INTERNAL MEDICINE

## 2023-04-12 RX ORDER — ACETAMINOPHEN 325 MG/1
975 TABLET ORAL 2 TIMES DAILY
COMMUNITY
End: 2023-04-12

## 2023-04-13 ENCOUNTER — TELEPHONE (OUTPATIENT)
Dept: INTERNAL MEDICINE CLINIC | Facility: CLINIC | Age: 83
End: 2023-04-13

## 2023-04-13 NOTE — TELEPHONE ENCOUNTER
[facility]  [phone], confidential    Let Dr Radha Reardon know plan is to discharge patient from home health tomorrow 4-14-23 because patient 's going to start out patient physical therapy for his knee next week. Patient is doing great at home  He is more than ready.

## 2023-04-17 ENCOUNTER — HOME HEALTH CHARGES (OUTPATIENT)
Dept: INTERNAL MEDICINE CLINIC | Facility: CLINIC | Age: 83
End: 2023-04-17

## 2023-04-17 ENCOUNTER — MED REC SCAN ONLY (OUTPATIENT)
Dept: INTERNAL MEDICINE CLINIC | Facility: CLINIC | Age: 83
End: 2023-04-17

## 2023-04-17 DIAGNOSIS — M17.12 OSTEOARTHRITIS OF LEFT KNEE, UNSPECIFIED OSTEOARTHRITIS TYPE: ICD-10-CM

## 2023-04-17 DIAGNOSIS — I10 HYPERTENSION, UNSPECIFIED TYPE: ICD-10-CM

## 2023-04-17 DIAGNOSIS — I25.10 CORONARY ARTERY DISEASE INVOLVING NATIVE CORONARY ARTERY OF NATIVE HEART WITHOUT ANGINA PECTORIS: ICD-10-CM

## 2023-04-17 DIAGNOSIS — I48.91 ATRIAL FIBRILLATION, UNSPECIFIED TYPE (HCC): Primary | ICD-10-CM

## 2023-04-17 DIAGNOSIS — Z47.89 ORTHOPEDIC AFTERCARE: ICD-10-CM

## 2023-04-17 NOTE — PROGRESS NOTES
[facility] [birth year]  Certification Period 4/2/23 to 5/31/23  Juve  Provider no. [id number]

## 2023-04-20 ENCOUNTER — OFFICE VISIT (OUTPATIENT)
Dept: PHYSICAL THERAPY | Age: 83
End: 2023-04-20
Attending: PHYSICIAN ASSISTANT
Payer: MEDICARE

## 2023-04-20 ENCOUNTER — TELEPHONE (OUTPATIENT)
Dept: PHYSICAL THERAPY | Age: 83
End: 2023-04-20

## 2023-04-20 DIAGNOSIS — Z47.89 ORTHOPEDIC AFTERCARE: ICD-10-CM

## 2023-04-20 PROCEDURE — 97110 THERAPEUTIC EXERCISES: CPT | Performed by: PHYSICAL THERAPIST

## 2023-04-20 PROCEDURE — 97162 PT EVAL MOD COMPLEX 30 MIN: CPT | Performed by: PHYSICAL THERAPIST

## 2023-04-20 NOTE — TELEPHONE ENCOUNTER
Called patient to offer him 2nd PT appointment next week. He declined due to construction being completed on his home. Advised Hep compliance and confirmed PT appt on 4/28/2023. Pt stated that he would call to schedule 2nd appointment next week if his schedule frees up.

## 2023-04-25 ENCOUNTER — TELEPHONE (OUTPATIENT)
Dept: ORTHOPEDICS CLINIC | Facility: CLINIC | Age: 83
End: 2023-04-25

## 2023-04-25 NOTE — TELEPHONE ENCOUNTER
Patient states his leg has been locking up. States when this happens he is unable to walk. States it kept happening over the weekend and happened yesterday as well. States he has been doing his exercises as well.   Please advise

## 2023-04-25 NOTE — TELEPHONE ENCOUNTER
S/P Left TKA on 3/14/23     Reports was doing really well prior to 4/21/23-Reports was doing stairs, laundry, driving and was really happy with progress since surgery. Started Outpatient PT 4/20/23 reports it was more just a consultation than a rigorous therapy session. Next PT session scheduled for 4/28/23. Reports he is doing his exercises as advised three times daily. Friday 4/21/23 woke up and was having a hard time straightening and bending left leg. He reports it took him about 3-4 hours to get his left knee/leg to locking up sensation to ease up. Reports had a similar episode over the weekend. Reports his left knee pain is 7-8/10 is taking 3-325mg tylenol twice daily for pain. Reports pain is to left knee and shin area. Patient reports he is also icing left knee. Reports he feels the left knee may be more swollen and is concerned about regression. Denies calf swelling/tenderness, SOB, chest pain, fever, chills.       Please advise next follow up appointment scheduled for 6/7/23

## 2023-04-25 NOTE — TELEPHONE ENCOUNTER
Spoke with patient. Denies any specific trauma to the knee. May have over worked with his therapy. He states he feels about 50 % better today. Advised him to move the knee as much as he can. He should attempt PT on Friday. Advised him to call if anything worsens.

## 2023-04-28 ENCOUNTER — OFFICE VISIT (OUTPATIENT)
Dept: PHYSICAL THERAPY | Age: 83
End: 2023-04-28
Attending: PHYSICIAN ASSISTANT
Payer: MEDICARE

## 2023-04-28 PROCEDURE — 97140 MANUAL THERAPY 1/> REGIONS: CPT | Performed by: PHYSICAL THERAPIST

## 2023-04-28 PROCEDURE — 97110 THERAPEUTIC EXERCISES: CPT | Performed by: PHYSICAL THERAPIST

## 2023-04-28 NOTE — TELEPHONE ENCOUNTER
Spoke with patient reports was overall doing well since he last talked to [employer] 4/25/23. Had no episodes of knee locking up 4/25/23-4/27/23. S/p Left total knee 3/14/23    Reports today 4/28/23 drove to PT and knee locked up again reports it is swollen and was having difficulty bending and straightening again. Reports with help from PT knee has loosened up again but PT recommended he reach out to make provider aware. Dr. Kendall/Harjit please advise.

## 2023-05-01 ENCOUNTER — OFFICE VISIT (OUTPATIENT)
Dept: PHYSICAL THERAPY | Age: 83
End: 2023-05-01
Attending: PHYSICIAN ASSISTANT
Payer: MEDICARE

## 2023-05-01 PROCEDURE — 97530 THERAPEUTIC ACTIVITIES: CPT | Performed by: PHYSICAL THERAPIST

## 2023-05-01 PROCEDURE — 97110 THERAPEUTIC EXERCISES: CPT | Performed by: PHYSICAL THERAPIST

## 2023-05-01 RX ORDER — FINASTERIDE 5 MG/1
5 TABLET, FILM COATED ORAL DAILY
Qty: 90 TABLET | Refills: 3 | Status: SHIPPED | OUTPATIENT
Start: 2023-05-01

## 2023-05-01 RX ORDER — ERGOCALCIFEROL 1.25 MG/1
50000 CAPSULE ORAL
Qty: 12 CAPSULE | Refills: 1 | Status: SHIPPED | OUTPATIENT
Start: 2023-05-01

## 2023-05-01 NOTE — TELEPHONE ENCOUNTER
Spoke with patient appt scheduled for 5/3/23 at [facility] with [facility] DR ALMAZAN

## 2023-05-03 ENCOUNTER — OFFICE VISIT (OUTPATIENT)
Dept: ORTHOPEDICS CLINIC | Facility: CLINIC | Age: 83
End: 2023-05-03

## 2023-05-03 ENCOUNTER — OFFICE VISIT (OUTPATIENT)
Dept: PHYSICAL THERAPY | Age: 83
End: 2023-05-03
Attending: PHYSICIAN ASSISTANT
Payer: MEDICARE

## 2023-05-03 DIAGNOSIS — Z47.89 ORTHOPEDIC AFTERCARE: Primary | ICD-10-CM

## 2023-05-03 PROCEDURE — 99024 POSTOP FOLLOW-UP VISIT: CPT | Performed by: PHYSICIAN ASSISTANT

## 2023-05-03 PROCEDURE — 97110 THERAPEUTIC EXERCISES: CPT | Performed by: PHYSICAL THERAPIST

## 2023-05-03 PROCEDURE — 97530 THERAPEUTIC ACTIVITIES: CPT | Performed by: PHYSICAL THERAPIST

## 2023-05-04 ENCOUNTER — TELEPHONE (OUTPATIENT)
Dept: INTERNAL MEDICINE CLINIC | Facility: CLINIC | Age: 83
End: 2023-05-04

## 2023-05-08 ENCOUNTER — OFFICE VISIT (OUTPATIENT)
Dept: PHYSICAL THERAPY | Age: 83
End: 2023-05-08
Attending: PHYSICIAN ASSISTANT
Payer: MEDICARE

## 2023-05-08 PROCEDURE — 97140 MANUAL THERAPY 1/> REGIONS: CPT | Performed by: PHYSICAL THERAPIST

## 2023-05-08 PROCEDURE — 97110 THERAPEUTIC EXERCISES: CPT | Performed by: PHYSICAL THERAPIST

## 2023-05-09 NOTE — TELEPHONE ENCOUNTER
Spoke with kusum from [facility] who stated they are missing [id number]. She will be faxing order today to the Ruchi chen office.

## 2023-05-10 ENCOUNTER — OFFICE VISIT (OUTPATIENT)
Dept: PHYSICAL THERAPY | Age: 83
End: 2023-05-10
Attending: PHYSICIAN ASSISTANT
Payer: MEDICARE

## 2023-05-10 PROCEDURE — 97110 THERAPEUTIC EXERCISES: CPT | Performed by: PHYSICAL THERAPIST

## 2023-05-10 PROCEDURE — 97140 MANUAL THERAPY 1/> REGIONS: CPT | Performed by: PHYSICAL THERAPIST

## 2023-05-15 ENCOUNTER — OFFICE VISIT (OUTPATIENT)
Dept: PHYSICAL THERAPY | Age: 83
End: 2023-05-15
Attending: PHYSICIAN ASSISTANT
Payer: MEDICARE

## 2023-05-15 PROCEDURE — 97140 MANUAL THERAPY 1/> REGIONS: CPT | Performed by: PHYSICAL THERAPIST

## 2023-05-15 PROCEDURE — 97110 THERAPEUTIC EXERCISES: CPT | Performed by: PHYSICAL THERAPIST

## 2023-05-17 ENCOUNTER — APPOINTMENT (OUTPATIENT)
Dept: PHYSICAL THERAPY | Age: 83
End: 2023-05-17
Attending: PHYSICIAN ASSISTANT
Payer: MEDICARE

## 2023-05-17 ENCOUNTER — TELEPHONE (OUTPATIENT)
Dept: PHYSICAL THERAPY | Age: 83
End: 2023-05-17

## 2023-05-17 NOTE — TELEPHONE ENCOUNTER
Called Alyson Andrea re: no show to today's PT appointment. He stated that he thought that his appointment was at 12:30pm. He apologized. Offered pt appointment with Norma Michael PT at 10:15am on 5/18/2023. Pt accepted, confirmed that he will attend. Confirmed with pt that I will debrief Mayra Sun, PT on pt's current status, hx of episodes of L knee pain s/p TKA (pt denies any episodes since started [facility] for distal ITB), and current POC/goals.

## 2023-05-18 ENCOUNTER — OFFICE VISIT (OUTPATIENT)
Dept: PHYSICAL THERAPY | Age: 83
End: 2023-05-18
Attending: PHYSICIAN ASSISTANT
Payer: MEDICARE

## 2023-05-18 PROCEDURE — 97140 MANUAL THERAPY 1/> REGIONS: CPT

## 2023-05-18 PROCEDURE — 97110 THERAPEUTIC EXERCISES: CPT

## 2023-05-18 NOTE — TELEPHONE ENCOUNTER
Metoprolol 25 mg was ordered on 3/19/23 #90 with 3 refills by Dr. Mayte Chiu. Active in patient's chart, but too soon to reorder.

## 2023-05-22 ENCOUNTER — APPOINTMENT (OUTPATIENT)
Dept: PHYSICAL THERAPY | Age: 83
End: 2023-05-22
Attending: PHYSICIAN ASSISTANT
Payer: MEDICARE

## 2023-05-22 ENCOUNTER — OFFICE VISIT (OUTPATIENT)
Dept: PHYSICAL THERAPY | Age: 83
End: 2023-05-22
Attending: PHYSICIAN ASSISTANT
Payer: MEDICARE

## 2023-05-22 PROCEDURE — 97110 THERAPEUTIC EXERCISES: CPT

## 2023-05-22 PROCEDURE — 97140 MANUAL THERAPY 1/> REGIONS: CPT

## 2023-05-24 ENCOUNTER — OFFICE VISIT (OUTPATIENT)
Dept: PHYSICAL THERAPY | Age: 83
End: 2023-05-24
Attending: PHYSICIAN ASSISTANT
Payer: MEDICARE

## 2023-05-24 PROCEDURE — 97110 THERAPEUTIC EXERCISES: CPT | Performed by: PHYSICAL THERAPIST

## 2023-05-24 PROCEDURE — 97140 MANUAL THERAPY 1/> REGIONS: CPT | Performed by: PHYSICAL THERAPIST

## 2023-05-30 ENCOUNTER — OFFICE VISIT (OUTPATIENT)
Dept: PHYSICAL THERAPY | Age: 83
End: 2023-05-30
Attending: PHYSICIAN ASSISTANT
Payer: MEDICARE

## 2023-05-30 PROCEDURE — 97110 THERAPEUTIC EXERCISES: CPT

## 2023-05-30 PROCEDURE — 97140 MANUAL THERAPY 1/> REGIONS: CPT

## 2023-06-01 ENCOUNTER — OFFICE VISIT (OUTPATIENT)
Dept: PHYSICAL THERAPY | Age: 83
End: 2023-06-01
Attending: PHYSICIAN ASSISTANT
Payer: MEDICARE

## 2023-06-01 PROCEDURE — 97140 MANUAL THERAPY 1/> REGIONS: CPT

## 2023-06-01 PROCEDURE — 97110 THERAPEUTIC EXERCISES: CPT

## 2023-06-06 ENCOUNTER — OFFICE VISIT (OUTPATIENT)
Dept: PHYSICAL THERAPY | Age: 83
End: 2023-06-06
Attending: INTERNAL MEDICINE
Payer: MEDICARE

## 2023-06-06 PROCEDURE — 97110 THERAPEUTIC EXERCISES: CPT

## 2023-06-06 PROCEDURE — 97140 MANUAL THERAPY 1/> REGIONS: CPT

## 2023-06-07 ENCOUNTER — OFFICE VISIT (OUTPATIENT)
Dept: ORTHOPEDICS CLINIC | Facility: CLINIC | Age: 83
End: 2023-06-07

## 2023-06-07 VITALS
SYSTOLIC BLOOD PRESSURE: 132 MMHG | HEIGHT: 69 IN | WEIGHT: 216 LBS | HEART RATE: 57 BPM | DIASTOLIC BLOOD PRESSURE: 72 MMHG | BODY MASS INDEX: 31.99 KG/M2

## 2023-06-07 DIAGNOSIS — Z47.89 ORTHOPEDIC AFTERCARE: ICD-10-CM

## 2023-06-07 DIAGNOSIS — M17.11 PRIMARY OSTEOARTHRITIS OF RIGHT KNEE: Primary | ICD-10-CM

## 2023-06-07 PROCEDURE — 20610 DRAIN/INJ JOINT/BURSA W/O US: CPT | Performed by: PHYSICIAN ASSISTANT

## 2023-06-07 PROCEDURE — 1126F AMNT PAIN NOTED NONE PRSNT: CPT | Performed by: PHYSICIAN ASSISTANT

## 2023-06-07 RX ORDER — TRIAMCINOLONE ACETONIDE 40 MG/ML
40 INJECTION, SUSPENSION INTRA-ARTICULAR; INTRAMUSCULAR ONCE
Status: COMPLETED | OUTPATIENT
Start: 2023-06-07 | End: 2023-06-07

## 2023-06-07 RX ORDER — OMEPRAZOLE 20 MG/1
20 CAPSULE, DELAYED RELEASE ORAL
COMMUNITY

## 2023-06-07 RX ADMIN — TRIAMCINOLONE ACETONIDE 40 MG: 40 INJECTION, SUSPENSION INTRA-ARTICULAR; INTRAMUSCULAR at 13:25:00

## 2023-06-07 NOTE — PROGRESS NOTES
Per verbal order from Dr. Siobhan Syed, draw up and 4ml of 0.5% Marcaine and 1ml of Kenalog 40 for injection into rightkneeJenny M, CMA  Patient provided education handout for cortisone injection.

## 2023-06-08 ENCOUNTER — OFFICE VISIT (OUTPATIENT)
Dept: PHYSICAL THERAPY | Age: 83
End: 2023-06-08
Attending: INTERNAL MEDICINE
Payer: MEDICARE

## 2023-06-08 PROCEDURE — 97140 MANUAL THERAPY 1/> REGIONS: CPT

## 2023-06-08 PROCEDURE — 97110 THERAPEUTIC EXERCISES: CPT

## 2023-06-12 ENCOUNTER — OFFICE VISIT (OUTPATIENT)
Dept: PHYSICAL THERAPY | Age: 83
End: 2023-06-12
Attending: INTERNAL MEDICINE
Payer: MEDICARE

## 2023-06-12 PROCEDURE — 97140 MANUAL THERAPY 1/> REGIONS: CPT | Performed by: PHYSICAL THERAPIST

## 2023-06-12 PROCEDURE — 97110 THERAPEUTIC EXERCISES: CPT | Performed by: PHYSICAL THERAPIST

## 2023-06-14 ENCOUNTER — TELEPHONE (OUTPATIENT)
Dept: PHYSICAL THERAPY | Age: 83
End: 2023-06-14

## 2023-06-14 NOTE — TELEPHONE ENCOUNTER
Called patient re: PT double booked at 11am on 6/15, could he attend at 12:30pm instead.   Pt confirmed that he would attend PT on 6/15 at 12:30pm.

## 2023-06-15 ENCOUNTER — OFFICE VISIT (OUTPATIENT)
Dept: PHYSICAL THERAPY | Age: 83
End: 2023-06-15
Attending: INTERNAL MEDICINE
Payer: MEDICARE

## 2023-06-15 PROCEDURE — 97110 THERAPEUTIC EXERCISES: CPT | Performed by: PHYSICAL THERAPIST

## 2023-06-15 PROCEDURE — 97140 MANUAL THERAPY 1/> REGIONS: CPT | Performed by: PHYSICAL THERAPIST

## 2023-06-15 RX ORDER — ATORVASTATIN CALCIUM 80 MG/1
80 TABLET, FILM COATED ORAL DAILY
Qty: 90 TABLET | Refills: 3 | Status: SHIPPED | OUTPATIENT
Start: 2023-06-15

## 2023-06-15 NOTE — TELEPHONE ENCOUNTER
Refill passed per [place] [employer] protocol. Requested Prescriptions   Pending Prescriptions Disp Refills    atorvastatin 80 MG Oral Tab 90 tablet 3     Sig: Take 1 tablet (80 mg total) by mouth daily.        Cholesterol Medication Protocol Passed - 6/15/2023 10:46 AM        Passed - ALT in past 12 months        Passed - LDL in past 12 months        Passed - Last ALT < 80     Lab Results   Component Value Date    ALT 19 02/15/2023             Passed - Last LDL < 130     Lab Results   Component Value Date    LDL 54 09/01/2022             Passed - In person appointment or virtual visit in the past 12 mos or appointment in next 3 mos     Recent Outpatient Visits              3 days ago     [address] [place]    Office Visit    1 week ago     [address]    Office Visit    1 week ago Primary osteoarthritis of right knee    Kirsty Cheung, Patricio Haynes MD    Office Visit    1 week ago     [address]    Office Visit    2 weeks ago     [address]    Office Visit          Future Appointments         Provider Department Appt Notes    Today Quang Cardenas, [address] Medicare/BCBS Suppl    In 1 week Quang Cardenas, [address] Medicare/BCBS Suppl    In 1 week Quang Cardenas, [address] Medicare/BCBS Suppl    In 2 weeks Quang Cardenas, [address] Medicare/BCBS Suppl    In 2 weeks Quang Cardenas, [address] Medicare/BCBS Suppl    In 3 weeks Janel Barnard [address] Medicare/BCBS Suppl    In 3 weeks Ingrid Mentasta, [address] [place] Medicare/BCBS Suppl    In 1 month Ruth White MD [address], [facility] [place] 3 month f/u    In 9 months Geovani Farrell MD [address], [address], [place] F/U 1 YR                     Recent Outpatient Visits              3 days ago     [address] [place], [place]    Office Visit    1 week ago     [address] [place], [place]    Office Visit    1 week ago Primary osteoarthritis of right knee    [address], [address], Maik Hirsch MD    Office Visit    1 week ago     [address] [facility] [address] [place], [place]    Office Visit    2 weeks ago     [address] [place], [place]    Office Visit            Future Appointments         Provider Department Appt Notes    Today Ingrid Mentasta, [address] [facility] Medicare/BCBS Suppl    In 1 week Ingrid Mentasta, [address] [facility] Medicare/BCBS Suppl    In 1 week Ingrid Mentasta, [address] [facility] Medicare/BCBS Suppl    In 2 weeks Ingrid Mentasta, [address] [place] Medicare/BCBS Suppl    In 2 weeks Ingrid Mentasta, [address] [place] Medicare/BCBS Suppl    In 3 weeks Ingrid Mentasta, [address] [facility] Medicare/BCBS Suppl    In 3 weeks Ingrid Mentasta, [address] [facility] Medicare/BCBS Suppl    In 1 month Ruth White MD [facility], [address] [facility] 3 month f/u    In 9 months [facility], [address], [place] F/U 1 YR

## 2023-06-15 NOTE — TELEPHONE ENCOUNTER
Pt requesting Rx refill:    atorvastatin 80 MG Oral Tab 90 tablet     Send to     [address] [phone], [phone]    He is out of medication    Call patient to advise: [phone]

## 2023-06-22 ENCOUNTER — OFFICE VISIT (OUTPATIENT)
Dept: PHYSICAL THERAPY | Age: 83
End: 2023-06-22
Attending: PHYSICIAN ASSISTANT
Payer: MEDICARE

## 2023-06-22 PROCEDURE — 97110 THERAPEUTIC EXERCISES: CPT | Performed by: PHYSICAL THERAPIST

## 2023-06-22 PROCEDURE — 97140 MANUAL THERAPY 1/> REGIONS: CPT | Performed by: PHYSICAL THERAPIST

## 2023-06-26 ENCOUNTER — OFFICE VISIT (OUTPATIENT)
Dept: PHYSICAL THERAPY | Age: 83
End: 2023-06-26
Attending: PHYSICIAN ASSISTANT
Payer: MEDICARE

## 2023-06-26 PROCEDURE — 97140 MANUAL THERAPY 1/> REGIONS: CPT | Performed by: PHYSICAL THERAPIST

## 2023-06-26 PROCEDURE — 97110 THERAPEUTIC EXERCISES: CPT | Performed by: PHYSICAL THERAPIST

## 2023-06-26 NOTE — TELEPHONE ENCOUNTER
[facility] to schedule procedure. Please transfer to [facility] at ext [phone] or [phone] for scheduling. Or please transfer to [facility] in GI if unavailable. 4 = No assist / stand by assistance

## 2023-06-26 NOTE — PROGRESS NOTES
Diagnosis:   Orthopedic aftercare (Z47.89); s/p L TKA   Referring Provider: Lien Bradshaw  Date of Evaluation:    4/20/2023    Precautions:   CAD, HTN Next MD visit:   3/2024  Date of Surgery: 3/14/2023   Insurance Primary/Secondary: MEDICARE / Oskar Blum     # Auth Visits: POC every 10 visits          Subjective:  Deny c/o following last session. Did not use SPC for 2 days in home without c/o or loss of balance. Have been able to reciprocally negotiate steps up/down now without pausing. My balance is improving. Pain:  1-2/10 pain    Objective:     AROM: (* denotes performed with pain)   Knee    Flexion: L  105   Extension: L  -3     Patellar Mobility/Accessory motion: L patella mobility WNL    Assessment:  Pt with L knee AROM similar to last session. Continue to encourage HEP compliance and for AROM goal of 0 - 110 deg. Pt with less distal L ITB c/o, less reliance on SPC (without c/o), improved/reciprocal stair negotiation, improved balance, and ADL tolerance. Pt progressing more steadily, tolerated session well, including additional there ex/balance exercises. Pt able to ambulate in PT clinic today without SPC without c/o and without loss of balance. SPC use advised for now outside of PT, as continue to work on gait/balance during PT sessions. Goals:   (To be met in 12 visits)   Pt will improve knee extension ROM to 0 deg to allow proper heel strike during gait and terminal knee extension in stance - PROGRESS  Pt will improve knee AROM flexion to >110 degrees to improve ability to perform sit - stand transfer, to squat to retrieve object from ground.  - PROGRESS  Pt will improve quad strength to 5/5 to ascend 1 flight of stairs reciprocally without UE assist - PROGRESS  Pt will increase hip and knee strength to grossly 4+/5 to be able to get up and down from the floor safely - PROGRESS  Pt will demonstrate increased hip ER/ABD strength to 4/5 to perform stepping and squatting activities without excessive femoral IR/ADD - PROGRESS  Pt will improve SLS to >3s to improve safety and independence with gait on uneven surfaces such as grass - PROGRESS  Pt will be independent and compliant with comprehensive HEP to maintain progress achieved in PT- PROGRESS    Plan: Continue PT 2 x weekly for 20 vistis  F/u on response to today's PT session, distal L ITB c/o. Progress knee ROM, WB interventions - gait with/without SPC, steps, balance each as well tolerated.    Date: 6/12/2023  Tx # : 15/20 Date: 6/15/2023  Tx #: 16/20 Date: 6/22/2023  Tx #: 17/20 Date: 6/26/2023  Tx #: 18/20   There Ex:   L knee flexion AAROM with L LE supported on ball 5 x 20 sec each  Bridge on red ball x 20 reps   Attempted s/l hip abd - pt c/o positional discomfort, stopped  Lateral steps at bar 3 x ~ 16 feet   4 inch step up over down x 5 reps L LE  Mini staircase (4, ~ 6 inch steps): up/down reciprocally, min - mod UE railing use x 2   There Ex:   L knee flexion AAROM with L LE supported on ball 5 x 20 sec each  Bridge on red ball x 20 reps   L SAQ using basketball 10 x 3 sec each  4 inch lateral step up/down x 10 reps each L/R LE  4 and 6 inch step up over down x 5 reps each L/R LE  Standing alternate L/R hip abd x ~15 reps each    There Ex:   L knee flexion AAROM with L LE supported on ball 5 x 20 sec each  Bridge on red ball x 20 reps   L SAQ using basketball 10 x 3 sec each  Standing alternate L/R hip abd x 10 reps each   6 and 8 inch step up over down x 5 reps each L LE at bar    There Ex (gait belt donned, PT SBA for WB interventions)  L knee flexion AAROM with L LE supported on ball 4 x 20 sec each  Bridge on red ball x 20 reps   L SAQ using basketball 10 x 3 sec each  Standing alternate L/R hip abd x 10 reps each   Standing alternate L/R hip ext x 10 reps each  6 and 8 inch step up over down x 5 reps each L LE at bar    Manual Therapy:   L knee surgical scar mobilization  S/l L quad stretches 2 x 20 sec each  STM L distal ITB (including post session as preventative measure)   Manual Therapy:   L knee surgical scar mobilization  S/l L quad stretches 2 x 20 sec each  STM L distal ITB (including post session as preventative measure) Manual Therapy:   L knee surgical scar mobilization  S/l L quad stretches 2 x 20 sec each  STM L distal ITB (including post session as preventative measure) Manual Therapy:   L knee surgical scar mobilization  L knee ext ROM/mobilization 4 x 20 sec each   S/l L quad stretches 2 x 20 sec each  STM L distal ITB with/without foam roller (including post session as preventative measure)     NM Re-Ed (gait belt donned, PT SBA):   Forward/backward heel to toe walking at bar 2 x ~ 16 feet  AirEx at bar: marching 10 x 3 sec each NM Re-Ed (gait belt donned, PT SBA):  Gait without assistive device, PT contact guard, PT cuing for fast/slow, stop/go x ~ 100 feet  AirEx at bar: alternating L/R anterior steps onto AirEx x 5 reps each         HEP: Seated L knee flexion ROM, seated L knee ext ROM, seated heel slides - optional, Seated L knee LAQ, lateral steps at countertop, standing hip ext with UE A  Charges: 2 TE (30 min), 1 manual (10 min), 0 NM Re-ed (5 min) Total Timed Treatment:  45 min  Total Treatment Time: 45 min

## 2023-06-28 ENCOUNTER — MED REC SCAN ONLY (OUTPATIENT)
Dept: INTERNAL MEDICINE CLINIC | Facility: CLINIC | Age: 83
End: 2023-06-28

## 2023-06-29 ENCOUNTER — OFFICE VISIT (OUTPATIENT)
Dept: PHYSICAL THERAPY | Age: 83
End: 2023-06-29
Attending: INTERNAL MEDICINE
Payer: MEDICARE

## 2023-06-29 PROCEDURE — 97110 THERAPEUTIC EXERCISES: CPT | Performed by: PHYSICAL THERAPIST

## 2023-06-29 PROCEDURE — 97112 NEUROMUSCULAR REEDUCATION: CPT | Performed by: PHYSICAL THERAPIST

## 2023-06-29 PROCEDURE — 97140 MANUAL THERAPY 1/> REGIONS: CPT | Performed by: PHYSICAL THERAPIST

## 2023-07-03 ENCOUNTER — OFFICE VISIT (OUTPATIENT)
Dept: PHYSICAL THERAPY | Age: 83
End: 2023-07-03
Attending: INTERNAL MEDICINE
Payer: MEDICARE

## 2023-07-03 PROCEDURE — 97112 NEUROMUSCULAR REEDUCATION: CPT | Performed by: PHYSICAL THERAPIST

## 2023-07-03 PROCEDURE — 97140 MANUAL THERAPY 1/> REGIONS: CPT | Performed by: PHYSICAL THERAPIST

## 2023-07-03 PROCEDURE — 97110 THERAPEUTIC EXERCISES: CPT | Performed by: PHYSICAL THERAPIST

## 2023-07-06 ENCOUNTER — OFFICE VISIT (OUTPATIENT)
Dept: PHYSICAL THERAPY | Age: 83
End: 2023-07-06
Attending: INTERNAL MEDICINE
Payer: MEDICARE

## 2023-07-06 PROCEDURE — 97140 MANUAL THERAPY 1/> REGIONS: CPT | Performed by: PHYSICAL THERAPIST

## 2023-07-06 PROCEDURE — 97110 THERAPEUTIC EXERCISES: CPT | Performed by: PHYSICAL THERAPIST

## 2023-07-06 PROCEDURE — 97112 NEUROMUSCULAR REEDUCATION: CPT | Performed by: PHYSICAL THERAPIST

## 2023-07-06 NOTE — PROGRESS NOTES
Diagnosis:   Orthopedic aftercare (Z47.89); s/p L TKA   Referring Provider: Nate Sanchez  Date of Evaluation:    4/20/2023    Precautions:   CAD, HTN Next MD visit:   3/2024  Date of Surgery: 3/14/2023   Insurance Primary/Secondary: MEDICARE / Nay Juarez     # Auth Visits: POC every 10 visits          Subjective:  Deny c/o following last session. Min SPC use but take it with me when I go out. Deny loss of balance. Negotiating stairs reciprocally without c/o. Min - no c/o L ITB tightening. Deny L knee pain. C/o R knee pain; may see physician, may need another injection. Not as compliant with HEP over past few days. Ate too many chips/dip; feel like LE are swollen from it. Pain:  0/10 pain    Objective:     AROM: (* denotes performed with pain)   Knee    Flexion: L  106   Extension: L  -5     Patellar Mobility/Accessory motion: L patella mobility WNL    Observation/Skin: + LE edema (ongoing). Pt presents without assistive device. Assessment:  Patient with some regression in L knee AROM. Encouraged HEP compliance for L knee Rom until ROM stabilizes, then may gradually wean. Pt tolerating stairs/steps well using L LE but c/o R knee pain. Pt to f/u with physician re: R knee c/o. L ITB c/o seem to have largely resolved. Pt with good balance overall during session, some challenge initially with marching on AirEx. Primary residual deficits are mild L knee AROM. Pt approaching discharge from PT due to progress. Goals:   (To be met in 12 visits)   Pt will improve knee extension ROM to 0 deg to allow proper heel strike during gait and terminal knee extension in stance - NEARLY MET  Pt will improve knee AROM flexion to >110 degrees to improve ability to perform sit - stand transfer, to squat to retrieve object from ground.  - PROGRESS  Pt will improve quad strength to 5/5 to ascend 1 flight of stairs reciprocally without UE assist - PARTIALLY MET  Pt will increase hip and knee strength to grossly 4+/5 to be able to get up and down from the floor safely - PROGRESS  Pt will demonstrate increased hip ER/ABD strength to 4/5 to perform stepping and squatting activities without excessive femoral IR/ADD - PROGRESS  Pt will improve SLS to >3s to improve safety and independence with gait on uneven surfaces such as grass - PROGRESS  Pt will be independent and compliant with comprehensive HEP to maintain progress achieved in PT- MET    Plan: Continue PT 2 x weekly for up to 22-26 PT sessions with goal of discharge to HEP thereafter, physician f/u for any unresolved c/o, including R knee pain.    Date: 6/22/2023  Tx #: 17/20 Date: 6/26/2023  Tx #: 18/20 Date: 6/29/2023  Tx #: 19/20 Date: 7/3/2023   Tx #: 20/20 Date: 7/6/2023  Tx #: 21/22-26   There Ex:   L knee flexion AAROM with L LE supported on ball 5 x 20 sec each  Bridge on red ball x 20 reps   L SAQ using basketball 10 x 3 sec each  Standing alternate L/R hip abd x 10 reps each   6 and 8 inch step up over down x 5 reps each L LE at bar    There Ex (gait belt donned, PT SBA for WB interventions)  L knee flexion AAROM with L LE supported on ball 4 x 20 sec each  Bridge on red ball x 20 reps   L SAQ using basketball 10 x 3 sec each  Standing alternate L/R hip abd x 10 reps each   Standing alternate L/R hip ext x 10 reps each  6 and 8 inch step up over down x 5 reps each L LE at bar  There Ex (gait belt donned, PT SBA for WB interventions):   L SAQ using basketball 10 x 3 sec each  Sit - stand from chair height plinth x 10   SLS with contralateral hip flex, abd, ext x 5 reps each L/R LE  6 and 8 inch step up over down x 5 reps each L LE at bar  There Ex (gait belt donned, PT SBA for WB interventions):   L SAQ using basketball 10 x 3 sec each  Sit - stand from chair height plinth x 10   6 inch lateral step up x 10 reps each L/R LE  Mini staircase  (4, 6 inch steps): up/down reciprocally x 2 There Ex (gait belt donned, PT SBA for WB interventions):   Stationary bike: level 1 x 2 min   6 and 8 inch step up/over down x ~ 4  reps each using L LE, x ~ 2 reps each using L LE  L/R SLS with contralateral hip flex/abd/ext at bar x 5 reps each    Manual Therapy:   L knee surgical scar mobilization  S/l L quad stretches 2 x 20 sec each  STM L distal ITB (including post session as preventative measure) Manual Therapy:   L knee surgical scar mobilization  L knee ext ROM/mobilization 4 x 20 sec each   S/l L quad stretches 2 x 20 sec each  STM L distal ITB with/without foam roller (including post session as preventative measure) Manual Therapy:   L knee surgical scar mobilization  L knee ext ROM/mobilization 4 x 20 sec each   S/l L quad stretches 2 x 20 sec each  S/L L knee flexion ROM stretches 4x 20 sec each  STM L distal ITB with/without foam roller (including post session as preventative measure) Manual Therapy:   L knee surgical scar mobilization  L knee ext ROM/mobilization 4 x 20 sec each   S/l L quad stretches 2 x 20 sec each  S/L L knee flexion ROM stretches 4x 20 sec each  STM L distal ITB with/without foam roller (including post session as preventative measure) Manual Therapy:   L knee surgical scar mobilization  L knee ext ROM/mobilization 4 x 20 sec each   S/l L quad stretches 2 x 20 sec each  S/L L knee flexion ROM stretches 4x 20 sec each  STM L distal ITB with/without foam roller (including post session as preventative measure)   NM Re-Ed (gait belt donned, PT SBA):   Forward/backward heel to toe walking at bar 2 x ~ 16 feet  AirEx at bar: marching 10 x 3 sec each NM Re-Ed (gait belt donned, PT SBA):  Gait without assistive device, PT contact guard, PT cuing for fast/slow, stop/go x ~ 100 feet  AirEx at bar: alternating L/R anterior steps onto AirEx x 5 reps each NM Re-Ed (gait belt donned, PT SBA):  Gait without assistive device, PT contact guard, PT cuing for fast/slow, stop/go x ~ 100 feet - pt shaila well, no loss of balance, downward gaze  Heel to toe forward/backward walking at bar 3 x ~16 feet  AirEx at bar: alternating L/R anterior  and lateral steps onto AirEx x 5 reps each L/R LE NM Re-Ed (gait belt donned, PT SBA):  Gait without assistive device, PT contact guard, PT cuing for fast/slow, stop/go, head nods yes/no x ~ 150 feet - pt shaila well, no loss of balance  Heel to toe forward/backward walking at bar 3 x ~16 feet  AirEx at bar: alternating L/R anterior  and lateral steps onto AirEx x 5 reps each L/R LE NM Re-Ed (gait belt donned, PT SBA):  Heel to toe forward/backward walking at bar 3 x ~16 feet  AirEx at bar: lateral and forward steps onto and over mat x ~ 5 reps; marching 10 x 3 sec each at bar          HEP: Seated L knee flexion ROM, seated L knee ext ROM, seated heel slides - optional, Seated L knee LAQ, lateral steps at countertop, standing hip ext with UE A  Charges: 1 TE (15 min), 1 manual (15 min), 1 NM Re-ed (15 min) Total Timed Treatment:  45 min  Total Treatment Time: 45 min

## 2023-07-10 ENCOUNTER — OFFICE VISIT (OUTPATIENT)
Dept: PHYSICAL THERAPY | Age: 83
End: 2023-07-10
Attending: INTERNAL MEDICINE
Payer: MEDICARE

## 2023-07-10 PROCEDURE — 97140 MANUAL THERAPY 1/> REGIONS: CPT | Performed by: PHYSICAL THERAPIST

## 2023-07-10 PROCEDURE — 97110 THERAPEUTIC EXERCISES: CPT | Performed by: PHYSICAL THERAPIST

## 2023-07-10 NOTE — PROGRESS NOTES
Diagnosis:   Orthopedic aftercare (Z47.89); s/p L TKA   Referring Provider: César Bliss  Date of Evaluation:    4/20/2023    Precautions:   CAD, HTN Next MD visit:   3/2024  Date of Surgery: 3/14/2023   Insurance Primary/Secondary: MEDICARE / Berto Britany     # Auth Visits: POC every 10 visits          Subjective:  Min L knee tightening with step exercise at home; resolved quickly. Increased HEP compliance. Ate less salt, less swollen. Would like to schedule 4 more PT sessions. R knee hurts; would like to minimize R knee exercises today. Go up/down stairs daily without c/o. Pain:  0/10 pain    Objective:   Resting vitals: BP R brachial artery 130/80 mmHg, O2 97%, HR 71 bpm    AROM: (* denotes performed with pain)   Knee    Flexion: L  112   Extension: L  -4       Observation/Skin: + LE edema (ongoing). Pt presents without assistive device. Assessment:  Patient with improved L knee AROM today. Encouraged continued HEP compliance, including for ROM. Pt challenged with balance interventions, reported felt a little light headed following. Vitals stable. C/o fully resolved, pt able to proceed through remainder of session without c/o. Primary residual deficits are mild L knee AROM, balance. Reviewed/progressed form for knee ext ROM as part of HEP. Pt approaching discharge from PT due to progress. Goals:   (To be met in 12 visits)   Pt will improve knee extension ROM to 0 deg to allow proper heel strike during gait and terminal knee extension in stance - NEARLY MET  Pt will improve knee AROM flexion to >110 degrees to improve ability to perform sit - stand transfer, to squat to retrieve object from ground.  - PROGRESS  Pt will improve quad strength to 5/5 to ascend 1 flight of stairs reciprocally without UE assist - PARTIALLY MET  Pt will increase hip and knee strength to grossly 4+/5 to be able to get up and down from the floor safely - PROGRESS  Pt will demonstrate increased hip ER/ABD strength to 4/5 to perform stepping and squatting activities without excessive femoral IR/ADD - PROGRESS  Pt will improve SLS to >3s to improve safety and independence with gait on uneven surfaces such as grass - PROGRESS  Pt will be independent and compliant with comprehensive HEP to maintain progress achieved in PT- MET    Plan: Continue PT 2 x weekly for up to 22-26 PT sessions with goal of discharge to HEP thereafter, physician f/u for any unresolved c/o, including R knee pain. Progress L knee ROM, balance interventions.    Date: 6/29/2023  Tx #: 19/20 Date: 7/3/2023   Tx #: 20/20 Date: 7/6/2023  Tx #: 21/22-26 Date: 7/10/2023   Tx #: 22/22-26   There Ex (gait belt donned, PT SBA for WB interventions):   L SAQ using basketball 10 x 3 sec each  Sit - stand from chair height plinth x 10   SLS with contralateral hip flex, abd, ext x 5 reps each L/R LE  6 and 8 inch step up over down x 5 reps each L LE at bar  There Ex (gait belt donned, PT SBA for WB interventions):   L SAQ using basketball 10 x 3 sec each  Sit - stand from chair height plinth x 10   6 inch lateral step up x 10 reps each L/R LE  Mini staircase  (4, 6 inch steps): up/down reciprocally x 2 There Ex (gait belt donned, PT SBA for WB interventions):   Stationary bike: level 1 x 2 min   6 and 8 inch step up/over down x ~ 4  reps each using L LE, x ~ 2 reps each using L LE  L/R SLS with contralateral hip flex/abd/ext at bar x 5 reps each  There Ex (gait belt donned, PT SBA for WB interventions):   Seated L knee ext ROM/mobilization 2  x ~ 40 sec each - verbal cuing form, form for stretch (HEP)  L/R SLS with contralateral hip flex, abd, ext x 10 reps each  Stationary bike: level 1 x 1.5 min (pt declined more due to R knee discomfort)  Standing L/R hip flexor/gastroc stretch 2 x 15 sec each      Manual Therapy:   L knee surgical scar mobilization  L knee ext ROM/mobilization 4 x 20 sec each   S/l L quad stretches 2 x 20 sec each  S/L L knee flexion ROM stretches 4x 20 sec each  STM L distal ITB with/without foam roller (including post session as preventative measure) Manual Therapy:   L knee surgical scar mobilization  L knee ext ROM/mobilization 4 x 20 sec each   S/l L quad stretches 2 x 20 sec each  S/L L knee flexion ROM stretches 4x 20 sec each  STM L distal ITB with/without foam roller (including post session as preventative measure) Manual Therapy:   L knee surgical scar mobilization  L knee ext ROM/mobilization 4 x 20 sec each   S/l L quad stretches 2 x 20 sec each  S/L L knee flexion ROM stretches 4x 20 sec each  STM L distal ITB with/without foam roller (including post session as preventative measure) Manual Therapy:   L knee surgical scar mobilization  L knee ext ROM/mobilization 4 x 20 sec each   S/l L quad stretches 2 x 20 sec each  S/L L knee flexion ROM stretches 4x 20 sec each  STM L distal ITB with foam roller (including post session as preventative measure)   NM Re-Ed (gait belt donned, PT SBA):  Gait without assistive device, PT contact guard, PT cuing for fast/slow, stop/go x ~ 100 feet - pt shaila well, no loss of balance, downward gaze  Heel to toe forward/backward walking at bar 3 x ~16 feet  AirEx at bar: alternating L/R anterior  and lateral steps onto AirEx x 5 reps each L/R LE NM Re-Ed (gait belt donned, PT SBA):  Gait without assistive device, PT contact guard, PT cuing for fast/slow, stop/go, head nods yes/no x ~ 150 feet - pt shaila well, no loss of balance  Heel to toe forward/backward walking at bar 3 x ~16 feet  AirEx at bar: alternating L/R anterior  and lateral steps onto AirEx x 5 reps each L/R LE NM Re-Ed (gait belt donned, PT SBA):  Heel to toe forward/backward walking at bar 3 x ~16 feet  AirEx at bar: lateral and forward steps onto and over mat x ~ 5 reps; marching 10 x 3 sec each at bar NM Re-Ed (gait belt donned, PT SBA):  Obstacle negotiation: 1/2 foam roll, AirEx           HEP: Seated L knee flexion ROM, seated L knee ext ROM, seated heel slides - optional, Seated L knee LAQ, lateral steps at countertop, standing hip ext with UE A  Charges: 2 TE (25 min), 1 manual (10 min), 0 NM Re-ed (5 min) Total Timed Treatment:  40 min  Total Treatment Time: 45 min

## 2023-07-13 ENCOUNTER — OFFICE VISIT (OUTPATIENT)
Dept: PHYSICAL THERAPY | Age: 83
End: 2023-07-13
Attending: INTERNAL MEDICINE
Payer: MEDICARE

## 2023-07-13 PROCEDURE — 97140 MANUAL THERAPY 1/> REGIONS: CPT | Performed by: PHYSICAL THERAPIST

## 2023-07-13 PROCEDURE — 97110 THERAPEUTIC EXERCISES: CPT | Performed by: PHYSICAL THERAPIST

## 2023-07-13 NOTE — PROGRESS NOTES
Diagnosis:   Orthopedic aftercare (Z47.89); s/p L TKA   Referring Provider: Masha Richardson  Date of Evaluation:    4/20/2023    Precautions:   CAD, HTN Next MD visit:   3/2024  Date of Surgery: 3/14/2023   Insurance Primary/Secondary: MEDICARE / Мария Johnson     # Auth Visits: POC every 10 visits          Subjective: Deny c/o following last session. Deny c/o with stairs, do them daily. Completing more intense/supported knee ext ROM at home, tolerating well. Pain:  0/10 pain    Objective:       AROM: (* denotes performed with pain)   Knee    Flexion: L  109   Extension: L  -1       Observation/Skin: + LE edema (ongoing)- pitting today. Pt presents without assistive device. Assessment:  Patient with some continued variance L knee ROM. Advised HEP compliance with ROM until stabilizes. Pt tolerated session well. Pt approaching discharge from PT due to progress. Goals:   (To be met in 12 visits)   Pt will improve knee extension ROM to 0 deg to allow proper heel strike during gait and terminal knee extension in stance - NEARLY MET  Pt will improve knee AROM flexion to >110 degrees to improve ability to perform sit - stand transfer, to squat to retrieve object from ground. - PROGRESS  Pt will improve quad strength to 5/5 to ascend 1 flight of stairs reciprocally without UE assist - PARTIALLY MET  Pt will increase hip and knee strength to grossly 4+/5 to be able to get up and down from the floor safely - PROGRESS  Pt will demonstrate increased hip ER/ABD strength to 4/5 to perform stepping and squatting activities without excessive femoral IR/ADD - PROGRESS  Pt will improve SLS to >3s to improve safety and independence with gait on uneven surfaces such as grass - PROGRESS  Pt will be independent and compliant with comprehensive HEP to maintain progress achieved in PT- MET    Plan: Continue PT 2 x weekly for up to 26 PT sessions with goal of discharge to HEP thereafter.  Progress L knee ROM, strength, balance interventions.    Date: 6/29/2023  Tx #: 19/20 Date: 7/3/2023   Tx #: 20/20 Date: 7/6/2023  Tx #: 21/22-26 Date: 7/10/2023   Tx #: 22/22-26 Date: 7/13/2023  Tx #: 23/26   There Ex (gait belt donned, PT SBA for WB interventions):   L SAQ using basketball 10 x 3 sec each  Sit - stand from chair height plinth x 10   SLS with contralateral hip flex, abd, ext x 5 reps each L/R LE  6 and 8 inch step up over down x 5 reps each L LE at bar  There Ex (gait belt donned, PT SBA for WB interventions):   L SAQ using basketball 10 x 3 sec each  Sit - stand from chair height plinth x 10   6 inch lateral step up x 10 reps each L/R LE  Mini staircase  (4, 6 inch steps): up/down reciprocally x 2 There Ex (gait belt donned, PT SBA for WB interventions):   Stationary bike: level 1 x 2 min   6 and 8 inch step up/over down x ~ 4  reps each using L LE, x ~ 2 reps each using L LE  L/R SLS with contralateral hip flex/abd/ext at bar x 5 reps each  There Ex (gait belt donned, PT SBA for WB interventions):   Seated L knee ext ROM/mobilization 2  x ~ 40 sec each - verbal cuing form, form for stretch (HEP)  L/R SLS with contralateral hip flex, abd, ext x 10 reps each  Stationary bike: level 1 x 1.5 min (pt declined more due to R knee discomfort)  Standing L/R hip flexor/gastroc stretch 2 x 15 sec each    There Ex (gait belt donned, PT SBA for WB interventions):   Supine PT A L/R hamstrings stretches using strap 3 x 20 sec each  Sit - stand from chair height plinth x 15 reps   Lateral steps at bar, GTB prox to knees 3 x ~ 16 feet   Standing L/R hip ext x 10 reps each  Standing L/R hip flexor/gastroc stretch 2 x 20 sec each        Manual Therapy:   L knee surgical scar mobilization  L knee ext ROM/mobilization 4 x 20 sec each   S/l L quad stretches 2 x 20 sec each  S/L L knee flexion ROM stretches 4x 20 sec each  STM L distal ITB with/without foam roller (including post session as preventative measure) Manual Therapy:   L knee surgical scar mobilization  L knee ext ROM/mobilization 4 x 20 sec each   S/l L quad stretches 2 x 20 sec each  S/L L knee flexion ROM stretches 4x 20 sec each  STM L distal ITB with/without foam roller (including post session as preventative measure) Manual Therapy:   L knee surgical scar mobilization  L knee ext ROM/mobilization 4 x 20 sec each   S/l L quad stretches 2 x 20 sec each  S/L L knee flexion ROM stretches 4x 20 sec each  STM L distal ITB with/without foam roller (including post session as preventative measure) Manual Therapy:   L knee surgical scar mobilization  L knee ext ROM/mobilization 4 x 20 sec each   S/l L quad stretches 2 x 20 sec each  S/L L knee flexion ROM stretches 4x 20 sec each  STM L distal ITB with foam roller (including post session as preventative measure) Manual Therapy:   L knee surgical scar mobilization  L knee ext ROM/mobilization 4 x 20 sec each   S/l L quad stretches 2 x 20 sec each  S/L L knee flexion ROM stretches 4x 20 sec each  STM L distal ITB with foam roller (post session)   NM Re-Ed (gait belt donned, PT SBA):  Gait without assistive device, PT contact guard, PT cuing for fast/slow, stop/go x ~ 100 feet - pt shaila well, no loss of balance, downward gaze  Heel to toe forward/backward walking at bar 3 x ~16 feet  AirEx at bar: alternating L/R anterior  and lateral steps onto AirEx x 5 reps each L/R LE NM Re-Ed (gait belt donned, PT SBA):  Gait without assistive device, PT contact guard, PT cuing for fast/slow, stop/go, head nods yes/no x ~ 150 feet - pt shaila well, no loss of balance  Heel to toe forward/backward walking at bar 3 x ~16 feet  AirEx at bar: alternating L/R anterior  and lateral steps onto AirEx x 5 reps each L/R LE NM Re-Ed (gait belt donned, PT SBA):  Heel to toe forward/backward walking at bar 3 x ~16 feet  AirEx at bar: lateral and forward steps onto and over mat x ~ 5 reps; marching 10 x 3 sec each at bar NM Re-Ed (gait belt donned, PT SBA):  Obstacle negotiation: 1/2 foam roll, AirEx             HEP: Seated L knee flexion ROM, seated L knee ext ROM, seated heel slides - optional, stair negotiation, standing hip ext with UE A  Charges: 2 TE (25 min), 1 manual (15  min) Total Timed Treatment:  40 min  Total Treatment Time: 45 min

## 2023-07-19 ENCOUNTER — OFFICE VISIT (OUTPATIENT)
Dept: INTERNAL MEDICINE CLINIC | Facility: CLINIC | Age: 83
End: 2023-07-19

## 2023-07-19 ENCOUNTER — TELEPHONE (OUTPATIENT)
Dept: PHYSICAL THERAPY | Age: 83
End: 2023-07-19

## 2023-07-19 VITALS
HEART RATE: 72 BPM | HEIGHT: 69 IN | RESPIRATION RATE: 14 BRPM | DIASTOLIC BLOOD PRESSURE: 70 MMHG | BODY MASS INDEX: 33.92 KG/M2 | WEIGHT: 229 LBS | OXYGEN SATURATION: 95 % | SYSTOLIC BLOOD PRESSURE: 128 MMHG

## 2023-07-19 DIAGNOSIS — I25.10 CORONARY ARTERY DISEASE INVOLVING NATIVE CORONARY ARTERY OF NATIVE HEART WITHOUT ANGINA PECTORIS: Primary | ICD-10-CM

## 2023-07-19 DIAGNOSIS — Z23 NEED FOR PNEUMOCOCCAL VACCINATION: ICD-10-CM

## 2023-07-19 DIAGNOSIS — I10 PRIMARY HYPERTENSION: ICD-10-CM

## 2023-07-19 DIAGNOSIS — I48.0 PAROXYSMAL ATRIAL FIBRILLATION (HCC): ICD-10-CM

## 2023-07-19 DIAGNOSIS — E78.00 HYPERCHOLESTEROLEMIA: ICD-10-CM

## 2023-07-19 DIAGNOSIS — K21.9 GASTROESOPHAGEAL REFLUX DISEASE WITHOUT ESOPHAGITIS: ICD-10-CM

## 2023-07-19 PROCEDURE — 99214 OFFICE O/P EST MOD 30 MIN: CPT | Performed by: INTERNAL MEDICINE

## 2023-07-19 PROCEDURE — G0009 ADMIN PNEUMOCOCCAL VACCINE: HCPCS | Performed by: INTERNAL MEDICINE

## 2023-07-19 PROCEDURE — 1126F AMNT PAIN NOTED NONE PRSNT: CPT | Performed by: INTERNAL MEDICINE

## 2023-07-19 PROCEDURE — 90677 PCV20 VACCINE IM: CPT | Performed by: INTERNAL MEDICINE

## 2023-07-19 RX ORDER — POTASSIUM CHLORIDE 20 MEQ/1
20 TABLET, EXTENDED RELEASE ORAL DAILY
Qty: 90 TABLET | Refills: 1 | Status: SHIPPED | OUTPATIENT
Start: 2023-07-19

## 2023-07-19 RX ORDER — ASPIRIN 81 MG/1
81 TABLET ORAL DAILY
COMMUNITY

## 2023-07-19 RX ORDER — OMEPRAZOLE 20 MG/1
20 CAPSULE, DELAYED RELEASE ORAL
Qty: 90 CAPSULE | Refills: 1 | Status: SHIPPED | OUTPATIENT
Start: 2023-07-19

## 2023-07-20 ENCOUNTER — LAB ENCOUNTER (OUTPATIENT)
Dept: LAB | Age: 83
End: 2023-07-20
Attending: INTERNAL MEDICINE
Payer: MEDICARE

## 2023-07-20 ENCOUNTER — OFFICE VISIT (OUTPATIENT)
Dept: PHYSICAL THERAPY | Age: 83
End: 2023-07-20
Attending: INTERNAL MEDICINE
Payer: MEDICARE

## 2023-07-20 DIAGNOSIS — I10 PRIMARY HYPERTENSION: ICD-10-CM

## 2023-07-20 DIAGNOSIS — I25.10 CORONARY ARTERY DISEASE INVOLVING NATIVE CORONARY ARTERY OF NATIVE HEART WITHOUT ANGINA PECTORIS: ICD-10-CM

## 2023-07-20 DIAGNOSIS — E78.00 HYPERCHOLESTEROLEMIA: ICD-10-CM

## 2023-07-20 LAB
ALBUMIN SERPL-MCNC: 3.2 G/DL (ref 3.4–5)
ALBUMIN/GLOB SERPL: 1 {RATIO} (ref 1–2)
ALP LIVER SERPL-CCNC: 102 U/L
ALT SERPL-CCNC: 14 U/L
ANION GAP SERPL CALC-SCNC: 2 MMOL/L (ref 0–18)
AST SERPL-CCNC: 19 U/L (ref 15–37)
BILIRUB SERPL-MCNC: 0.4 MG/DL (ref 0.1–2)
BUN BLD-MCNC: 20 MG/DL (ref 7–18)
BUN/CREAT SERPL: 29 (ref 10–20)
CALCIUM BLD-MCNC: 9.1 MG/DL (ref 8.5–10.1)
CHLORIDE SERPL-SCNC: 106 MMOL/L (ref 98–112)
CHOLEST SERPL-MCNC: 111 MG/DL (ref ?–200)
CO2 SERPL-SCNC: 33 MMOL/L (ref 21–32)
CREAT BLD-MCNC: 0.69 MG/DL
DEPRECATED RDW RBC AUTO: 38 FL (ref 35.1–46.3)
EGFRCR SERPLBLD CKD-EPI 2021: 91 ML/MIN/1.73M2 (ref 60–?)
ERYTHROCYTE [DISTWIDTH] IN BLOOD BY AUTOMATED COUNT: 20.1 % (ref 11–15)
FASTING PATIENT LIPID ANSWER: YES
FASTING STATUS PATIENT QL REPORTED: YES
GLOBULIN PLAS-MCNC: 3.2 G/DL (ref 2.8–4.4)
GLUCOSE BLD-MCNC: 99 MG/DL (ref 70–99)
HCT VFR BLD AUTO: 39.5 %
HDLC SERPL-MCNC: 34 MG/DL (ref 40–59)
HGB BLD-MCNC: 11.9 G/DL
LDLC SERPL CALC-MCNC: 53 MG/DL (ref ?–100)
MCH RBC QN AUTO: 17.9 PG (ref 26–34)
MCHC RBC AUTO-ENTMCNC: 30.1 G/DL (ref 31–37)
MCV RBC AUTO: 59.4 FL
NONHDLC SERPL-MCNC: 77 MG/DL (ref ?–130)
OSMOLALITY SERPL CALC.SUM OF ELEC: 295 MOSM/KG (ref 275–295)
PLATELET # BLD AUTO: 223 10(3)UL (ref 150–450)
POTASSIUM SERPL-SCNC: 4 MMOL/L (ref 3.5–5.1)
PROT SERPL-MCNC: 6.4 G/DL (ref 6.4–8.2)
RBC # BLD AUTO: 6.65 X10(6)UL
SODIUM SERPL-SCNC: 141 MMOL/L (ref 136–145)
T4 FREE SERPL-MCNC: 0.5 NG/DL (ref 0.8–1.7)
TRIGL SERPL-MCNC: 138 MG/DL (ref 30–149)
TSI SER-ACNC: 23.1 MIU/ML (ref 0.36–3.74)
VLDLC SERPL CALC-MCNC: 20 MG/DL (ref 0–30)
WBC # BLD AUTO: 10.1 X10(3) UL (ref 4–11)

## 2023-07-20 PROCEDURE — 36415 COLL VENOUS BLD VENIPUNCTURE: CPT

## 2023-07-20 PROCEDURE — 80061 LIPID PANEL: CPT

## 2023-07-20 PROCEDURE — 84439 ASSAY OF FREE THYROXINE: CPT

## 2023-07-20 PROCEDURE — 84443 ASSAY THYROID STIM HORMONE: CPT

## 2023-07-20 PROCEDURE — 97110 THERAPEUTIC EXERCISES: CPT | Performed by: PHYSICAL THERAPIST

## 2023-07-20 PROCEDURE — 85027 COMPLETE CBC AUTOMATED: CPT

## 2023-07-20 PROCEDURE — 80053 COMPREHEN METABOLIC PANEL: CPT

## 2023-07-20 PROCEDURE — 97140 MANUAL THERAPY 1/> REGIONS: CPT | Performed by: PHYSICAL THERAPIST

## 2023-07-20 NOTE — PROGRESS NOTES
Diagnosis:   Orthopedic aftercare (Z47.89); s/p L TKA   Referring Provider: Masha Richardson  Date of Evaluation:    4/20/2023    Precautions:   CAD, HTN Next MD visit:   3/2024  Date of Surgery: 3/14/2023   Insurance Primary/Secondary: MEDICARE / Мария Johnson     # Auth Visits: POC every 10 visits          Subjective: Mostly focusing on knee ROM as part of HEP. Deny c/o with stairs, do them daily. Deny concerns re: balance. Not really using SPC but bring it with me when I go out just in case. Deny any L knee tightening up. Consulted PCP re: skin on LE; advised no interventions needed or that will be helpful. Pain:  0/10 pain    Objective:       AROM: (* denotes performed with pain)   Knee    Flexion: L  105   Extension: L  -1       Observation/Skin: + LE edema (ongoing)- pitting today. Pt presents without assistive device. Assessment:  Patient returns to PT since last attending 7/13/2023. L knee AROM stabilizing but advised continued HEP compliance for ROM at this time. Pt tolerated session well. Pt approaching discharge from PT due to progress. Goals:   (To be met in 12 visits)   Pt will improve knee extension ROM to 0 deg to allow proper heel strike during gait and terminal knee extension in stance - NEARLY MET  Pt will improve knee AROM flexion to >110 degrees to improve ability to perform sit - stand transfer, to squat to retrieve object from ground.  - PROGRESS  Pt will improve quad strength to 5/5 to ascend 1 flight of stairs reciprocally without UE assist - PARTIALLY MET  Pt will increase hip and knee strength to grossly 4+/5 to be able to get up and down from the floor safely - PROGRESS  Pt will demonstrate increased hip ER/ABD strength to 4/5 to perform stepping and squatting activities without excessive femoral IR/ADD - PROGRESS  Pt will improve SLS to >3s to improve safety and independence with gait on uneven surfaces such as grass - PROGRESS  Pt will be independent and compliant with comprehensive HEP to maintain progress achieved in PT- MET    Plan: Continue PT 2 x weekly for up to 26 PT sessions with goal of discharge to HEP thereafter. Progress L knee ROM, strength, balance interventions.    Date: 7/6/2023  Tx #: 21/22-26 Date: 7/10/2023   Tx #: 22/22-26 Date: 7/13/2023  Tx #: 23/26 Date: 7/20/2023  Tx #: 24/26   There Ex (gait belt donned, PT SBA for WB interventions):   Stationary bike: level 1 x 2 min   6 and 8 inch step up/over down x ~ 4  reps each using L LE, x ~ 2 reps each using L LE  L/R SLS with contralateral hip flex/abd/ext at bar x 5 reps each  There Ex (gait belt donned, PT SBA for WB interventions):   Seated L knee ext ROM/mobilization 2  x ~ 40 sec each - verbal cuing form, form for stretch (HEP)  L/R SLS with contralateral hip flex, abd, ext x 10 reps each  Stationary bike: level 1 x 1.5 min (pt declined more due to R knee discomfort)  Standing L/R hip flexor/gastroc stretch 2 x 15 sec each    There Ex (gait belt donned, PT SBA for WB interventions):   Supine PT A L/R hamstrings stretches using strap 3 x 20 sec each  Sit - stand from chair height plinth x 15 reps   Lateral steps at bar, GTB prox to knees 3 x ~ 16 feet   Standing L/R hip ext x 10 reps each  Standing L/R hip flexor/gastroc stretch 2 x 20 sec each      There Ex (gait belt donned, PT SBA for WB interventions):   Seated L knee ext stretch 2 x 30 sec each  Attempted L knee flexion ROM/mobilization x 20 sec - ineffective, stopped  B gastroc stretch on slant board 2 x 20 sec each  Lateral steps at bar, GTB prox to knees 4 x ~ 16 feet   Standing L/R hip ext x 10 reps each AirEx  Mini staircase (4, ~ 6 inch steps) up/down reciprocally     Manual Therapy:   L knee surgical scar mobilization  L knee ext ROM/mobilization 4 x 20 sec each   S/l L quad stretches 2 x 20 sec each  S/L L knee flexion ROM stretches 4x 20 sec each  STM L distal ITB with/without foam roller (including post session as preventative measure) Manual Therapy:   L knee surgical scar mobilization  L knee ext ROM/mobilization 4 x 20 sec each   S/l L quad stretches 2 x 20 sec each  S/L L knee flexion ROM stretches 4x 20 sec each  STM L distal ITB with foam roller (including post session as preventative measure) Manual Therapy:   L knee surgical scar mobilization  L knee ext ROM/mobilization 4 x 20 sec each   S/l L quad stretches 2 x 20 sec each  S/L L knee flexion ROM stretches 4x 20 sec each  STM L distal ITB with foam roller (post session) Manual Therapy:   L knee surgical scar mobilization  L knee ext ROM/mobilization 4 x 20 sec each   S/l L quad stretches 2 x 20 sec each  S/L L knee flexion ROM stretches 4x 20 sec each  STM L distal ITB with foam roller (post session)   NM Re-Ed (gait belt donned, PT SBA):  Heel to toe forward/backward walking at bar 3 x ~16 feet  AirEx at bar: lateral and forward steps onto and over mat x ~ 5 reps; marching 10 x 3 sec each at bar NM Re-Ed (gait belt donned, PT SBA):  Obstacle negotiation: 1/2 foam roll, AirEx    NM Re-Ed (gait belt donned, PT SBA):  Obstacle negotiation at bar forward and lateral : 1/2 foam roll, AirEx         HEP: Seated L knee flexion ROM, seated L knee ext ROM, seated heel slides - optional, stair negotiation, standing hip ext with UE A  Charges: 2 TE (25 min), 1 manual (15  min), 0 NMRE (5 min) Total Timed Treatment:  45 min  Total Treatment Time: 45 min

## 2023-07-27 ENCOUNTER — OFFICE VISIT (OUTPATIENT)
Dept: PHYSICAL THERAPY | Age: 83
End: 2023-07-27
Attending: INTERNAL MEDICINE
Payer: MEDICARE

## 2023-07-27 PROCEDURE — 97110 THERAPEUTIC EXERCISES: CPT | Performed by: PHYSICAL THERAPIST

## 2023-07-27 PROCEDURE — 97140 MANUAL THERAPY 1/> REGIONS: CPT | Performed by: PHYSICAL THERAPIST

## 2023-07-27 NOTE — PROGRESS NOTES
Diagnosis:   Orthopedic aftercare (Z47.89); s/p L TKA   Referring Provider: Zakyia Melchor  Date of Evaluation:    4/20/2023    Precautions:   CAD, HTN Next MD visit:   3/2024  Date of Surgery: 3/14/2023   Insurance Primary/Secondary: MEDICARE / [employer]     # Auth Visits: POC every 10 visits          Subjective:  Go up stairs like a rabbit now, alternate feet. Go up/down stairs 5-10 times daily. C/o some L knee cap discomfort with stairs. R knee hurts; going to see if I can get another injection Deny any loss of balance or falls. Take SPC with me as a precaution when I leave the house, leave it in the car; do not use it at home. Completing HEP. Pain:  0/10 pain    Objective:     L LAQ - patellar tracking WNL    AROM: (* denotes performed with pain)   Knee    Flexion: L  105   Extension: L  0       Assessment:  Patient progressing well overall, approaching discharge due to progress. L knee AROM stabilizing 0- 105 deg; encouraged continued ROM in effort to maintain progress, potentially gain further flexion. Advised will gradually wean as ROM stabilizes. Pt with good balance overall today, min UE A for balance interventions. Pt with some c/o L patella discomfort with stair negotiation; anticipate that this may improve with further strength gains. Pt tolerated session well, required 2 seated rest/water breaks. Goals:   (To be met in 12 visits)   Pt will improve knee extension ROM to 0 deg to allow proper heel strike during gait and terminal knee extension in stance - NEARLY MET  Pt will improve knee AROM flexion to >110 degrees to improve ability to perform sit - stand transfer, to squat to retrieve object from ground.  - PROGRESS  Pt will improve quad strength to 5/5 to ascend 1 flight of stairs reciprocally without UE assist - PARTIALLY MET  Pt will increase hip and knee strength to grossly 4+/5 to be able to get up and down from the floor safely - PROGRESS  Pt will demonstrate increased hip ER/ABD strength to 4/5 to perform stepping and squatting activities without excessive femoral IR/ADD - PROGRESS  Pt will improve SLS to >3s to improve safety and independence with gait on uneven surfaces such as grass - PROGRESS  Pt will be independent and compliant with comprehensive HEP to maintain progress achieved in PT- MET    Plan: Continue PT 2 x weekly for up to 26 PT sessions with goal of discharge to HEP thereafter. Consider discharge next session due to progress, ROM stabilizing.     Date: 7/6/2023  Tx #: 21/22-26 Date: 7/10/2023   Tx #: 22/22-26 Date: 7/13/2023  Tx #: 23/26 Date: 7/20/2023  Tx #: 24/26 Date: 7/27/2023  Tx #: 25/26   There Ex (gait belt donned, PT SBA for WB interventions):   Stationary bike: level 1 x 2 min   6 and 8 inch step up/over down x ~ 4  reps each using L LE, x ~ 2 reps each using L LE  L/R SLS with contralateral hip flex/abd/ext at bar x 5 reps each  There Ex (gait belt donned, PT SBA for WB interventions):   Seated L knee ext ROM/mobilization 2  x ~ 40 sec each - verbal cuing form, form for stretch (HEP)  L/R SLS with contralateral hip flex, abd, ext x 10 reps each  Stationary bike: level 1 x 1.5 min (pt declined more due to R knee discomfort)  Standing L/R hip flexor/gastroc stretch 2 x 15 sec each    There Ex (gait belt donned, PT SBA for WB interventions):   Supine PT A L/R hamstrings stretches using strap 3 x 20 sec each  Sit - stand from chair height plinth x 15 reps   Lateral steps at bar, GTB prox to knees 3 x ~ 16 feet   Standing L/R hip ext x 10 reps each  Standing L/R hip flexor/gastroc stretch 2 x 20 sec each      There Ex (gait belt donned, PT SBA for WB interventions):   Seated L knee ext stretch 2 x 30 sec each  Attempted L knee flexion ROM/mobilization x 20 sec - ineffective, stopped  B gastroc stretch on slant board 2 x 20 sec each  Lateral steps at bar, GTB prox to knees 4 x ~ 16 feet   Standing L/R hip ext x 10 reps each AirEx  Mini staircase (4, ~ 6 inch steps) up/down reciprocally   There Ex (gait belt donned, PT SBA for WB interventions): Attempted L knee flexion ROM/mobilization using staircase x 20 sec - ineffective, stopped  B gastroc stretch on slant board 2 x 20 sec each  Lateral steps at bar, GTB prox to knees 4 x ~ 16 feet   Standing L/R hip ext 2 x 10 reps each AirEx  Mini staircase (4, ~ 6 inch steps) up/down reciprocally x 1  HEP review; see below.     Manual Therapy:   L knee surgical scar mobilization  L knee ext ROM/mobilization 4 x 20 sec each   S/l L quad stretches 2 x 20 sec each  S/L L knee flexion ROM stretches 4x 20 sec each  STM L distal ITB with/without foam roller (including post session as preventative measure) Manual Therapy:   L knee surgical scar mobilization  L knee ext ROM/mobilization 4 x 20 sec each   S/l L quad stretches 2 x 20 sec each  S/L L knee flexion ROM stretches 4x 20 sec each  STM L distal ITB with foam roller (including post session as preventative measure) Manual Therapy:   L knee surgical scar mobilization  L knee ext ROM/mobilization 4 x 20 sec each   S/l L quad stretches 2 x 20 sec each  S/L L knee flexion ROM stretches 4x 20 sec each  STM L distal ITB with foam roller (post session) Manual Therapy:   L knee surgical scar mobilization  L knee ext ROM/mobilization 4 x 20 sec each   S/l L quad stretches 2 x 20 sec each  S/L L knee flexion ROM stretches 4x 20 sec each  STM L distal ITB with foam roller (post session) Manual Therapy:   L knee surgical scar mobilization  L knee ext ROM/mobilization gentle oscillatory  S/l L quad stretches 2 x 20 sec each  S/L L knee flexion ROM stretches 4x 20 sec each  STM L distal ITB with foam roller    NM Re-Ed (gait belt donned, PT SBA):  Heel to toe forward/backward walking at bar 3 x ~16 feet  AirEx at bar: lateral and forward steps onto and over mat x ~ 5 reps; marching 10 x 3 sec each at bar NM Re-Ed (gait belt donned, PT SBA):  Obstacle negotiation: 1/2 foam roll, AirEx    NM Re-Ed (gait belt donned, PT SBA):  Obstacle negotiation at bar forward and lateral : 1/2 foam roll, AirEx NM Re-Ed (gait belt donned, PT SBA):  Obstacle negotiation at bar forward and lateral : 1/2 foam roll, AirEx x 2 each          HEP: Seated L knee flexion ROM, seated L knee ext ROM,  stair negotiation, standing hip ext with UE A  Charges: 2 TE (25 min), 1 manual (15  min), 0 NMRE (5 min) Total Timed Treatment:  45 min  Total Treatment Time: 45 min

## 2023-08-03 ENCOUNTER — OFFICE VISIT (OUTPATIENT)
Dept: PHYSICAL THERAPY | Age: 83
End: 2023-08-03
Attending: INTERNAL MEDICINE
Payer: MEDICARE

## 2023-08-03 PROCEDURE — 97110 THERAPEUTIC EXERCISES: CPT | Performed by: PHYSICAL THERAPIST

## 2023-08-03 PROCEDURE — 97140 MANUAL THERAPY 1/> REGIONS: CPT | Performed by: PHYSICAL THERAPIST

## 2023-08-03 NOTE — PROGRESS NOTES
ProgressSummary  Pt has attended 26 visits in Physical Therapy. Diagnosis:   Orthopedic aftercare (Z47.89); s/p L TKA   Referring Provider: Benny Palomino  Date of Evaluation:    4/20/2023    Precautions:   CAD, HTN Next MD visit:   3/2024  Date of Surgery: 3/14/2023   Insurance Primary/Secondary: MEDICARE / [employer]     # Auth Visits: POC every 10 visits          Subjective:  B knee cap pain with steps, c/o groin strain - think overdid it last PT session. Would like to complete last 2 scheduled PT sessions, then can be discharged. Pain:  0/10 pain    Objective:     Palpation: increased tone/c/o L ITB/lateral quadriceps    AROM: (* denotes performed with pain)   Knee    Flexion: L  108   Extension: L  -2     Patellar Mobility/Accessory motion: L patella mobility WNL    Observation/Skin: L/R distal LE red/purple coloring, pitting edema (pt reports has discussed with physician multiple times, no interventions). L knee surgical scar WNL, adherence distally    Flexibility:   Quads: L restriction    Strength/MMT: (* denotes performed with pain)  Hip Knee   Extension:  L/R at least 3/5 - tested in standing  Abduction:  R/L each at least 3/5 - tested in standing Flexion: R 5-/5; L 5-/5  Extension: R 5-/5; L 5-/5        Balance: SLS: R/L each ~ 2-3 sec  Pt able to transfer sit - stand without UE A. Gait: pt ambulates on level ground, no assisitive device, wide base of support, slower roseann  Pt able to reciprocally negotiate 6 and 8 inch steps but with c/o patella discomfort. Assessment:  Pt with increased L ITB/lateral quad tone, c/o upon palpation, and report of symptom reproduction (see subjective). Completed STM, MHP, stretches to address. Pt tolerated well; advised may complete STM, MHP 10-20 min at home as helpful for ITB/lateral quad c/o. Modified PT session based on Subjective. Pt tolerated well. Added hip abd and LAQ to HEP to further address L/R patella c/o with stair negotiation.   Pt wishes to continue remaining scheduled 2 PT sessions with discharge to [facility] thereafter. Goals:   (To be met in 12 visits)   Pt will improve knee extension ROM to 0 deg to allow proper heel strike during gait and terminal knee extension in stance - NEARLY MET  Pt will improve knee AROM flexion to >110 degrees to improve ability to perform sit - stand transfer, to squat to retrieve object from ground. - NEARLY MET  Pt will improve quad strength to 5/5 to ascend 1 flight of stairs reciprocally without UE assist - PARTIALLY MET  Pt will increase hip and knee strength to grossly 4+/5 to be able to get up and down from the floor safely - PROGRESS  Pt will demonstrate increased hip ER/ABD strength to 4/5 to perform stepping and squatting activities without excessive femoral IR/ADD - PROGRESS  Pt will improve SLS to >3s to improve safety and independence with gait on uneven surfaces such as grass -NEARLY MET  Pt will be independent and compliant with comprehensive HEP to maintain progress achieved in PT- MET    Plan: Continue PT 1-2 x weekly for up to 28 PT sessions with goal of discharge to [facility] thereafter. F/u on L ITB/lateral quad and patellar c/o.     Date: 7/13/2023  Tx #: 23/26 Date: 7/20/2023  Tx #: 24/26 Date: 7/27/2023  Tx #: 25/26 Date: 8/3/2023  Tx #: 26/26   There Ex (gait belt donned, PT SBA for WB interventions):   Supine PT A L/R hamstrings stretches using strap 3 x 20 sec each  Sit - stand from chair height plinth x 15 reps   Lateral steps at bar, GTB prox to knees 3 x ~ 16 feet   Standing L/R hip ext x 10 reps each  Standing L/R hip flexor/gastroc stretch 2 x 20 sec each      There Ex (gait belt donned, PT SBA for WB interventions):   Seated L knee ext stretch 2 x 30 sec each  Attempted L knee flexion ROM/mobilization x 20 sec - ineffective, stopped  B gastroc stretch on slant board 2 x 20 sec each  Lateral steps at bar, GTB prox to knees 4 x ~ 16 feet   Standing L/R hip ext x 10 reps each AirEx  Mini staircase (4, ~ 6 inch steps) up/down reciprocally   There Ex (gait belt donned, PT SBA for WB interventions): Attempted L knee flexion ROM/mobilization using staircase x 20 sec - ineffective, stopped  B gastroc stretch on slant board 2 x 20 sec each  Lateral steps at bar, GTB prox to knees 4 x ~ 16 feet   Standing L/R hip ext 2 x 10 reps each AirEx  Mini staircase (4, ~ 6 inch steps) up/down reciprocally x 1  HEP review; see below. There Ex:   Supine L/R SAQ 0# x 10 reps each, 2# x 10 reps each  PT A hamstrings stretch with strap 2 x 20 sec each  Bridge on red ball x 10 reps  HEP review/modification/instruction; see below. Manual Therapy:   L knee surgical scar mobilization  L knee ext ROM/mobilization 4 x 20 sec each   S/l L quad stretches 2 x 20 sec each  S/L L knee flexion ROM stretches 4x 20 sec each  STM L distal ITB with foam roller (post session) Manual Therapy:   L knee surgical scar mobilization  L knee ext ROM/mobilization 4 x 20 sec each   S/l L quad stretches 2 x 20 sec each  S/L L knee flexion ROM stretches 4x 20 sec each  STM L distal ITB with foam roller (post session) Manual Therapy:   L knee surgical scar mobilization  L knee ext ROM/mobilization gentle oscillatory  S/l L quad stretches 2 x 20 sec each  S/L L knee flexion ROM stretches 4x 20 sec each  STM L distal ITB with foam roller  Manual Therapy:   L patella mobilization sup/inf and med/lat  L knee surgical scar mobilization  L knee ext ROM/mobilization gentle oscillatory  S/l L quad stretches 2 x 20 sec each  S/L L knee flexion ROM stretches 4x 20 sec each  STM L distal ITB.  L quad stretch with foam roller     NM Re-Ed (gait belt donned, PT SBA):  Obstacle negotiation at bar forward and lateral : 1/2 foam roll, AirEx NM Re-Ed (gait belt donned, PT SBA):  Obstacle negotiation at bar forward and lateral : 1/2 foam roll, AirEx x 2 each       MHP x ~ 5 min+ L ITB/lateral quad during quad stretches, knee ROM   HEP: Seated L knee flexion ROM, seated L knee ext ROM,  stair negotiation, LAQ, standing hip ext and abd with UE A  Charges: 1 TE (15 min), 2 manual (25  min) Total Timed Treatment:  40 min  Total Treatment Time: 45 min  FOTO: -  LEFS to be re-administered at discharge from PT    Plan: Continue skilled Physical Therapy 1-2 x/week or a total of 28 visits over a 90 day period. Treatment will include: there ex, there activities, manual therapy, NM-Re-ed and modalities as needed       Patient/Family/Caregiver was advised of these findings, precautions, and treatment options and has agreed to actively participate in planning and for this course of care. Thank you for your referral. If you have any questions, please contact me at Dept: [phone]. Sincerely,  Electronically signed by therapist: Alice Helm PT     Physician's certification required:  Yes  Please co-sign or sign and return this letter via fax as soon as possible to [phone]. I certify the need for these services furnished under this plan of treatment and while under my care.     X___________________________________________________ Date____________________    Certification From: 5/1/7207  To:11/1/2023

## 2023-08-10 ENCOUNTER — OFFICE VISIT (OUTPATIENT)
Dept: PHYSICAL THERAPY | Age: 83
End: 2023-08-10
Attending: INTERNAL MEDICINE
Payer: MEDICARE

## 2023-08-10 PROCEDURE — 97110 THERAPEUTIC EXERCISES: CPT | Performed by: PHYSICAL THERAPIST

## 2023-08-10 PROCEDURE — 97140 MANUAL THERAPY 1/> REGIONS: CPT | Performed by: PHYSICAL THERAPIST

## 2023-08-10 PROCEDURE — 97530 THERAPEUTIC ACTIVITIES: CPT | Performed by: PHYSICAL THERAPIST

## 2023-08-10 NOTE — PROGRESS NOTES
Diagnosis:   Orthopedic aftercare (Z47.89); s/p L TKA   Referring Provider: Zakiya Melchor  Date of Evaluation:    4/20/2023    Precautions:   CAD, HTN Next MD visit:   3/2024  Date of Surgery: 3/14/2023   Insurance Primary/Secondary: MEDICARE / [employer]     # Auth Visits: POC every 10 visits          Subjective:  C/o L buttock and lateral thigh pain. Use SPC  for long walk due to R knee (1/2 block). Pain:  ~ 3/10 pain    Objective:     Palpation: increased tone/c/o L ITB, L gluteals with c/o. Mild adherence distal L knee surgical scar    AROM: (* denotes performed with pain)   Knee    Flexion: L  105   Extension: L  -2   Gait: pt ambulates on level ground, no assisitive device, wide base of support, slower roseann      Assessment:  Pt presents with c/o suggestive of L greater trochanteric bursitis. Completed interventions to address, discussed HEP considerations (see below). Pt's L knee ROM stabilizing. Pt with min - no L knee c/o, tolerating ADL well. Reviewed gradual weaning from ROM exercises. Pt expressed that does not get out of house too much. Discussed some possible social outlet considerations; see below. Goals:   (To be met in 12 visits)   Pt will improve knee extension ROM to 0 deg to allow proper heel strike during gait and terminal knee extension in stance - NEARLY MET  Pt will improve knee AROM flexion to >110 degrees to improve ability to perform sit - stand transfer, to squat to retrieve object from ground.  - NEARLY MET  Pt will improve quad strength to 5/5 to ascend 1 flight of stairs reciprocally without UE assist - PARTIALLY MET  Pt will increase hip and knee strength to grossly 4+/5 to be able to get up and down from the floor safely - PROGRESS  Pt will demonstrate increased hip ER/ABD strength to 4/5 to perform stepping and squatting activities without excessive femoral IR/ADD - PROGRESS  Pt will improve SLS to >3s to improve safety and independence with gait on uneven surfaces such as grass -NEARLY MET  Pt will be independent and compliant with comprehensive HEP to maintain progress achieved in PT- MET    Plan: Continue PT 1-2 x weekly for up to 28 PT sessions with goal of discharge to HEP thereafter, pending L hip bursitis c/o next session. Date: 7/13/2023  Tx #: 23/26 Date: 7/20/2023  Tx #: 24/26 Date: 7/27/2023  Tx #: 25/26 Date: 8/3/2023  Tx #: 26/26 Date: 8/10/2023  Tx #: 27/28   There Ex (gait belt donned, PT SBA for WB interventions):   Supine PT A L/R hamstrings stretches using strap 3 x 20 sec each  Sit - stand from chair height plinth x 15 reps   Lateral steps at bar, GTB prox to knees 3 x ~ 16 feet   Standing L/R hip ext x 10 reps each  Standing L/R hip flexor/gastroc stretch 2 x 20 sec each      There Ex (gait belt donned, PT SBA for WB interventions):   Seated L knee ext stretch 2 x 30 sec each  Attempted L knee flexion ROM/mobilization x 20 sec - ineffective, stopped  B gastroc stretch on slant board 2 x 20 sec each  Lateral steps at bar, GTB prox to knees 4 x ~ 16 feet   Standing L/R hip ext x 10 reps each AirEx  Mini staircase (4, ~ 6 inch steps) up/down reciprocally   There Ex (gait belt donned, PT SBA for WB interventions): Attempted L knee flexion ROM/mobilization using staircase x 20 sec - ineffective, stopped  B gastroc stretch on slant board 2 x 20 sec each  Lateral steps at bar, GTB prox to knees 4 x ~ 16 feet   Standing L/R hip ext 2 x 10 reps each AirEx  Mini staircase (4, ~ 6 inch steps) up/down reciprocally x 1  HEP review; see below. There Ex:   Supine L/R SAQ 0# x 10 reps each, 2# x 10 reps each  PT A hamstrings stretch with strap 2 x 20 sec each  Bridge on red ball x 10 reps  HEP review/modification/instruction; see below. There Ex:   Across body L/R piriformis stretch 2 x 20 PT A (HEP)  Seated B hip ER using GTB x 20 reps (HEP)  Self STM/myofascial release L gluteals tennis ball/wall  HEP review; see below.           Manual Therapy:   L knee surgical scar mobilization  L knee ext ROM/mobilization 4 x 20 sec each   S/l L quad stretches 2 x 20 sec each  S/L L knee flexion ROM stretches 4x 20 sec each  STM L distal ITB with foam roller (post session) Manual Therapy:   L knee surgical scar mobilization  L knee ext ROM/mobilization 4 x 20 sec each   S/l L quad stretches 2 x 20 sec each  S/L L knee flexion ROM stretches 4x 20 sec each  STM L distal ITB with foam roller (post session) Manual Therapy:   L knee surgical scar mobilization  L knee ext ROM/mobilization gentle oscillatory  S/l L quad stretches 2 x 20 sec each  S/L L knee flexion ROM stretches 4x 20 sec each  STM L distal ITB with foam roller  Manual Therapy:   L patella mobilization sup/inf and med/lat  L knee surgical scar mobilization  L knee ext ROM/mobilization gentle oscillatory  S/l L quad stretches 2 x 20 sec each  S/L L knee flexion ROM stretches 4x 20 sec each  STM L distal ITB.  L quad stretch with foam roller  Manual Therapy:   STM/myofascial release L gluteals, ITB with/without foam roller  L knee surgical scar mobilization  L knee ext ROM/mobilization gentle oscillatory      NM Re-Ed (gait belt donned, PT SBA):  Obstacle negotiation at bar forward and lateral : 1/2 foam roll, AirEx NM Re-Ed (gait belt donned, PT SBA):  Obstacle negotiation at bar forward and lateral : 1/2 foam roll, AirEx x 2 each  There Activities:   May consider R knee brace for OA c/o  Considerations for hip bursitis: Ice, heat, piriformis stretches, tennis ball STM, hip strengthening (hip ER with band, standing hip abd, ext)  Social: consider senior groups with similar interests ([employer], [employer], etc)      MHP x ~ 5 min+ L ITB/lateral quad during quad stretches, knee ROM    HEP: Seated L knee flexion ROM, seated L knee ext ROM,  stair negotiation, LAQ, standing hip ext and abd with UE A  Charges: 1 TE (15 min), 1 manual (15  min), 1 TA (15 min) Total Timed Treatment:  45 min  Total Treatment Time: 50 min  FOTO: -  LEFS to be re-administered at discharge from PT

## 2023-08-17 ENCOUNTER — OFFICE VISIT (OUTPATIENT)
Dept: PHYSICAL THERAPY | Age: 83
End: 2023-08-17
Attending: INTERNAL MEDICINE
Payer: MEDICARE

## 2023-08-17 PROCEDURE — 97110 THERAPEUTIC EXERCISES: CPT | Performed by: PHYSICAL THERAPIST

## 2023-08-17 NOTE — PROGRESS NOTES
ProgressSummary  Pt has attended 28 visits in Physical Therapy. Diagnosis:   Orthopedic aftercare (Z47.89); s/p L TKA   Referring Provider: Nate Sanchez  Date of Evaluation:    4/20/2023    Precautions:   CAD, HTN Next MD visit:   3/2024  Date of Surgery: 3/14/2023   Insurance Primary/Secondary: MEDICARE / [employer]     # Auth Visits: POC every 10 visits          Subjective:  C/o L buttock and lateral thigh pain. Tried stretches and massage for L hip, but they made it worse, shoots down LE sometimes; c/o occur with standing and walking, resolve when I sit. Had a similar issue 5 years ago; had epidural which resolved c/o. L knee is great, deny c/o/concerns. Use SPC due to R knee pain. Pain:  ~ 6-7/10 L LE when stand/walk    Objective:     Palpation: + c/o L lateral gluteal tendons/greater trochanter of hip. Mild adherence distal L knee surgical scar. Neuro: + SLUMP L LE, negative R LE; B LE dermatomes intact to light touch    AROM: (* denotes performed with pain)   Knee    Flexion: L  105   Extension: L  -3     Patellar Mobility/Accessory motion: L patella mobility WNL    Observation/Skin: L/R distal LE red/purple coloring, pitting edema (pt reports has discussed with physician multiple times, no interventions). Flexibility:   Quads/hip flexors: L restriction    Strength/MMT: (* denotes performed with pain)  Hip Knee   Extension:  L/R at least 3/5 - tested in standing  Abduction:  R at least 3/5 - tested in standing, L 4/5 Flexion: R 5-/5; L 5-/5  Extension: R 5-/5; L 5-/5        Balance: SLS: R/L each ~ 2-3 sec  Pt able to transfer sit - stand without UE A. Gait: pt ambulates on level ground using SPC due to c/o R knee pain  Pt able to reciprocally negotiate 6 and 8 inch steps but with c/o patella discomfort (per previous session, not reassessed today). Assessment:  Pt with c/o L hip/LE pain suggestive of L greater trochanteric bursitis, possible sciatica/lumbar stenosis.   Modified Hep for better tolerance/to address. Pt tolerated well. Pt may benefit from brief bout of PT to address in conjunction with physician/PA-C consultation for c/o. Pt has progressed well s/p L TKA, denies c/o related to L knee. Goals:   (To be met in 12 visits)   Pt will improve knee extension ROM to 0 deg to allow proper heel strike during gait and terminal knee extension in stance - NEARLY MET  Pt will improve knee AROM flexion to >110 degrees to improve ability to perform sit - stand transfer, to squat to retrieve object from ground. - NEARLY MET  Pt will improve quad strength to 5/5 to ascend 1 flight of stairs reciprocally without UE assist - PARTIALLY MET  Pt will increase hip and knee strength to grossly 4+/5 to be able to get up and down from the floor safely - PROGRESS  Pt will demonstrate increased hip ER/ABD strength to 4/5 to perform stepping and squatting activities without excessive femoral IR/ADD - PROGRESS  Pt will improve SLS to >3s to improve safety and independence with gait on uneven surfaces such as grass -NEARLY MET  Pt will be independent and compliant with comprehensive HEP to maintain progress achieved in PT- MET  (To be met at visit 34):   - Patient to walk/stand each 5-10 min unlimited by L LE. Plan: Continue PT 1-2 x weekly for up to 34 sessions, physician consultation re: L LE c/o. F/u on response to HEP for L LE c/o. Date: 7/27/2023  Tx #: 25/26 Date: 8/3/2023  Tx #: 26/26 Date: 8/10/2023  Tx #: 27/28 Date: 8/17/2023   Tx #: 28/28   There Ex (gait belt donned, PT SBA for WB interventions): Attempted L knee flexion ROM/mobilization using staircase x 20 sec - ineffective, stopped  B gastroc stretch on slant board 2 x 20 sec each  Lateral steps at bar, GTB prox to knees 4 x ~ 16 feet   Standing L/R hip ext 2 x 10 reps each AirEx  Mini staircase (4, ~ 6 inch steps) up/down reciprocally x 1  HEP review; see below.   There Ex:   Supine L/R SAQ 0# x 10 reps each, 2# x 10 reps each  PT A hamstrings stretch with strap 2 x 20 sec each  Bridge on red ball x 10 reps  HEP review/modification/instruction; see below. There Ex:   Across body L/R piriformis stretch 2 x 20 PT A (HEP)  Seated B hip ER using GTB x 20 reps (HEP)  Self STM/myofascial release L gluteals tennis ball/wall  HEP review; see below. There Ex:   Supine LTR 5 x 5 sec each  Supine SKC x 1 each LE - c/o L groin pain  Supine L hip flexor stretch 2 x 20 sec each  PT A L adductor stretch 2 x 20 sec each  Supine DKC with LE supported on ball 5 x 5 sec each  Bridge on green ball x 10 reps  HEP considerations for L LE: S/L hip ER x 10 reps or seated B hip ER using band, supine bridge with pillow for adductor isometric x 10 reps, seated lumbar flexion (if helpful)       Manual Therapy:   L knee surgical scar mobilization  L knee ext ROM/mobilization gentle oscillatory  S/l L quad stretches 2 x 20 sec each  S/L L knee flexion ROM stretches 4x 20 sec each  STM L distal ITB with foam roller  Manual Therapy:   L patella mobilization sup/inf and med/lat  L knee surgical scar mobilization  L knee ext ROM/mobilization gentle oscillatory  S/l L quad stretches 2 x 20 sec each  S/L L knee flexion ROM stretches 4x 20 sec each  STM L distal ITB.  L quad stretch with foam roller  Manual Therapy:   STM/myofascial release L gluteals, ITB with/without foam roller  L knee surgical scar mobilization  L knee ext ROM/mobilization gentle oscillatory   Manual Therapy:   STM/myofascial release L gluteals, ITB with foam roller   NM Re-Ed (gait belt donned, PT SBA):  Obstacle negotiation at bar forward and lateral : 1/2 foam roll, AirEx x 2 each  There Activities:   May consider R knee brace for OA c/o  Considerations for hip bursitis: Ice, heat, piriformis stretches, tennis ball STM, hip strengthening (hip ER with band, standing hip abd, ext)  Social: consider senior groups with similar interests ([employer], [employer], etc)     MHP x ~ 5 min+ L ITB/lateral quad during quad stretches, knee ROM     HEP: wean from: Seated L knee flexion ROM, seated L knee ext ROM; continue with:  stair negotiation. Considerations for current L LE c/o: bridge with pillow for adductor isometric, LTR , seated B hip ER, MHP, seated lumbar flexion  Charges: 3 TE (40 min) Total Timed Treatment:  40 min  Total Treatment Time: 45 min  FOTO: -  LEFS to be re-administered at discharge from PT      Plan: Continue skilled Physical Therapy 2 x/week or a total of 34 visits over a 90 day period. Treatment will include: there ex, there activities, manual therapy, NM Re-ed, and modalities as needed. Patient/Family/Caregiver was advised of these findings, precautions, and treatment options and has agreed to actively participate in planning and for this course of care. Thank you for your referral. If you have any questions, please contact me at Dept: [phone]. Sincerely,  Electronically signed by therapist: Ilir Mar PT     Physician's certification required:  Yes  Please co-sign or sign and return this letter via fax as soon as possible to [phone]. I certify the need for these services furnished under this plan of treatment and while under my care.     X___________________________________________________ Date____________________    Certification From: 4/43/5569  To:11/15/2023

## 2023-08-24 ENCOUNTER — OFFICE VISIT (OUTPATIENT)
Dept: PHYSICAL THERAPY | Age: 83
End: 2023-08-24
Attending: PHYSICIAN ASSISTANT
Payer: MEDICARE

## 2023-08-24 PROCEDURE — 97110 THERAPEUTIC EXERCISES: CPT | Performed by: PHYSICAL THERAPIST

## 2023-08-24 PROCEDURE — 97140 MANUAL THERAPY 1/> REGIONS: CPT | Performed by: PHYSICAL THERAPIST

## 2023-08-24 NOTE — PROGRESS NOTES
Diagnosis:   Orthopedic aftercare (Z47.89); s/p L TKA   Referring Provider: Corine Stephens  Date of Evaluation:    4/20/2023    Precautions:   CAD, HTN Next MD visit:   3/2024  Date of Surgery: 3/14/2023   Insurance Primary/Secondary: MEDICARE / [facility]ull[facility]     # Auth Visits: POC every 10 visits          Subjective:  L LE same - not better, not worse. C/o L buttock and inner thigh discomfort, deny L LE distal to buttock today. Have not contacted physician re: c/o. Completing HEP. Pain:  ~ 5/10 L LE when stand/walk (0/10 sitting)    Objective:     Restrictions L/R hip PROM flex, abd, ER    Palpation: + increased tone/c/o L gluteals, ITB    Assessment:  Pt with c/o L hip/LE pain suggestive of possible sciatica/lumbar stenosis. Pt denies any L LE c/o distal to gluteals. Session tailored to addressing c/o, pt shaila well. Reviewed Hep considerations for c/o. Advised pt again to consult physician re: c/o. Pt denies L knee c/o. Goals:   (To be met in 12 visits)   Pt will improve knee extension ROM to 0 deg to allow proper heel strike during gait and terminal knee extension in stance - NEARLY MET  Pt will improve knee AROM flexion to >110 degrees to improve ability to perform sit - stand transfer, to squat to retrieve object from ground.  - NEARLY MET  Pt will improve quad strength to 5/5 to ascend 1 flight of stairs reciprocally without UE assist - PARTIALLY MET  Pt will increase hip and knee strength to grossly 4+/5 to be able to get up and down from the floor safely - PROGRESS  Pt will demonstrate increased hip ER/ABD strength to 4/5 to perform stepping and squatting activities without excessive femoral IR/ADD - PROGRESS  Pt will improve SLS to >3s to improve safety and independence with gait on uneven surfaces such as grass -NEARLY MET  Pt will be independent and compliant with comprehensive HEP to maintain progress achieved in PT- MET  (To be met at visit 34):   - Patient to walk/stand each 5-10 min unlimited by L LE. Plan: Continue PT 1-2 x weekly for up to 34 sessions, physician consultation re: L LE c/o. Gluteal stretches, strengthening, hip ROM, manual therapy. Date: 7/27/2023  Tx #: 25/26 Date: 8/3/2023  Tx #: 26/26 Date: 8/10/2023  Tx #: 27/28 Date: 8/17/2023   Tx #: 28/28 Date: 8/24/2023  Tx #: 29/34   There Ex (gait belt donned, PT SBA for WB interventions): Attempted L knee flexion ROM/mobilization using staircase x 20 sec - ineffective, stopped  B gastroc stretch on slant board 2 x 20 sec each  Lateral steps at bar, GTB prox to knees 4 x ~ 16 feet   Standing L/R hip ext 2 x 10 reps each AirEx  Mini staircase (4, ~ 6 inch steps) up/down reciprocally x 1  HEP review; see below. There Ex:   Supine L/R SAQ 0# x 10 reps each, 2# x 10 reps each  PT A hamstrings stretch with strap 2 x 20 sec each  Bridge on red ball x 10 reps  HEP review/modification/instruction; see below. There Ex:   Across body L/R piriformis stretch 2 x 20 PT A (HEP)  Seated B hip ER using GTB x 20 reps (HEP)  Self STM/myofascial release L gluteals tennis ball/wall  HEP review; see below. There Ex:   Supine LTR 5 x 5 sec each  Supine SKC x 1 each LE - c/o L groin pain  Supine L hip flexor stretch 2 x 20 sec each  PT A L adductor stretch 2 x 20 sec each  Supine DKC with LE supported on ball 5 x 5 sec each  Bridge on green ball x 10 reps  HEP considerations for L LE: S/L hip ER x 10 reps or seated B hip ER using band, supine bridge with pillow for adductor isometric x 10 reps, seated lumbar flexion (if helpful)     There Ex:   S/l L hip ER x 10 reps  [facility]ine in [facility] with LE supported on ball 5 x 5 sec each  Bridge on red ball 2 x 10 reps   PT A L/R sciatic nerve glides/hamstrings stretches 5 x 5 sec each  Lateral steps at wall 3 x ~ 16 feet  HEP review; see below.      Manual Therapy:   L knee surgical scar mobilization  L knee ext ROM/mobilization gentle oscillatory  S/l L quad stretches 2 x 20 sec each  S/L L knee flexion ROM stretches 4x 20 sec each  STM L distal ITB with foam roller  Manual Therapy:   L patella mobilization sup/inf and med/lat  L knee surgical scar mobilization  L knee ext ROM/mobilization gentle oscillatory  S/l L quad stretches 2 x 20 sec each  S/L L knee flexion ROM stretches 4x 20 sec each  STM L distal ITB. L quad stretch with foam roller  Manual Therapy:   STM/myofascial release L gluteals, ITB with/without foam roller  L knee surgical scar mobilization  L knee ext ROM/mobilization gentle oscillatory   Manual Therapy:   STM/myofascial release L gluteals, ITB with foam roller Manual Therapy:   STM/myofascial release L gluteals, ITB with foam roller  PROM L/R hip abd, ER, flexion  PT A L/R figure 4 and across body piriformis stretch x 20 sec each   NM Re-Ed (gait belt donned, PT SBA):  Obstacle negotiation at bar forward and lateral : 1/2 foam roll, AirEx x 2 each  There Activities:   May consider R knee brace for OA c/o  Considerations for hip bursitis: Ice, heat, piriformis stretches, tennis ball STM, hip strengthening (hip ER with band, standing hip abd, ext)  Social: consider senior groups with similar interests ([employer], [employer], etc)      MHP x ~ 5 min+ L ITB/lateral quad during quad stretches, knee ROM      HEP: wean from: Seated L knee flexion ROM, seated L knee ext ROM; continue with:  stair negotiation.  Considerations for current L LE c/o: bridge with pillow for adductor isometric, LTR ( since piriformis stretch poorly tolerated) , seated B hip ER, MHP, seated lumbar flexion  Charges: 2TE (25 min), 1 manual (15 min) Total Timed Treatment:  40 min  Total Treatment Time: 45 min

## 2023-08-30 ENCOUNTER — OFFICE VISIT (OUTPATIENT)
Dept: PHYSICAL THERAPY | Age: 83
End: 2023-08-30
Attending: INTERNAL MEDICINE
Payer: MEDICARE

## 2023-08-30 PROCEDURE — 97110 THERAPEUTIC EXERCISES: CPT | Performed by: PHYSICAL THERAPIST

## 2023-08-30 PROCEDURE — 97140 MANUAL THERAPY 1/> REGIONS: CPT | Performed by: PHYSICAL THERAPIST

## 2023-09-01 ENCOUNTER — LAB ENCOUNTER (OUTPATIENT)
Dept: LAB | Age: 83
End: 2023-09-01
Attending: NURSE PRACTITIONER
Payer: MEDICARE

## 2023-09-01 DIAGNOSIS — E03.9 HYPOTHYROIDISM, UNSPECIFIED TYPE: ICD-10-CM

## 2023-09-01 LAB
T4 FREE SERPL-MCNC: 0.7 NG/DL (ref 0.8–1.7)
TSI SER-ACNC: 11.9 MIU/ML (ref 0.36–3.74)

## 2023-09-01 PROCEDURE — 84439 ASSAY OF FREE THYROXINE: CPT

## 2023-09-01 PROCEDURE — 36415 COLL VENOUS BLD VENIPUNCTURE: CPT

## 2023-09-01 PROCEDURE — 84443 ASSAY THYROID STIM HORMONE: CPT

## 2023-09-02 DIAGNOSIS — E03.4 HYPOTHYROIDISM DUE TO ACQUIRED ATROPHY OF THYROID: Primary | ICD-10-CM

## 2023-09-02 RX ORDER — LEVOTHYROXINE SODIUM 0.07 MG/1
75 TABLET ORAL
Qty: 90 TABLET | Refills: 1 | Status: SHIPPED | OUTPATIENT
Start: 2023-09-02 | End: 2024-02-29

## 2023-09-06 ENCOUNTER — OFFICE VISIT (OUTPATIENT)
Dept: PHYSICAL THERAPY | Age: 83
End: 2023-09-06
Attending: INTERNAL MEDICINE
Payer: MEDICARE

## 2023-09-06 PROCEDURE — 97110 THERAPEUTIC EXERCISES: CPT | Performed by: PHYSICAL THERAPIST

## 2023-09-06 PROCEDURE — 97140 MANUAL THERAPY 1/> REGIONS: CPT | Performed by: PHYSICAL THERAPIST

## 2023-09-06 NOTE — PROGRESS NOTES
Diagnosis:   Orthopedic aftercare (Z47.89); s/p L TKA   Referring Provider: Liv Akins  Date of Evaluation:    4/20/2023    Precautions:   CAD, HTN Next MD visit:   3/2024  Date of Surgery: 3/14/2023   Insurance Primary/Secondary: MEDICARE / [facility]     # Auth Visits: POC every 10 visits          Subjective:  L LE is 50% better - better after I stretch it. I had to walk 3/4 of a block for cardiology, L LE tightened up afterward but then reduced c/o - used some ice. Have not consulted physician re: c/o since I am getting better. L knee is a little more mobile; have been doing stretches/ROM. A little more UE A with stairs - may be related to gaining some weight. Pain:  3-4/10 L LE    Objective:     L knee AROM 0 - 106 deg - pt reports increased HEP compliance for knee ROM    Restrictions L/R hip PROM flex, abd, ER (L abd PROM restriction > R)    Palpation: + increased tone/c/o L gluteals, ITB, adductors    Assessment:  Pt progressing, less LE c/o, improved self management. C/o seem related to adductor strain, piriformis irritation/hip bursitis, possible lumbar stenosis. Advised pt again may consult physician re: c/o. Pt with improved L knee AROM. Encouraged continued L knee ROM as needed. Pt tolerated session well, will benefit from continued PT, pending consistent progress. Goals:   (To be met in 12 visits)   Pt will improve knee extension ROM to 0 deg to allow proper heel strike during gait and terminal knee extension in stance - NEARLY MET  Pt will improve knee AROM flexion to >110 degrees to improve ability to perform sit - stand transfer, to squat to retrieve object from ground.  - NEARLY MET  Pt will improve quad strength to 5/5 to ascend 1 flight of stairs reciprocally without UE assist - PARTIALLY MET  Pt will increase hip and knee strength to grossly 4+/5 to be able to get up and down from the floor safely - PROGRESS  Pt will demonstrate increased hip ER/ABD strength to 4/5 to perform stepping and squatting activities without excessive femoral IR/ADD - PROGRESS  Pt will improve SLS to >3s to improve safety and independence with gait on uneven surfaces such as grass -NEARLY MET  Pt will be independent and compliant with comprehensive HEP to maintain progress achieved in PT- MET  (To be met at visit 34):   - Patient to walk/stand each 5-10 min unlimited by L LE. Plan: Continue PT 1-2 x weekly for up to 34 sessions, pending consistent progress with PT. Physician consultation re: L LE c/o. Gluteal/adductor stretches, strengthening, hip ROM, lumbar flexion, manual therapy. Date: 8/10/2023  Tx #: 27/28 Date: 8/17/2023   Tx #: 28/28 Date: 8/24/2023  Tx #: 29/34 Date: 8/30/2023  Tx #: 30/34 Date: 9/6/2023  Tx #: 31/34   There Ex:   Across body L/R piriformis stretch 2 x 20 PT A (HEP)  Seated B hip ER using GTB x 20 reps (HEP)  Self STM/myofascial release L gluteals tennis ball/wall  HEP review; see below. There Ex:   Supine LTR 5 x 5 sec each  Supine SKC x 1 each LE - c/o L groin pain  Supine L hip flexor stretch 2 x 20 sec each  PT A L adductor stretch 2 x 20 sec each  Supine DKC with LE supported on ball 5 x 5 sec each  Bridge on green ball x 10 reps  HEP considerations for L LE: S/L hip ER x 10 reps or seated B hip ER using band, supine bridge with pillow for adductor isometric x 10 reps, seated lumbar flexion (if helpful)     There Ex:   S/l L hip ER x 10 reps  Supine in [address] with LE supported on ball 5 x 5 sec each  Bridge on red ball 2 x 10 reps   PT A L/R sciatic nerve glides/hamstrings stretches 5 x 5 sec each  Lateral steps at wall 3 x ~ 16 feet  HEP review; see below. There Ex:   S/l L hip ER  2 x 10 reps  Supine in [address] with LE supported on ball 5 x 5 sec each  Bridge on red ball 2 x 10 reps   PT A L/R sciatic nerve glides/hamstrings stretches 10 x 5 sec each  Lateral steps at wall 3 x ~ 16 feet  HEP review; see below.  There Ex:   S/l L hip ER  2 x 10 reps  Supine in [address] with LE supported on ball 5 x 5 sec each  Bridge on red ball  x 15 reps   PT A L/R sciatic nerve glides/hamstrings stretches 10 x 5 sec each  Supine BKFO 4 x 10 sec each  Lateral steps at wall 3 x ~ 16 feet- not completed, insufficient time  HEP review; see below. Manual Therapy:   STM/myofascial release L gluteals, ITB with/without foam roller  L knee surgical scar mobilization  L knee ext ROM/mobilization gentle oscillatory   Manual Therapy:   STM/myofascial release L gluteals, ITB with foam roller Manual Therapy:   STM/myofascial release L gluteals, ITB with foam roller  PROM L/R hip abd, ER, flexion  PT A L/R figure 4 and across body piriformis stretch x 20 sec each Manual Therapy:   STM/myofascial release L gluteals, ITB with/without foam roller, limited distal L adductors  PROM L/R hip abd, ER, flexion  PT A L/R figure 3 and across body piriformis stretches x 20 sec each Manual Therapy:   STM/myofascial release L gluteals, ITB, quads with/without foam roller, limited distal L adductors  PROM L/R hip abd, ER, flexion  PT A L/R figure 3 and across body piriformis stretches x 20 sec each  PT A L quad stretches 3 x 20 sec each   There Activities:   May consider R knee brace for OA c/o  Considerations for hip bursitis: Ice, heat, piriformis stretches, tennis ball STM, hip strengthening (hip ER with band, standing hip abd, ext)  Social: consider senior groups with similar interests ([employer], [employer], etc)              HEP: wean from: Seated L knee flexion ROM, seated L knee ext ROM; continue with:  stair negotiation.  Considerations for current L LE c/o: bridge with pillow for adductor isometric, LTR ( since piriformis stretch poorly tolerated) , seated B hip ER, MHP, seated lumbar flexion, supine supported hip adductor stretch  Charges: 2TE (25 min), 1 manual (15 min) Total Timed Treatment:  40 min  Total Treatment Time: 45 min

## 2023-09-11 ENCOUNTER — PATIENT MESSAGE (OUTPATIENT)
Dept: PHYSICAL THERAPY | Age: 83
End: 2023-09-11

## 2023-09-19 ENCOUNTER — OFFICE VISIT (OUTPATIENT)
Dept: PHYSICAL THERAPY | Age: 83
End: 2023-09-19
Attending: INTERNAL MEDICINE
Payer: MEDICARE

## 2023-09-19 PROCEDURE — 97110 THERAPEUTIC EXERCISES: CPT | Performed by: PHYSICAL THERAPIST

## 2023-09-19 PROCEDURE — 97140 MANUAL THERAPY 1/> REGIONS: CPT | Performed by: PHYSICAL THERAPIST

## 2023-09-19 NOTE — PROGRESS NOTES
Diagnosis:   Orthopedic aftercare (Z47.89); s/p L TKA   Referring Provider: Edmundo Coto  Date of Evaluation:    4/20/2023    Precautions:   CAD, HTN Next MD visit:   3/18/2024  Date of Surgery: 3/14/2023   Insurance Primary/Secondary: MEDICARE / Dmitriy Casey     # Auth Visits: POC every 10 visits        Subjective:  L LE better - 30% better. Have not contacted physician yet since pain is getting better. No pain when sit and don't move. Greatest pain is when start walking after sit but goes away. Stairs uncomfortable on front of knees. Using SPC due to R knee pain. Inner thigh sore. Pain:  3-4/10 L LE    Objective:     L knee AROM 0 - 105 deg     Restrictions L/R hip PROM flex, abd, ER (L abd PROM restriction > R)    Palpation: + increased tone/c/o L gluteals, ITB, adductors    Assessment:  Patient reports progressing but with residual L LE c/o suggestive of adductor strain, piriformis irritation/hip bursitis, possible lumbar stenosis. Advised pt again to consult physician re: c/o. L knee AROM has stabilized s/p TKA; advised may discontinue L knee ROM as part of HEP. Pt may benefit from 1-2 more PT sessions, pending consistent progress. Pt reported MHP very helpful today for c/o; improved gait speed following. Goals:   (To be met in 12 visits)   Pt will improve knee extension ROM to 0 deg to allow proper heel strike during gait and terminal knee extension in stance - NEARLY MET  Pt will improve knee AROM flexion to >110 degrees to improve ability to perform sit - stand transfer, to squat to retrieve object from ground.  - NEARLY MET  Pt will improve quad strength to 5/5 to ascend 1 flight of stairs reciprocally without UE assist - PARTIALLY MET  Pt will increase hip and knee strength to grossly 4+/5 to be able to get up and down from the floor safely - PROGRESS  Pt will demonstrate increased hip ER/ABD strength to 4/5 to perform stepping and squatting activities without excessive femoral IR/ADD - PROGRESS  Pt will improve SLS to >3s to improve safety and independence with gait on uneven surfaces such as grass -NEARLY MET  Pt will be independent and compliant with comprehensive HEP to maintain progress achieved in PT- MET  (To be met at visit 34):   - Patient to walk/stand each 5-10 min unlimited by L LE. Plan: Continue PT 1-2 x weekly for up to 34 sessions, pending consistent progress with PT. Physician consultation re: L LE c/o. Gluteal/adductor stretches, strengthening, hip ROM, lumbar flexion, manual therapy. Date: 8/24/2023  Tx #: 29/34 Date: 8/30/2023  Tx #: 30/34 Date: 9/6/2023  Tx #: 31/34 Date: 9/19/2023  Tx # 32/34   There Ex:   S/l L hip ER x 10 reps  Supine in [facility] with LE supported on ball 5 x 5 sec each  Bridge on red ball 2 x 10 reps   PT A L/R sciatic nerve glides/hamstrings stretches 5 x 5 sec each  Lateral steps at wall 3 x ~ 16 feet  HEP review; see below. There Ex:   S/l L hip ER  2 x 10 reps  Supine in [facility] with LE supported on ball 5 x 5 sec each  Bridge on red ball 2 x 10 reps   PT A L/R sciatic nerve glides/hamstrings stretches 10 x 5 sec each  Lateral steps at wall 3 x ~ 16 feet  HEP review; see below. There Ex:   S/l L hip ER  2 x 10 reps  Supine in [facility] with LE supported on ball 5 x 5 sec each  Bridge on red ball  x 15 reps   PT A L/R sciatic nerve glides/hamstrings stretches 10 x 5 sec each  Supine BKFO 4 x 10 sec each  Lateral steps at wall 3 x ~ 16 feet- not completed, insufficient time  HEP review; see below.  There Ex:   S/l L hip ER  2 x 10 reps  Supine wide [facility] with LE supported on ball 5 x 5 sec each  Bridge on red ball  x 10 reps   PT A L/R sciatic nerve glides/hamstrings stretches 10 x 5 sec each  Supine BKFO 4 x 10 sec each (HEP)  Lateral steps at bar 4 x ~ 10 feet  6 inch step up L LE x 10 reps  HEP: Add supine hip adductor stretch with legs straight   Manual Therapy:   STM/myofascial release L gluteals, ITB with foam roller  PROM L/R hip abd, ER, flexion  PT A L/R figure 4 and across body piriformis stretch x 20 sec each Manual Therapy:   STM/myofascial release L gluteals, ITB with/without foam roller, limited distal L adductors  PROM L/R hip abd, ER, flexion  PT A L/R figure 3 and across body piriformis stretches x 20 sec each Manual Therapy:   STM/myofascial release L gluteals, ITB, quads with/without foam roller, limited distal L adductors  PROM L/R hip abd, ER, flexion  PT A L/R figure 3 and across body piriformis stretches x 20 sec each  PT A L quad stretches 3 x 20 sec each Manual Therapy:   STM/myofascial release L gluteals, ITB, quads with/without foam roller, distal L adductors  PROM L/R hip abd, ER, flexion  PT A L/R figure 3 and across body piriformis stretches  3 x 20 sec each        MHP L adductor/quad regions x 10 min seated         HEP: Discontinue: Seated L knee flexion ROM, seated L knee ext ROM; continue with:  stair negotiation.  Considerations for current L LE c/o: bridge with pillow for adductor isometric, LTR ( since piriformis stretch poorly tolerated) , seated B hip ER, MHP, seated lumbar flexion, supine supported hip adductor stretch  Charges: 2TE (25 min), 1 manual (15 min) Total Timed Treatment:  40 min  Total Treatment Time: 50 min

## 2023-09-21 ENCOUNTER — OFFICE VISIT (OUTPATIENT)
Dept: PHYSICAL THERAPY | Age: 83
End: 2023-09-21
Attending: INTERNAL MEDICINE
Payer: MEDICARE

## 2023-09-21 PROCEDURE — 97110 THERAPEUTIC EXERCISES: CPT | Performed by: PHYSICAL THERAPIST

## 2023-09-21 PROCEDURE — 97140 MANUAL THERAPY 1/> REGIONS: CPT | Performed by: PHYSICAL THERAPIST

## 2023-09-21 NOTE — PROGRESS NOTES
Diagnosis:   Orthopedic aftercare (Z47.89); s/p L TKA   Referring Provider: Edmundo Coto  Date of Evaluation:    4/20/2023    Precautions:   CAD, HTN Next MD visit:   3/18/2024  Date of Surgery: 3/14/2023   Insurance Primary/Secondary: MEDICARE / Dmitriy Casey     # Auth Visits: POC every 10 visits        Subjective: Felt really great after last session. L knee is 100% better. L adductor/gluteal c/o 20% better. Pain:  3-4/10 L LE    Objective:     L knee AROM 0 - 105 deg     Restrictions L/R hip PROM flex, abd, ER (L abd PROM restriction > R)    Palpation: + increased tone/c/o L gluteals, ITB, adductors    Assessment:  Pt denies L knee c/o. Pt progressing with L LE c/o, but he c/o residual L gluteal, ITB, adductor c/o suggestive of adductor strain, piriformis irritation/hip bursitis. Advised pt again to consult physician re: c/o. Pt shaila session well, similiar content today based on Subjective. Goals:   (To be met in 12 visits)   Pt will improve knee extension ROM to 0 deg to allow proper heel strike during gait and terminal knee extension in stance - NEARLY MET  Pt will improve knee AROM flexion to >110 degrees to improve ability to perform sit - stand transfer, to squat to retrieve object from ground. - NEARLY MET  Pt will improve quad strength to 5/5 to ascend 1 flight of stairs reciprocally without UE assist - PARTIALLY MET  Pt will increase hip and knee strength to grossly 4+/5 to be able to get up and down from the floor safely - PROGRESS  Pt will demonstrate increased hip ER/ABD strength to 4/5 to perform stepping and squatting activities without excessive femoral IR/ADD - PROGRESS  Pt will improve SLS to >3s to improve safety and independence with gait on uneven surfaces such as grass -NEARLY MET  Pt will be independent and compliant with comprehensive HEP to maintain progress achieved in PT- MET  (To be met at visit 34):   - Patient to walk/stand each 5-10 min unlimited by L LE.      Plan: Continue PT 1-2 x weekly for 1 more session. Pt to consult physician after 1 more session for further evaluation of L LE prior to continuation of any PT thereafter. Gluteal/adductor stretches, strengthening, hip ROM, lumbar flexion, manual therapy. Date: 8/24/2023  Tx #: 29/34 Date: 8/30/2023  Tx #: 30/34 Date: 9/6/2023  Tx #: 31/34 Date: 9/19/2023  Tx #: 32/34 Date: 9/21/2023  Tx # 33/34   There Ex:   S/l L hip ER x 10 reps  Supine in [facility] with LE supported on ball 5 x 5 sec each  Bridge on red ball 2 x 10 reps   PT A L/R sciatic nerve glides/hamstrings stretches 5 x 5 sec each  Lateral steps at wall 3 x ~ 16 feet  HEP review; see below. There Ex:   S/l L hip ER  2 x 10 reps  Supine in [facility] with LE supported on ball 5 x 5 sec each  Bridge on red ball 2 x 10 reps   PT A L/R sciatic nerve glides/hamstrings stretches 10 x 5 sec each  Lateral steps at wall 3 x ~ 16 feet  HEP review; see below. There Ex:   S/l L hip ER  2 x 10 reps  Supine in [facility] with LE supported on ball 5 x 5 sec each  Bridge on red ball  x 15 reps   PT A L/R sciatic nerve glides/hamstrings stretches 10 x 5 sec each  Supine BKFO 4 x 10 sec each  Lateral steps at wall 3 x ~ 16 feet- not completed, insufficient time  HEP review; see below. See Daily Note for details. There Ex:   S/l L hip ER  2 x 10 reps  Supine wide [facility] with LE supported on ball 5 x 5 sec each  Bridge on red ball  x 15 reps   PT A L/R sciatic nerve glides/hamstrings stretches 8 x 5 sec each  Supine BKFO x 60 sec   Lateral steps at bar 4 x ~ 10 feet  6 inch step up L LE x 10 reps  HEP review; see below.    Manual Therapy:   STM/myofascial release L gluteals, ITB with foam roller  PROM L/R hip abd, ER, flexion  PT A L/R figure 4 and across body piriformis stretch x 20 sec each Manual Therapy:   STM/myofascial release L gluteals, ITB with/without foam roller, limited distal L adductors  PROM L/R hip abd, ER, flexion  PT A L/R figure 3 and across body piriformis stretches x 20 sec each Manual Therapy:   STM/myofascial release L gluteals, ITB, quads with/without foam roller, limited distal L adductors  PROM L/R hip abd, ER, flexion  PT A L/R figure 3 and across body piriformis stretches x 20 sec each  PT A L quad stretches 3 x 20 sec each  Manual Therapy:   STM/myofascial release L gluteals, ITB with/without foam roller, distal L adductors  PROM L/R hip abd, ER, flexion  PT A L/R figure   4 and across body piriformis stretches  3 x 20 sec each         MHP L adductor/quad regions x 10 min seated          HEP: Discontinue: Seated L knee flexion ROM, seated L knee ext ROM; continue with:  stair negotiation.  Considerations for current L LE c/o: bridge with pillow for adductor isometric, LTR ( since piriformis stretch poorly tolerated) , seated B hip ER with blue TB, MHP, seated lumbar flexion, supine supported hip adductor stretch  Charges: 2TE (25 min), 1 manual (15 min) Total Timed Treatment:  40 min  Total Treatment Time: 50 min

## 2023-09-26 ENCOUNTER — OFFICE VISIT (OUTPATIENT)
Dept: PHYSICAL THERAPY | Age: 83
End: 2023-09-26
Attending: INTERNAL MEDICINE
Payer: MEDICARE

## 2023-09-26 PROCEDURE — 97140 MANUAL THERAPY 1/> REGIONS: CPT | Performed by: PHYSICAL THERAPIST

## 2023-09-26 PROCEDURE — 97110 THERAPEUTIC EXERCISES: CPT | Performed by: PHYSICAL THERAPIST

## 2023-09-26 NOTE — PROGRESS NOTES
ProgressSummary  Pt has attended 34 visits in Physical Therapy. Diagnosis:   Orthopedic aftercare (Z47.89); s/p L TKA   Referring Provider: Idalmis Dia  Date of Evaluation:    4/20/2023    Precautions:   CAD, HTN Next MD visit:   3/18/2023  Dr. Rocio Anaya 10/23/2023  Guero Boss PA-C 10/25/2023  Date of Surgery: 3/14/2023   Insurance Primary/Secondary: MEDICARE / 3 hospitals     # Auth Visits: POC every 10 visits        Subjective:  Scheduled appointments with Dr. Rocio Anaya and Guero Boss re: L LE c/o. L LE tightened up after last session. L LE symptoms limited walking/standing to 10-15 min. L gluteals, inner/outer thigh tighten up. L LE ~ 50% better. Deny L knee c/o, it is strong. Pain:  3-4/10 L LE    Objective:     Restrictions L/R hip PROM flex, abd, ER (L abd PROM restriction > R)    Lumbar Spine AROM:   Flexion: WNL* L LE symptoms  Extension: ~ to neutral  Rotation: R ~ 50 deg* L inner thigh c/o, L ~ 70 deg   Sidbend: R/L each NWL    Negative L/R SLUMP. B LE dermatomes intact to light touch      Palpation: + c/o L lateral gluteal tendons/greater trochanter of hip, adductors. Mild adherence distal L knee surgical scar. AROM: (* denotes performed with pain)   Knee    Flexion: L  105   Extension: L 0      Patellar Mobility/Accessory motion: L patella mobility WNL    Observation/Skin: L/R distal LE red/purple coloring, pitting edema (pt reports has discussed with physician multiple times, no interventions). Flexibility:   Quads/hip flexors: L restriction    Strength/MMT: (* denotes performed with pain)  Hip Knee   Extension:  L/R at least 3/5 - tested in standing  Abduction:  R at least 3/5 - tested in standing, L 4/5 Flexion: R 5-/5; L 5-/5  Extension: R 5-/5; L 5-/5        Balance: SLS: R/L each ~ 2-3 sec with UE A  Pt able to transfer sit - stand without UE A.   Gait: pt ambulates on level ground using SPC at times due to c/o R knee pain  Pt able to reciprocally negotiate 6 and 8 inch steps but with some c/o L prox hamstrings discomfot    Assessment:  Patient has progressed well s/p L TKA, denies any current c/o. Pt continues, however, to have c/o L gluteals/adductor pain/tightening up which may be suggestive of lumbar spine involvement and  adductor strain, piriformis irritation/hip bursitis. Pt now confirming that he has appointments scheduled with PCP and Brandie Bailey for c/o late 10/2023, appointments not available prior to then. Advisable for further evaluation by physician due to ongoing c/o, despite progress. Pt reports similar symptoms in past, reduced with lumbar injection. HEP reviewed with pt. Goals:   (To be met in 12 visits)   Pt will improve knee extension ROM to 0 deg to allow proper heel strike during gait and terminal knee extension in stance - MET  Pt will improve knee AROM flexion to >110 degrees to improve ability to perform sit - stand transfer, to squat to retrieve object from ground. - MET  Pt will improve quad strength to 5/5 to ascend 1 flight of stairs reciprocally without UE assist - PARTIALLY MET  Pt will increase hip and knee strength to grossly 4+/5 to be able to get up and down from the floor safely - PROGRESS  Pt will demonstrate increased hip ER/ABD strength to 4/5 to perform stepping and squatting activities without excessive femoral IR/ADD - PROGRESS  Pt will improve SLS to >3s to improve safety and independence with gait on uneven surfaces such as grass -PROGRESS  Pt will be independent and compliant with comprehensive HEP to maintain progress achieved in PT- MET  (To be met at visit 34):   - Patient to walk/stand each 5-10 min unlimited by L LE  symptoms. - IN PROGRESS    Plan: Refer patient back to physician for further evaluation of L gluteal/adductor c/o. Pt  may attend limited number of sessions (2-4) for symptom management until physician consultation 10/23/2023.   Gluteal/adductor stretches, strengthening, hip ROM, lumbar flexion, manual therapy. Date: 8/30/2023  Tx #: 30/34 Date: 9/6/2023  Tx #: 31/34 Date: 9/19/2023  Tx #: 32/34 Date: 9/21/2023  Tx # 33/34 Date: 9/26/2023  Tx #: 34/34   There Ex:   S/l L hip ER  2 x 10 reps  Supine in [facility] with LE supported on ball 5 x 5 sec each  Bridge on red ball 2 x 10 reps   PT A L/R sciatic nerve glides/hamstrings stretches 10 x 5 sec each  Lateral steps at wall 3 x ~ 16 feet  HEP review; see below. There Ex:   S/l L hip ER  2 x 10 reps  Supine in [facility] with LE supported on ball 5 x 5 sec each  Bridge on red ball  x 15 reps   PT A L/R sciatic nerve glides/hamstrings stretches 10 x 5 sec each  Supine BKFO 4 x 10 sec each  Lateral steps at wall 3 x ~ 16 feet- not completed, insufficient time  HEP review; see below. See Daily Note for details. There Ex:   S/l L hip ER  2 x 10 reps  Supine wide DKC with LE supported on ball 5 x 5 sec each  Bridge on red ball  x 15 reps   PT A L/R sciatic nerve glides/hamstrings stretches 8 x 5 sec each  Supine BKFO x 60 sec   Lateral steps at bar 4 x ~ 10 feet  6 inch step up L LE x 10 reps  HEP review; see below. There Ex:   PT HEP review; see below. S/l L hip ER  2 x 10 re  Supine DKC with LE supported on ball 5 x 5 sec each  Bridge on red ball  x 15 reps   PT A L/R sciatic nerve glides/hamstrings stretches 8 x 5 sec each  Supine BKFO x 60 sec   Lateral steps at bar 4 x ~ 10 feet  6 inch step up L LE x 10 reps  Hep review; see below.       Manual Therapy:   STM/myofascial release L gluteals, ITB with/without foam roller, limited distal L adductors  PROM L/R hip abd, ER, flexion  PT A L/R figure 3 and across body piriformis stretches x 20 sec each Manual Therapy:   STM/myofascial release L gluteals, ITB, quads with/without foam roller, limited distal L adductors  PROM L/R hip abd, ER, flexion  PT A L/R figure 3 and across body piriformis stretches x 20 sec each  PT A L quad stretches 3 x 20 sec each  Manual Therapy:   STM/myofascial release L gluteals, ITB with/without foam roller, distal L adductors  PROM L/R hip abd, ER, flexion  PT A L/R figure   4 and across body piriformis stretches  3 x 20 sec each   Manual Therapy:   STM/myofascial release L gluteals, ITB with/without foam roller, distal L adductors  PROM L/R hip abd, ER, flexion  PT A L/R figure 4 stretch 3 x 20 sec each, attempted across body stretch, pt c/o groin discomfort, stopped       MHP L adductor/quad regions x 10 min seated MHP L buttock/gluteal region x 10 min seated          HEP: Discontinue: Seated L knee flexion ROM, seated L knee ext ROM; continue with:  stair negotiation. Considerations for current L LE c/o: bridge with pillow for adductor isometric, LTR ( since piriformis stretch poorly tolerated) , seated B hip ER with blue TB, MHP, seated lumbar flexion, supine supported hip adductor stretch, supine BKFO  Charges: 2TE (25 min), 1 manual (15 min) Total Timed Treatment:  40 min  Total Treatment Time: 50 min  LEFS to be re-administered    Plan: Continue skilled Physical Therapy 1-2 x/week or a total of up to 38 sessions visits over a 90 day period. Refer patient back to physician for further evaluation of L gluteal/adductor c/o. Pt  may attend limited number of sessions (2-4) for symptom management until physician consultation 10/23/2023. Treatment will include:  there ex, there activities, manual therapy, NM Re-ed, and MHP, cold pack as needed       Patient/Family/Caregiver was advised of these findings, precautions, and treatment options and has agreed to actively participate in planning and for this course of care. Thank you for your referral. If you have any questions, please contact me at Dept: [phone]. Sincerely,  Electronically signed by therapist: Mliss Dandy, PT     Physician's certification required:  Yes  Please co-sign or sign and return this letter via fax as soon as possible to [phone].    I certify the need for these services furnished under this plan of treatment and while under my care.     X___________________________________________________ Date____________________    Certification From: [id number]/[id number]  To:12/25/2023

## 2023-10-11 ENCOUNTER — MED REC SCAN ONLY (OUTPATIENT)
Dept: INTERNAL MEDICINE CLINIC | Facility: CLINIC | Age: 83
End: 2023-10-11

## 2023-10-18 ENCOUNTER — LAB ENCOUNTER (OUTPATIENT)
Dept: LAB | Age: 83
End: 2023-10-18
Attending: NURSE PRACTITIONER
Payer: MEDICARE

## 2023-10-18 DIAGNOSIS — E03.4 HYPOTHYROIDISM DUE TO ACQUIRED ATROPHY OF THYROID: ICD-10-CM

## 2023-10-18 LAB
T3 SERPL-MCNC: 93 NG/DL (ref 60–181)
T4 FREE SERPL-MCNC: 1 NG/DL (ref 0.8–1.7)
TSI SER-ACNC: 4.84 MIU/ML (ref 0.36–3.74)

## 2023-10-18 PROCEDURE — 84439 ASSAY OF FREE THYROXINE: CPT

## 2023-10-18 PROCEDURE — 84480 ASSAY TRIIODOTHYRONINE (T3): CPT

## 2023-10-18 PROCEDURE — 36415 COLL VENOUS BLD VENIPUNCTURE: CPT

## 2023-10-18 PROCEDURE — 84443 ASSAY THYROID STIM HORMONE: CPT

## 2023-10-18 RX ORDER — ERGOCALCIFEROL 1.25 MG/1
50000 CAPSULE ORAL WEEKLY
Qty: 12 CAPSULE | Refills: 3 | Status: SHIPPED | OUTPATIENT
Start: 2023-10-18

## 2023-10-18 NOTE — TELEPHONE ENCOUNTER
Please review; no protocol  Medication pended for your review and approval.     Requested Prescriptions   Pending Prescriptions Disp Refills    ERGOCALCIFEROL 1.25 MG (41900 UT) Oral Cap [Pharmacy Med Name: Vitamin D 50,000 Unit Cap Bion] 12 capsule 3     Sig: TAKE ONE CAPSULE BY MOUTH ONCE A WEEK       There is no refill protocol information for this order

## 2023-10-23 ENCOUNTER — OFFICE VISIT (OUTPATIENT)
Dept: INTERNAL MEDICINE CLINIC | Facility: CLINIC | Age: 83
End: 2023-10-23
Payer: MEDICARE

## 2023-10-23 VITALS
BODY MASS INDEX: 33.92 KG/M2 | SYSTOLIC BLOOD PRESSURE: 116 MMHG | HEIGHT: 69 IN | WEIGHT: 229 LBS | OXYGEN SATURATION: 95 % | DIASTOLIC BLOOD PRESSURE: 80 MMHG | RESPIRATION RATE: 14 BRPM | HEART RATE: 86 BPM

## 2023-10-23 DIAGNOSIS — I25.10 CORONARY ARTERY DISEASE INVOLVING NATIVE CORONARY ARTERY OF NATIVE HEART WITHOUT ANGINA PECTORIS: ICD-10-CM

## 2023-10-23 DIAGNOSIS — I10 HYPERTENSION, UNSPECIFIED TYPE: ICD-10-CM

## 2023-10-23 DIAGNOSIS — Z23 INFLUENZA VACCINE NEEDED: ICD-10-CM

## 2023-10-23 DIAGNOSIS — E78.00 HYPERCHOLESTEROLEMIA: ICD-10-CM

## 2023-10-23 DIAGNOSIS — E03.9 ACQUIRED HYPOTHYROIDISM: Primary | ICD-10-CM

## 2023-10-25 ENCOUNTER — HOSPITAL ENCOUNTER (OUTPATIENT)
Dept: GENERAL RADIOLOGY | Facility: HOSPITAL | Age: 83
Discharge: HOME | End: 2023-10-25
Attending: ORTHOPAEDIC SURGERY

## 2023-10-25 ENCOUNTER — OFFICE VISIT (OUTPATIENT)
Dept: ORTHOPEDICS CLINIC | Facility: CLINIC | Age: 83
End: 2023-10-25

## 2023-10-25 VITALS — WEIGHT: 231.81 LBS | HEIGHT: 69 IN | BODY MASS INDEX: 34.33 KG/M2

## 2023-10-25 DIAGNOSIS — M17.11 PRIMARY OSTEOARTHRITIS OF RIGHT KNEE: Primary | ICD-10-CM

## 2023-10-25 DIAGNOSIS — M17.11 PRIMARY OSTEOARTHRITIS OF RIGHT KNEE: ICD-10-CM

## 2023-10-25 PROCEDURE — 1126F AMNT PAIN NOTED NONE PRSNT: CPT | Performed by: ORTHOPAEDIC SURGERY

## 2023-10-25 PROCEDURE — 99214 OFFICE O/P EST MOD 30 MIN: CPT | Performed by: ORTHOPAEDIC SURGERY

## 2023-10-25 PROCEDURE — 73562 X-RAY EXAM OF KNEE 3: CPT | Performed by: ORTHOPAEDIC SURGERY

## 2023-10-27 ENCOUNTER — TELEPHONE (OUTPATIENT)
Dept: ORTHOPEDICS CLINIC | Facility: CLINIC | Age: 83
End: 2023-10-27

## 2023-10-30 NOTE — TELEPHONE ENCOUNTER
I Spoke with patient. He chose 1/12/24 for his surgery date. He was informed that he must have medical and dental clearance within 30 days prior to his surgery date.

## 2023-11-14 ENCOUNTER — TELEPHONE (OUTPATIENT)
Dept: INTERNAL MEDICINE CLINIC | Facility: CLINIC | Age: 83
End: 2023-11-14

## 2023-11-14 NOTE — TELEPHONE ENCOUNTER
Patient is requesting a pre-op clearance appointment with PCP after 12/12/23 and prior to 12/20/23 at [facility].   Patient is scheduled for surgery on 1/12/2024.   PCPs first available appointment is on 1/22/2024.   Is PCP able to see patient in time frame requested.  Please advise

## 2023-12-08 ENCOUNTER — HOSPITAL ENCOUNTER (OUTPATIENT)
Dept: MRI IMAGING | Facility: HOSPITAL | Age: 83
Discharge: HOME | End: 2023-12-08
Attending: ORTHOPAEDIC SURGERY
Payer: MEDICARE

## 2023-12-08 DIAGNOSIS — M17.11 PRIMARY OSTEOARTHRITIS OF RIGHT KNEE: ICD-10-CM

## 2023-12-08 PROCEDURE — 73721 MRI JNT OF LWR EXTRE W/O DYE: CPT | Performed by: ORTHOPAEDIC SURGERY

## 2023-12-16 NOTE — TELEPHONE ENCOUNTER
Please review; protocol failed.  Or has no protocol    Requested Prescriptions   Pending Prescriptions Disp Refills    GABAPENTIN 100 MG Oral Cap [Pharmacy Med Name: Gabapentin 100 Mg Cap Nort] 180 capsule 0     Sig: TAKE ONE CAPSULE BY MOUTH TWICE DAILY       Neurology Medications Passed - 12/15/2023  1:45 PM        Passed - In person appointment or virtual visit in the past 6 mos or appointment in next 3 mos     Recent Outpatient Visits              1 month ago Primary osteoarthritis of right knee    [address], [address], Ming Napoles MD    Office Visit    1 month ago Acquired hypothyroidism    Michelle Price MD    Office Visit    2 months ago     [address] [place], [place]    Office Visit    2 months ago     [address] [place], [place]    Office Visit    2 months ago     [address], [place], [place]    Office Visit          Future Appointments         Provider Department Appt Notes    In 2 days Hettie Kanner, MD [address], [facility]/12/2024 see comm    In 1 week Tej Jones PA-C [facility], [address], [place] Right TKA scheduled for 1/12/24    In 1 month Tej Jones PA-C [facility], [address], [place] 1st POV Right TKA 1/12/24    In 2 months Hettie Kanner, MD [address], [facility] px 11/09/22    In 3 months Carol Mixon MD [address], [address], [place] F/U 1 YR                     Recent Outpatient Visits              1 month ago Primary osteoarthritis of right knee    [address], [address], Ming Napoles MD    Office Visit    1 month ago Acquired hypothyroidism    Giovany Carty MD    Office Visit    2 months ago     [address] [place]    Office Visit    2 months ago     [address] [place], [place]    Office Visit    2 months ago     [address], [place]    Office Visit           Future Appointments         Provider Department Appt Notes    In 2 days Sachin Jiménez MD [address], [place] pre op surgery is on 01/12/2024 see comm    In 1 week Tennille Castro PA-C [facility], [address], [place] Right TKA scheduled for 1/12/24    In 1 month [facility], [address], [place] 1st POV Right TKA 1/12/24    In 2 months Sachin Jiménez MD [address], [place] last px 11/09/22    In 3 months Savannah Bergeron MD [facility], [address], [address] F/U 1 YR

## 2023-12-17 RX ORDER — GABAPENTIN 100 MG/1
100 CAPSULE ORAL 2 TIMES DAILY
Qty: 180 CAPSULE | Refills: 0 | Status: SHIPPED | OUTPATIENT
Start: 2023-12-17

## 2023-12-18 ENCOUNTER — OFFICE VISIT (OUTPATIENT)
Dept: INTERNAL MEDICINE CLINIC | Facility: CLINIC | Age: 83
End: 2023-12-18

## 2023-12-18 VITALS
SYSTOLIC BLOOD PRESSURE: 132 MMHG | WEIGHT: 221 LBS | HEART RATE: 73 BPM | DIASTOLIC BLOOD PRESSURE: 61 MMHG | HEIGHT: 69 IN | BODY MASS INDEX: 32.73 KG/M2

## 2023-12-18 DIAGNOSIS — I48.91 ATRIAL FIBRILLATION, UNSPECIFIED TYPE (HCC): ICD-10-CM

## 2023-12-18 DIAGNOSIS — E03.9 ACQUIRED HYPOTHYROIDISM: ICD-10-CM

## 2023-12-18 DIAGNOSIS — E78.00 HYPERCHOLESTEROLEMIA: ICD-10-CM

## 2023-12-18 DIAGNOSIS — Z01.818 PREOPERATIVE CLEARANCE: Primary | ICD-10-CM

## 2023-12-18 DIAGNOSIS — I25.10 CORONARY ARTERY DISEASE INVOLVING NATIVE CORONARY ARTERY OF NATIVE HEART WITHOUT ANGINA PECTORIS: ICD-10-CM

## 2023-12-18 DIAGNOSIS — I10 HYPERTENSION, UNSPECIFIED TYPE: ICD-10-CM

## 2023-12-18 PROCEDURE — 1126F AMNT PAIN NOTED NONE PRSNT: CPT | Performed by: INTERNAL MEDICINE

## 2023-12-18 PROCEDURE — 99214 OFFICE O/P EST MOD 30 MIN: CPT | Performed by: INTERNAL MEDICINE

## 2023-12-19 ENCOUNTER — LAB ENCOUNTER (OUTPATIENT)
Dept: LAB | Age: 83
End: 2023-12-19
Attending: INTERNAL MEDICINE
Payer: MEDICARE

## 2023-12-19 DIAGNOSIS — Z01.818 PREOPERATIVE CLEARANCE: ICD-10-CM

## 2023-12-19 LAB
ALBUMIN SERPL-MCNC: 4.3 G/DL (ref 3.2–4.8)
ALBUMIN/GLOB SERPL: 1.7 {RATIO} (ref 1–2)
ALP LIVER SERPL-CCNC: 91 U/L
ALT SERPL-CCNC: <7 U/L
ANION GAP SERPL CALC-SCNC: 5 MMOL/L (ref 0–18)
AST SERPL-CCNC: 17 U/L (ref ?–34)
BILIRUB SERPL-MCNC: 0.9 MG/DL (ref 0.2–1.1)
BUN BLD-MCNC: 16 MG/DL (ref 9–23)
BUN/CREAT SERPL: 24.6 (ref 10–20)
CALCIUM BLD-MCNC: 9.7 MG/DL (ref 8.7–10.4)
CHLORIDE SERPL-SCNC: 103 MMOL/L (ref 98–112)
CO2 SERPL-SCNC: 33 MMOL/L (ref 21–32)
CREAT BLD-MCNC: 0.65 MG/DL
DEPRECATED RDW RBC AUTO: 39.2 FL (ref 35.1–46.3)
EGFRCR SERPLBLD CKD-EPI 2021: 92 ML/MIN/1.73M2 (ref 60–?)
ERYTHROCYTE [DISTWIDTH] IN BLOOD BY AUTOMATED COUNT: 21.2 % (ref 11–15)
FASTING STATUS PATIENT QL REPORTED: NO
GLOBULIN PLAS-MCNC: 2.5 G/DL (ref 2.8–4.4)
GLUCOSE BLD-MCNC: 90 MG/DL (ref 70–99)
HCT VFR BLD AUTO: 39.9 %
HGB BLD-MCNC: 12.4 G/DL
MCH RBC QN AUTO: 18.3 PG (ref 26–34)
MCHC RBC AUTO-ENTMCNC: 31.1 G/DL (ref 31–37)
MCV RBC AUTO: 58.8 FL
OSMOLALITY SERPL CALC.SUM OF ELEC: 293 MOSM/KG (ref 275–295)
PLATELET # BLD AUTO: 222 10(3)UL (ref 150–450)
POTASSIUM SERPL-SCNC: 3.8 MMOL/L (ref 3.5–5.1)
PROT SERPL-MCNC: 6.8 G/DL (ref 5.7–8.2)
RBC # BLD AUTO: 6.78 X10(6)UL
SODIUM SERPL-SCNC: 141 MMOL/L (ref 136–145)
WBC # BLD AUTO: 7.6 X10(3) UL (ref 4–11)

## 2023-12-19 PROCEDURE — 80053 COMPREHEN METABOLIC PANEL: CPT

## 2023-12-19 PROCEDURE — 85027 COMPLETE CBC AUTOMATED: CPT

## 2023-12-19 PROCEDURE — 87081 CULTURE SCREEN ONLY: CPT

## 2023-12-19 PROCEDURE — 87186 SC STD MICRODIL/AGAR DIL: CPT

## 2023-12-19 PROCEDURE — 36415 COLL VENOUS BLD VENIPUNCTURE: CPT

## 2023-12-19 PROCEDURE — 87086 URINE CULTURE/COLONY COUNT: CPT

## 2023-12-19 PROCEDURE — 81015 MICROSCOPIC EXAM OF URINE: CPT

## 2023-12-19 PROCEDURE — 87088 URINE BACTERIA CULTURE: CPT

## 2023-12-26 DIAGNOSIS — Z01.818 PREOPERATIVE CLEARANCE: Primary | ICD-10-CM

## 2023-12-27 ENCOUNTER — LAB ENCOUNTER (OUTPATIENT)
Dept: LAB | Facility: HOSPITAL | Age: 83
End: 2023-12-27
Attending: INTERNAL MEDICINE
Payer: MEDICARE

## 2023-12-27 ENCOUNTER — OFFICE VISIT (OUTPATIENT)
Dept: ORTHOPEDICS CLINIC | Facility: CLINIC | Age: 83
End: 2023-12-27
Payer: MEDICARE

## 2023-12-27 ENCOUNTER — TELEPHONE (OUTPATIENT)
Dept: INTERNAL MEDICINE CLINIC | Facility: CLINIC | Age: 83
End: 2023-12-27

## 2023-12-27 DIAGNOSIS — Z01.818 PREOPERATIVE CLEARANCE: ICD-10-CM

## 2023-12-27 DIAGNOSIS — M17.11 PRIMARY OSTEOARTHRITIS OF RIGHT KNEE: Primary | ICD-10-CM

## 2023-12-27 PROCEDURE — 99213 OFFICE O/P EST LOW 20 MIN: CPT | Performed by: PHYSICIAN ASSISTANT

## 2023-12-27 PROCEDURE — 87088 URINE BACTERIA CULTURE: CPT

## 2023-12-27 PROCEDURE — 87086 URINE CULTURE/COLONY COUNT: CPT

## 2023-12-27 PROCEDURE — 81015 MICROSCOPIC EXAM OF URINE: CPT

## 2023-12-27 PROCEDURE — 87186 SC STD MICRODIL/AGAR DIL: CPT

## 2023-12-27 RX ORDER — SULFAMETHOXAZOLE AND TRIMETHOPRIM 800; 160 MG/1; MG/1
1 TABLET ORAL 2 TIMES DAILY
Qty: 10 TABLET | Refills: 0 | Status: SHIPPED | OUTPATIENT
Start: 2023-12-27 | End: 2024-01-01

## 2023-12-27 NOTE — PROGRESS NOTES
NURSING INTAKE COMMENTS:   Chief Complaint   Patient presents with    Pre-Op     Pt scheduled for R TKA 1/09/24 -  Has an appointment for the dentist next week        HPI: This 78year old male presents today for preoperative consultation. He is scheduled for right total knee arthroplasty in a few weeks. He is anxious to proceed with surgery. He continues to have pain in instability in the knee. He has trouble going up and down stairs. He is pleased with his left knee replacement that was done in March. He has seen his primary care physician and his cardiologist.  He is scheduled to see his dentist on January 8. He is ambulating with a cane mostly due to balance. Past Medical History:   Diagnosis Date    Back problem     Cataract     Cellulitis     Cellulitis of left lower extremity 03/16/2017    Elevated prostate specific antigen (PSA) 08/06/2012    Essential hypertension     Heart attack ([facility] [address]     High blood pressure     High cholesterol     History of prostate biopsy     History of stomach ulcers     Hyperlipidemia     Intractable back pain 10/16/2019    NSTEMI (non-ST elevated myocardial infarction) ([facility] [address] 02/04/2022    Sciatica of right side      Past Surgical History:   Procedure Laterality Date    ANGIOPLASTY (CORONARY)  2022    ARTHROSCOPY OF JOINT UNLISTED      CATARACT SURGERY, COMPLEX      CATH BARE METAL STENT (BMS)      HC INJ EPIDURAL STEROID LUMBAR OR SACRAL W IMG GUIDANCE      TONSILLECTOMY       Current Outpatient Medications   Medication Sig Dispense Refill    gabapentin 100 MG Oral Cap Take 1 capsule (100 mg total) by mouth 2 (two) times daily. 180 capsule 0    ergocalciferol 1.25 MG (65835 UT) Oral Cap Take 1 capsule (50,000 Units total) by mouth once a week. 12 capsule 3    acetaminophen 325 MG Oral Tab       levothyroxine 75 MCG Oral Tab Take 1 tablet (75 mcg total) by mouth before breakfast. 90 tablet 1    aspirin 81 MG Oral Tab EC Take 1 tablet (81 mg total) by mouth daily. omeprazole 20 MG Oral Capsule Delayed Release Take 1 capsule (20 mg total) by mouth every morning before breakfast. 90 capsule 1    potassium chloride 20 MEQ Oral Tab CR Take 1 tablet (20 mEq total) by mouth daily. 90 tablet 1    atorvastatin 80 MG Oral Tab Take 1 tablet (80 mg total) by mouth daily. 90 tablet 3    finasteride 5 MG Oral Tab Take 1 tablet (5 mg total) by mouth daily. 90 tablet 3    furosemide 40 MG Oral Tab TAKE ONE TABLET BY MOUTH ONE TIME DAILY 90 tablet 3    ferrous sulfate 325 (65 FE) MG Oral Tab EC Take 1 tablet (325 mg total) by mouth daily with breakfast. 90 tablet 3    metoprolol succinate ER 25 MG Oral Tablet 24 Hr Take 1 tablet (25 mg total) by mouth Daily Beta Blocker.  90 tablet 3     Allergies   Allergen Reactions    Clarithromycin OTHER (SEE COMMENTS)     Other reaction(s): Unknown    Penicillins OTHER (SEE COMMENTS)     Other reaction(s): Rash     Family History   Problem Relation Age of Onset    Pulmonary Disease Father         Per NG: pneumonia ( cause of death)       Social History     Occupational History    Not on file   Tobacco Use    Smoking status: Former     Packs/day: 1.50     Years: 20.00     Additional pack years: 0.00     Total pack years: 30.00     Types: Cigarettes     Quit date: 1988     Years since quittin.5     Passive exposure: Past    Smokeless tobacco: Never   Vaping Use    Vaping Use: Never used   Substance and Sexual Activity    Alcohol use: Not Currently     Alcohol/week: 1.0 standard drink of alcohol     Types: 1 Standard drinks or equivalent per week    Drug use: No    Sexual activity: Not on file        Review of Systems:  GENERAL: feels generally well, no significant weight loss or weight gain  SKIN: no ulcerated or worrisome skin lesions  EYES:denies blurred vision or double vision  HEENT: denies new nasal congestion, sinus pain or ST  LUNGS: denies shortness of breath  CARDIOVASCULAR: denies chest pain  GI: no hematemesis, no worsening heartburn, no diarrhea  : no dysuria, no blood in urine, no difficulty urinating, no incontinence  MUSCULOSKELETAL: no other musculoskeletal complaints other than in HPI  NEURO: no numbness or tingling, no weakness or balance disorder  PSYCHE: no depression or anxiety  HEMATOLOGIC: no hx of blood dyscrasia  ENDOCRINE: no thyroid or diabetes issues  ALL/ASTHMA: no new hx of severe allergy or asthma    Physical Examination:    There were no vitals taken for this visit. Constitutional: appears well hydrated, alert and responsive, no acute distress noted  Extremities: He lacks some extension has about 95 degrees of flexion. He ambulates with an antalgic gait on the right side. Imaging: MRI KNEE, RIGHT (POJ=[id number])    Result Date: 12/9/2023  PROCEDURE: MRI KNEE, RIGHT (LUV=[id number])  COMPARISON: [facility], [facility] 2nd Floor, XR KNEE (3 VIEWS), RIGHT (CPT=[id number]), 10/25/2023, 10:31 AM.  INDICATIONS: Medacta Protocol right knee. Right knee pain, osteoarthritis. TECHNIQUE: A multi-planar MRI was performed without contrast   CONCLUSION:         CONCLUSION: Images were obtained according to [address] [place] [address] knee joint replacement protocol from the hip through ankle. * HIP:  Limited. No significant finding  * KNEE:  Advanced osteoarthritis. A 6 x 1.4 cm benign osteochondroma medial distal femur osteo chondroma. * ANKLE:  Limited.   No significant finding   * OTHER:  Mild-to-moderate prostate enlargement   Dictated by (CST): Mary Nagel MD on 12/09/2023 at 0:25 AM     Finalized by (CST): Mary Nagel MD on 12/09/2023 at 0:29 AM             Lab Results   Component Value Date    WBC 7.6 12/19/2023    HGB 12.4 (L) 12/19/2023    .0 12/19/2023      Lab Results   Component Value Date    GLU 90 12/19/2023    BUN 16 12/19/2023    CREATSERUM 0.65 (L) 12/19/2023    GFRNAA 87 08/13/2021    GFRAA 100 08/13/2021        Assessment and Plan:  Diagnoses and all orders for this visit:    Primary osteoarthritis of right knee          Plan: Mr. Ruslan López is scheduled for a right total knee arthroplasty on January 9. We discussed the surgery and the expected risks and benefits. I answered all of his questions. He is getting a urinalysis today due to an abnormal urinalysis and his preoperative testing. He will be following up with his primary care physician regarding this. He has received cardiac clearance. We will proceed with surgery pending final clearances. He has a postop visit scheduled. I advised him to call if there are any issues prior to his upcoming surgery. The above note was creating using Dragon speech recognition technology. Please excuse any typos. This visit was performed under the supervision of Dr. Shelby Pimentel who formulated the treatment plan and decision making.

## 2023-12-28 NOTE — TELEPHONE ENCOUNTER
Patient was called. Script for Bactrim per Dr. Una Rodriguez order sent to pharmacy. Patient will start the medication tomorrow.     Lisa Tyson, DNP

## 2024-01-05 ENCOUNTER — LAB ENCOUNTER (OUTPATIENT)
Dept: LAB | Age: 84
End: 2024-01-05
Attending: NURSE PRACTITIONER
Payer: MEDICARE

## 2024-01-05 DIAGNOSIS — R82.79 POSITIVE URINE CULTURE: ICD-10-CM

## 2024-01-05 PROCEDURE — 87086 URINE CULTURE/COLONY COUNT: CPT

## 2024-01-07 NOTE — PROGRESS NOTES
Patient was treated with Bactrim and urine culture is showing no growth at 18-24 hours.    Christen Johnson DNP

## 2024-01-08 ENCOUNTER — ANESTHESIA EVENT (OUTPATIENT)
Dept: SURGERY | Facility: HOSPITAL | Age: 84
End: 2024-01-08
Payer: MEDICARE

## 2024-01-08 RX ORDER — OMEPRAZOLE 20 MG/1
20 CAPSULE, DELAYED RELEASE ORAL
Qty: 90 CAPSULE | Refills: 3 | Status: SHIPPED | OUTPATIENT
Start: 2024-01-08

## 2024-01-08 NOTE — H&P
[facility]    History & Physical    Dedrick Norton Patient Status:  Outpatient in a Bed    3/6/1940 MRN [id number]   Location [facility] OPERATING ROOM Attending Michel Kendall, *   Hosp Day # 0 PCP Marquise Galarza MD     Date:  23  Date of Admission:  (Not on file)    History provided by:patient  Chief Complaint:   Right knee pain    HPI:   Dedrick Norton is a(n) 83 year old male.  He is scheduled for right total knee arthroplasty in a few weeks.  He is anxious to proceed with surgery.  He continues to have pain in instability in the knee.  He has trouble going up and down stairs.  He is pleased with his left knee replacement that was done in March.  He has seen his primary care physician and his cardiologist.  He is scheduled to see his dentist on .  He is ambulating with a cane mostly due to balance.     History     Past Medical History:   Diagnosis Date    Back problem     BPH (benign prostatic hyperplasia)     Cataract     Cellulitis     Cellulitis of left lower extremity 2017    Disorder of thyroid     Elevated prostate specific antigen (PSA) 2012    Esophageal reflux     Essential hypertension     Hearing impairment     no hearing aids    Heart attack (HCC)     High blood pressure     High cholesterol     History of blood transfusion     no reactions    History of prostate biopsy     History of stomach ulcers     Hyperlipidemia     Intractable back pain 10/16/2019    NSTEMI (non-ST elevated myocardial infarction) (HCC) 2022    Osteoarthritis     Sciatica of right side     Sleep apnea     no CPAP use    Visual impairment     readers     Past Surgical History:   Procedure Laterality Date    ANGIOPLASTY (CORONARY)      ARTHROSCOPY OF JOINT UNLISTED Left     knee    CATARACT SURGERY, COMPLEX      CATH BARE METAL STENT (BMS)      COLONOSCOPY      HC INJ EPIDURAL STEROID LUMBAR OR SACRAL W IMG GUIDANCE      TONSILLECTOMY      UPPER GI ENDOSCOPY,EXAM        Family History   Problem Relation Age of Onset    Pulmonary Disease Father         Per NG: pneumonia ( cause of death)     Social History:  Social History     Socioeconomic History    Marital status:    Tobacco Use    Smoking status: Former     Packs/day: 1.50     Years: 20.00     Additional pack years: 0.00     Total pack years: 30.00     Types: Cigarettes     Quit date: 1988     Years since quittin.6     Passive exposure: Past    Smokeless tobacco: Never   Vaping Use    Vaping Use: Never used   Substance and Sexual Activity    Alcohol use: Not Currently     Alcohol/week: 1.0 standard drink of alcohol     Types: 1 Standard drinks or equivalent per week    Drug use: No   Other Topics Concern    Caffeine Concern Yes     Comment: Per NG: coffee 1 cup daily     Allergies/Medications:   Allergies:   Allergies   Allergen Reactions    Clarithromycin DIZZINESS and OTHER (SEE COMMENTS)     Other reaction(s): blurring in the eyes    Penicillins RASH and OTHER (SEE COMMENTS)     As a child, was told to have an allergic reaction; eyes became blurry     No medications prior to admission.       Review of Systems:   Pertinent items are noted in HPI.    Physical Exam:   Vital Signs:  Height 5' 9\" (1.753 m), weight 217 lb (98.4 kg).     General appearance: alert, appears stated age and cooperative  Extremities: : He lacks some extension has about 95 degrees of flexion.  He ambulates with an antalgic gait on the right side.   Pulses: 2+ and symmetric          Results:     Lab Results   Component Value Date    WBC 7.6 2023    HGB 12.4 (L) 2023    HCT 39.9 2023    .0 2023    CREATSERUM 0.65 (L) 2023    BUN 16 2023     2023    K 3.8 2023     2023    CO2 33.0 (H) 2023    GLU 90 2023    CA 9.7 2023    ALB 4.3 2023    ALKPHO 91 2023    BILT 0.9 2023    TP 6.8 2023    AST 17 2023    ALT <7 (L)  12/19/2023    PTT 34.6 03/11/2023    INR 1.07 03/11/2023    T4F 1.0 10/18/2023    TSH 4.840 (H) 10/18/2023     04/02/2019    PSA 0.7 05/07/2018    MG 2.0 03/18/2023    TROP <0.045 02/19/2019       No results found.        Assessment/Plan:     Plan: Mr. Norton is scheduled for a right total knee arthroplasty on January 9.  We discussed the surgery and the expected risks and benefits.  I answered all of his questions.  He has received his clearances.  We will proceed with surgery as scheduled.      SHERI CLANCY PA-C  1/8/2024

## 2024-01-09 ENCOUNTER — HOSPITAL ENCOUNTER (INPATIENT)
Facility: HOSPITAL | Age: 84
LOS: 2 days | Discharge: NURSING, REHAB OR LONG-TERM CARE | End: 2024-01-11
Attending: ORTHOPAEDIC SURGERY | Admitting: ORTHOPAEDIC SURGERY
Payer: MEDICARE

## 2024-01-09 ENCOUNTER — APPOINTMENT (OUTPATIENT)
Dept: GENERAL RADIOLOGY | Facility: HOSPITAL | Age: 84
End: 2024-01-09
Attending: PHYSICIAN ASSISTANT
Payer: MEDICARE

## 2024-01-09 ENCOUNTER — ANESTHESIA (OUTPATIENT)
Dept: SURGERY | Facility: HOSPITAL | Age: 84
End: 2024-01-09
Payer: MEDICARE

## 2024-01-09 DIAGNOSIS — G89.18 ACUTE POST-OPERATIVE PAIN: Primary | ICD-10-CM

## 2024-01-09 DIAGNOSIS — M17.11 PRIMARY OSTEOARTHRITIS OF RIGHT KNEE: ICD-10-CM

## 2024-01-09 PROBLEM — R00.1 BRADYCARDIA: Status: ACTIVE | Noted: 2024-01-09

## 2024-01-09 PROBLEM — Z01.818 PREOP TESTING: Status: ACTIVE | Noted: 2024-01-09

## 2024-01-09 PROBLEM — Z98.890 POSTOPERATIVE NAUSEA: Status: ACTIVE | Noted: 2024-01-09

## 2024-01-09 PROBLEM — R11.0 POSTOPERATIVE NAUSEA: Status: ACTIVE | Noted: 2024-01-09

## 2024-01-09 LAB
HCT VFR BLD AUTO: 34.3 %
HGB BLD-MCNC: 10.7 G/DL

## 2024-01-09 PROCEDURE — 0SRC0J9 REPLACEMENT OF RIGHT KNEE JOINT WITH SYNTHETIC SUBSTITUTE, CEMENTED, OPEN APPROACH: ICD-10-PCS | Performed by: ORTHOPAEDIC SURGERY

## 2024-01-09 PROCEDURE — 73560 X-RAY EXAM OF KNEE 1 OR 2: CPT | Performed by: PHYSICIAN ASSISTANT

## 2024-01-09 PROCEDURE — 99232 SBSQ HOSP IP/OBS MODERATE 35: CPT | Performed by: INTERNAL MEDICINE

## 2024-01-09 DEVICE — FIXED TIBIAL TRAY CEMENTED RIGHT, SIZE 5
Type: IMPLANTABLE DEVICE | Site: KNEE | Status: FUNCTIONAL
Brand: GMK PRIMARY TOTAL KNEE SYSTEM

## 2024-01-09 DEVICE — PATELLA RESURFACING # 4 E-CROSS
Type: IMPLANTABLE DEVICE | Site: KNEE | Status: FUNCTIONAL
Brand: GMK

## 2024-01-09 DEVICE — IMPLANTABLE DEVICE
Type: IMPLANTABLE DEVICE | Site: KNEE | Status: FUNCTIONAL
Brand: REFOBACIN® BONE CEMENT R

## 2024-01-09 DEVICE — TIBIAL INSERT FIXED SPHERE FLEX   #5/10 MM R E-CROSS
Type: IMPLANTABLE DEVICE | Site: KNEE | Status: FUNCTIONAL
Brand: GMK SPHERE TOTAL KNEE SYSTEM

## 2024-01-09 DEVICE — GMK SPHERE KNEE: Type: IMPLANTABLE DEVICE | Site: KNEE

## 2024-01-09 DEVICE — FEMUR SPHERE CEMENTED RIGHT, SIZE 5
Type: IMPLANTABLE DEVICE | Site: KNEE | Status: FUNCTIONAL
Brand: GMK SPHERE TOTAL KNEE SYSTEM

## 2024-01-09 DEVICE — MY KNEE: Type: IMPLANTABLE DEVICE | Site: KNEE

## 2024-01-09 RX ORDER — ENEMA 19; 7 G/133ML; G/133ML
1 ENEMA RECTAL ONCE AS NEEDED
Status: DISCONTINUED | OUTPATIENT
Start: 2024-01-09 | End: 2024-01-11

## 2024-01-09 RX ORDER — DIPHENHYDRAMINE HCL 25 MG
25 CAPSULE ORAL EVERY 4 HOURS PRN
Status: DISCONTINUED | OUTPATIENT
Start: 2024-01-09 | End: 2024-01-11

## 2024-01-09 RX ORDER — HYDROCODONE BITARTRATE AND ACETAMINOPHEN 7.5; 325 MG/1; MG/1
1 TABLET ORAL EVERY 6 HOURS PRN
Status: DISCONTINUED | OUTPATIENT
Start: 2024-01-09 | End: 2024-01-11

## 2024-01-09 RX ORDER — ASPIRIN 325 MG
325 TABLET, DELAYED RELEASE (ENTERIC COATED) ORAL 2 TIMES DAILY
Status: DISCONTINUED | OUTPATIENT
Start: 2024-01-09 | End: 2024-01-11

## 2024-01-09 RX ORDER — HYDROMORPHONE HYDROCHLORIDE 1 MG/ML
0.6 INJECTION, SOLUTION INTRAMUSCULAR; INTRAVENOUS; SUBCUTANEOUS EVERY 5 MIN PRN
Status: DISCONTINUED | OUTPATIENT
Start: 2024-01-09 | End: 2024-01-09 | Stop reason: HOSPADM

## 2024-01-09 RX ORDER — MECLIZINE HYDROCHLORIDE 25 MG/1
25 TABLET ORAL 3 TIMES DAILY PRN
Status: DISCONTINUED | OUTPATIENT
Start: 2024-01-09 | End: 2024-01-11

## 2024-01-09 RX ORDER — METOCLOPRAMIDE HYDROCHLORIDE 5 MG/ML
10 INJECTION INTRAMUSCULAR; INTRAVENOUS ONCE
Status: COMPLETED | OUTPATIENT
Start: 2024-01-09 | End: 2024-01-09

## 2024-01-09 RX ORDER — DIPHENHYDRAMINE HYDROCHLORIDE 50 MG/ML
25 INJECTION INTRAMUSCULAR; INTRAVENOUS ONCE AS NEEDED
Status: ACTIVE | OUTPATIENT
Start: 2024-01-09 | End: 2024-01-09

## 2024-01-09 RX ORDER — METOCLOPRAMIDE HYDROCHLORIDE 5 MG/ML
10 INJECTION INTRAMUSCULAR; INTRAVENOUS EVERY 8 HOURS PRN
Status: DISCONTINUED | OUTPATIENT
Start: 2024-01-09 | End: 2024-01-11

## 2024-01-09 RX ORDER — MELATONIN
325
Status: DISCONTINUED | OUTPATIENT
Start: 2024-01-09 | End: 2024-01-11

## 2024-01-09 RX ORDER — HYDROMORPHONE HYDROCHLORIDE 1 MG/ML
0.2 INJECTION, SOLUTION INTRAMUSCULAR; INTRAVENOUS; SUBCUTANEOUS EVERY 2 HOUR PRN
Status: DISCONTINUED | OUTPATIENT
Start: 2024-01-09 | End: 2024-01-11

## 2024-01-09 RX ORDER — DOCUSATE SODIUM 100 MG/1
100 CAPSULE, LIQUID FILLED ORAL 2 TIMES DAILY
Status: DISCONTINUED | OUTPATIENT
Start: 2024-01-09 | End: 2024-01-11

## 2024-01-09 RX ORDER — POLYETHYLENE GLYCOL 3350 17 G/17G
17 POWDER, FOR SOLUTION ORAL DAILY PRN
Status: DISCONTINUED | OUTPATIENT
Start: 2024-01-09 | End: 2024-01-11

## 2024-01-09 RX ORDER — ATORVASTATIN CALCIUM 80 MG/1
80 TABLET, FILM COATED ORAL NIGHTLY
Status: DISCONTINUED | OUTPATIENT
Start: 2024-01-10 | End: 2024-01-11

## 2024-01-09 RX ORDER — SODIUM CHLORIDE, SODIUM LACTATE, POTASSIUM CHLORIDE, CALCIUM CHLORIDE 600; 310; 30; 20 MG/100ML; MG/100ML; MG/100ML; MG/100ML
INJECTION, SOLUTION INTRAVENOUS CONTINUOUS
Status: DISCONTINUED | OUTPATIENT
Start: 2024-01-09 | End: 2024-01-11

## 2024-01-09 RX ORDER — SODIUM CHLORIDE, SODIUM LACTATE, POTASSIUM CHLORIDE, CALCIUM CHLORIDE 600; 310; 30; 20 MG/100ML; MG/100ML; MG/100ML; MG/100ML
INJECTION, SOLUTION INTRAVENOUS CONTINUOUS
Status: DISCONTINUED | OUTPATIENT
Start: 2024-01-09 | End: 2024-01-09 | Stop reason: HOSPADM

## 2024-01-09 RX ORDER — CEFAZOLIN SODIUM/WATER 2 G/20 ML
2 SYRINGE (ML) INTRAVENOUS EVERY 8 HOURS
Qty: 40 ML | Refills: 0 | Status: COMPLETED | OUTPATIENT
Start: 2024-01-09 | End: 2024-01-09

## 2024-01-09 RX ORDER — CEFAZOLIN SODIUM/WATER 2 G/20 ML
2 SYRINGE (ML) INTRAVENOUS ONCE
Status: COMPLETED | OUTPATIENT
Start: 2024-01-09 | End: 2024-01-09

## 2024-01-09 RX ORDER — CELECOXIB 200 MG/1
400 CAPSULE ORAL ONCE
Status: COMPLETED | OUTPATIENT
Start: 2024-01-09 | End: 2024-01-09

## 2024-01-09 RX ORDER — HYDROMORPHONE HYDROCHLORIDE 1 MG/ML
0.4 INJECTION, SOLUTION INTRAMUSCULAR; INTRAVENOUS; SUBCUTANEOUS EVERY 5 MIN PRN
Status: DISCONTINUED | OUTPATIENT
Start: 2024-01-09 | End: 2024-01-09 | Stop reason: HOSPADM

## 2024-01-09 RX ORDER — LIDOCAINE HYDROCHLORIDE 10 MG/ML
INJECTION, SOLUTION EPIDURAL; INFILTRATION; INTRACAUDAL; PERINEURAL AS NEEDED
Status: DISCONTINUED | OUTPATIENT
Start: 2024-01-09 | End: 2024-01-09 | Stop reason: SURG

## 2024-01-09 RX ORDER — FAMOTIDINE 20 MG/1
20 TABLET, FILM COATED ORAL 2 TIMES DAILY
Status: DISCONTINUED | OUTPATIENT
Start: 2024-01-09 | End: 2024-01-11

## 2024-01-09 RX ORDER — HYDROMORPHONE HYDROCHLORIDE 1 MG/ML
0.2 INJECTION, SOLUTION INTRAMUSCULAR; INTRAVENOUS; SUBCUTANEOUS EVERY 5 MIN PRN
Status: DISCONTINUED | OUTPATIENT
Start: 2024-01-09 | End: 2024-01-09 | Stop reason: HOSPADM

## 2024-01-09 RX ORDER — MORPHINE SULFATE 4 MG/ML
4 INJECTION, SOLUTION INTRAMUSCULAR; INTRAVENOUS EVERY 10 MIN PRN
Status: DISCONTINUED | OUTPATIENT
Start: 2024-01-09 | End: 2024-01-09 | Stop reason: HOSPADM

## 2024-01-09 RX ORDER — ONDANSETRON 2 MG/ML
4 INJECTION INTRAMUSCULAR; INTRAVENOUS EVERY 4 HOURS PRN
Status: DISCONTINUED | OUTPATIENT
Start: 2024-01-09 | End: 2024-01-11

## 2024-01-09 RX ORDER — FAMOTIDINE 10 MG/ML
20 INJECTION, SOLUTION INTRAVENOUS 2 TIMES DAILY
Status: DISCONTINUED | OUTPATIENT
Start: 2024-01-09 | End: 2024-01-11

## 2024-01-09 RX ORDER — FAMOTIDINE 20 MG/1
20 TABLET, FILM COATED ORAL ONCE
Status: COMPLETED | OUTPATIENT
Start: 2024-01-09 | End: 2024-01-09

## 2024-01-09 RX ORDER — FINASTERIDE 5 MG/1
5 TABLET, FILM COATED ORAL DAILY
Status: DISCONTINUED | OUTPATIENT
Start: 2024-01-09 | End: 2024-01-11

## 2024-01-09 RX ORDER — SODIUM CHLORIDE 9 MG/ML
INJECTION, SOLUTION INTRAVENOUS CONTINUOUS
Status: DISCONTINUED | OUTPATIENT
Start: 2024-01-09 | End: 2024-01-10

## 2024-01-09 RX ORDER — SENNOSIDES 8.6 MG
17.2 TABLET ORAL NIGHTLY
Status: DISCONTINUED | OUTPATIENT
Start: 2024-01-09 | End: 2024-01-11

## 2024-01-09 RX ORDER — ONDANSETRON 2 MG/ML
4 INJECTION INTRAMUSCULAR; INTRAVENOUS EVERY 6 HOURS PRN
Status: DISCONTINUED | OUTPATIENT
Start: 2024-01-09 | End: 2024-01-09

## 2024-01-09 RX ORDER — NALOXONE HYDROCHLORIDE 0.4 MG/ML
0.08 INJECTION, SOLUTION INTRAMUSCULAR; INTRAVENOUS; SUBCUTANEOUS AS NEEDED
Status: DISCONTINUED | OUTPATIENT
Start: 2024-01-09 | End: 2024-01-09 | Stop reason: HOSPADM

## 2024-01-09 RX ORDER — DIPHENHYDRAMINE HYDROCHLORIDE 50 MG/ML
12.5 INJECTION INTRAMUSCULAR; INTRAVENOUS EVERY 4 HOURS PRN
Status: DISCONTINUED | OUTPATIENT
Start: 2024-01-09 | End: 2024-01-11

## 2024-01-09 RX ORDER — ONDANSETRON 2 MG/ML
4 INJECTION INTRAMUSCULAR; INTRAVENOUS EVERY 6 HOURS PRN
Status: DISCONTINUED | OUTPATIENT
Start: 2024-01-09 | End: 2024-01-09 | Stop reason: HOSPADM

## 2024-01-09 RX ORDER — GABAPENTIN 100 MG/1
100 CAPSULE ORAL 2 TIMES DAILY
Status: DISCONTINUED | OUTPATIENT
Start: 2024-01-09 | End: 2024-01-11

## 2024-01-09 RX ORDER — FAMOTIDINE 10 MG/ML
20 INJECTION, SOLUTION INTRAVENOUS ONCE
Status: COMPLETED | OUTPATIENT
Start: 2024-01-09 | End: 2024-01-09

## 2024-01-09 RX ORDER — POTASSIUM CHLORIDE 20 MEQ/1
20 TABLET, EXTENDED RELEASE ORAL DAILY
Qty: 90 TABLET | Refills: 0 | Status: SHIPPED | OUTPATIENT
Start: 2024-01-09

## 2024-01-09 RX ORDER — BISACODYL 10 MG
10 SUPPOSITORY, RECTAL RECTAL
Status: DISCONTINUED | OUTPATIENT
Start: 2024-01-09 | End: 2024-01-11

## 2024-01-09 RX ORDER — METOCLOPRAMIDE 10 MG/1
10 TABLET ORAL ONCE
Status: COMPLETED | OUTPATIENT
Start: 2024-01-09 | End: 2024-01-09

## 2024-01-09 RX ORDER — LIDOCAINE HYDROCHLORIDE 10 MG/ML
INJECTION, SOLUTION INFILTRATION; PERINEURAL
Status: COMPLETED | OUTPATIENT
Start: 2024-01-09 | End: 2024-01-09

## 2024-01-09 RX ORDER — ACETAMINOPHEN 500 MG
1000 TABLET ORAL ONCE
Status: COMPLETED | OUTPATIENT
Start: 2024-01-09 | End: 2024-01-09

## 2024-01-09 RX ORDER — MORPHINE SULFATE 1 MG/ML
INJECTION, SOLUTION EPIDURAL; INTRATHECAL; INTRAVENOUS
Status: COMPLETED | OUTPATIENT
Start: 2024-01-09 | End: 2024-01-09

## 2024-01-09 RX ORDER — MORPHINE SULFATE 10 MG/ML
6 INJECTION, SOLUTION INTRAMUSCULAR; INTRAVENOUS EVERY 10 MIN PRN
Status: DISCONTINUED | OUTPATIENT
Start: 2024-01-09 | End: 2024-01-09 | Stop reason: HOSPADM

## 2024-01-09 RX ORDER — BUPIVACAINE HYDROCHLORIDE 5 MG/ML
INJECTION, SOLUTION EPIDURAL; INTRACAUDAL
Status: COMPLETED | OUTPATIENT
Start: 2024-01-09 | End: 2024-01-09

## 2024-01-09 RX ORDER — METOCLOPRAMIDE HYDROCHLORIDE 5 MG/ML
10 INJECTION INTRAMUSCULAR; INTRAVENOUS EVERY 8 HOURS PRN
Status: DISCONTINUED | OUTPATIENT
Start: 2024-01-09 | End: 2024-01-09 | Stop reason: HOSPADM

## 2024-01-09 RX ORDER — MORPHINE SULFATE 4 MG/ML
2 INJECTION, SOLUTION INTRAMUSCULAR; INTRAVENOUS EVERY 10 MIN PRN
Status: DISCONTINUED | OUTPATIENT
Start: 2024-01-09 | End: 2024-01-09 | Stop reason: HOSPADM

## 2024-01-09 RX ORDER — LEVOTHYROXINE SODIUM 0.07 MG/1
75 TABLET ORAL
Status: DISCONTINUED | OUTPATIENT
Start: 2024-01-10 | End: 2024-01-11

## 2024-01-09 RX ORDER — HYDROMORPHONE HYDROCHLORIDE 1 MG/ML
0.4 INJECTION, SOLUTION INTRAMUSCULAR; INTRAVENOUS; SUBCUTANEOUS EVERY 2 HOUR PRN
Status: DISCONTINUED | OUTPATIENT
Start: 2024-01-09 | End: 2024-01-11

## 2024-01-09 RX ORDER — TRANEXAMIC ACID 10 MG/ML
INJECTION, SOLUTION INTRAVENOUS AS NEEDED
Status: DISCONTINUED | OUTPATIENT
Start: 2024-01-09 | End: 2024-01-09 | Stop reason: SURG

## 2024-01-09 RX ORDER — BUPIVACAINE HYDROCHLORIDE AND EPINEPHRINE 5; 5 MG/ML; UG/ML
INJECTION, SOLUTION PERINEURAL AS NEEDED
Status: DISCONTINUED | OUTPATIENT
Start: 2024-01-09 | End: 2024-01-09 | Stop reason: HOSPADM

## 2024-01-09 RX ADMIN — LIDOCAINE HYDROCHLORIDE 25 MG: 10 INJECTION, SOLUTION EPIDURAL; INFILTRATION; INTRACAUDAL; PERINEURAL at 07:50:00

## 2024-01-09 RX ADMIN — MORPHINE SULFATE 0.15 MG: 1 INJECTION, SOLUTION EPIDURAL; INTRATHECAL; INTRAVENOUS at 07:47:00

## 2024-01-09 RX ADMIN — SODIUM CHLORIDE, SODIUM LACTATE, POTASSIUM CHLORIDE, CALCIUM CHLORIDE: 600; 310; 30; 20 INJECTION, SOLUTION INTRAVENOUS at 07:38:00

## 2024-01-09 RX ADMIN — BUPIVACAINE HYDROCHLORIDE 2 ML: 5 INJECTION, SOLUTION EPIDURAL; INTRACAUDAL at 07:47:00

## 2024-01-09 RX ADMIN — LIDOCAINE HYDROCHLORIDE 5 ML: 10 INJECTION, SOLUTION INFILTRATION; PERINEURAL at 07:46:00

## 2024-01-09 RX ADMIN — TRANEXAMIC ACID 1000 MG: 10 INJECTION, SOLUTION INTRAVENOUS at 07:57:00

## 2024-01-09 RX ADMIN — CEFAZOLIN SODIUM/WATER 2 G: 2 G/20 ML SYRINGE (ML) INTRAVENOUS at 07:55:00

## 2024-01-09 NOTE — ANESTHESIA PREPROCEDURE EVALUATION
Anesthesia PreOp Note    HPI:     Dedrick Norton is a 83 year old male who presents for preoperative consultation requested by: Mihcel eKndall MD    Date of Surgery: 1/9/2024    Procedure(s):  Right total knee repalcement  Indication: Primary osteoarthritis of right knee [M17.11]    Relevant Problems   No relevant active problems       NPO:  Last Liquid Consumption Date: 01/09/24  Last Liquid Consumption Time: 0400 (water with meds)  Last Solid Consumption Date: 01/08/24  Last Solid Consumption Time: 1800  Last Liquid Consumption Date: 01/09/24          History Review:  Patient Active Problem List    Diagnosis Date Noted    Acquired hypothyroidism 10/23/2023    Orthopedic aftercare 04/10/2023    Atrial fibrillation (HCC)     Hypomagnesemia     Difficult Matt catheter placement (HCC)     Gross hematuria     Osteoarthritis of left knee     Primary osteoarthritis of both knees 11/30/2022    Gastroesophageal reflux disease without esophagitis 04/01/2022    Coronary artery disease involving native coronary artery 02/04/2022    Primary osteoarthritis of right knee 08/23/2021    Lumbar radiculopathy 10/16/2019    Cellulitis of left lower extremity 03/16/2017    Hypertension 08/27/2013    Hypercholesterolemia 09/13/2011    BPH with urinary obstruction 07/14/2009       Past Medical History:   Diagnosis Date    Back problem     BPH (benign prostatic hyperplasia)     Cataract     Cellulitis     Cellulitis of left lower extremity 03/16/2017    Disorder of thyroid     Elevated prostate specific antigen (PSA) 08/06/2012    Esophageal reflux     Essential hypertension     Hearing impairment     no hearing aids    Heart attack (HCC)     High blood pressure     High cholesterol     History of blood transfusion     no reactions    History of prostate biopsy     History of stomach ulcers     Hyperlipidemia     Intractable back pain 10/16/2019    NSTEMI (non-ST elevated myocardial infarction) ([facility]) 02/04/2022    Osteoarthritis      Sciatica of right side     Sleep apnea     no CPAP use    Visual impairment     readers       Past Surgical History:   Procedure Laterality Date    ANGIOPLASTY (CORONARY)      ARTHROSCOPY OF JOINT UNLISTED Left     knee    CATARACT SURGERY, COMPLEX      CATH BARE METAL STENT (BMS)      COLONOSCOPY      HC INJ EPIDURAL STEROID LUMBAR OR SACRAL W IMG GUIDANCE      TONSILLECTOMY      UPPER GI ENDOSCOPY,EXAM         Medications Prior to Admission   Medication Sig Dispense Refill Last Dose    omeprazole 20 MG Oral Capsule Delayed Release Take 1 capsule (20 mg total) by mouth before breakfast. 90 capsule 3 2024    [] sulfamethoxazole-trimethoprim DS (BACTRIM DS) 800-160 MG Oral Tab per tablet Take 1 tablet by mouth 2 (two) times daily for 5 days. 10 tablet 0     gabapentin 100 MG Oral Cap Take 1 capsule (100 mg total) by mouth 2 (two) times daily. 180 capsule 0 2024    ergocalciferol 1.25 MG (29880 UT) Oral Cap Take 1 capsule (50,000 Units total) by mouth once a week. (Patient taking differently: Take 1 capsule (50,000 Units total) by mouth Every Friday.) 12 capsule 3 2024    acetaminophen 325 MG Oral Tab Take 2 tablets (650 mg total) by mouth in the morning and 2 tablets (650 mg total) before bedtime.   2024    levothyroxine 75 MCG Oral Tab Take 1 tablet (75 mcg total) by mouth before breakfast. 90 tablet 1 2024    aspirin 81 MG Oral Tab EC Take 1 tablet (81 mg total) by mouth daily.   2024    potassium chloride 20 MEQ Oral Tab CR Take 1 tablet (20 mEq total) by mouth daily. 90 tablet 1 2024    atorvastatin 80 MG Oral Tab Take 1 tablet (80 mg total) by mouth daily. (Patient taking differently: Take 1 tablet (80 mg total) by mouth nightly.) 90 tablet 3 2024    finasteride 5 MG Oral Tab Take 1 tablet (5 mg total) by mouth daily. 90 tablet 3 2024    furosemide 40 MG Oral Tab TAKE ONE TABLET BY MOUTH ONE TIME DAILY 90 tablet 3 2024    ferrous sulfate 325 (65 FE) MG Oral  Tab EC Take 1 tablet (325 mg total) by mouth daily with breakfast. 90 tablet 3 Past Week    metoprolol succinate ER 25 MG Oral Tablet 24 Hr Take 1 tablet (25 mg total) by mouth Daily Beta Blocker. 90 tablet 3 2024     Current Facility-Administered Medications Ordered in Epic   Medication Dose Route Frequency Provider Last Rate Last Admin    lactated ringers infusion   Intravenous Continuous Michel Kendall MD 20 mL/hr at 24 0648 New Bag at 24 0648    metoprolol tartrate (Lopressor) tab 25 mg  25 mg Oral Once PRN Michel Kendall MD        ceFAZolin (Ancef) 2 g in 20mL IV syringe premix  2 g Intravenous Once Michel Kendall MD        vancomycin (Vancocin) 1,000 mg in sodium chloride 0.9% 250 mL IVPB-ADDV  1,000 mg Intravenous Once iMchel Kendall  mL/hr at 24 0707 1,000 mg at 24 0707     No current [facility]-ordered outpatient medications on file.       Allergies   Allergen Reactions    Clarithromycin DIZZINESS and OTHER (SEE COMMENTS)     Other reaction(s): blurring in the eyes    Penicillins RASH and OTHER (SEE COMMENTS)     As a child, was told to have an allergic reaction; eyes became blurry       Family History   Problem Relation Age of Onset    Pulmonary Disease Father         Per NG: pneumonia ( cause of death)     Social History     Socioeconomic History    Marital status:    Tobacco Use    Smoking status: Former     Packs/day: 1.50     Years: 20.00     Additional pack years: 0.00     Total pack years: 30.00     Types: Cigarettes     Quit date: 1988     Years since quittin.6     Passive exposure: Past    Smokeless tobacco: Never   Vaping Use    Vaping Use: Never used   Substance and Sexual Activity    Alcohol use: Not Currently     Alcohol/week: 1.0 standard drink of alcohol     Types: 1 Standard drinks or equivalent per week    Drug use: No   Other Topics Concern    Caffeine Concern Yes     Comment: Per NG: coffee 1 cup daily        Available pre-op labs reviewed.  Lab Results   Component Value Date    WBC 7.6 12/19/2023    RBC 6.78 (H) 12/19/2023    HGB 12.4 (L) 12/19/2023    HCT 39.9 12/19/2023    MCV 58.8 (L) 12/19/2023    MCH 18.3 (L) 12/19/2023    MCHC 31.1 12/19/2023    RDW 21.2 (H) 12/19/2023    .0 12/19/2023     Lab Results   Component Value Date     12/19/2023    K 3.8 12/19/2023     12/19/2023    CO2 33.0 (H) 12/19/2023    BUN 16 12/19/2023    CREATSERUM 0.65 (L) 12/19/2023    GLU 90 12/19/2023    CA 9.7 12/19/2023          Vital Signs:  Body mass index is 31.01 kg/m².   height is 1.753 m (5' 9\") and weight is 95.3 kg (210 lb). His oral temperature is 97.6 °F (36.4 °C). His blood pressure is 122/69 and his pulse is 68. His respiration is 17 and oxygen saturation is 95%.   Vitals:    01/02/24 1253 01/09/24 0558   BP:  122/69   Pulse:  68   Resp:  17   Temp:  97.6 °F (36.4 °C)   TempSrc:  Oral   SpO2:  95%   Weight: 98.4 kg (217 lb) 95.3 kg (210 lb)   Height: 1.753 m (5' 9\")         Anesthesia Evaluation      Airway   Mallampati: II  TM distance: >3 FB  Neck ROM: full  Dental - Dentition appears grossly intact     Pulmonary     breath sounds clear to auscultation  (+) sleep apnea  Cardiovascular   (+) hypertension, past MI, CAD, dysrhythmias    Rhythm: regular  Rate: normal    Neuro/Psych    (+)  neuromuscular disease,        GI/Hepatic/Renal    (+) GERD    Endo/Other    Abdominal     Abdomen: soft.                 Anesthesia Plan:   ASA:  3  Plan:   MAC and spinal  Post-op Pain Management: Intrathecal narcotics  Informed Consent Plan and Risks Discussed With:  Patient  Use of Blood Products Discussed With:  Patient  Discussed plan with:  Surgeon      I have informed Dedrick Norton and/or legal guardian or family member of the nature of the anesthetic plan, benefits, risks including possible dental damage if relevant, major complications, and any alternative forms of anesthetic management.   All of the patient's  questions were answered to the best of my ability. The patient desires the anesthetic management as planned.  Catalino Serrato MD  1/9/2024 7:10 AM  Present on Admission:  **None**

## 2024-01-09 NOTE — BRIEF OP NOTE
Pre-Operative Diagnosis: Primary osteoarthritis of right knee [M17.11]     Post-Operative Diagnosis: Primary osteoarthritis of right knee [M17.11]      Procedure Performed:   Right total knee repalcement    Surgeon(s) and Role:     * Corinne Kendall MD - Primary    Assistant(s):  Surgical Assistant.: Dana Saeed  PA: Everardo Multani PA-C     Surgical Findings: OA     Specimen: path     Estimated Blood Loss: 50ml    Dictation Number:  [id number]    CORINNE KENDALL MD  1/9/2024  9:02 AM

## 2024-01-09 NOTE — TELEPHONE ENCOUNTER
Please review; protocol failed/no protocol.     Requested Prescriptions   Pending Prescriptions Disp Refills    potassium chloride 20 MEQ Oral Tab CR [Pharmacy Med Name: Potassium Chloride Er 20 Meq Tab Amne] 90 tablet 0     Sig: Take 1 tablet (20 mEq total) by mouth daily.       There is no refill protocol information for this order      Signed Prescriptions Disp Refills    omeprazole 20 MG Oral Capsule Delayed Release 90 capsule 3     Sig: Take 1 capsule (20 mg total) by mouth before breakfast.       Gastrointestional Medication Protocol Passed - 1/7/2024  1:32 AM        Passed - In person appointment or virtual visit in the past 12 mos or appointment in next 3 mos     Recent Outpatient Visits              1 week ago Primary osteoarthritis of right knee    [facility] Everardo Multani PA-C    Office Visit    3 weeks ago Preoperative clearance    [facility] Marquise Galarza MD    Office Visit    2 months ago Primary osteoarthritis of right knee    [facility] Michel Kendall MD    Office Visit    2 months ago Acquired hypothyroidism    [facility] Marquise Galarza MD    Office Visit    3 months ago     [facility] HiramLaura PT    Office Visit          Future Appointments         Provider Department Appt Notes    In 1 month Everardo Mutlani PA-C [facility] 1st POV Right TKA 1/12/24    In 1 month Marquise Galarza MD [facility] last px 11/09/22    In 2 months Michel Kendall MD [facility] F/U 1 YR                     Recent Outpatient Visits              1 week ago Primary osteoarthritis of right knee    [facility]  [facility] Everardo Multani PA-C    Office Visit    3 weeks ago Preoperative clearance    [facility] Marquise Galarza MD    Office Visit    2 months ago Primary osteoarthritis of right knee    [facility] Michel Kendall MD    Office Visit    2 months ago Acquired hypothyroidism    [facility] Marquise Galarza MD    Office Visit    3 months ago     [facility] Laura Gandhi PT    Office Visit             Future Appointments         Provider Department Appt Notes    In 1 month Everardo Multani PA-C [facility] 1st POV Right TKA 1/12/24    In 1 month Marquise Galarza MD [facility] last px 11/09/22    In 2 months Michel Kendall MD [facility] F/U 1 YR

## 2024-01-09 NOTE — OPERATIVE REPORT
[facility]    PATIENT'S NAME: LUIS ROMERO   ATTENDING PHYSICIAN: Michel Kendall MD   OPERATING PHYSICIAN: Michel Kendall MD   PATIENT ACCOUNT#:   [id number]    LOCATION:  [address] [place] [address]  MEDICAL RECORD #:   [id number]       YOB: 1940  ADMISSION DATE:       01/09/2024      OPERATION DATE:  01/09/2024    OPERATIVE REPORT    PREOPERATIVE DIAGNOSIS:  Osteoarthritis, right knee.  POSTOPERATIVE DIAGNOSIS:  Osteoarthritis, right knee.   PROCEDURE PERFORMED:  Right total knee arthroplasty with Medacta MRI navigated patient specific instruments.    ASSISTANT:  Everardo Multani PA-C    COMPONENTS USED:  Medacta sphere size 5 femur, size 5 tibia, size 4 patella, 10 mm spacer.    INDICATIONS:  The patient is an 83-year-old male patient with advanced arthritis of the right knee.  It has not responded to appropriate conservative management.  After discussion of the options for treatment, the patient requested the above procedure.  Our discussion included alternative treatments, and also included, but was not limited to, the potential risk of anesthetic complications, infection, DVT or PE, bleeding complications, periprosthetic fracture or extensor mechanism failure, potential need for revision surgery, nerve or vascular injury, unanticipated perioperative orthopedic or medical complications, etc.  Written consent was obtained.    OPERATIVE TECHNIQUE:  The patient was brought to the operating room and given appropriate anesthesia.  Prior to making an incision, a time out was performed by the surgical team.  A tourniquet was placed, and the knee was prepped and draped in the usual sterile fashion.  After exsanguination with an Esmarch bandage, the tourniquet was inflated with the knee fully flexed.  A longitudinal incision was made from just superior to the superomedial corner of the patella to the tibial tubercle.  A midvastus approach to the femur was used.  The patellar cut was made  first, and a patella protector was placed over the cut surface of the patella which was then subluxed into the lateral gutter.  The anterior and posterior cruciate ligaments were excised, and the femoral template was placed over the distal femur and seated well.  It was pinned anteriorly, and the distal reference holes were drilled.  The distal femoral cutting guide was then attached to the anterior pins, and the distal femoral cut was made.  The thickness of the distal femoral bone resection was measured and compared to the templated values.  The pins were then translated into the anterior reference holes, and the distal femoral cutting guide was applied.  The distal femoral guide was centered appropriately, pinned in place, and the distal femoral finishing cuts were made.  Next, the meniscal remnants were excised, and the tibial guide was seated.  The extramedullary tibial alignment guide was applied, and alignment was checked.  The tibial template was then pinned into place and exchanged for the tibial cutting guide.  Alignment of the cutting guide was rechecked with the extramedullary alignment guide.  Hohmann retractor was placed behind the tibia to protect the neurovascular structures, and Hohmann retractors were placed medially and laterally to protect the collateral ligaments and the patellar tendon.  The tibial cut was made and the tibial bone resection was taken and compared to the templated values.  Overall alignment was then checked with the extramedullary alignment guide. The spacer block was used, and the overall alignment, flexion and extension gaps were checked and were excellent.  The guide was used to size the patella.  The holes were drilled for the patella.  The trial components were then inserted.  The knee came to full extension and balanced well with varus and valgus stress with symmetrical flexion and extension gaps with the spacer.  The tibial guide was then pinned into place and  preparations were made for the stemmed portion of the tibial component.  The trochlear recess was cut for the femur.  The trial components were then removed, and the bone was prepared with pulsatile lavage.  All three components were cemented in place using contemporary technique.  Excess cement was removed, and trials again were performed.  Stability and alignment were the same as with the provisional components.  The actual polyethylene spacer was locked into place in the tibial tray.  The tourniquet was deflated and hemostasis was achieved.  The wound was closed in routine fashion.  Sponge and instrument counts were correct at the end of the procedure.  Estimated blood loss was 50 mL.  Routine specimens were sent to Pathology.  There were no complications.  The patient was stable under the care of the anesthesiologist at the completion of the procedure.     Dictated By Michel Kendall MD  d: 01/09/2024 09:02:40  t: 01/09/2024 11:23:09  Job [id number]  JSM/

## 2024-01-09 NOTE — ANESTHESIA PROCEDURE NOTES
Spinal Block    Date/Time: 1/9/2024 7:44 AM    Performed by: Catalino Serrato MD  Authorized by: Caatlino Serrato MD      General Information and Staff    Start Time:  1/9/2024 7:44 AM  End Time:  1/9/2024 7:48 AM  Anesthesiologist:  Catalino Serrato MD  Performed by:  Anesthesiologist  Patient Location:  OR  Site identification: surface landmarks    Reason for Block: post-op pain management and surgical anesthesia    Preanesthetic Checklist: patient identified, IV checked, risks and benefits discussed, monitors and equipment checked, pre-op evaluation, timeout performed, anesthesia consent and sterile technique used      Procedure Details    Patient Position:  Sitting  Prep: ChloraPrep    Monitoring:  Heart rate, cardiac monitor and continuous pulse ox  Approach:  Midline  Location:  L3-4  Injection Technique:  Single-shot    Needle    Needle Type:  Pencil-tip  Needle Gauge:  24 G  Needle Length:  3.5 in    Assessment    Sensory Level:  T10  Events: clear CSF and well tolerated      Additional Comments

## 2024-01-09 NOTE — PHYSICAL THERAPY NOTE
PT orders received and chart reviewed. RN approved activity. Patient is POD #0 for R TKA WBAT. Patient resting in bed upon arrival. Patient reporting dizziness and nausea, stating \"The room is rolling.\" Vitals assessed as SpO2 on room air 99%, /59 mmHg, HR 51 bpm. Communicated with RN who reports that this is a change in earlier reported dizziness. Defer PT evaluation at this time. Will re-schedule PT evaluation for tomorrow, 1/10/24.     Jenna Haase, PT, DPT  [facility]  Ext: [phone]

## 2024-01-09 NOTE — INTERVAL H&P NOTE
Pre-op Diagnosis: Primary osteoarthritis of right knee [M17.11]    The above referenced H&P was reviewed by CORINNE SOLIS MD on 1/9/2024, the patient was examined and no significant changes have occurred in the patient's condition since the H&P was performed.  I discussed with the patient and/or legal representative the potential benefits, risks and side effects of this procedure; the likelihood of the patient achieving goals; and potential problems that might occur during recuperation.  I discussed reasonable alternatives to the procedure, including risks, benefits and side effects related to the alternatives and risks related to not receiving this procedure.  We will proceed with procedure as planned.

## 2024-01-09 NOTE — TELEPHONE ENCOUNTER
Refill passed per [facility] protocol.     Requested Prescriptions   Pending Prescriptions Disp Refills    OMEPRAZOLE 20 MG Oral Capsule Delayed Release [Pharmacy Med Name: Omeprazole Dr 20 Mg Cap Nort] 90 capsule 0     Sig: TAKE ONE CAPSULE BY MOUTH ONCE DAILY IN THE MORNING BEFORE BREAKFAST       Gastrointestional Medication Protocol Passed - 1/7/2024  1:32 AM        Passed - In person appointment or virtual visit in the past 12 mos or appointment in next 3 mos     Recent Outpatient Visits              1 week ago Primary osteoarthritis of right knee    [facility] Everardo Multani PA-C    Office Visit    3 weeks ago Preoperative clearance    [facility] Marquise Galarza MD    Office Visit    2 months ago Primary osteoarthritis of right knee    [facility] Michel Kendall MD    Office Visit    2 months ago Acquired hypothyroidism    [facility] Marquise Galarza MD    Office Visit    3 months ago     [facility] Laura Gandhi PT    Office Visit          Future Appointments         Provider Department Appt Notes    In 1 month Everardo Multani PA-C [facility] 1st POV Right TKA 1/12/24    In 1 month Marquise Galarza MD [facility] last px 11/09/22    In 2 months Michel Kendall MD [facility] F/U 1 YR                 POTASSIUM CHLORIDE 20 MEQ Oral Tab CR [Pharmacy Med Name: Potassium Chloride Er 20 Meq Tab Amne] 90 tablet 0     Sig: TAKE ONE TABLET BY MOUTH ONE TIME DAILY       There is no refill protocol information for this order           Future Appointments         Provider Department Appt Notes    In 1 month Everardo Multani  [facility] 1st POV Right TKA 1/12/24    In 1 month Marquise Galarza MD [facility] last px 11/09/22    In 2 months Michel Kendall MD [facility] F/U 1 YR             Recent Outpatient Visits              1 week ago Primary osteoarthritis of right knee    [facility] Everardo Multani PA-C    Office Visit    3 weeks ago Preoperative clearance    [facility] Marquise Galarza MD    Office Visit    2 months ago Primary osteoarthritis of right knee    [facility] Michel Kendall MD    Office Visit    2 months ago Acquired hypothyroidism    [facility] Marquise Galarza MD    Office Visit    3 months ago     [facility] Laura Gandhi PT    Office Visit

## 2024-01-09 NOTE — ANESTHESIA POSTPROCEDURE EVALUATION
Patient: Dedrick Norton    Procedure Summary       Date: 01/09/24 Room / Location: [facility] MAIN OR  / [facility] MAIN OR    Anesthesia Start: 0737 Anesthesia Stop:     Procedure: Right total knee repalcement (Right: Knee) Diagnosis:       Primary osteoarthritis of right knee      (Primary osteoarthritis of right knee [M17.11])    Surgeons: Michel Kendall MD Anesthesiologist: Catalino Serrato MD    Anesthesia Type: MAC, spinal ASA Status: 3            Anesthesia Type: MAC, spinal    Vitals Value Taken Time   /50 01/09/24 0929   Temp 98.1 01/09/24 0934   Pulse 62 01/09/24 0933   Resp 19 01/09/24 0933   SpO2 98 % 01/09/24 0933   Vitals shown include unfiled device data.    [facility] AN Post Evaluation:   Patient Evaluated in PACU  Patient Participation: complete - patient participated  Level of Consciousness: awake and alert  Pain Management: adequate  Airway Patency:patent  Dental exam unchanged from preop  Yes    Cardiovascular Status: hemodynamically stable  Respiratory Status: face mask  Postoperative Hydration euvolemic      Catalino Serrato MD  1/9/2024 9:34 AM

## 2024-01-09 NOTE — CONSULTS
Cardiology Consultation    Dedrick Norton Patient Status:  Inpatient    3/6/1940 MRN [id number]   Location [facility] 4W/SW/SE Attending Michel Kendall, *   Hosp Day # 0 PCP Marquise Galarza MD     Reason for Consultation:  bradycardia      History of Present Illness:  Dedrick Norton is a a(n) 83 year old male. Very nice older anette, obese, s/p right TKA today.  He iscared for at [facility], so nothing in EPIC.  He had a heart attack with stent placement two years ago.  He was here in March for a left TKA and we were consulted for bradycardia and PAC's.  Only EKG at that time shows sinus with PAC's, though a note reports he had some afib.  He was discharged on eliquis, but his cardiologist stopped it after a few months.  He is now post op and his HR is in the 40's, though sinus mechanism with PAC's.  He is nauseous and has vomited.  He has some vertiginous sx's.  He is on low dose metoprolol and his HR seems to run near 50 bpm.  He denies any syncope or near syncope.  No cp or dyspnea.      History:  Past Medical History:   Diagnosis Date    Back problem     BPH (benign prostatic hyperplasia)     Cataract     Cellulitis     Cellulitis of left lower extremity 2017    Disorder of thyroid     Elevated prostate specific antigen (PSA) 2012    Esophageal reflux     Essential hypertension     Hearing impairment     no hearing aids    Heart attack (HCC)     High blood pressure     High cholesterol     History of blood transfusion     no reactions    History of prostate biopsy     History of stomach ulcers     Hyperlipidemia     Intractable back pain 10/16/2019    NSTEMI (non-ST elevated myocardial infarction) (HCC) 2022    Osteoarthritis     Sciatica of right side     Sleep apnea     no CPAP use    Visual impairment     readers     Past Surgical History:   Procedure Laterality Date    ANGIOPLASTY (CORONARY)      ARTHROSCOPY OF JOINT UNLISTED Left     knee    CATARACT SURGERY, COMPLEX       CATH BARE METAL STENT (BMS)      COLONOSCOPY      HC INJ EPIDURAL STEROID LUMBAR OR SACRAL W IMG GUIDANCE      TONSILLECTOMY      UPPER GI ENDOSCOPY,EXAM       Family History   Problem Relation Age of Onset    Pulmonary Disease Father         Per NG: pneumonia ( cause of death)         Allergies:  Allergies   Allergen Reactions    Clarithromycin DIZZINESS and OTHER (SEE COMMENTS)     Other reaction(s): blurring in the eyes    Penicillins RASH and OTHER (SEE COMMENTS)     As a child, was told to have an allergic reaction; eyes became blurry       Medications:   sennosides  17.2 mg Oral Nightly    docusate sodium  100 mg Oral BID    aspirin  325 mg Oral BID    famotidine  20 mg Oral BID    Or    famotidine  20 mg Intravenous BID    ferrous sulfate  325 mg Oral Daily with breakfast    ceFAZolin  2 g Intravenous Q8H       Continuous Infusions:   lactated ringers Stopped (01/09/24 0924)    sodium chloride 125 mL/hr at 01/09/24 1115       Social History:   reports that he quit smoking about 35 years ago. His smoking use included cigarettes. He has a 30.00 pack-year smoking history. He has been exposed to tobacco smoke. He has never used smokeless tobacco. He reports that he does not currently use alcohol after a past usage of about 1.0 standard drink of alcohol per week. He reports that he does not use drugs.    Review of Systems:  All systems were reviewed and are negative except as described above in HPI.    Physical Exam:      Temp:  [97 °F (36.1 °C)-97.6 °F (36.4 °C)] 97.2 °F (36.2 °C)  Pulse:  [42-68] 51  Resp:  [10-22] 14  BP: (103-139)/(50-69) 113/55  SpO2:  [95 %-100 %] 96 %    Last 3 Weights   01/09/24 0558 210 lb (95.3 kg)   01/02/24 1253 217 lb (98.4 kg)   12/18/23 1635 221 lb (100.2 kg)   10/25/23 1019 231 lb 12.8 oz (105.1 kg)           General: No apparent distress  HEENT: No focal deficits.  Neck: supple. JVP normal  Cardiac: Regular rhythm, S1, S2 normal,   No rub or gallop.  No murmur.  Lungs:  CTA  Abdomen: Soft, non-tender.   Extremities: No edema  Neurologic: no focal deficits  Skin: Warm and dry.          Telemetry: sinus, PAC's    Laboratories and Data:  Diagnostics:    EKG, 1/9/2024:  reviewed    CXR, 1/9/2024:  none    Labs:   HEM:  Recent Labs   Lab 01/09/24  0958   HGB 10.7*       Chem:  No results for input(s): \"NA\", \"K\", \"CL\", \"CO2\", \"BUN\", \"CREATSERUM\", \"CA\", \"CAION\", \"MG\", \"PHOS\", \"GLU\" in the last 168 hours.    No results for input(s): \"ALT\", \"AST\", \"ALB\", \"AMYLASE\", \"LIPASE\", \"LDH\" in the last 168 hours.    Invalid input(s): \"ALPHOS\", \"TBIL\", \"DBIL\", \"TPROT\"    No results for input(s): \"PTP\", \"INR\" in the last 168 hours.    No results for input(s): \"TROP\", \"CK\" in the last 168 hours.      Impression:   Sinus bradycardia with PAC's - vagal contribution post anesthesia with nausea and vomiting.  Possible PAF in the past.  Right TKA 19/24.  Left in March 2023.  Obesity  HTN  CAD with PCI around 2021.      Plan:  Hold metoprolol.  Echo with doppler.  DVT prophylaxis per ortho, no indication at this time for a/c.  Continue aspirin and statin.  OK to proceed with PTx as per ortho protocol.  Follow on tele.          Shay Kirk MD  1/9/2024  4:44 PM  C5

## 2024-01-10 ENCOUNTER — APPOINTMENT (OUTPATIENT)
Dept: CV DIAGNOSTICS | Facility: HOSPITAL | Age: 84
End: 2024-01-10
Attending: INTERNAL MEDICINE
Payer: MEDICARE

## 2024-01-10 LAB
ATRIAL RATE: 57 BPM
DEPRECATED RDW RBC AUTO: 41.1 FL (ref 35.1–46.3)
ERYTHROCYTE [DISTWIDTH] IN BLOOD BY AUTOMATED COUNT: 20.4 % (ref 11–15)
HCT VFR BLD AUTO: 31.7 %
HGB BLD-MCNC: 9.6 G/DL
MCH RBC QN AUTO: 18.2 PG (ref 26–34)
MCHC RBC AUTO-ENTMCNC: 30.3 G/DL (ref 31–37)
MCV RBC AUTO: 60.2 FL
P AXIS: 49 DEGREES
P-R INTERVAL: 156 MS
PLATELET # BLD AUTO: 175 10(3)UL (ref 150–450)
Q-T INTERVAL: 436 MS
QRS DURATION: 112 MS
QTC CALCULATION (BEZET): 424 MS
R AXIS: -40 DEGREES
RBC # BLD AUTO: 5.27 X10(6)UL
T AXIS: -45 DEGREES
VENTRICULAR RATE: 57 BPM
WBC # BLD AUTO: 11.2 X10(3) UL (ref 4–11)

## 2024-01-10 PROCEDURE — 93306 TTE W/DOPPLER COMPLETE: CPT | Performed by: INTERNAL MEDICINE

## 2024-01-10 PROCEDURE — 99232 SBSQ HOSP IP/OBS MODERATE 35: CPT | Performed by: INTERNAL MEDICINE

## 2024-01-10 NOTE — PROGRESS NOTES
[facility]    Dedrick Norton Patient Status:  Inpatient    3/6/1940 MRN [id number]   Location [facility] 4W/SW/SE Attending Marquise Galarza MD   Hosp Day # 1 PCP Marquise Galarza MD     Subjective:  Post-Operative Day: 1 Status Post right Total Knee Arthroplasty  Systemic or Specific Complaints:No Complaints      Objective:  Vital signs in last 24 hours:  Temp:  [97.2 °F (36.2 °C)-98.7 °F (37.1 °C)] 97.8 °F (36.6 °C)  Pulse:  [51-65] 65  Resp:  [14-17] 17  BP: (104-134)/(55-63) 119/63  SpO2:  [90 %-96 %] 93 %    General: alert, appears stated age, and cooperative   Wound: No Drainage   Motion: Flexion: 0  to 90 Degrees and Extension: Full Extension   DVT Exam: No evidence of DVT seen on physical exam.  Negative Wes's sign.  No cords or calf tenderness.  No significant calf/ankle edema.  No cyanosis     Data Review  CBC:   Lab Results   Component Value Date    WBC 11.2 (H) 01/10/2024    RBC 5.27 01/10/2024    HGB 9.6 (L) 01/10/2024    HCT 31.7 (L) 01/10/2024    .0 01/10/2024       Assessment/Plan:  Status Post right Total Knee Arthroplasty. Doing well postoperatively.     Continues current post-op course.  Case discussed with physical therapy.  The patient lives alone in a trilevel and is not appropriate for discharge straight home.  He would like to go to a rehab facility as he did after his left knee replacement which is appropriate.  He can be transferred after he meets his minimum and hospital stay of 3 nights.     LOS: 1 day

## 2024-01-10 NOTE — PLAN OF CARE
Problem: Patient Centered Care  Goal: Patient preferences are identified and integrated in the patient's plan of care  Description: Interventions:  - What would you like us to know as we care for you? From home alone and he's plan prior to surgery was to go to a Rehab.  - Provide timely, complete, and accurate information to patient/family  - Incorporate patient and family knowledge, values, beliefs, and cultural backgrounds into the planning and delivery of care  - Encourage patient/family to participate in care and decision-making at the level they choose  - Honor patient and family perspectives and choices  Outcome: Progressing     Problem: Patient/Family Goals  Goal: Patient/Family Long Term Goal  Description: Patient's Long Term Goal: Will be able to return home from rehab and will be able to walk well with a walker and will have no complications from surgery.     Interventions:  - Up with walker, WBAT to right leg.  - Monitor incision for any signs of infection/bleeding.  - Pain management with oral medications.  - Oral anticoagulation to prevent blood clots.  - May apply ice to incision to prevent swelling or help reduce pain.  - Follow up with ortho as recommended.  - See additional Care Plan goals for specific interventions  Outcome: Progressing  Goal: Patient/Family Short Term Goal  Description: Patient's Short Term Goal: Transfer to Acute Rehab when stable.    Interventions:   - Up with walker, WBAT to right leg.  - Monitor incision for any signs of infection/bleeding.  - Pain management with oral medications.  - Bilateral SCD's and oral anticoagulation to prevent blood clots.  - May apply ice to incision to prevent swelling or help reduce pain.  - CPM to right knee to promote better mobility.  - See additional Care Plan goals for specific interventions  Outcome: Progressing     Problem: PAIN - ADULT  Goal: Verbalizes/displays adequate comfort level or patient's stated pain goal  Description:  INTERVENTIONS:  - Encourage pt to monitor pain and request assistance  - Assess pain using appropriate pain scale  - Administer analgesics based on type and severity of pain and evaluate response  - Implement non-pharmacological measures as appropriate and evaluate response  - Consider cultural and social influences on pain and pain management  - Manage/alleviate anxiety  - Utilize distraction and/or relaxation techniques  - Monitor for opioid side effects  - Notify MD/LIP if interventions unsuccessful or patient reports new pain  - Anticipate increased pain with activity and pre-medicate as appropriate  Outcome: Progressing     Problem: RISK FOR INFECTION - ADULT  Goal: Absence of fever/infection during anticipated neutropenic period  Description: INTERVENTIONS  - Monitor WBC  - Administer growth factors as ordered  - Implement neutropenic guidelines  Outcome: Progressing     Problem: SAFETY ADULT - FALL  Goal: Free from fall injury  Description: INTERVENTIONS:  - Assess pt frequently for physical needs  - Identify cognitive and physical deficits and behaviors that affect risk of falls.  - [place] fall precautions as indicated by assessment.  - Educate pt/family on patient safety including physical limitations  - Instruct pt to call for assistance with activity based on assessment  - Modify environment to reduce risk of injury  - Provide assistive devices as appropriate  - Consider OT/PT consult to assist with strengthening/mobility  - Encourage toileting schedule  Outcome: Progressing     Problem: DISCHARGE PLANNING  Goal: Discharge to home or other facility with appropriate resources  Description: INTERVENTIONS:  - Identify barriers to discharge w/pt and caregiver  - Include patient/family/discharge partner in discharge planning  - Arrange for needed discharge resources and transportation as appropriate  - Identify discharge learning needs (meds, wound care, etc)  - Arrange for interpreters to assist at  discharge as needed  - Consider post-discharge preferences of patient/family/discharge partner  - Complete POLST form as appropriate  - Assess patient's ability to be responsible for managing their own health  - Refer to Case Management Department for coordinating discharge planning if the patient needs post-hospital services based on physician/LIP order or complex needs related to functional status, cognitive ability or social support system  Outcome: Progressing     Problem: SKIN/TISSUE INTEGRITY - ADULT  Goal: Incision(s), wounds(s) or drain site(s) healing without S/S of infection  Description: INTERVENTIONS:  - Assess and document risk factors for pressure ulcer development  - Assess and document skin integrity  - Assess and document dressing/incision, wound bed, drain sites and surrounding tissue  - Implement wound care per orders  - Initiate isolation precautions as appropriate  - Initiate Pressure Ulcer prevention bundle as indicated  Outcome: Progressing     Problem: MUSCULOSKELETAL - ADULT  Goal: Return mobility to safest level of function  Description: INTERVENTIONS:  - Assess patient stability and activity tolerance for standing, transferring and ambulating w/ or w/o assistive devices  - Assist with transfers and ambulation using safe patient handling equipment as needed  - Ensure adequate protection for wounds/incisions during mobilization  - Obtain PT/OT consults as needed  - Advance activity as appropriate  - Communicate ordered activity level and limitations with patient/family  Outcome: Progressing  Goal: Maintain proper alignment of affected body part  Description: INTERVENTIONS:  - Support and protect limb and body alignment per provider's orders  - Instruct and reinforce with patient and family use of appropriate assistive device and precautions (e.g. spinal or hip dislocation precautions)  Outcome: Progressing    Patient is alert and oriented, aware to call for help as needed.  Patient is  currently in room air, denies shortness of breathing nor chest pain.  Patient is up with walker with assist.  Patient take oral medication for pain.  Patient still needs to void post levi removal this morning.  Patient also is having nausea but denies vomiting.  Patient plans to go to an acute rehab when stable.

## 2024-01-10 NOTE — PLAN OF CARE
Patient alert and oriented X4. Post-op day 1. Dressing in place to R knee. Ice therapy wrap in place prn. CPM ordered, patient declined over night. Per patient will try CPM today. Matt removed, check void. Remote tele in place. SCD's and ASA for DVT prophylaxis. Pain management with norco prn. General diet. Encouraged to cough and deep breathe with incentive spirometer and patient is compliant. Fall precautions in place including bed in lowest position, call light and personal belongings within reach, non-skid socks. Frequent rounding by nursing staff. Plan is pending PT/OT eval. Patient would like [facility].    Problem: Patient Centered Care  Goal: Patient preferences are identified and integrated in the patient's plan of care  Description: Interventions:  - What would you like us to know as we care for you? I am from home alone  - Provide timely, complete, and accurate information to patient/family  - Incorporate patient and family knowledge, values, beliefs, and cultural backgrounds into the planning and delivery of care  - Encourage patient/family to participate in care and decision-making at the level they choose  - Honor patient and family perspectives and choices  Outcome: Progressing     Problem: PAIN - ADULT  Goal: Verbalizes/displays adequate comfort level or patient's stated pain goal  Description: INTERVENTIONS:  - Encourage pt to monitor pain and request assistance  - Assess pain using appropriate pain scale  - Administer analgesics based on type and severity of pain and evaluate response  - Implement non-pharmacological measures as appropriate and evaluate response  - Consider cultural and social influences on pain and pain management  - Manage/alleviate anxiety  - Utilize distraction and/or relaxation techniques  - Monitor for opioid side effects  - Notify MD/LIP if interventions unsuccessful or patient reports new pain  - Anticipate increased pain with activity and pre-medicate as appropriate  Outcome:  Progressing     Problem: RISK FOR INFECTION - ADULT  Goal: Absence of fever/infection during anticipated neutropenic period  Description: INTERVENTIONS  - Monitor WBC  - Administer growth factors as ordered  - Implement neutropenic guidelines  Outcome: Progressing     Problem: SAFETY ADULT - FALL  Goal: Free from fall injury  Description: INTERVENTIONS:  - Assess pt frequently for physical needs  - Identify cognitive and physical deficits and behaviors that affect risk of falls.  - [place] fall precautions as indicated by assessment.  - Educate pt/family on patient safety including physical limitations  - Instruct pt to call for assistance with activity based on assessment  - Modify environment to reduce risk of injury  - Provide assistive devices as appropriate  - Consider OT/PT consult to assist with strengthening/mobility  - Encourage toileting schedule  Outcome: Progressing     Problem: DISCHARGE PLANNING  Goal: Discharge to home or other facility with appropriate resources  Description: INTERVENTIONS:  - Identify barriers to discharge w/pt and caregiver  - Include patient/family/discharge partner in discharge planning  - Arrange for needed discharge resources and transportation as appropriate  - Identify discharge learning needs (meds, wound care, etc)  - Arrange for interpreters to assist at discharge as needed  - Consider post-discharge preferences of patient/family/discharge partner  - Complete POLST form as appropriate  - Assess patient's ability to be responsible for managing their own health  - Refer to Case Management Department for coordinating discharge planning if the patient needs post-hospital services based on physician/LIP order or complex needs related to functional status, cognitive ability or social support system  Outcome: Progressing

## 2024-01-10 NOTE — PHYSICAL THERAPY NOTE
PHYSICAL THERAPY KNEE TREATMENT NOTE - INPATIENT     Room Number: 432/432-A             Presenting Problem: R TKA performed 1/10  Co-Morbidities : arthritis    Problem List  Active Problems:    Preop testing    Postoperative nausea    Bradycardia      PHYSICAL THERAPY ASSESSMENT     Patient seen for therapy treatment this date with clearance of RN, pt agreeable to treatment.      Functional Mobility:  - bed mobility: supine to/from sit SBA   - transfers: sit to/from stand transfers with CGA from EOB And chair with arms  - seated EX LAQ 2 x 10 reps, seated marches, ankle pumps    - gait: ambulation with RW 3' and 25' with RW      At end of session patient in chair with all needs in reach, RN aware.      The patient's Approx Degree of Impairment: 54.16% has been calculated based on documentation in the [facility] '6 clicks' Inpatient Basic Mobility Short Form.  Research supports that patients with this level of impairment may benefit from EJ.    DISCHARGE RECOMMENDATIONS  PT Discharge Recommendations: Acute rehabilitation (Pt wants [facility] for rehab - did this for other TKA)    PLAN  PT Treatment Plan: Bed mobility;Body mechanics;Endurance;Energy conservation;Family education;Gait training;Range of motion;Stair training;Transfer training;Balance training  Frequency (Obs): Daily      SUBJECTIVE  \"I do feel better now.\"     OBJECTIVE  Precautions: Bed/chair alarm    WEIGHT BEARING STATUS        R Lower Extremity: Weight Bearing as Tolerated       PAIN ASSESSMENT              BALANCE  Static Sitting: Fair +  Dynamic Sitting: Fair  Static Standing: Fair -  Dynamic Standing: Fair -    ACTIVITY TOLERANCE           BP: 118/56  BP Location: Right arm  BP Method: Automatic  Patient Position: Semi-Elliott    O2 WALK       AM-[facility] '6-Clicks' INPATIENT SHORT FORM - BASIC MOBILITY  How much difficulty does the patient currently have...  Patient Difficulty: Turning over in bed (including adjusting bedclothes, sheets and  blankets)?: A Little   Patient Difficulty: Sitting down on and standing up from a chair with arms (e.g., wheelchair, bedside commode, etc.): A Little   Patient Difficulty: Moving from lying on back to sitting on the side of the bed?: A Little   How much help from another person does the patient currently need...   Help from Another: Moving to and from a bed to a chair (including a wheelchair)?: A Little   Help from Another: Need to walk in hospital room?: A Little   Help from Another: Climbing 3-5 steps with a railing?: Total     AM-PAC Score:  Raw Score: 16   Approx Degree of Impairment: 54.16%   Standardized Score (AM-PAC Scale): 40.78   CMS Modifier (G-Code): CK    FUNCTIONAL ABILITY STATUS  Functional Mobility/Gait Assessment  Gait Assistance: Contact guard assist  Distance (ft): 4', 25'  Assistive Device: Rolling walker  Pattern: Shuffle      Patient End of Session: Up in chair;Call light within reach;Needs met;RN aware of session/findings;All patient questions and concerns addressed;Discussed recommendations with /    CURRENT GOALS  Goals to be met by: 1/20  Patient Goal Patient's self-stated goal is: to get stronger   Goal #1 Patient is able to demonstrate supine - sit EOB @ level: supervision      Goal #1   Current Status  in progress    Goal #2 Patient is able to demonstrate transfers Sit to/from Stand at assistance level: supervision with walker - rolling      Goal #2  Current Status  in progress    Goal #3 Patient is able to ambulate 150 feet with assist device: walker - rolling at assistance level: supervision   Goal #3   Current Status  in progress    Goal #4 Patient will negotiate 2 stairs/one curb w/ assistive device and supervision   Goal #4   Current Status  in progress    Goal #5 Patient to demonstrate independence with home activity/exercise instructions provided to patient in preparation for discharge.   Goal #5   Current Status  in progress    Goal #6     Goal  #6  Current Status         Theract 10 min   Gait training 14 min

## 2024-01-10 NOTE — CONSULTS
Physician Medicine & Rehabilitation Consult Note         SUBJECTIVE:    Chief Complaint: To assess rehabilitation needs following Mobility and ADL dysfunction s/p <principal problem not specified> as per request of PCP wants to be admitting physician  .    History of Present Illness: Review of the records show that this  83 year old, male  with PMH significant for NSTEMI, CAD, MI (2022) s/p stent, gastric ulcers, thyroid disorder, s/p left TKA (3/2023) with progressive right knee OA that did not improve with conservative measures. He was admitted to [facility] for right TKA on 1/9/2024.  Patient taken to the operating room 1/9/2024 by Dr. Kendall s/p right TKA for primary osteoarthritis of the right knee.  Neurology consulted for postop bradycardia with heart rate in the 40s PACs associated with nausea and emesis.  It is noted that patient had similar issue postop following left TKA in March 2023.  Cardiology sinus bradycardia with PACs likely secondary to vagal contribution postanesthesia.  Metoprolol on hold continue aspirin and statin, DVT prophylaxis per Ortho.  Echo shows LVEF 50-55% grade 2 diastolic dysfunction, moderate pulmonary hypertension, mild aortic regurgitation.. Patient is Weight bearing to tolerance on RLE extremity.      Functional status:  1/10 PT  Functional Mobility:  - bed mobility: supine to/from sit SBA   - transfers: sit to/from stand transfers with CGA from EOB And chair with arms  - seated EX LAQ 2 x 10 reps, seated marches, ankle pumps    - gait: ambulation with RW 3' and 25' with RW     FUNCTIONAL TRANSFER ASSESSMENT  Supine to Sit : Contact Guard Assist  Sit to stand: min-CGA  Toilet Transfer: min-CGA        FUNCTIONAL ADL ASSESSMENT  Overall max A      Past Medical History:   Diagnosis Date    Back problem     BPH (benign prostatic hyperplasia)     Cataract     Cellulitis     Cellulitis of left lower extremity 03/16/2017    Disorder of thyroid     Elevated prostate specific  antigen (PSA) 08/06/2012    Esophageal reflux     Essential hypertension     Hearing impairment     no hearing aids    Heart attack (HCC)     High blood pressure     High cholesterol     History of blood transfusion     no reactions    History of prostate biopsy     History of stomach ulcers     Hyperlipidemia     Intractable back pain 10/16/2019    NSTEMI (non-ST elevated myocardial infarction) (HCC) 02/04/2022    Osteoarthritis     Postoperative nausea 1/9/2024    Sciatica of right side     Sleep apnea     no CPAP use    Visual impairment     readers       Past Surgical History:   Procedure Laterality Date    ANGIOPLASTY (CORONARY)  2022    ARTHROSCOPY OF JOINT UNLISTED Left     knee    CATARACT SURGERY, COMPLEX      CATH BARE METAL STENT (BMS)      COLONOSCOPY      HC INJ EPIDURAL STEROID LUMBAR OR SACRAL W IMG GUIDANCE      TONSILLECTOMY      UPPER GI ENDOSCOPY,EXAM          Medications:   sennosides  17.2 mg Oral Nightly    docusate sodium  100 mg Oral BID    aspirin  325 mg Oral BID    famotidine  20 mg Oral BID    Or    famotidine  20 mg Intravenous BID    ferrous sulfate  325 mg Oral Daily with breakfast    atorvastatin  80 mg Oral Nightly    finasteride  5 mg Oral Daily    gabapentin  100 mg Oral BID    levothyroxine  75 mcg Oral Before breakfast       sodium chloride, polyethylene glycol (PEG 3350), magnesium hydroxide, bisacodyl, fleet enema, metoclopramide, diphenhydrAMINE **OR** diphenhydrAMINE, HYDROmorphone **OR** HYDROmorphone, HYDROcodone-acetaminophen, meclizine, ondansetron    Allergies   Allergen Reactions    Clarithromycin DIZZINESS and OTHER (SEE COMMENTS)     Other reaction(s): blurring in the eyes    Penicillins RASH and OTHER (SEE COMMENTS)     As a child, was told to have an allergic reaction; eyes became blurry        Family History   Problem Relation Age of Onset    Pulmonary Disease Father         Per NG: pneumonia ( cause of death)        Social History     Socioeconomic History     Marital status:    Tobacco Use    Smoking status: Former     Packs/day: 1.50     Years: 20.00     Additional pack years: 0.00     Total pack years: 30.00     Types: Cigarettes     Quit date: 1988     Years since quittin.6     Passive exposure: Past    Smokeless tobacco: Never   Vaping Use    Vaping Use: Never used   Substance and Sexual Activity    Alcohol use: Not Currently     Alcohol/week: 1.0 standard drink of alcohol     Types: 1 Standard drinks or equivalent per week    Drug use: No   Other Topics Concern    Caffeine Concern Yes     Comment: Per NG: coffee 1 cup daily     Social Determinants of Health     Food Insecurity: No Food Insecurity (2024)    Food Insecurity     Food Insecurity: Never true   Transportation Needs: No Transportation Needs (2024)    Transportation Needs     Lack of Transportation: No   Housing Stability: Low Risk  (2024)    Housing Stability     Housing Instability: No       Social History/Living Situation & Prior Functional Status:  Patient lives alone in two-level home with 7 stairs to upper and lower level  Indept PTA    Review of systems:   Constitutional systems: Denies fever chills change in weight fatigue  Eyes: Denies vision change  Ears nose mouth and throat: Denies sore throat  Cardiovascular: Denies chest pain, palpitations  Respiratory: Denies SOB, cough hemoptysis  Gastrointestinal: Denies nausea vomiting abdominal pain diarrhea constipation bright red blood per rectum, melena  Genitourinary: Denies dysuria to increased urinary frequency or hematuria  Musculoskeletal: Denies myalgias or arthralgias  Integumentary: Denies rash  Neurological: Denies weakness , denies headache +dizzy \"room spinning\" earlier during therapy  Psychiatric: Denies depression or anxiety  Endocrine denies any creased thirst  Hematologic lymphatic: Denies bleeding or easy bruising  Allergic or immunologic denies current allergic symptoms     OBJECTIVE:    Vitals:    01/10/24  0728 01/10/24 0829 01/10/24 0830 01/10/24 1202   BP: 114/61 118/56 118/56 119/63   BP Location: Right arm Right arm  Right arm   Pulse:       Resp: 16   17   Temp: 98.5 °F (36.9 °C)   97.8 °F (36.6 °C)   TempSrc: Oral   Oral   SpO2: 90%   93%   Weight:       Height:         Body mass index is 31.01 kg/m².     Physical Exam:  General: awake, no distress, appears stated age, obese, supine in hospital bed  HEENT: normocephalic, normal conjunctiva  Cardiovascular: warm, well perfused, RRR  Pulm: breathing comfortably, no respiratory distress, bilateral lungs clear in all lung fields  GI: abdomen soft, non distended   Skin: right knee surgical incision with dressing in place no strikethrough  Extremities: RLE edema  MSK: unable to hold RLE against gravity, can fully extend right knee, DF, PF at least antigravity in strength  LLE at least antigravity in strength  Neuro: alert, face grossly symmetrical  Speech-fluent  Psych: Appropriate affect, cooperative    Data Review:   Recent Labs   Lab 01/09/24  0958 01/10/24  0715   RBC  --  5.27   HGB 10.7* 9.6*   HCT 34.3* 31.7*   MCV  --  60.2*   MCH  --  18.2*   MCHC  --  30.3*   RDW  --  20.4*   WBC  --  11.2*   PLT  --  175.0     No results for input(s): \"GLU\", \"BUN\", \"CREATSERUM\", \"GFRAA\", \"GFRNAA\", \"CA\", \"NA\", \"K\", \"CL\", \"CO2\" in the last 168 hours.  Lab Results   Component Value Date    INR 1.07 03/11/2023         REHAB DIAGNOSES:  Impaired mobility and self-care secondary to Primary osteoarthritis of right knee [M17.11]  Preop testing.    Patient Active Problem List   Diagnosis    BPH with urinary obstruction    Hypertension    Hypercholesterolemia    Cellulitis of left lower extremity    Lumbar radiculopathy    Primary osteoarthritis of right knee    Coronary artery disease involving native coronary artery    Gastroesophageal reflux disease without esophagitis    Primary osteoarthritis of both knees    Difficult Matt catheter placement (HCC)    Gross hematuria     Osteoarthritis of left knee    Hypomagnesemia    Atrial fibrillation (HCC)    Orthopedic aftercare    Acquired hypothyroidism    Preop testing    Postoperative nausea    Bradycardia         IMPAIRMENTS:      Activities of daily living, functional mobility, balance, cognition, strength, endurance, self care, transfers, hygiene     RECOMMENDATIONS:      Patient has been accepted at [facility] Acute Rehabilitation facility.   Patient is reporting vertigo symptoms during therapy. Recommend further monitoring and evaluation.  Monitor for further episodes of hypotension.   Prior to transfer to acute inpatient rehab, the following to be addressed:  - Off all IV push medications and continuous IV fluids  - Pain and behavior controlled with PO and transdermal meds.  - Pt should be participating with PT/OT and able to tolerate therapy for 3 hours a day.    - Weight bearing or ROM precautions clarified.  - Labs stable, including electrolytes, Hgb (no less than 7.0 and stable), with no signs of infection.  - Encourage OOB to chair as often as possible.    - Encourage patient to transfer to commode or toilet for toileting   -- Please have PT/OT continue to follow the patient while in house.   - We will continue to follow along with this patient's course during their hospitalization.    This treatment can not be provided at a lower level of care. Requires closer medical supervision than available in other levels of rehab care to manage concomitant medical issues, maintain medical stability and prevent complications, assure adequate pain control, rehabilitation team coordination for an efficient program and optimal outcomeRisks if patient is transferred to a lower level of care include: anemia, hypotension, thrombus, intractable pain  Goal directed therapy with rehabilitation potential will be provided in the following domains. Bed mobility or transfers , Cognitive, speech, language or swallowing retraining,  Range of motion and strengthening and Ambulation  There are at least two functional impairments requiring at least minimum assistance. Physical Therapy , Speech Therapy  and Occupational Therapy   This patient should be able to tolerate at least 3 hours per day of skilled therapy, at least 5 days a week, in one or more of the functional domains documented above.        24 hour rehabilitation nursing care also beneficial for medication management, pressure sore prevention, bladder and bowel management. Physiatric medical oversight to monitor anemia, cardiac function, neurologic status, deep vein thrombosis prevention. Estimated length of stay is 10-14 days. Rehabilitation potential is good. Discharge goal is home with family at a supervision to modified independent level.    Will provide family/caregiver education with appropriate discharge plan of care.     Will follow.    PQRI: Advanced care plan documented    Thank you for involving us with this patient's care.  Manuel Gutierrez MD, 01/10/24, 5:01 PM

## 2024-01-10 NOTE — PLAN OF CARE
Patient admitted from PACU post op R knee. R knee dressing dry and intact with ice therapy wrap. Denies pain. Matt in place. Patient nauseous and having multiple episodes of emesis. Bradycardic as low has 38- MD notified. Stat EKG obtained cardiology consulted and seen by cardiologist. Antiemetics given for nausea with little relief.  Resting in bed, call light within reach and frequent rounds in place.     Problem: Patient Centered Care  Goal: Patient preferences are identified and integrated in the patient's plan of care  Description: Interventions:  - What would you like us to know as we care for you?   - Provide timely, complete, and accurate information to patient/family  - Incorporate patient and family knowledge, values, beliefs, and cultural backgrounds into the planning and delivery of care  - Encourage patient/family to participate in care and decision-making at the level they choose  - Honor patient and family perspectives and choices  Outcome: Progressing

## 2024-01-10 NOTE — PHYSICAL THERAPY NOTE
PHYSICAL THERAPY EVALUATION - INPATIENT     Room Number: 432/432-A  Evaluation Date: 1/10/2024  Type of Evaluation: Initial   Physician Order: PT Eval and Treat    Presenting Problem: R TKA performed 1/10  Co-Morbidities : arthritis  Reason for Therapy: Mobility Dysfunction and Discharge Planning    PHYSICAL THERAPY ASSESSMENT     Patient is a 83 year old male admitted 1/9/2024 for R TKA performed 1/10/2024 .  Patient's current functional deficits include bed mobility, transfers, and gait, which are below the patient's pre-admission status.  Patient was independent prior to this admission.      Functional Mobility: Gait belt used throughout mobility.   - bed mobility: supine to/from sit SBA   - transfers: sit to/from stand transfers with CGA   - gait: ambulation with CGA with RW 10' and 2' limited by dizziness not orthostatic      At end of session patient in chair with feet up with all needs in reach, RN aware.      The patient's Approx Degree of Impairment: 54.16% has been calculated based on documentation in the [facility] '6 clicks' Inpatient Basic Mobility Short Form.  Research supports that patients with this level of impairment may benefit from EJ.    Patient will benefit from continued IP PT services to address these deficits in preparation for discharge.    DISCHARGE RECOMMENDATIONS  PT Discharge Recommendations: Acute rehabilitation (Pt wants [facility] for rehab - did this for other TKA)    PLAN  PT Treatment Plan: Bed mobility;Body mechanics;Endurance;Energy conservation;Family education;Gait training;Range of motion;Stair training;Transfer training;Balance training  Rehab Potential : Good  Frequency (Obs): Daily       PHYSICAL THERAPY MEDICAL/SOCIAL HISTORY     History related to current admission: bradycardia      Problem List  Active Problems:    Preop testing    Postoperative nausea    Bradycardia      HOME SITUATION  Home Situation  Type of Home: House  Home Layout: Multi-level  Stairs to  Bedroom: 6  Railing: Yes  Lives With: Alone     Prior Level of [place]: independent     SUBJECTIVE  \"I might have a harder time since this is my driving leg.\"     PHYSICAL THERAPY EXAMINATION     OBJECTIVE  Precautions: Bed/chair alarm  Fall Risk: High fall risk    WEIGHT BEARING RESTRICTION  Weight Bearing Restriction: R lower extremity        R Lower Extremity: Weight Bearing as Tolerated       PAIN ASSESSMENT      Surgical unrated        COGNITION  Overall Cognitive Status:  WFL - within functional limits    BALANCE  Static Sitting: Fair +  Dynamic Sitting: Fair  Static Standing: Fair -  Dynamic Standing: Poor +    ACTIVITY TOLERANCE           BP: 118/56  BP Location: Right arm  BP Method: Automatic  Patient Position: Semi-Elliott    O2 WALK       AM-PAC '6-Clicks' INPATIENT SHORT FORM - BASIC MOBILITY  How much difficulty does the patient currently have...  Patient Difficulty: Turning over in bed (including adjusting bedclothes, sheets and blankets)?: A Little   Patient Difficulty: Sitting down on and standing up from a chair with arms (e.g., wheelchair, bedside commode, etc.): A Little   Patient Difficulty: Moving from lying on back to sitting on the side of the bed?: A Little   How much help from another person does the patient currently need...   Help from Another: Moving to and from a bed to a chair (including a wheelchair)?: A Little   Help from Another: Need to walk in hospital room?: A Little   Help from Another: Climbing 3-5 steps with a railing?: Total     AM-PAC Score:  Raw Score: 16   Approx Degree of Impairment: 54.16%   Standardized Score (AM-PAC Scale): 40.78   CMS Modifier (G-Code): CK    FUNCTIONAL ABILITY STATUS  Functional Mobility/Gait Assessment  Gait Assistance: Contact guard assist (min VC for walker management)  Distance (ft): 10', 2'  Assistive Device: Rolling walker  Pattern: Shuffle    Bed Mobility: SBA    Transfers: CGA    Exercise/Education Provided:  Bed mobility  Body  mechanics  Functional activity tolerated  Gait training  Transfer training    Patient End of Session: Up in chair;Needs met;Call light within reach;RN aware of session/findings;All patient questions and concerns addressed;Discussed recommendations with /    CURRENT GOALS    Goals to be met by: 1/20  Patient Goal Patient's self-stated goal is: to get stronger   Goal #1 Patient is able to demonstrate supine - sit EOB @ level: supervision     Goal #1   Current Status    Goal #2 Patient is able to demonstrate transfers Sit to/from Stand at assistance level: supervision with walker - rolling     Goal #2  Current Status    Goal #3 Patient is able to ambulate 150 feet with assist device: walker - rolling at assistance level: supervision   Goal #3   Current Status    Goal #4 Patient will negotiate 2 stairs/one curb w/ assistive device and supervision   Goal #4   Current Status    Goal #5 Patient to demonstrate independence with home activity/exercise instructions provided to patient in preparation for discharge.   Goal #5   Current Status    Goal #6    Goal #6  Current Status      Patient Evaluation Complexity Level:  History Moderate - 1 or 2 personal factors and/or co-morbidities   Examination of body systems Moderate - addressing a total of 3 or more elements   Clinical Presentation Moderate - Evolving   Clinical Decision Making Moderate Complexity       Gait Training: 15 minutes

## 2024-01-10 NOTE — PROGRESS NOTES
Progress Note  Dedrick SAUNDRA Norton Patient Status:  Inpatient    3/6/1940 MRN [id number]   Location [facility] 4W/SW/SE Attending Marquise Galarza MD   Hosp Day # 1 PCP Marquise Galarza MD       CARDIOLOGIST ADDENDUM:    I agree with the findings below.  I have personally performed the Assessment and Plan in its entirety, as detailed below:    Sinus bradycardia  -Asymptomatic  -Pacemaker not indicated  -Continue current meds    Coronary artery disease  -Stable  -Continue current meds    Please call if new CV questions.    Sumaya Griffiths M.D.   Cardiology/Electrophysiology   1/10/2024  L3  -----------------------------------------      Subjective:  Pt denies any chest pain, palpitation, dizziness   Pt up in chair    Objective:  /56   Pulse 65   Temp 98.5 °F (36.9 °C) (Oral)   Resp 16   Ht 5' 9\" (1.753 m)   Wt 210 lb (95.3 kg)   SpO2 90%   BMI 31.01 kg/m²     Telemetry: NSR with PAC's      Intake/Output:    Intake/Output Summary (Last 24 hours) at 1/10/2024 1022  Last data filed at 1/10/2024 0838  Gross per 24 hour   Intake 590 ml   Output 800 ml   Net -210 ml       Last 3 Weights   24 0558 210 lb (95.3 kg)   24 1253 217 lb (98.4 kg)   23 1635 221 lb (100.2 kg)   10/25/23 1019 231 lb 12.8 oz (105.1 kg)       Labs:  No results for input(s): \"GLU\", \"BUN\", \"CREATSERUM\", \"GFRAA\", \"GFRNAA\", \"EGFRCR\", \"CA\", \"NA\", \"K\", \"CL\", \"CO2\" in the last 168 hours.  Recent Labs   Lab 24  0958 01/10/24  0715   RBC  --  5.27   HGB 10.7* 9.6*   HCT 34.3* 31.7*   MCV  --  60.2*   MCH  --  18.2*   MCHC  --  30.3*   RDW  --  20.4*   WBC  --  11.2*   PLT  --  175.0         No results for input(s): \"TROP\", \"TROPHS\", \"CK\" in the last 168 hours.  Lab Results   Component Value Date    INR 1.07 2023       Diagnostics:     Review of Systems   Respiratory: Negative.     Cardiovascular: Negative.          Physical Exam:    Physical Exam  Vitals reviewed.   Constitutional:       General: He is not in  acute distress.     Appearance: He is obese.   Neck:      Vascular: No JVD.   Cardiovascular:      Rate and Rhythm: Normal rate and regular rhythm.      Heart sounds: Normal heart sounds. No murmur heard.  Pulmonary:      Effort: Pulmonary effort is normal. No respiratory distress.      Breath sounds: Normal breath sounds. No wheezing or rhonchi.   Abdominal:      Palpations: Abdomen is soft.      Tenderness: There is no abdominal tenderness.   Musculoskeletal:      Cervical back: Normal range of motion.      Right lower leg: Edema present.      Left lower leg: No edema.      Comments: Right lower extremity with dressing   Skin:     General: Skin is warm and dry.   Neurological:      General: No focal deficit present.      Mental Status: He is alert and oriented to person, place, and time.   Psychiatric:         Mood and Affect: Mood normal.         Medications:   sennosides  17.2 mg Oral Nightly    docusate sodium  100 mg Oral BID    aspirin  325 mg Oral BID    famotidine  20 mg Oral BID    Or    famotidine  20 mg Intravenous BID    ferrous sulfate  325 mg Oral Daily with breakfast    atorvastatin  80 mg Oral Nightly    finasteride  5 mg Oral Daily    gabapentin  100 mg Oral BID    levothyroxine  75 mcg Oral Before breakfast      lactated ringers Stopped (01/09/24 0924)    sodium chloride 125 mL/hr at 01/10/24 0915       Assessment:    Sinus bradycardia with PAC's   - vagal contribution post anesthesia with nausea and vomiting.  - now SR with HR in 60's with PAC's  - echo pending    PAF  -now Sinus rhythm with PAC's    S/p Right TKA 1/9/24.    - DVT prophylaxis per ortho.    Obesity    HTN:   -well controlled     CAD  with history of PCI  -on BB, Statin and aspirin    Plan:  Echo pending  HR in low 60's. Will hold metoprolol for now    GUILLERMO Gutierrez  [facility]  1/10/2024  10:22 AM

## 2024-01-10 NOTE — OCCUPATIONAL THERAPY NOTE
OCCUPATIONAL THERAPY EVALUATION - INPATIENT     Room Number: 432/432-A  Evaluation Date: 1/10/2024  Type of Evaluation: Initial  Presenting Problem: s/p R TKA    Physician Order: IP Consult to Occupational Therapy  Reason for Therapy: ADL/IADL Dysfunction and Discharge Planning    OCCUPATIONAL THERAPY ASSESSMENT     Patient is a 83 year old male admitted 1/9/2024 for R TKA; WBAT.       RN cleared pt for participation in occupational therapy session, which was completed in collaboration with PT. Upon arrival, pt was supine in bed and agreeable to activity. No visitors present during session. Introduced self and role of OT to pt. Pt verbalized understanding. Gait belt used.    Education provided  Educated pt about proper safety techniques and TKA protocol. Pt verbalized/demonstrated good carryover.    Analysis of occupational performance  Pt required up to max A for ADLs overall along with CGA for supine to sit, SBA for sitting at EOB, CGA-min A for STS, and CGA-min A for functional transfer at RW level. Pt tolerated about 1 minutes of standing in supported standing. Pt was limited by pain. Pt was left in chair and alarm was activated. Call light and all needs left in reach. Handoff given to RN.    Discharge recommendation  The patient is below baseline and would benefit from continued skilled inpatient OT to address the above deficits, maximizing patient's ability to return to prior level of function. Recommend EJ    The patient's Approx Degree of Impairment: 53.32% has been calculated based on documentation in the [facility] '6 clicks' Inpatient Daily Activity Short Form.  Research supports that patients with this level of impairment may benefit from EJ.      PLAN OT Treatment Plan: Balance activities;Energy conservation/work simplification techniques;Continued evaluation;Compensatory technique education;Fine motor coordination activities;Neuromuscluar reeducation;Equipment eval/education;Patient/Family  training;Patient/Family education;Cognitive reorientation;Endurance training;UE strengthening/ROM;Functional transfer training;Visual perceptual training;IADL training;ADL training    OCCUPATIONAL THERAPY MEDICAL/SOCIAL HISTORY     Problem List  Active Problems:    Preop testing    Postoperative nausea    Bradycardia      Past Medical History  Past Medical History:   Diagnosis Date    Back problem     BPH (benign prostatic hyperplasia)     Cataract     Cellulitis     Cellulitis of left lower extremity 2017    Disorder of thyroid     Elevated prostate specific antigen (PSA) 2012    Esophageal reflux     Essential hypertension     Hearing impairment     no hearing aids    Heart attack (HCC)     High blood pressure     High cholesterol     History of blood transfusion     no reactions    History of prostate biopsy     History of stomach ulcers     Hyperlipidemia     Intractable back pain 10/16/2019    NSTEMI (non-ST elevated myocardial infarction) (HCC) 2022    Osteoarthritis     Postoperative nausea 2024    Sciatica of right side     Sleep apnea     no CPAP use    Visual impairment     readers       Past Surgical History  Past Surgical History:   Procedure Laterality Date    ANGIOPLASTY (CORONARY)      ARTHROSCOPY OF JOINT UNLISTED Left     knee    CATARACT SURGERY, COMPLEX      CATH BARE METAL STENT (BMS)      COLONOSCOPY      HC INJ EPIDURAL STEROID LUMBAR OR SACRAL W IMG GUIDANCE      TONSILLECTOMY      UPPER GI ENDOSCOPY,EXAM         HOME SITUATION  Type of Home: House  Home Layout: Multi-level  Lives With: Alone                              Use of Assistive Device(s):     Prior Level of [place]: independent    SUBJECTIVE  \"I really need to go to rehab.\"    OCCUPATIONAL THERAPY EXAMINATION      OBJECTIVE  Precautions: Bed/chair alarm  Fall Risk: High fall risk    PAIN ASSESSMENT  Ratin  Location: R knee         O2 SATURATIONS  Oxygen Therapy  SPO2% Ambulation on Room Air:  91    RANGE OF MOTION   Upper extremity ROM is within functional limits     STRENGTH ASSESSMENT  Upper extremity strength is within functional limits     COORDINATION  Gross Motor: WFL   Fine Motor: WFL          ACTIVITIES OF DAILY LIVING ASSESSMENT  AM-PAC ‘6-Clicks’ Inpatient Daily Activity Short Form  How much help from another person does the patient currently need…  -   Putting on and taking off regular lower body clothing?: A Lot  -   Bathing (including washing, rinsing, drying)?: A Lot  -   Toileting, which includes using toilet, bedpan or urinal? : A Lot  -   Putting on and taking off regular upper body clothing?: A Little  -   Taking care of personal grooming such as brushing teeth?: A Little  -   Eating meals?: None    AM-PAC Score:  Score: 16  Approx Degree of Impairment: 53.32%  Standardized Score (AM-PAC Scale): 35.96  CMS Modifier (G-Code): CK    FUNCTIONAL TRANSFER ASSESSMENT  Supine to Sit : Contact Guard Assist     Sit to stand: min-CGA  Toilet Transfer: min-CGA       FUNCTIONAL ADL ASSESSMENT  Overall max A    OT Goals  Patients self stated goal is: to go to rehab     Patient will complete functional transfer with SBA  Comment:     Patient will complete toileting with min A  Comment:     Patient will tolerate standing for 1 minutes in prep for adls with SBA   Comment:    Patient will complete item retrieval with SBA  Comment:          Goals  on:   Frequency:     Patient Evaluation Complexity Level:   Occupational Profile/Medical History MODERATE - Expanded review of history including review of medical or therapy record   Specific performance deficits impacting engagement in ADL/IADL MODERATE  3 - 5 performance deficits   Client Assessment/Performance Deficits MODERATE - Comorbidities and min to mod modifications of tasks    Clinical Decision Making MODERATE - Analysis of occupational profile, detailed assessments, several treatment options    Overall Complexity MODERATE     OT Session Time:  25 minutes  Self-Care Home Management: 15 minutes       Kathryn Velazquez OT  [facility]  Inpatient Rehabilitation  Occupational Therapy  [phone]

## 2024-01-10 NOTE — CM/SW NOTE
01/10/24 1100   CM/SW Referral Data   Referral Source Physician   Reason for Referral Discharge planning   Informant Patient   Medical Hx   Does patient have an established PCP? Yes   Significant Past Medical/Mental Health Hx Rt total knee replacement-Faiza   Patient Info   Patient's Current Mental Status at Time of Assessment Alert;Oriented   Patient's Home Environment House   Number of Levels in Home 2   Number of Stair in Home 7 up 7down   Patient lives with Alone   Patient Status Prior to Admission   Independent with ADLs and Mobility Yes   Discharge Needs   Anticipated D/C needs Acute rehab   Services Requested   PMR Consult Requested Consult ordered   Choice of Post-Acute Provider   Informed patient of right to choose their preferred provider Yes   List of appropriate post-acute services provided to patient/family with quality data Yes   Patient/family choice [facility]     CM met with Dedrick at bedside, confirmed dc plan to go to [facility] on dc.   PMR consult pending.   Spoke w/ Karen johnsonison, she is reviewing therapy notes and aware to anticipate discharge tomorrow.     1400: Per [facility] pt is clinically accepted and a bed will be ready for pt tomorrow.     Plan: [facility] pending med clearance    / to remain available for support and/or discharge planning.   Kinjal Pastrana RN, BSN  Nurse   [phone]

## 2024-01-10 NOTE — PROGRESS NOTES
[facility]    Progress Note    Dedrick Norton Patient Status:  Inpatient    3/6/1940 MRN [id number]   Location [facility] 4W/SW/SE Attending Michel Kendall, *   Hosp Day # 0 PCP Marquise Galarza MD     Chief Complaint: Postop right knee arthroplasty    Subjective:   Subjective:  I saw patient postoperatively.  He developed nausea and vomiting.  Had an episode of drop in heart rate.  Cardiology was consulted.  He also had an episode of dizziness.  When I saw him, he denied any chest pain or shortness of breath.  Denies any abdominal pain.  He has no more dizziness.  Objective:   Blood pressure 134/63, pulse 51, temperature 97.8 °F (36.6 °C), temperature source Oral, resp. rate 15, height 5' 9\" (1.753 m), weight 210 lb (95.3 kg), SpO2 94%.  Physical Exam  Constitutional:       Appearance: Normal appearance.   HENT:      Head: Atraumatic.   Eyes:      General: No scleral icterus.     Conjunctiva/sclera: Conjunctivae normal.   Cardiovascular:      Rate and Rhythm: Regular rhythm.      Heart sounds: Normal heart sounds.   Pulmonary:      Effort: Pulmonary effort is normal. No respiratory distress.      Breath sounds: Normal breath sounds. No wheezing or rales.   Abdominal:      General: Bowel sounds are normal. There is no distension.      Palpations: Abdomen is soft.      Tenderness: There is no abdominal tenderness. There is no guarding.   Musculoskeletal:      Cervical back: Neck supple.      Right lower leg: No edema.      Left lower leg: No edema.   Skin:     General: Skin is warm.   Neurological:      Mental Status: He is oriented to person, place, and time. Mental status is at baseline.         Results:   Lab Results   Component Value Date    WBC 7.6 2023    HGB 10.7 (L) 2024    HCT 34.3 (L) 2024    .0 2023    CREATSERUM 0.65 (L) 2023    BUN 16 2023     2023    K 3.8 2023     2023    CO2 33.0 (H) 2023     GLU 90 12/19/2023    CA 9.7 12/19/2023    ALB 4.3 12/19/2023    ALKPHO 91 12/19/2023    BILT 0.9 12/19/2023    TP 6.8 12/19/2023    AST 17 12/19/2023    ALT <7 (L) 12/19/2023    PTT 34.6 03/11/2023    INR 1.07 03/11/2023    T4F 1.0 10/18/2023    TSH 4.840 (H) 10/18/2023     04/02/2019    PSA 0.7 05/07/2018    MG 2.0 03/18/2023    TROP <0.045 02/19/2019       XR KNEE (1 OR 2 VIEWS), RIGHT (CPT=73560)    Result Date: 1/9/2024  CONCLUSION:  1. Recent postop changes following total knee arthroplasty.  2. Prosthetic components in anatomic position.  No acute fracture dislocation. 3. Soft tissue emphysema related to recent surgery.    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  Continue Zofran as needed.    Bradycardia-appreciate cardiology input.  Will follow recommendations.    Hypertension monitor blood pressure    Dyslipidemia-continue statins    Coronary disease status post PCI    Hypothyroidism-continue supplementation    Remote history of atrial fibrillation-currently in sinus.    Plan  Appreciate cardiology input.  Planning to hold metoprolol.  Plan for echocardiogram.  Continue aspirin and statins.  zofran for postoperative nausea        Marquise Galarza MD  1/9/2024

## 2024-01-11 ENCOUNTER — TELEPHONE (OUTPATIENT)
Dept: ORTHOPEDICS CLINIC | Facility: CLINIC | Age: 84
End: 2024-01-11

## 2024-01-11 VITALS
WEIGHT: 210 LBS | SYSTOLIC BLOOD PRESSURE: 139 MMHG | RESPIRATION RATE: 14 BRPM | TEMPERATURE: 99 F | OXYGEN SATURATION: 93 % | DIASTOLIC BLOOD PRESSURE: 61 MMHG | BODY MASS INDEX: 31.1 KG/M2 | HEART RATE: 73 BPM | HEIGHT: 69 IN

## 2024-01-11 PROCEDURE — 99239 HOSP IP/OBS DSCHRG MGMT >30: CPT | Performed by: INTERNAL MEDICINE

## 2024-01-11 RX ORDER — ASPIRIN 325 MG
325 TABLET, DELAYED RELEASE (ENTERIC COATED) ORAL 2 TIMES DAILY
Qty: 60 TABLET | Refills: 0 | Status: SHIPPED | OUTPATIENT
Start: 2024-01-11

## 2024-01-11 RX ORDER — PSEUDOEPHEDRINE HCL 30 MG
100 TABLET ORAL 2 TIMES DAILY
Qty: 20 CAPSULE | Refills: 0 | Status: SHIPPED | OUTPATIENT
Start: 2024-01-11

## 2024-01-11 RX ORDER — HYDROCODONE BITARTRATE AND ACETAMINOPHEN 7.5; 325 MG/1; MG/1
1 TABLET ORAL EVERY 6 HOURS PRN
Qty: 30 TABLET | Refills: 0 | Status: SHIPPED | OUTPATIENT
Start: 2024-01-11

## 2024-01-11 NOTE — PROGRESS NOTES
Progress Note  Dedrick Norton Patient Status:  Inpatient    3/6/1940 MRN [id number]   Location [facility] 4W/SW/SE Attending Marquise Galarza MD   Hosp Day # 2 PCP Marquise Galarza MD     Subjective:  No events overnight. Feels good this morning, denies any cardiac symptoms at this time. Tolerating PO diet without further nausea or vomiting.     Objective:  /80 (BP Location: Right arm)   Pulse 69   Temp 98.7 °F (37.1 °C) (Oral)   Resp 14   Ht 5' 9\" (1.753 m)   Wt 210 lb (95.3 kg)   SpO2 93%   BMI 31.01 kg/m²     Telemetry: SR, HR 70s       Intake/Output:    Intake/Output Summary (Last 24 hours) at 2024 1116  Last data filed at 2024 0649  Gross per 24 hour   Intake 640 ml   Output 2050 ml   Net -1410 ml       Last 3 Weights   24 0558 210 lb (95.3 kg)   24 1253 217 lb (98.4 kg)   23 1635 221 lb (100.2 kg)   10/25/23 1019 231 lb 12.8 oz (105.1 kg)       Labs:  No results for input(s): \"GLU\", \"BUN\", \"CREATSERUM\", \"GFRAA\", \"GFRNAA\", \"EGFRCR\", \"CA\", \"NA\", \"K\", \"CL\", \"CO2\" in the last 168 hours.  Recent Labs   Lab 24  0958 01/10/24  0715   RBC  --  5.27   HGB 10.7* 9.6*   HCT 34.3* 31.7*   MCV  --  60.2*   MCH  --  18.2*   MCHC  --  30.3*   RDW  --  20.4*   WBC  --  11.2*   PLT  --  175.0         No results for input(s): \"TROP\", \"TROPHS\", \"CK\" in the last 168 hours.    Review of Systems   Constitutional: Negative.   Cardiovascular: Negative.    Respiratory: Negative.     Musculoskeletal:  Positive for joint pain.        Right knee surgical pain       Physical Exam:    Gen: alert, oriented x 3, NAD  Heent: pupils equal, reactive. Mucous membranes moist.   Neck: no jvd  Cardiac: regular rate and rhythm, normal S1,S2, no murmur, gallop or rub   Lungs: CTA  Abd: soft, NT/ND +bs  Ext: right knee dressing c,d,I, post surgical edema   Skin: Warm, dry  Neuro: No focal deficits      Medications:     sennosides  17.2 mg Oral Nightly    docusate sodium  100 mg Oral BID     aspirin  325 mg Oral BID    famotidine  20 mg Oral BID    Or    famotidine  20 mg Intravenous BID    ferrous sulfate  325 mg Oral Daily with breakfast    atorvastatin  80 mg Oral Nightly    finasteride  5 mg Oral Daily    gabapentin  100 mg Oral BID    levothyroxine  75 mcg Oral Before breakfast      lactated ringers Stopped (01/09/24 0924)       Assessment:  POD #2 right knee replacement d/t DJD  Asymptomatic sinus bradycardia -post op likely d/t nausea and vomiting    EKG showed sinus bradycardia with PVCs  Echo 1/10/24 [id number]%, no rwma, mild aortic regurgitation, mild TR, PASP 51 mm Hg no significant change when compared with echo 1/26/2011   Was on BB PTA, now held     CAD s/p PCI 2021  On ASA, statin   PHTN, PASP 51 mmHg   PAF, currently maintains SR  HTN-controlled  HLP-on statin  Obesity, BMI 31    Plan:  POFD # 2 s/p right knee replacement-doing well.  Heart rates improved 60-70s, tachycardic with activity on tele monitor.  Will resume home dose Toprol.  Echo results noted LVEF 50-55%, no rmwa, PASP 51 mmHg   Continue ASA, statin.  Tentative plan to dc to rehab today, ok with cardiology.  Will follow up with home cardiology OP.        Plan of care discussed with patient, RN.    Conchita Argueta, APRN  1/11/2024  11:16 AM  [phone]

## 2024-01-11 NOTE — PHYSICAL THERAPY NOTE
PHYSICAL THERAPY KNEE TREATMENT NOTE - INPATIENT     Room Number: 432/432-A             Presenting Problem: R TKA performed 1/10  Co-Morbidities : arthritis    Problem List  Active Problems:    Postoperative anemia    Preop testing    Postoperative nausea    Bradycardia      PHYSICAL THERAPY ASSESSMENT     Since previous session patient has progressed, ambulating 80' CGA with rolling walker. Patient continues to function below baseline with bed mobility, transfers, gait, stair negotiation, ambulating household distances, and ambulating community distances. Pt understands importance of frequent mobility and is motivated to participate in therapy. Contributing factors to remaining limitations include decreased functional strength, decreased endurance/aerobic capacity, pain, and limited R knee ROM. Patient continues to benefit from continued skilled therapy services to facilitate return to independent PLOF as patient demonstrates high motivation with excellent tolerance and potential benefiting from an intensive rehab program. Final discharge placement decision is directed by the inter-disciplinary team.    Patient demonstrated good  progress this session, goals remain in progress. Patient with good carryover during session. Will continue to follow patient for duration of hospital stay per plan of care, next session anticipate to progress bed mobility, transfers, and gait. Anticipated therapy needs remain appropriate based on the patient's performance, personal factors, and remaining functional impairments.     The patient's Approx Degree of Impairment: 50.57% has been calculated based on documentation in the [facility] '6 clicks' Inpatient Basic Mobility Short Form.  Research supports that patients with this level of impairment may benefit from EJ, however patient would benefit from multiple therapies, is unable to navigate home environment, needs to be Andrei to return home, and has had previous success at acute rehab with  L TKA     DISCHARGE RECOMMENDATIONS  PT Discharge Recommendations: Acute rehabilitation    PLAN  PT Treatment Plan: Bed mobility;Body mechanics;Endurance;Energy conservation;Patient education;Gait training;Range of motion;Strengthening;Transfer training  Frequency (Obs): Daily      SUBJECTIVE  \"The pain decreased as I walked more\"     OBJECTIVE  Precautions: Bed/chair alarm    WEIGHT BEARING STATUS        R Lower Extremity: Weight Bearing as Tolerated       PAIN ASSESSMENT   Ratin  Location: R knee  Management Techniques: Body mechanics;Activity promotion    BALANCE  Static Sitting: Good  Dynamic Sitting: Fair +  Static Standing: Fair  Dynamic Standing: Fair    ACTIVITY TOLERANCE  Pulse: 73  Heart Rate Source: Monitor     BP: 139/61  BP Location: Right arm  BP Method: Automatic  Patient Position: Semi-Elliott    O2 WALK  Oxygen Therapy  SPO2% on Room Air at Rest: 95    AM-PAC '6-Clicks' INPATIENT SHORT FORM - BASIC MOBILITY  How much difficulty does the patient currently have...  Patient Difficulty: Turning over in bed (including adjusting bedclothes, sheets and blankets)?: A Little   Patient Difficulty: Sitting down on and standing up from a chair with arms (e.g., wheelchair, bedside commode, etc.): A Little   Patient Difficulty: Moving from lying on back to sitting on the side of the bed?: A Little   How much help from another person does the patient currently need...   Help from Another: Moving to and from a bed to a chair (including a wheelchair)?: A Little   Help from Another: Need to walk in hospital room?: A Little   Help from Another: Climbing 3-5 steps with a railing?: A Lot     AM-PAC Score:  Raw Score: 17   Approx Degree of Impairment: 50.57%   Standardized Score (AM-PAC Scale): 42.13   CMS Modifier (G-Code): CK    FUNCTIONAL ABILITY STATUS  Functional Mobility/Gait Assessment  Gait Assistance: Contact guard assist  Distance (ft): 80  Assistive Device: Rolling walker  Pattern: Shuffle;L Foot flat;R  Foot flat (slight toeing out, kyphotic posture, no LOB, moderate UE relaince on rw, slow roseann)    ROLLING: modified independent with handrail   SUPINE TO SIT: minimal assist using handhold technique with therapist   SIT TO SUPINE: supervision/setup assist   SIT TO STAND: contact guard assist from edge of bed with rolling walker   STAND TO SIT: contact guard assist     Patient received semi-fowlers in bed, agreeable to physical therapy. Vital signs monitored as noted above, no adverse symptoms and patient stable during session. Session focused on bed mobility, transfers, and gait training.     Exercises - R and L sides, sitting edge of bed  AM Session   Ankle Pumps 10 reps   LAQs 10 reps   Hip march 10 reps   Heel slides 10 reps       Knee ROM   R Knee Flexion (degrees): 90     R Knee Extension (degrees): -10       Patient End of Session: In bed;Needs met;Call light within reach;RN aware of session/findings;SCDs in place;All patient questions and concerns addressed;Alarm set    CURRENT GOALS  Goals to be met by: 1/20  Patient Goal Patient's self-stated goal is: to get stronger   Goal #1 Patient is able to demonstrate supine - sit EOB @ level: supervision      Goal #1   Current Status  in progress    Goal #2 Patient is able to demonstrate transfers Sit to/from Stand at assistance level: supervision with walker - rolling      Goal #2  Current Status  in progress    Goal #3 Patient is able to ambulate 150 feet with assist device: walker - rolling at assistance level: supervision   Goal #3   Current Status  in progress    Goal #4 Patient will negotiate 2 stairs/one curb w/ assistive device and supervision   Goal #4   Current Status  in progress    Goal #5 Patient to demonstrate independence with home activity/exercise instructions provided to patient in preparation for discharge.   Goal #5   Current Status  in progress      Gait Training: 15 minutes  Therapeutic Exercise: 15 minutes    Mikaela Zavaleta, BERNARD [place]  [place]  DPT Candidate 2024    I provided clinical instruction and supervision for the duration of this session and agree with the above documentation.    Shelbie Mac PT, DPT  [facility]

## 2024-01-11 NOTE — PLAN OF CARE
Patient alert and oriented X4. Post-op day 2. Dressing in place to R knee. CPM ordered, patient declined over night. Per patient, will do CPM in the morning after breakfast. Voiding freely via urinal. Remote tele in place. SCD's and ASA for DVT prophylaxis. Pain management with norco prn. General diet. Encouraged to cough and deep breathe with incentive spirometer and patient is compliant. Fall precautions in place including bed in lowest position, call light and personal belongings within reach, non-skid socks. Frequent rounding by nursing staff. Plan is to d/c to [facility] at [facility] today.    Problem: Patient Centered Care  Goal: Patient preferences are identified and integrated in the patient's plan of care  Description: Interventions:  - What would you like us to know as we care for you? I am from home alone, that is part of the reason why I want to go to [place].  - Provide timely, complete, and accurate information to patient/family  - Incorporate patient and family knowledge, values, beliefs, and cultural backgrounds into the planning and delivery of care  - Encourage patient/family to participate in care and decision-making at the level they choose  - Honor patient and family perspectives and choices  Outcome: Progressing     Problem: Patient/Family Goals  Goal: Patient/Family Long Term Goal  Description: Patient's Long Term Goal: Will be able to return home from rehab and will be able to walk well with a walker and will have no complications from surgery.     Interventions:  - Up with walker, WBAT to right leg.  - Monitor incision for any signs of infection/bleeding.  - Pain management with oral medications.  - Oral anticoagulation to prevent blood clots.  - May apply ice to incision to prevent swelling or help reduce pain.  - Follow up with ortho as recommended.  - See additional Care Plan goals for specific interventions  Outcome: Progressing  Goal: Patient/Family Short Term Goal  Description:  Patient's Short Term Goal: Transfer to Acute Rehab when stable.    Interventions:   - Up with walker, WBAT to right leg.  - Monitor incision for any signs of infection/bleeding.  - Pain management with oral medications.  - Bilateral SCD's and oral anticoagulation to prevent blood clots.  - May apply ice to incision to prevent swelling or help reduce pain.  - CPM to right knee to promote better mobility.  - See additional Care Plan goals for specific interventions  Outcome: Progressing     Problem: PAIN - ADULT  Goal: Verbalizes/displays adequate comfort level or patient's stated pain goal  Description: INTERVENTIONS:  - Encourage pt to monitor pain and request assistance  - Assess pain using appropriate pain scale  - Administer analgesics based on type and severity of pain and evaluate response  - Implement non-pharmacological measures as appropriate and evaluate response  - Consider cultural and social influences on pain and pain management  - Manage/alleviate anxiety  - Utilize distraction and/or relaxation techniques  - Monitor for opioid side effects  - Notify MD/LIP if interventions unsuccessful or patient reports new pain  - Anticipate increased pain with activity and pre-medicate as appropriate  Outcome: Progressing     Problem: RISK FOR INFECTION - ADULT  Goal: Absence of fever/infection during anticipated neutropenic period  Description: INTERVENTIONS  - Monitor WBC  - Administer growth factors as ordered  - Implement neutropenic guidelines  Outcome: Progressing     Problem: SAFETY ADULT - FALL  Goal: Free from fall injury  Description: INTERVENTIONS:  - Assess pt frequently for physical needs  - Identify cognitive and physical deficits and behaviors that affect risk of falls.  - [place] fall precautions as indicated by assessment.  - Educate pt/family on patient safety including physical limitations  - Instruct pt to call for assistance with activity based on assessment  - Modify environment to reduce  risk of injury  - Provide assistive devices as appropriate  - Consider OT/PT consult to assist with strengthening/mobility  - Encourage toileting schedule  Outcome: Progressing     Problem: DISCHARGE PLANNING  Goal: Discharge to home or other facility with appropriate resources  Description: INTERVENTIONS:  - Identify barriers to discharge w/pt and caregiver  - Include patient/family/discharge partner in discharge planning  - Arrange for needed discharge resources and transportation as appropriate  - Identify discharge learning needs (meds, wound care, etc)  - Arrange for interpreters to assist at discharge as needed  - Consider post-discharge preferences of patient/family/discharge partner  - Complete POLST form as appropriate  - Assess patient's ability to be responsible for managing their own health  - Refer to Case Management Department for coordinating discharge planning if the patient needs post-hospital services based on physician/LIP order or complex needs related to functional status, cognitive ability or social support system  Outcome: Progressing     Problem: SKIN/TISSUE INTEGRITY - ADULT  Goal: Incision(s), wounds(s) or drain site(s) healing without S/S of infection  Description: INTERVENTIONS:  - Assess and document risk factors for pressure ulcer development  - Assess and document skin integrity  - Assess and document dressing/incision, wound bed, drain sites and surrounding tissue  - Implement wound care per orders  - Initiate isolation precautions as appropriate  - Initiate Pressure Ulcer prevention bundle as indicated  Outcome: Progressing     Problem: MUSCULOSKELETAL - ADULT  Goal: Return mobility to safest level of function  Description: INTERVENTIONS:  - Assess patient stability and activity tolerance for standing, transferring and ambulating w/ or w/o assistive devices  - Assist with transfers and ambulation using safe patient handling equipment as needed  - Ensure adequate protection for  wounds/incisions during mobilization  - Obtain PT/OT consults as needed  - Advance activity as appropriate  - Communicate ordered activity level and limitations with patient/family  Outcome: Progressing  Goal: Maintain proper alignment of affected body part  Description: INTERVENTIONS:  - Support and protect limb and body alignment per provider's orders  - Instruct and reinforce with patient and family use of appropriate assistive device and precautions (e.g. spinal or hip dislocation precautions)  Outcome: Progressing

## 2024-01-11 NOTE — H&P
[facility]    Progress Note    Dedrick Norton Patient Status:  Inpatient    3/6/1940 MRN [id number]   Location [facility] 4W/SW/SE Attending Marquise Galarza MD   Hosp Day # 2 PCP Marquise Galarza MD       Subjective:   Dedrick Norton is a(n) 83 year old male with a arthroplasty.  He is bed.  He is comfortable.  He is progressing with therapy.  He uses CPM last night.  Awaiting placement at acute rehab    Objective:   Blood pressure 120/64, pulse 69, temperature 97.9 °F (36.6 °C), temperature source Oral, resp. rate 16, height 5' 9\" (1.753 m), weight 210 lb (95.3 kg), SpO2 95%.    General appearance: alert, appears stated age and cooperative  INCISION/WOUND: clean, dry and intact, dressing intact and in place and no evidence of infection  Extremities: No calf pain  Pulses: 2+ and symmetric  Neurologic: Grossly normal    Assessment and Plan:   Postop day #2 status post right total knee arthroplasty.  He is progressing well.  Discharge planning to acute rehab when bed available possibly today.  Follow-up as scheduled.        Results:     Lab Results   Component Value Date    WBC 11.2 (H) 01/10/2024    HGB 9.6 (L) 01/10/2024    HCT 31.7 (L) 01/10/2024    .0 01/10/2024    CREATSERUM 0.65 (L) 2023    BUN 16 2023     2023    K 3.8 2023     2023    CO2 33.0 (H) 2023    GLU 90 2023    CA 9.7 2023    ALB 4.3 2023    ALKPHO 91 2023    BILT 0.9 2023    TP 6.8 2023    AST 17 2023    ALT <7 (L) 2023    PTT 34.6 2023    INR 1.07 2023    T4F 1.0 10/18/2023    TSH 4.840 (H) 10/18/2023     2019    PSA 0.7 2018    MG 2.0 2023    TROP <0.045 2019       XR KNEE (1 OR 2 VIEWS), RIGHT (CPT=73560)    Result Date: 2024  CONCLUSION:  1. Recent postop changes following total knee arthroplasty.  2. Prosthetic components in anatomic position.  No acute fracture dislocation.  3. Soft tissue emphysema related to recent surgery.    Dictated by (CST): Jay Cruz MD on 1/09/2024 at 11:00 AM     Finalized by (CST): Jay Cruz MD on 1/09/2024 at 11:02 AM         EKG 12 Lead    Result Date: 1/10/2024  Sinus bradycardia with Premature supraventricular complexes Left axis deviation Nonspecific ST and T wave abnormality Abnormal ECG When compared with ECG of 15-MAR-2023 11:16, ST now depressed in Inferior leads Inverted T waves have replaced nonspecific T wave abnormality in Inferior leads Confirmed by MANDEEP NARANJO STEVEN ([id number]) on 1/10/2024 10:06:09 AM       SHERI CLANCY PA-C  1/11/2024

## 2024-01-11 NOTE — PLAN OF CARE
Problem: Patient Centered Care  Goal: Patient preferences are identified and integrated in the patient's plan of care  Description: Interventions:  - What would you like us to know as we care for you? From home alone and he's plan prior to surgery was to go to a Rehab.  - Provide timely, complete, and accurate information to patient/family  - Incorporate patient and family knowledge, values, beliefs, and cultural backgrounds into the planning and delivery of care  - Encourage patient/family to participate in care and decision-making at the level they choose  - Honor patient and family perspectives and choices  Outcome: Adequate for Discharge     Problem: Patient/Family Goals  Goal: Patient/Family Long Term Goal  Description: Patient's Long Term Goal: Will be able to return home from rehab and will be able to walk well with a walker and will have no complications from surgery.     Interventions:  - Up with walker, WBAT to right leg.  - Monitor incision for any signs of infection/bleeding.  - Pain management with oral medications.  - Oral anticoagulation to prevent blood clots.  - May apply ice to incision to prevent swelling or help reduce pain.  - Follow up with ortho as recommended.  - See additional Care Plan goals for specific interventions  Outcome: Adequate for Discharge  Goal: Patient/Family Short Term Goal  Description: Patient's Short Term Goal: Transfer to Acute Rehab when stable.    Interventions:   - Up with walker, WBAT to right leg.  - Monitor incision for any signs of infection/bleeding.  - Pain management with oral medications.  - Bilateral SCD's and oral anticoagulation to prevent blood clots.  - May apply ice to incision to prevent swelling or help reduce pain.  - CPM to right knee to promote better mobility.  - See additional Care Plan goals for specific interventions  Outcome: Adequate for Discharge     Problem: PAIN - ADULT  Goal: Verbalizes/displays adequate comfort level or patient's stated  pain goal  Description: INTERVENTIONS:  - Encourage pt to monitor pain and request assistance  - Assess pain using appropriate pain scale  - Administer analgesics based on type and severity of pain and evaluate response  - Implement non-pharmacological measures as appropriate and evaluate response  - Consider cultural and social influences on pain and pain management  - Manage/alleviate anxiety  - Utilize distraction and/or relaxation techniques  - Monitor for opioid side effects  - Notify MD/LIP if interventions unsuccessful or patient reports new pain  - Anticipate increased pain with activity and pre-medicate as appropriate  Outcome: Adequate for Discharge     Problem: RISK FOR INFECTION - ADULT  Goal: Absence of fever/infection during anticipated neutropenic period  Description: INTERVENTIONS  - Monitor WBC  - Administer growth factors as ordered  - Implement neutropenic guidelines  Outcome: Adequate for Discharge     Problem: SAFETY ADULT - FALL  Goal: Free from fall injury  Description: INTERVENTIONS:  - Assess pt frequently for physical needs  - Identify cognitive and physical deficits and behaviors that affect risk of falls.  - [place] fall precautions as indicated by assessment.  - Educate pt/family on patient safety including physical limitations  - Instruct pt to call for assistance with activity based on assessment  - Modify environment to reduce risk of injury  - Provide assistive devices as appropriate  - Consider OT/PT consult to assist with strengthening/mobility  - Encourage toileting schedule  Outcome: Adequate for Discharge    Patient is alert and oriented, aware to call for help as needed.  Patient is currently in room air, denies shortness of breathing nor chest pain.  Patient is up with a walker with 1 assist.  Patient takes oral medication for pain.  Patient is voiding well, passing gas but denies having a bowel movement.  Patient will be transferring to [facility] via  medicar today.    1255 Report given to GERARD Askew at [facility].

## 2024-01-11 NOTE — DISCHARGE INSTRUCTIONS
Keep incision dry for 2 weeks.  Change Mepilex dressing every 3-5 days.  Ambulate with walker and assist, weight bearing as tolerated to right leg.  May ice incision to prevent swelling or help reduce pain.  Monitor incision for any signs infection or bleeding. Continue to encourage use of incentive spirometry to prevent Pneumonia.

## 2024-01-11 NOTE — DISCHARGE SUMMARY
[facility]    Discharge Summary    Dedrick Norton Patient Status:  Inpatient    3/6/1940 MRN [id number]   Location [facility] 4W/SW/SE Attending Marquise Galarza MD   Hosp Day # 2 PCP Marquise Galarza MD     Date of Admission: 2024   Date of Discharge: 2024    Admitting Diagnosis: Primary osteoarthritis of right knee [M17.11]  Preop testing    Disposition: Transfer to Rehab Facility: [facility]    Discharge Diagnosis: .Active Problems:    Postoperative anemia    Preop testing    Postoperative nausea    Bradycardia      Hospital Course:   Reason for Admission: Knee arthroplastic surgery    Hospital Course: 83 gentleman was admitted to the hospital for knee arthroplastic surgery.  Postoperatively, he developed nausea and emesis.  They got better.  He tolerated diet.  Orthopedics has cleared him for discharge to rehabilitation.    DJD right knee status post total knee arthroplasty-follow Ortho protocol cleared for discharge to rehab     Postoperative nausea-resolved      Bradycardia-appreciate cardiology input.  Resolved  Postoperative anemia-monitor hemoglobin in the rehab     Hypertension monitor blood pressure     Dyslipidemia-continue statins     Coronary disease status post PCI-continue aspirin and statins     Hypothyroidism-continue supplementation     Remote history of atrial fibrillation-currently in sinus.     DVT prophylaxis-aspirin 2 times daily     Plan  Appreciate Ortho and cardiology input.  Okay for transfer to rehab       Discharge Physical Exam:  Vital Signs:  Blood pressure 123/80, pulse 69, temperature 98.7 °F (37.1 °C), temperature source Oral, resp. rate 14, height 5' 9\" (1.753 m), weight 210 lb (95.3 kg), SpO2 93%.     Constitutional:       Appearance: Normal appearance.   HENT:      Head: Atraumatic.   Eyes:      General: No scleral icterus.   Cardiovascular:      Rate and Rhythm: Regular rhythm.      Heart sounds: Normal heart sounds.   Pulmonary:       Effort: Pulmonary effort is normal. No respiratory distress.      Breath sounds: Normal breath sounds. No wheezing or rales.   Abdominal:      General: Bowel sounds are normal. There is no distension.      Palpations: Abdomen is soft.      Tenderness: There is no abdominal tenderness. There is no guarding.   Musculoskeletal:      Cervical back: Neck supple.      Right lower leg: No edema.      Left lower leg: No edema.   Skin:     General: Skin is warm.   Neurological:      Mental Status: He is oriented to person, place, and time. Mental status is at baseline.     Complications: None    Consultants         Provider   Role Specialty     Manuel Gutierrez MD  Consulting Physician Physical Medicine     Shay Kirk MD  Consulting Physician CARDIOLOGY     Marquise Galarza MD  Consulting Physician Internal Medicine          Surgical Procedures       Case IDs Date Procedure Surgeon Location Status    [id number] 1/9/24 Right total knee repalcement Michel Kendall MD [facility] MAIN OR Comp              Discharge Plan:   Discharge Condition: Stable    Current Discharge Medication List        New Orders    Details   docusate sodium 100 MG Oral Cap Take 100 mg by mouth 2 (two) times daily.      HYDROcodone-acetaminophen 7.5-325 MG Oral Tab Take 1 tablet by mouth every 6 (six) hours as needed.           Home Meds - Modified    Details   aspirin 325 MG Oral Tab EC Take 1 tablet (325 mg total) by mouth in the morning and 1 tablet (325 mg total) before bedtime.           Home Meds - Unchanged    Details   omeprazole 20 MG Oral Capsule Delayed Release Take 1 capsule (20 mg total) by mouth before breakfast.      gabapentin 100 MG Oral Cap Take 1 capsule (100 mg total) by mouth 2 (two) times daily.      ergocalciferol 1.25 MG (94640 UT) Oral Cap Take 1 capsule (50,000 Units total) by mouth once a week.      levothyroxine 75 MCG Oral Tab Take 1 tablet (75 mcg total) by mouth before breakfast.      atorvastatin 80 MG Oral Tab Take  1 tablet (80 mg total) by mouth daily.      finasteride 5 MG Oral Tab Take 1 tablet (5 mg total) by mouth daily.      furosemide 40 MG Oral Tab TAKE ONE TABLET BY MOUTH ONE TIME DAILY      ferrous sulfate 325 (65 FE) MG Oral Tab EC Take 1 tablet (325 mg total) by mouth daily with breakfast.      metoprolol succinate ER 25 MG Oral Tablet 24 Hr Take 1 tablet (25 mg total) by mouth Daily Beta Blocker.      potassium chloride 20 MEQ Oral Tab CR Take 1 tablet (20 mEq total) by mouth daily.                 Discharge Diet: General diet    Discharge Activity: As tolerated    Follow up:      Follow-up Information       Everardo Multani PA-C Follow up on 2/12/2024.    Specialties: Physician Assistant, SURGERY, ORTHOPEDIC  Contact information:  [address] [place] [address] [id number] [phone]                             Follow up Labs: CBC, basic metabolic panel in 3 days        Other Discharge Instructions:         Keep incision dry for 2 weeks.  Change Mepilex dressing every 3-5 days.  Ambulate with walker and assist.        Hospital Discharge Diagnoses:  Arthroplastic knee surgery    Lace+ Score: 37  59-90 High Risk  29-58 Medium Risk  0-28   Low Risk.    TCM Follow-Up Recommendation:  LACE 29-58: Moderate Risk of readmission after discharge from the hospital.    Time spent:  30 to 74 minutes, more than 50% of the time was spend on counseling and coordination of care.    Marquise Galarza MD  1/11/2024

## 2024-01-11 NOTE — CM/SW NOTE
01/11/24 0800   Discharge disposition   Expected discharge disposition Rehab Facili   Post Acute Care Provider Rehab Other  ([facility] [place])   Discharge transportation [facility] dc order entered.  Per Karen [facility] liaison, bed available today.   RN to call report:[phone]  Room: E6    Plan:[facility] arranged for pickup @ 1pm. PCS form completed in Epic, RN to print with AVS. Patient/family notified transport is not covered by insurance. Pt/family are agreeable to the charges ($115).    Kinjal Pastrana RN, BSN  Nurse   [phone]

## 2024-01-11 NOTE — PROGRESS NOTES
[facility]    Progress Note    Dedrick Norton Patient Status:  Inpatient    3/6/1940 MRN [id number]   Location [facility] 4W/SW/SE Attending Marquise Galarza MD   Hosp Day # 1 PCP Marquise Galarza MD     Chief Complaint:Postop knee replacement    Subjective:   Subjective:  I saw patient today morning.  He is sitting comfortably in the chair.  Tolerating breakfast.  Denies any chest pains or palpitation.  Objective:   Blood pressure 114/56, pulse 65, temperature 97.2 °F (36.2 °C), temperature source Oral, resp. rate 16, height 5' 9\" (1.753 m), weight 210 lb (95.3 kg), SpO2 90%.  Physical Exam  Constitutional:       Appearance: Normal appearance.   HENT:      Head: Atraumatic.   Eyes:      General: No scleral icterus.   Cardiovascular:      Rate and Rhythm: Regular rhythm.      Heart sounds: Normal heart sounds.   Pulmonary:      Effort: Pulmonary effort is normal. No respiratory distress.      Breath sounds: Normal breath sounds. No wheezing or rales.   Abdominal:      General: Bowel sounds are normal. There is no distension.      Palpations: Abdomen is soft.      Tenderness: There is no abdominal tenderness. There is no guarding.   Musculoskeletal:      Cervical back: Neck supple.      Right lower leg: No edema.      Left lower leg: No edema.   Skin:     General: Skin is warm.   Neurological:      Mental Status: He is oriented to person, place, and time. Mental status is at baseline.         Results:   Lab Results   Component Value Date    WBC 11.2 (H) 01/10/2024    HGB 9.6 (L) 01/10/2024    HCT 31.7 (L) 01/10/2024    .0 01/10/2024    CREATSERUM 0.65 (L) 2023    BUN 16 2023     2023    K 3.8 2023     2023    CO2 33.0 (H) 2023    GLU 90 2023    CA 9.7 2023    ALB 4.3 2023    ALKPHO 91 2023    BILT 0.9 2023    TP 6.8 2023    AST 17 2023    ALT <7 (L) 2023    PTT 34.6 2023    INR 1.07  03/11/2023    T4F 1.0 10/18/2023    TSH 4.840 (H) 10/18/2023     04/02/2019    PSA 0.7 05/07/2018    MG 2.0 03/18/2023    TROP <0.045 02/19/2019       XR KNEE (1 OR 2 VIEWS), RIGHT (CPT=73560)    Result Date: 1/9/2024  CONCLUSION:  1. Recent postop changes following total knee arthroplasty.  2. Prosthetic components in anatomic position.  No acute fracture dislocation. 3. Soft tissue emphysema related to recent surgery.    Dictated by (CST): Jay Cruz MD on 1/09/2024 at 11:00 AM     Finalized by (CST): Jay Cruz MD on 1/09/2024 at 11:02 AM         EKG 12 Lead    Result Date: 1/10/2024  Sinus bradycardia with Premature supraventricular complexes Left axis deviation Nonspecific ST and T wave abnormality Abnormal ECG When compared with ECG of 15-MAR-2023 11:16, ST now depressed in Inferior leads Inverted T waves have replaced nonspecific T wave abnormality in Inferior leads Confirmed by MANDEEP NARANJO STEVEN ([id number]) on 1/10/2024 10:06:09 AM     Assessment & Plan:    DJD right knee status post total knee arthroplasty-follow Ortho protocol     Postoperative nausea-resolved continue Zofran as needed.     Bradycardia-appreciate cardiology input.  Will follow recommendations.  Holding metoprolol for now    Postoperative anemia-monitor hemoglobin     Hypertension monitor blood pressure     Dyslipidemia-continue statins     Coronary disease status post PCI-continue aspirin and statins     Hypothyroidism-continue supplementation     Remote history of atrial fibrillation-currently in sinus.    DVT prophylaxis-aspirin 2 times daily     Plan  Physical therapy recommended acute rehabilitation.  PMR has been consulted.  Advance diet as tolerated  Activity as per Ortho protocol        Certification      PHYSICIAN Certification of Need for Inpatient Hospitalization - Initial Certification    Patient will require inpatient services that will reasonably be expected to span two midnight's based on the clinical  documentation in H+P.   Based on patients current state of illness, I anticipate that, after discharge, patient will require TBD.      Marquise Galarza MD  1/10/2024

## 2024-01-11 NOTE — TELEPHONE ENCOUNTER
Patient is in Rehab in [place]. Appetite getting better. States he is feeling OK/ Post op Nurse

## 2024-01-22 ENCOUNTER — TELEPHONE (OUTPATIENT)
Dept: INTERNAL MEDICINE CLINIC | Facility: CLINIC | Age: 84
End: 2024-01-22

## 2024-01-22 NOTE — TELEPHONE ENCOUNTER
Adriana called to obtain a verbal confirmation form the  To oversee home health services for the patient so they can start with services.

## 2024-01-29 ENCOUNTER — HOME HEALTH CHARGES (OUTPATIENT)
Dept: INTERNAL MEDICINE CLINIC | Facility: CLINIC | Age: 84
End: 2024-01-29

## 2024-01-29 DIAGNOSIS — I25.10 ATHEROSCLEROSIS OF NATIVE CORONARY ARTERY, UNSPECIFIED WHETHER ANGINA PRESENT, UNSPECIFIED WHETHER NATIVE OR TRANSPLANTED HEART: ICD-10-CM

## 2024-01-29 DIAGNOSIS — Z47.1 AFTERCARE FOLLOWING JOINT REPLACEMENT SURGERY, UNSPECIFIED JOINT: Primary | ICD-10-CM

## 2024-01-29 DIAGNOSIS — I10 ESSENTIAL HYPERTENSION, BENIGN: ICD-10-CM

## 2024-01-29 DIAGNOSIS — Z96.651 PRESENCE OF RIGHT ARTIFICIAL KNEE JOINT: ICD-10-CM

## 2024-01-29 NOTE — PROGRESS NOTES
This is the initial home health certification. [facility]  SOC:01/23/2024  Dates of service are for 01/23/2024-03/22/2024  See scanned reports for plan of care     Order# [id number]

## 2024-01-30 NOTE — PROGRESS NOTES
This is the initial home health certification. [facility]  SOC:01/23/2024  Dates of service are for 01/23/2024-03/22/2024  See scanned reports for plan of care     Order# [id number]

## 2024-01-31 ENCOUNTER — TELEPHONE (OUTPATIENT)
Dept: ORTHOPEDICS CLINIC | Facility: CLINIC | Age: 84
End: 2024-01-31

## 2024-01-31 DIAGNOSIS — Z96.651 S/P TKR (TOTAL KNEE REPLACEMENT), RIGHT: Primary | ICD-10-CM

## 2024-01-31 NOTE — TELEPHONE ENCOUNTER
S/p Right TKR on 1/9/24. S/w pt and he states he HH PT is ending on 2/1 and he needs an order for outpt PT. Informed him I would discuss with Harjit and call back once order in.   Pending outpt PT order for your review and signature if you approve?

## 2024-01-31 NOTE — TELEPHONE ENCOUNTER
Called pt and informed him order is in and provided him scheduling number to PT to set up appts. He had no further q's.

## 2024-02-05 ENCOUNTER — OFFICE VISIT (OUTPATIENT)
Dept: PHYSICAL THERAPY | Age: 84
End: 2024-02-05
Attending: PHYSICIAN ASSISTANT
Payer: MEDICARE

## 2024-02-05 DIAGNOSIS — Z96.651 S/P TKR (TOTAL KNEE REPLACEMENT), RIGHT: Primary | ICD-10-CM

## 2024-02-05 PROCEDURE — 97110 THERAPEUTIC EXERCISES: CPT | Performed by: PHYSICAL THERAPIST

## 2024-02-05 PROCEDURE — 97161 PT EVAL LOW COMPLEX 20 MIN: CPT | Performed by: PHYSICAL THERAPIST

## 2024-02-05 NOTE — PROGRESS NOTES
POST-OP KNEE EVALUATION:     Diagnosis:    S/P TKR (total knee replacement), right (Z96.651); R knee pain      Referring Provider: MD Faiza Date of Evaluation:    2/5/2024    Precautions:  CAD, HTN  Next MD visit:   2/12/2024  Date of Surgery: 1/9/2024     PATIENT SUMMARY   Dedrick Norton is a 83 year old male who presents to therapy today s/p R TKA on 1/9/2024 with complaints of R knee pain, limitations with ADL. Pt reports progressive R knee pain/OA limiting walking/ADL, leading up to R TKA.  Pt underwent R TKA 1/9/2024, followed by hospital stay, rehab center stay, home therapy x 3 sessions leading up to outpatient PT today. Pt reports has been using SPC since he returned home. Pt reports SPC use prior to surgery. Pt reports prior PT edema massage helpful for reducing swelling.  Pt reports standing/walking limited to 10 min, stairs improving (uses SPC and railing).     Pt describes pain level current 5/10, at best 5/10, at worst 6-7/10.   Current functional limitations include: reliance on SPC, standing/walking limited to 10 min, difficulty with stairs, has help with cleaning home/meal prep/groceries.     Social: , lives alone, 7 steps in home, neighbors help with meals/cooking/prescription and grocery     Dedrick describes prior level of function: chronic R knee pain/limitations/SPC use Pt goals include: to go [place] to visit his brother.   Past medical history was reviewed with Dedrick. Significant findings include:     Bradycardia 1/9/2024   Acquired hypothyroidism 10/23/2023     Atrial fibrillation (HCC) Unknown   Hypomagnesemia Unknown   Postoperative anemia Unknown   Gross hematuria Unknown   Osteoarthritis of left knee Unknown   Primary osteoarthritis of both knees 11/30/2022   Gastroesophageal reflux disease without esophagitis 4/1/2022   Coronary artery disease involving native coronary artery 2/4/2022   Primary osteoarthritis of right knee 8/23/2021   Lumbar radiculopathy 10/16/2019    Cellulitis of left lower extremity 3/16/2017   Hypertension 8/27/2013   Hypercholesterolemia 9/13/2011   BPH with urinary obstruction 7/14/2009   Cardiac stents, L TKA,  tonstillectomy         ASSESSMENT  Dedrick presents to physical therapy evaluation with primary c/o R knee pain, ADL limitations. The results of the objective tests and measures show: B LE edema, R knee AROM deficits, R LE weakness, gait/function as detailed below .  Functional deficits include but are not limited to: reliance on SPC, standing/walking limited to 10 min, difficulty with stairs, has help with cleaning home/meal prep/groceries. Signs and symptoms are consistent with diagnosis of s/p R TKA, R knee pain. Pt and PT discussed evaluation findings, pathology, POC and HEP.  Pt voiced understanding and performs HEP correctly without reported pain. Skilled Physical Therapy is medically necessary to address the above impairments and reach functional goals. SIGNIFICANT PORTION OF SESSION DEDICATED TO SCHEDULING PT FOR PATIENT, LIMITED TIME FOR EVALUATION/INTERVENTIONS. Changed pt's surgical bandage, per his request (see Objective).     OBJECTIVE:   Observation/Skin: R knee surgical scar healing, appears WNL; large bandage in place; assisted pt with changing  bandage, per his request; pt provided bandage from surgeon, reports advised to change it.  B LE edema.    AROM: (* denotes performed with pain)   Knee    Flexion: R 103; L 110   Extension: R -10; L 0     Patellar Mobility/Accessory motion: L/R patella mobility WNL    Flexibility:   Hamstrings: R/L restriction  Quads: R/L restriction    Strength/MMT: (* denotes performed with pain)  Hip - deferred Knee   Flexion: R -/5; L -/5  Extension: R -/5; L -/5  Abduction: R -/5; L -/5  ER: R -/5; L -/5 Flexion: R 4/5; L 4+/5  Extension: R 4/5; L 4+/5        Gait: pt ambulates on level ground with  lacks R TKE, R SPC use (too high, SPC non-adjustable) .     Today’s Treatment and Response:   Pt education  was provided on exam findings, treatment diagnosis, treatment plan, expectations, and prognosis. Pt was also provided recommendations for  HEP . Patient was instructed in and issued a HEP for:  Seated R knee ext stretch with overpressure, seated R knee flexion stretch, seated R knee isomtric quad, gastroc stretch    Charges: PT Eval Low Complexity, 1 TE (10 min)      Total Timed Treatment: 10 min     Total Treatment Time: 45 min     Based on clinical rationale and outcome measures, this evaluation involved Low Complexity decision making.  PLAN OF CARE:    Goals: (To be met in 10-12 visits)   Pt will improve knee extension ROM to 0 deg to allow proper heel strike during gait and terminal knee extension in stance  Pt will improve knee AROM flexion to >110 degrees to improve ability to perform transfers, stair negotiation.  Pt will improve quad strength to 5/5 to ascend 1 flight of stairs reciprocally   Pt will increase hip and knee strength to grossly 4+/5 to be able to get up and down from the floor safely  Pt will demonstrate increased hip ER/ABD strength to 4/5 to perform stepping and squatting activities without excessive femoral IR/ADD  Pt will improve SLS to >2s to improve safety and independence with gait on uneven surfaces such as grass  Pt will be independent and compliant with comprehensive HEP to maintain progress achieved in PT    Frequency / Duration: Patient will be seen for 2-3 x/week or a total of 10-12 visits over a 90 day period.  Treatment will include: Gait training, Manual Therapy, Neuromuscular Re-education, Self-Care Home Management, Therapeutic Activities, Therapeutic Exercise, Home Exercise Program instruction, and Modalities to include: MHP, cold pack    Education or treatment limitation: None  Rehab Potential: good - excellent    LEFS Score  LEFS Score: 56.25 % (2/1/2024  3:51 PM)      Patient/Family/Caregiver was advised of these findings, precautions, and treatment options and has agreed  to actively participate in planning and for this course of care.    Thank you for your referral. Please co-sign or sign and return this letter via fax as soon as possible to [phone]. If you have any questions, please contact me at Dept: [phone]    Sincerely,  Electronically signed by therapist: Laura Gandhi PT  Physician's certification required: Yes  I certify the need for these services furnished under this plan of treatment and while under my care.    X___________________________________________________ Date____________________    Certification From: 2/5/2024  To:5/5/2024

## 2024-02-07 ENCOUNTER — OFFICE VISIT (OUTPATIENT)
Dept: PHYSICAL THERAPY | Age: 84
End: 2024-02-07
Attending: PHYSICIAN ASSISTANT
Payer: MEDICARE

## 2024-02-07 PROCEDURE — 97140 MANUAL THERAPY 1/> REGIONS: CPT | Performed by: PHYSICAL THERAPIST

## 2024-02-07 PROCEDURE — 97110 THERAPEUTIC EXERCISES: CPT | Performed by: PHYSICAL THERAPIST

## 2024-02-07 NOTE — PROGRESS NOTES
Diagnosis:   S/P TKR (total knee replacement), right (Z96.651); R knee pain   Referring Provider: EVELINE Multani Date of Evaluation:    2/5/2024    Precautions:   CAD, HTN Next MD visit:   2/2024  Date of Surgery: 1/9/2024   Insurance Primary/Secondary: MEDICARE / [employer] [place] INDEMNITY     # Auth Visits: POC every 10 visits          Subjective: R knee is getting better. Completing HEP.     Pain: 4/10      Objective:     AROM: (* denotes performed with pain)   Knee    Flexion: R 105 (108 deg); L 110   Extension: R -8; L 0       Assessment: Pt progressing well overall s/p R TKA but with residual R knee ROM, strength, functional deficits for which he will benefit from continued PT.  Pt with some improvement in R knee AROM today.  Pt with hx of ITB c/o; proactively completed ITB STM today; pt reports completing hip strengthening at home. Gait trained pt using L SPC but pt okay to use SPC R/L hand, per pt preference. Pt tolerated session well.      Goals:    (To be met in 10-12 visits)   Pt will improve knee extension ROM to 0 deg to allow proper heel strike during gait and terminal knee extension in stance  Pt will improve knee AROM flexion to >110 degrees to improve ability to perform transfers, stair negotiation.  Pt will improve quad strength to 5/5 to ascend 1 flight of stairs reciprocally   Pt will increase hip and knee strength to grossly 4+/5 to be able to get up and down from the floor safely  Pt will demonstrate increased hip ER/ABD strength to 4/5 to perform stepping and squatting activities without excessive femoral IR/ADD  Pt will improve SLS to >2s to improve safety and independence with gait on uneven surfaces such as grass  Pt will be independent and compliant with comprehensive HEP to maintain progress achieved in PT    Plan: Continue PT 2-3 x weekly for 10-12 visits. Progress knee ROM, knee/hip strength, balance, gait with/without SPC, steps.  Date: 2/7/2024  TX#: 2/10-12 Date:                 TX#: 3/ Date:                  TX#: 4/ Date:                 TX#: 5/ Date:   Tx#: 6/   There Ex:   HEP review; see below.   R SAQ using bolster 10 x 3 sec each  R knee flexion AAROM using ball PT A 4 x 20 sec each  Supine L/R active hamstrings stretch 5 x 15 sec each  Bridge on red ball x 12 reps  Standing alternate hip abd x 10 reps each L/R LE           Manual:   R patella sup/inf, med/lat mobilization  Gentle R knee ext ROM/stretch 3 x 20 sec each  STM L/R ITB using ITB        NM Re-ed:   Gait using SPC L/R hand              HEP: Seated R knee ext stretch with overpressure, seated R knee flexion stretch, seated R knee isomtric quad, gastroc stretch    Charges: 2 TE (25 min), 1 manual (10 min), 0 NMRE (5 min)     Total Timed Treatment: 40 min  Total Treatment Time: 45 min

## 2024-02-12 ENCOUNTER — HOSPITAL ENCOUNTER (OUTPATIENT)
Dept: GENERAL RADIOLOGY | Facility: HOSPITAL | Age: 84
Discharge: HOME | End: 2024-02-12
Attending: PHYSICIAN ASSISTANT
Payer: MEDICARE

## 2024-02-12 ENCOUNTER — OFFICE VISIT (OUTPATIENT)
Dept: ORTHOPEDICS CLINIC | Facility: CLINIC | Age: 84
End: 2024-02-12
Payer: MEDICARE

## 2024-02-12 ENCOUNTER — APPOINTMENT (OUTPATIENT)
Dept: PHYSICAL THERAPY | Age: 84
End: 2024-02-12
Attending: PHYSICIAN ASSISTANT
Payer: MEDICARE

## 2024-02-12 DIAGNOSIS — Z47.89 ORTHOPEDIC AFTERCARE: ICD-10-CM

## 2024-02-12 DIAGNOSIS — Z47.89 ORTHOPEDIC AFTERCARE: Primary | ICD-10-CM

## 2024-02-12 PROCEDURE — 99024 POSTOP FOLLOW-UP VISIT: CPT | Performed by: PHYSICIAN ASSISTANT

## 2024-02-12 PROCEDURE — 73562 X-RAY EXAM OF KNEE 3: CPT | Performed by: PHYSICIAN ASSISTANT

## 2024-02-12 NOTE — PROGRESS NOTES
NURSING INTAKE COMMENTS:   Chief Complaint   Patient presents with    Post-Op     1st POV R TKA - Pt states he is healing well - Pain is after pt - no numbness or tingling.        HPI: This 83 year old male presents today for follow-up on his right knee.  His surgery was about 4 weeks ago.  Overall he is doing well.  He is taking Tylenol for pain control.  He has started outpatient therapy.  He is ambulating with a cane.    Past Medical History:   Diagnosis Date    Back problem     BPH (benign prostatic hyperplasia)     Cataract     Cellulitis     Cellulitis of left lower extremity 03/16/2017    Disorder of thyroid     Elevated prostate specific antigen (PSA) 08/06/2012    Esophageal reflux     Essential hypertension     Hearing impairment     no hearing aids    Heart attack (HCC)     High blood pressure     High cholesterol     History of blood transfusion     no reactions    History of prostate biopsy     History of stomach ulcers     Hyperlipidemia     Intractable back pain 10/16/2019    NSTEMI (non-ST elevated myocardial infarction) (HCC) 02/04/2022    Osteoarthritis     Postoperative nausea 1/9/2024    Sciatica of right side     Sleep apnea     no CPAP use    Visual impairment     readers     Past Surgical History:   Procedure Laterality Date    ANGIOPLASTY (CORONARY)  2022    ARTHROSCOPY OF JOINT UNLISTED Left     knee    CATARACT SURGERY, COMPLEX      CATH BARE METAL STENT (BMS)      COLONOSCOPY      HC INJ EPIDURAL STEROID LUMBAR OR SACRAL W IMG GUIDANCE      TONSILLECTOMY      UPPER GI ENDOSCOPY,EXAM       Current Outpatient Medications   Medication Sig Dispense Refill    aspirin 325 MG Oral Tab EC Take 1 tablet (325 mg total) by mouth in the morning and 1 tablet (325 mg total) before bedtime. 60 tablet 0    docusate sodium 100 MG Oral Cap Take 100 mg by mouth 2 (two) times daily. 20 capsule 0    HYDROcodone-acetaminophen 7.5-325 MG Oral Tab Take 1 tablet by mouth every 6 (six) hours as needed. 30 tablet 0     potassium chloride 20 MEQ Oral Tab CR Take 1 tablet (20 mEq total) by mouth daily. 90 tablet 0    omeprazole 20 MG Oral Capsule Delayed Release Take 1 capsule (20 mg total) by mouth before breakfast. 90 capsule 3    gabapentin 100 MG Oral Cap Take 1 capsule (100 mg total) by mouth 2 (two) times daily. 180 capsule 0    ergocalciferol 1.25 MG (50104 UT) Oral Cap Take 1 capsule (50,000 Units total) by mouth once a week. (Patient taking differently: Take 1 capsule (50,000 Units total) by mouth Every Friday.) 12 capsule 3    levothyroxine 75 MCG Oral Tab Take 1 tablet (75 mcg total) by mouth before breakfast. 90 tablet 1    atorvastatin 80 MG Oral Tab Take 1 tablet (80 mg total) by mouth daily. (Patient taking differently: Take 1 tablet (80 mg total) by mouth nightly.) 90 tablet 3    finasteride 5 MG Oral Tab Take 1 tablet (5 mg total) by mouth daily. 90 tablet 3    furosemide 40 MG Oral Tab TAKE ONE TABLET BY MOUTH ONE TIME DAILY 90 tablet 3    ferrous sulfate 325 (65 FE) MG Oral Tab EC Take 1 tablet (325 mg total) by mouth daily with breakfast. 90 tablet 3    metoprolol succinate ER 25 MG Oral Tablet 24 Hr Take 1 tablet (25 mg total) by mouth Daily Beta Blocker. 90 tablet 3     Allergies   Allergen Reactions    Clarithromycin DIZZINESS and OTHER (SEE COMMENTS)     Other reaction(s): blurring in the eyes    Penicillins RASH and OTHER (SEE COMMENTS)     As a child, was told to have an allergic reaction; eyes became blurry     Family History   Problem Relation Age of Onset    Pulmonary Disease Father         Per NG: pneumonia ( cause of death)       Social History     Occupational History    Not on file   Tobacco Use    Smoking status: Former     Packs/day: 1.50     Years: 20.00     Additional pack years: 0.00     Total pack years: 30.00     Types: Cigarettes     Quit date: 1988     Years since quittin.7     Passive exposure: Past    Smokeless tobacco: Never   Vaping Use    Vaping Use: Never used   Substance  and Sexual Activity    Alcohol use: Not Currently     Alcohol/week: 1.0 standard drink of alcohol     Types: 1 Standard drinks or equivalent per week    Drug use: No    Sexual activity: Not on file        Review of Systems:  GENERAL: feels generally well, no significant weight loss or weight gain  SKIN: no ulcerated or worrisome skin lesions  EYES:denies blurred vision or double vision  HEENT: denies new nasal congestion, sinus pain or ST  LUNGS: denies shortness of breath  CARDIOVASCULAR: denies chest pain  GI: no hematemesis, no worsening heartburn, no diarrhea  : no dysuria, no blood in urine, no difficulty urinating, no incontinence  MUSCULOSKELETAL: no other musculoskeletal complaints other than in HPI  NEURO: no numbness or tingling, no weakness or balance disorder  PSYCHE: no depression or anxiety  HEMATOLOGIC: no hx of blood dyscrasia  ENDOCRINE: no thyroid or diabetes issues  ALL/ASTHMA: no new hx of severe allergy or asthma    Physical Examination:    There were no vitals taken for this visit.  Constitutional: appears well hydrated, alert and responsive, no acute distress noted  Extremities: The incision is healing well.  No redness or swelling.  Nontender to palpation.  He lacks some extension and has 105 degrees of flexion.  Good strength resisted knee flexion and extension.  His calf is soft and nontender.      Imaging: XR KNEE (3 VIEWS), RIGHT (CPT=73562)    Result Date: 2/12/2024  PROCEDURE: XR KNEE ROUTINE (3 VIEWS), RIGHT (CPT=73562)  COMPARISON: [facility], XR KNEE (1 OR 2 VIEWS), RIGHT (CPT=73560), 1/09/2024, 10:39 AM.  [facility] 2nd Floor, XR KNEE (3 VIEWS), RIGHT (CPT=73562), 10/25/2023, 10:31 AM.  [facility], XR KNEE, COMPLETE (4 OR MORE VIEWS), RIGHT (CPT=73564), 6/15/2021, 10:06 AM.  INDICATIONS: Follow-up for right knee surgery.  TECHNIQUE: 3 views were obtained.    FINDINGS/CONCLUSION:          Prior postsurgical changes of right  knee total arthroplasty with no evidence of hardware complication.  No acute fracture.  No joint effusion.  Soft tissues are unremarkable.  Unchanged calcification in the region of the medial aspect of the right femoral metaphysis, probably a benign bony exostosis.  Incidental note is made of left knee arthroplasty which is grossly normal.      Dictated by (CST): Stefan Chen MD on 2/12/2024 at 10:17 AM     Finalized by (CST): Stefan Chen MD on 2/12/2024 at 10:20 AM             Lab Results   Component Value Date    WBC 11.2 (H) 01/10/2024    HGB 9.6 (L) 01/10/2024    .0 01/10/2024      Lab Results   Component Value Date    GLU 90 12/19/2023    BUN 16 12/19/2023    CREATSERUM 0.65 (L) 12/19/2023    GFRNAA 87 08/13/2021    GFRAA 100 08/13/2021        Assessment and Plan:  Diagnoses and all orders for this visit:    Orthopedic aftercare  -     XR KNEE (3 VIEWS), RIGHT (CPT=73562); Future        Assessment: Status post right total knee arthroplasty    Plan: Mr. Norton is progressing well following his surgery.  We discussed his x-ray findings.  He will continue with outpatient therapy.  I encouraged him to be diligent with his home exercise program.  Will see him back in 4 weeks to assess his progress.  We will obtain a new x-ray of his knee at that time as well.  I advised him to call if any questions or problems arise in the meantime.    The above note was creating using Dragon speech recognition technology. Please excuse any typos.    This visit was performed under the supervision of Dr. Michel Kendall who formulated the treatment plan and decision making.

## 2024-02-13 ENCOUNTER — OFFICE VISIT (OUTPATIENT)
Dept: PHYSICAL THERAPY | Age: 84
End: 2024-02-13
Attending: PHYSICIAN ASSISTANT
Payer: MEDICARE

## 2024-02-13 PROCEDURE — 97140 MANUAL THERAPY 1/> REGIONS: CPT | Performed by: PHYSICAL THERAPIST

## 2024-02-13 PROCEDURE — 97110 THERAPEUTIC EXERCISES: CPT | Performed by: PHYSICAL THERAPIST

## 2024-02-13 NOTE — PROGRESS NOTES
Diagnosis:   S/P TKR (total knee replacement), right (Z96.651); R knee pain   Referring Provider: EVELINE Multani Date of Evaluation:    2/5/2024    Precautions:   CAD, HTN Next MD visit:   2/2024  Date of Surgery: 1/9/2024   Insurance Primary/Secondary: MEDICARE / [employer] [place] INDEMNITY     # Auth Visits: POC every 10 visits          Subjective: Had f/u with ARTIS Multani PA-C: xrays taken (may be a small issue but no restrictions), f/u in ~ 4 weeks, to continue with PT . Deny c/o following last PT session. Sometimes going up/down stairs reciprocally but with some lateral knee c/o.  Using SPC.    Pain: 4-5/10      Objective:     AROM: (* denotes performed with pain)   Knee    Flexion: R 110 (mid session); L 110   Extension: R -5 (mid session; L 0     Per 2/12/2024 appointment with ARTIS Multani PA-C:     Imaging: XR KNEE (3 VIEWS), RIGHT (CPT=73562)     Result Date: 2/12/2024  PROCEDURE:         XR KNEE ROUTINE (3 VIEWS), RIGHT (CPT=73562)  COMPARISON:      [facility], XR KNEE (1 OR 2 VIEWS), RIGHT (CPT=73560), 1/09/2024, 10:39 AM.  [facility] 2nd Floor, XR KNEE (3 VIEWS), RIGHT (CPT=73562), 10/25/2023, 10:31 AM.  [facility], XR KNEE, COMPLETE (4 OR MORE VIEWS), RIGHT (CPT=73564), 6/15/2021, 10:06 AM.  INDICATIONS: Follow-up for right knee surgery.  TECHNIQUE:     3 views were obtained.    FINDINGS/CONCLUSION:                    Prior postsurgical changes of right knee total arthroplasty with no evidence of hardware complication.  No acute fracture.  No joint effusion.  Soft tissues are unremarkable.  Unchanged calcification in the region of the medial aspect of the right femoral metaphysis, probably a benign bony exostosis.  Incidental note is made of left knee arthroplasty which is grossly normal.      Dictated by (CST): Stefan Chen MD on 2/12/2024 at 10:17 AM     Finalized by (CST): Stefan Chen MD on 2/12/2024 at 10:20 AM            Assessment:   Pt continues to  progress well s/p R TKA, demonstrating improvements in R knee AROM, ADL tolerance, but deficits persist. Reviewed 2/12/2024 xray results, based on Subjective; see Objective for results. Continue to complete STM for ITB c/o in addition to hip strengthening; pt shaila well.  Pt will benefit from HEP progression next session. Pt tolerated session well.     Goals:    (To be met in 10-12 visits)   Pt will improve knee extension ROM to 0 deg to allow proper heel strike during gait and terminal knee extension in stance  Pt will improve knee AROM flexion to >110 degrees to improve ability to perform transfers, stair negotiation.  Pt will improve quad strength to 5/5 to ascend 1 flight of stairs reciprocally   Pt will increase hip and knee strength to grossly 4+/5 to be able to get up and down from the floor safely  Pt will demonstrate increased hip ER/ABD strength to 4/5 to perform stepping and squatting activities without excessive femoral IR/ADD  Pt will improve SLS to >2s to improve safety and independence with gait on uneven surfaces such as grass  Pt will be independent and compliant with comprehensive HEP to maintain progress achieved in PT    Plan: Continue PT 2-3 x weekly for 10-12 visits. Progress HEP next session. Progress knee ROM, knee/hip strength, balance, gait with/without SPC, steps.  Date: 2/7/2024  TX#: 2/10-12 Date:  2/13/2024               TX#: 3/10-12 Date:                 TX#: 4/ Date:                 TX#: 5/ Date:   Tx#: 6/   There Ex:   HEP review; see below.   R SAQ using bolster 10 x 3 sec each  R knee flexion AAROM using ball PT A 4 x 20 sec each  Supine L/R active hamstrings stretch 5 x 15 sec each  Bridge on red ball x 12 reps  Standing alternate hip abd x 10 reps each L/R LE     There Ex:   HEP review; see below.   R SAQ using bolster 10 x 3 sec each  Supine active L/R hamstrings stretch 3 x 15 sec  Supine B hip ER using blue tubing  x 30 reps   R knee flexion AAROM using ball PT A 4 x 20 sec  each  Bridge on red ball x 12 reps  4 inch step up over down x 3 reps  R LE - too easy; 6 inch step up over down x 4 reps R LE      Manual:   R patella sup/inf, med/lat mobilization  Gentle R knee ext ROM/stretch 3 x 20 sec each  STM L/R ITB using foam roller  Manual:   R patella sup/inf, med/lat mobilization  Gentle R knee ext ROM/stretch 3 x 20 sec each  STM L/R ITB using foam roller      NM Re-ed:   Gait using SPC L/R hand              HEP: Seated R knee ext stretch with overpressure, seated R knee flexion stretch, seated R knee isometric quad, gastroc stretch    Charges: 2 TE (30 min), 1 manual (10 min)     Total Timed Treatment: 40 min  Total Treatment Time: 45 min

## 2024-02-14 ENCOUNTER — HOME HEALTH CHARGES (OUTPATIENT)
Dept: INTERNAL MEDICINE CLINIC | Facility: CLINIC | Age: 84
End: 2024-02-14

## 2024-02-14 ENCOUNTER — OFFICE VISIT (OUTPATIENT)
Dept: PHYSICAL THERAPY | Age: 84
End: 2024-02-14
Attending: PHYSICIAN ASSISTANT
Payer: MEDICARE

## 2024-02-14 DIAGNOSIS — Z47.1 AFTERCARE FOLLOWING JOINT REPLACEMENT SURGERY, UNSPECIFIED JOINT: Primary | ICD-10-CM

## 2024-02-14 DIAGNOSIS — I10 HYPERTENSION, UNSPECIFIED TYPE: ICD-10-CM

## 2024-02-14 DIAGNOSIS — I25.10 ATHEROSCLEROSIS OF NATIVE CORONARY ARTERY OF NATIVE HEART WITHOUT ANGINA PECTORIS: ICD-10-CM

## 2024-02-14 DIAGNOSIS — I48.91 ATRIAL FIBRILLATION, UNSPECIFIED TYPE (HCC): ICD-10-CM

## 2024-02-14 PROCEDURE — 97140 MANUAL THERAPY 1/> REGIONS: CPT | Performed by: PHYSICAL THERAPIST

## 2024-02-14 PROCEDURE — 97110 THERAPEUTIC EXERCISES: CPT | Performed by: PHYSICAL THERAPIST

## 2024-02-14 RX ORDER — ASPIRIN 325 MG
325 TABLET, DELAYED RELEASE (ENTERIC COATED) ORAL 2 TIMES DAILY
Qty: 60 TABLET | Refills: 0 | OUTPATIENT
Start: 2024-02-14

## 2024-02-14 NOTE — PROGRESS NOTES
This is the Initial home health certification. [facility]  SOC: 01/23/2024  Dates of service are for 01/23/2024-03/22/2024     See scanned reports for plan of care     Order# [id number]

## 2024-02-14 NOTE — PROGRESS NOTES
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  Using SPC for balance. I am okay continuing to use SPC, not necessarily a goal to get rid of it.     Pain: 4/10      Objective:     AROM: (* denotes performed with pain)   Knee    Flexion: R 108 (beginning of session); L 110   Extension: R -4 (beginning of session); L 0       Assessment:   Pt continues to progress well s/p R TKA. Modified session/min progression of interventions based on subjective report of soreness after last session. Pt tolerated session well, will benefit from continued PT to address deficits. Modified/progressed HEP; see below. Continue to complete STM for ITB c/o in addition to hip strengthening; pt shaila well.      Goals:    (To be met in 10-12 visits)   Pt will improve knee extension ROM to 0 deg to allow proper heel strike during gait and terminal knee extension in stance  Pt will improve knee AROM flexion to >110 degrees to improve ability to perform transfers, stair negotiation.  Pt will improve quad strength to 5/5 to ascend 1 flight of stairs reciprocally   Pt will increase hip and knee strength to grossly 4+/5 to be able to get up and down from the floor safely  Pt will demonstrate increased hip ER/ABD strength to 4/5 to perform stepping and squatting activities without excessive femoral IR/ADD  Pt will improve SLS to >2s to improve safety and independence with gait on uneven surfaces such as grass  Pt will be independent and compliant with comprehensive HEP to maintain progress achieved in PT    Plan: Continue PT 2-3 x weekly for 10-12 visits. F/u on HEP progressions.  Progress knee ROM,  knee/hip strength, balance, gait with/without SPC, steps.  Date: 2/7/2024  TX#: 2/10-12 Date:  2/13/2024               TX#: 3/10-12 Date:   2/14/2024              TX#: 4/10-12 Date:                 TX#: 5/ Date:   Tx#: 6/ There Ex:   HEP review; see below.   R SAQ using bolster 10 x 3 sec each  R knee flexion AAROM using ball PT A 4 x 20 sec each  Supine L/R active hamstrings stretch 5 x 15 sec each  Bridge on red ball x 12 reps  Standing alternate hip abd x 10 reps each L/R LE     There Ex:   HEP review; see below.   R SAQ using bolster 10 x 3 sec each  Supine active L/R hamstrings stretch 3 x 15 sec  Supine B hip ER using blue tubing  x 30 reps   R knee flexion AAROM using ball PT A 4 x 20 sec each  Bridge on red ball x 12 reps  4 inch step up over down x 3 reps  R LE - too easy; 6 inch step up over down x 4 reps R LE There Ex:    R/L SAQ using bolster 10 x 3 sec each  Supine active L/R hamstrings stretch 3 x 15 sec  Supine B hip ER using blue tubing  x 30 reps   R knee flexion AAROM using ball PT A 4 x 20 sec each  Bridge on red ball x 12 reps  4 inch step up over down x 3 reps  R LE - too easy; 6 inch step up over down x 6 reps R LE  HEP review/progression/handout; see below. Progress to LAQ and side steps at countertop.     Manual:   R patella sup/inf, med/lat mobilization  Gentle R knee ext ROM/stretch 3 x 20 sec each  STM L/R ITB using foam roller  Manual:   R patella sup/inf, med/lat mobilization  Gentle R knee ext ROM/stretch 3 x 20 sec each  STM L/R ITB using foam roller Manual:   R patella sup/inf, med/lat mobilization  Gentle R knee ext ROM/stretch 3 x 20 sec each  STM L/R ITB using foam roller     NM Re-ed:   Gait using SPC L/R hand              HEP: Seated R knee ext stretch with overpressure, seated R knee flexion stretch, seated R knee isometric quad - progress to LAQ, sidesteps at countertop, gastroc stretch    Charges: 2 TE (30 min), 1 manual (10 min)     Total Timed Treatment: 40 min  Total  Treatment Time: 45 min

## 2024-02-14 NOTE — TELEPHONE ENCOUNTER
S/p right TKA on 1/9/24.   Aspirin rx'd on 1/11/24 #60 for 30 day post operative course.   Does pt need to refill this?

## 2024-02-14 NOTE — PROGRESS NOTES
This is the Initial home health certification. [facility]  SOC: 01/23/2024  Dates of service are for 01/23/2024-03/22/2024    See scanned reports for plan of care     Order# [id number]

## 2024-02-20 ENCOUNTER — OFFICE VISIT (OUTPATIENT)
Dept: PHYSICAL THERAPY | Age: 84
End: 2024-02-20
Attending: PHYSICIAN ASSISTANT
Payer: MEDICARE

## 2024-02-20 PROCEDURE — 97140 MANUAL THERAPY 1/> REGIONS: CPT | Performed by: PHYSICAL THERAPIST

## 2024-02-20 PROCEDURE — 97110 THERAPEUTIC EXERCISES: CPT | Performed by: PHYSICAL THERAPIST

## 2024-02-20 NOTE — PROGRESS NOTES
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 Completing HEP. C/o some intermittent lateral knee discomfort and L lateral hip c/o.     Pain: 3/10      Objective:     AROM: (* denotes performed with pain)   Knee    Flexion: R 106 (beginning of session; 110 deg mid session); L 110   Extension: R -7 (beginning of session; - 3 mid session); L 0       Assessment:   Pt progressing well overall s/p R TKA, demonstrating improvements in ADL tolerance with less c/o. Continue to address ITB, L lateral hip bursitis c/o, knee ROM deficits.  Reviewed form/progression for R knee ext ROM as part of HEP due to pt reporting stretch no longer effective. Pt shaila well.    Goals:    (To be met in 10-12 visits)   Pt will improve knee extension ROM to 0 deg to allow proper heel strike during gait and terminal knee extension in stance  Pt will improve knee AROM flexion to >110 degrees to improve ability to perform transfers, stair negotiation.  Pt will improve quad strength to 5/5 to ascend 1 flight of stairs reciprocally   Pt will increase hip and knee strength to grossly 4+/5 to be able to get up and down from the floor safely  Pt will demonstrate increased hip ER/ABD strength to 4/5 to perform stepping and squatting activities without excessive femoral IR/ADD  Pt will improve SLS to >2s to improve safety and independence with gait on uneven surfaces such as grass  Pt will be independent and compliant with comprehensive HEP to maintain progress achieved in PT    Plan: Continue PT 2-3 x weekly for 10-12 visits. F/u on knee ext ROM as part of HEP.  Progress knee ROM, knee/hip strength, balance, gait with/without SPC, steps.  Date: 2/7/2024  TX#: 2/10-12 Date:  2/13/2024               TX#: 3/10-12 Date:   2/14/2024              TX#: 4/10-12 Date:  2/20/2024               TX#: 5/10-12 Date:   Tx#: 6/ There Ex:   HEP review; see below.   R SAQ using bolster 10 x 3 sec each  R knee flexion AAROM using ball PT A 4 x 20 sec each  Supine L/R active hamstrings stretch 5 x 15 sec each  Bridge on red ball x 12 reps  Standing alternate hip abd x 10 reps each L/R LE     There Ex:   HEP review; see below.   R SAQ using bolster 10 x 3 sec each  Supine active L/R hamstrings stretch 3 x 15 sec  Supine B hip ER using blue tubing  x 30 reps   R knee flexion AAROM using ball PT A 4 x 20 sec each  Bridge on red ball x 12 reps  4 inch step up over down x 3 reps  R LE - too easy; 6 inch step up over down x 4 reps R LE There Ex:    R/L SAQ using bolster 10 x 3 sec each  Supine active L/R hamstrings stretch 3 x 15 sec  Supine B hip ER using blue tubing  x 30 reps   R knee flexion AAROM using ball PT A 4 x 20 sec each  Bridge on red ball x 12 reps  4 inch step up over down x 3 reps  R LE - too easy; 6 inch step up over down x 6 reps R LE  HEP review/progression/handout; see below. Progress to LAQ and side steps at countertop. There Ex:   Seated R LAQ x 10 reps (HEP)  R SAQ using bolster 15 x 3 sec each  Supine L/R modified across body piriformis stretch x 30 sec each (HEP)  Supine B hip ER using blue TB  x 30 reps   R knee flexion AAROM using ball PT A 4 x 30 sec each  Bridge on red ball x 12 reps  Sit - stand from plinth x 10 reps   Mini staircase (4, ~ 6 inch steps) up/down reciprocally x 2  Marching at bar 10 x 3 sec each L/R LE    Manual:   R patella sup/inf, med/lat mobilization  Gentle R knee ext ROM/stretch 3 x 20 sec each  STM L/R ITB using foam roller  Manual:   R patella sup/inf, med/lat mobilization  Gentle R knee ext ROM/stretch 3 x 20 sec each  STM L/R ITB using foam  roller Manual:   R patella sup/inf, med/lat mobilization  Gentle R knee ext ROM/stretch 3 x 20 sec each  STM L/R ITB using foam roller Manual:   R patella sup/inf, med/lat mobilization  R knee scar mobilization  Gentle R knee ext ROM/stretch 3 x 20 sec each  STM L/R ITB using foam roller, L gluteal/greater trochanter region    NM Re-ed:   Gait using SPC L/R hand              HEP: Seated R knee ext stretch with overpressure ( with gentle overpressure, may attempt with ~ 4# wt prox to knee joint), seated R knee flexion stretch, seated LAQ, sidesteps at countertop, gastroc stretch    Charges: 2 TE (30 min), 1 manual (10 min)     Total Timed Treatment: 40 min  Total Treatment Time: 45 min

## 2024-02-22 ENCOUNTER — OFFICE VISIT (OUTPATIENT)
Dept: PHYSICAL THERAPY | Age: 84
End: 2024-02-22
Attending: PHYSICIAN ASSISTANT
Payer: MEDICARE

## 2024-02-22 PROCEDURE — 97110 THERAPEUTIC EXERCISES: CPT | Performed by: PHYSICAL THERAPIST

## 2024-02-22 PROCEDURE — 97140 MANUAL THERAPY 1/> REGIONS: CPT | Performed by: PHYSICAL THERAPIST

## 2024-02-22 NOTE — PROGRESS NOTES
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 R lateral knee discomfort improving.  Really working on HEP ROM. Go up/down stairs daily; c/o some lateral R knee pain with descending stairs.     Pain: 3/10      Objective:     AROM: (* denotes performed with pain)   Knee    Flexion: R 110; L 110   Extension: R -5 ; L 0       Assessment:   Pt with progressive improvements in R knee AROM, but deficits persist in ext > flexion. Pt with improved gait, stair negotiation tolerance with less knee c/o. ITB c/o improving. Pt reports less lateral hip c/o with piriformis stretches. Pt tolerated session well.     Goals:    (To be met in 10-12 visits)   Pt will improve knee extension ROM to 0 deg to allow proper heel strike during gait and terminal knee extension in stance  Pt will improve knee AROM flexion to >110 degrees to improve ability to perform transfers, stair negotiation.  Pt will improve quad strength to 5/5 to ascend 1 flight of stairs reciprocally   Pt will increase hip and knee strength to grossly 4+/5 to be able to get up and down from the floor safely  Pt will demonstrate increased hip ER/ABD strength to 4/5 to perform stepping and squatting activities without excessive femoral IR/ADD  Pt will improve SLS to >2s to improve safety and independence with gait on uneven surfaces such as grass  Pt will be independent and compliant with comprehensive HEP to maintain progress achieved in PT    Plan: Continue PT 2-3 x weekly for 10-12 visits. Progress knee ROM, knee/hip strength, balance, gait with/without SPC, steps.  Date:  2/13/2024               TX#: 3/10-12 Date:   2/14/2024              TX#: 4/10-12 Date:   2/20/2024               TX#: 5/10-12 Date: 2/22/2024  Tx#: 6/10-12   There Ex:   HEP review; see below.   R SAQ using bolster 10 x 3 sec each  Supine active L/R hamstrings stretch 3 x 15 sec  Supine B hip ER using blue tubing  x 30 reps   R knee flexion AAROM using ball PT A 4 x 20 sec each  Bridge on red ball x 12 reps  4 inch step up over down x 3 reps  R LE - too easy; 6 inch step up over down x 4 reps R LE There Ex:    R/L SAQ using bolster 10 x 3 sec each  Supine active L/R hamstrings stretch 3 x 15 sec  Supine B hip ER using blue tubing  x 30 reps   R knee flexion AAROM using ball PT A 4 x 20 sec each  Bridge on red ball x 12 reps  4 inch step up over down x 3 reps  R LE - too easy; 6 inch step up over down x 6 reps R LE  HEP review/progression/handout; see below. Progress to LAQ and side steps at countertop. There Ex:   Seated R LAQ x 10 reps (HEP)  R SAQ using bolster 15 x 3 sec each  Supine L/R modified across body piriformis stretch x 30 sec each (HEP)  Supine B hip ER using blue TB  x 30 reps   R knee flexion AAROM using ball PT A 4 x 30 sec each  Bridge on red ball x 12 reps  Sit - stand from plinth x 10 reps   Mini staircase (4, ~ 6 inch steps) up/down reciprocally x 2  Marching at bar 10 x 3 sec each L/R LE There Ex:   Supine L/R SAQ using bolster 10 x 3 sec  Supine L/R modified across body piriformis stretch x 30 sec each (HEP)  Supine B hip ER using black TB  x 30 reps   R knee flexion AAROM using ball PT A 4 x 30 sec each  Bridge on red ball x 12 reps  Sit - stand from plinth x 10 reps   Mini staircase (4, ~ 6 inch steps) up/down reciprocally x 2 - progress to 8 inch step next session  Standing alternate L/R hip abd x 10 reps each  Marching at bar 10 x 3 sec each L/R LE  AirEx marching - next session  HEP review; see below.    Manual:   R patella sup/inf, med/lat mobilization  Gentle R knee ext ROM/stretch 3 x 20 sec each  STM L/R ITB using foam roller Manual:   R patella sup/inf, med/lat  mobilization  Gentle R knee ext ROM/stretch 3 x 20 sec each  STM L/R ITB using foam roller Manual:   R patella sup/inf, med/lat mobilization  R knee scar mobilization  Gentle R knee ext ROM/stretch 3 x 20 sec each  STM L/R ITB using foam roller, L gluteal/greater trochanter region Manual:   R patella sup/inf, med/lat mobilization  R knee scar mobilization  Gentle R knee ext ROM/stretch 3 x 20 sec each  STM L/R ITB using foam roller               HEP: Seated R knee ext stretch with overpressure ( with gentle overpressure, may attempt with ~ 4# wt prox to knee joint), seated R knee flexion stretch,  seated LAQ, sidesteps at countertop, gastroc stretch, piriformis stretch as needed    Charges: 2 TE (30 min), 1 manual (10 min)     Total Timed Treatment: 40 min  Total Treatment Time: 45 min

## 2024-02-27 ENCOUNTER — OFFICE VISIT (OUTPATIENT)
Dept: PHYSICAL THERAPY | Age: 84
End: 2024-02-27
Attending: PHYSICIAN ASSISTANT
Payer: MEDICARE

## 2024-02-27 PROCEDURE — 97140 MANUAL THERAPY 1/> REGIONS: CPT | Performed by: PHYSICAL THERAPIST

## 2024-02-27 PROCEDURE — 97110 THERAPEUTIC EXERCISES: CPT | Performed by: PHYSICAL THERAPIST

## 2024-02-27 NOTE — PROGRESS NOTES
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 Completing HEP. Able to go up/down stairs alternating feet.  Not a goal to get rid of SPC; have been using it for years.     Pain: 2-3/10      Objective:     AROM: (* denotes performed with pain)   Knee    Flexion: R 112; L 110   Extension: R -5 (- 3 deg post manual) ; L 0       Assessment:   Pt continues to progress well s/p R TKA, demonstrating progressive improvements in knee ROM, ADL tolerance, less c/o pain. Pt with residual R knee ext > flex ROM deficits; addressing during PT sessions and with Hep.  Pt able to negotiate stairs, 8 inch step using L/R LE. Working on balance, but pt reports not a goal for him to discontinue SPC use.  Pt without loss of balance during interventions but slight veering of gait with head nods. Pt tolerated session well, likely appropriate for discharge in 2-3 sessions due to progress.    Goals:    (To be met in 10-12 visits)   Pt will improve knee extension ROM to 0 deg to allow proper heel strike during gait and terminal knee extension in stance  Pt will improve knee AROM flexion to >110 degrees to improve ability to perform transfers, stair negotiation.  Pt will improve quad strength to 5/5 to ascend 1 flight of stairs reciprocally   Pt will increase hip and knee strength to grossly 4+/5 to be able to get up and down from the floor safely  Pt will demonstrate increased hip ER/ABD strength to 4/5 to perform stepping and squatting activities without excessive femoral IR/ADD  Pt will improve SLS to >2s to improve safety and independence with gait on uneven surfaces such as grass  Pt will be independent and  compliant with comprehensive HEP to maintain progress achieved in PT    Plan: Continue PT 2-3 x weekly for 10-12 visits. See Assessment. Progress knee ROM, knee/hip strength, balance, gait with/without SPC, steps.  Date:   2/14/2024              TX#: 4/10-12 Date:  2/20/2024               TX#: 5/10-12 Date: 2/22/2024  Tx#: 6/10-12 Date: 2/27/2024  Tx #: 7/10-12   There Ex:    R/L SAQ using bolster 10 x 3 sec each  Supine active L/R hamstrings stretch 3 x 15 sec  Supine B hip ER using blue tubing  x 30 reps   R knee flexion AAROM using ball PT A 4 x 20 sec each  Bridge on red ball x 12 reps  4 inch step up over down x 3 reps  R LE - too easy; 6 inch step up over down x 6 reps R LE  HEP review/progression/handout; see below. Progress to LAQ and side steps at countertop. There Ex:   Seated R LAQ x 10 reps (HEP)  R SAQ using bolster 15 x 3 sec each  Supine L/R modified across body piriformis stretch x 30 sec each (HEP)  Supine B hip ER using blue TB  x 30 reps   R knee flexion AAROM using ball PT A 4 x 30 sec each  Bridge on red ball x 12 reps  Sit - stand from plinth x 10 reps   Mini staircase (4, ~ 6 inch steps) up/down reciprocally x 2  Marching at bar 10 x 3 sec each L/R LE There Ex:   Supine L/R SAQ using bolster 10 x 3 sec  Supine L/R modified across body piriformis stretch x 30 sec each (HEP)  Supine B hip ER using black TB  x 30 reps   R knee flexion AAROM using ball PT A 4 x 30 sec each  Bridge on red ball x 12 reps  Sit - stand from plinth x 10 reps   Mini staircase (4, ~ 6 inch steps) up/down reciprocally x 2 - progress to 8 inch step next session  Standing alternate L/R hip abd x 10 reps each  Marching at bar 10 x 3 sec each L/R LE  AirEx marching - next session  HEP review; see below.  There Ex:   Stationary bike: level 1 x 3 min  R knee flexion AAROM using ball PT A 4 x 30 sec each  Bridge on red ball x ~ 20 reps  Sit - stand from plinth x 10 reps   Mini staircase (4, ~ 6 inch steps) up/down reciprocally x  2 - unilateral UE  8 inch step up over down x 5 reps reach L/R LE  HEP review; see below.    Manual:   R patella sup/inf, med/lat mobilization  Gentle R knee ext ROM/stretch 3 x 20 sec each  STM L/R ITB using foam roller Manual:   R patella sup/inf, med/lat mobilization  R knee scar mobilization  Gentle R knee ext ROM/stretch 3 x 20 sec each  STM L/R ITB using foam roller, L gluteal/greater trochanter region Manual:   R patella sup/inf, med/lat mobilization  R knee scar mobilization  Gentle R knee ext ROM/stretch 3 x 20 sec each  STM L/R ITB using foam roller Manual:   R patella sup/inf, med/lat mobilization  R knee scar mobilization  Gentle R knee ext ROM/stretch 3 x 20 sec each  STM L/R ITB using foam roller      NM Re-Ed (gait belt donned, PT SBA/contact guard):  Gait: fast/slow, stop/go, head nods yes/no  Heel to toe walking forward/backward at bar 3 x ~ 16 feet         HEP: Seated R knee ext stretch with overpressure ( with gentle overpressure, may attempt with ~ 4# wt prox and or distally to knee joint), seated R knee flexion stretch,  seated LAQ, sidesteps at countertop, gastroc stretch, piriformis stretch as needed    Charges: 2 TE (30 min), 1 manual (10 min), 0 NM RE (5 min)     Total Timed Treatment: 45 min  Total Treatment Time: 45 min

## 2024-02-29 ENCOUNTER — OFFICE VISIT (OUTPATIENT)
Dept: PHYSICAL THERAPY | Age: 84
End: 2024-02-29
Attending: PHYSICIAN ASSISTANT
Payer: MEDICARE

## 2024-02-29 PROCEDURE — 97140 MANUAL THERAPY 1/> REGIONS: CPT | Performed by: PHYSICAL THERAPIST

## 2024-02-29 PROCEDURE — 97110 THERAPEUTIC EXERCISES: CPT | Performed by: PHYSICAL THERAPIST

## 2024-02-29 RX ORDER — DOCUSATE SODIUM 100 MG/1
100 CAPSULE, LIQUID FILLED ORAL 2 TIMES DAILY
Qty: 20 CAPSULE | Refills: 0 | OUTPATIENT
Start: 2024-02-29

## 2024-02-29 NOTE — PROGRESS NOTES
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 Deny c/o following PT sessions. Completing HEP.  Less and less lateral knee c/o.   Pain: 2-3/10      Objective:     AROM: (* denotes performed with pain)   Knee    Flexion: R 110 (114 deg post ROM); L 110   Extension: R -4(- 3 deg post manual) ; L 0       Assessment:   Pt continues to progress well s/p R TKA, likely appropriate for [facility] to [facility] after 2 more PT sessions next week due to progress. Pt tolerating balance interventions well without loss of balance. Pt with continued improvements in R knee AROM but progress slowing. Pt tolerated session well.     Goals:    (To be met in 10-12 visits)   Pt will improve knee extension ROM to 0 deg to allow proper heel strike during gait and terminal knee extension in stance  Pt will improve knee AROM flexion to >110 degrees to improve ability to perform transfers, stair negotiation.  Pt will improve quad strength to 5/5 to ascend 1 flight of stairs reciprocally   Pt will increase hip and knee strength to grossly 4+/5 to be able to get up and down from the floor safely  Pt will demonstrate increased hip ER/ABD strength to 4/5 to perform stepping and squatting activities without excessive femoral IR/ADD  Pt will improve SLS to >2s to improve safety and independence with gait on uneven surfaces such as grass  Pt will be independent and compliant with comprehensive HEP to maintain progress achieved in PT    Plan: Continue PT 2-3 x weekly for 10-12 visits. See Assessment.  Progress knee ROM, knee/hip strength, balance, gait with/without SPC, steps.  Date:  2/20/2024               TX#: 5/10-12 Date: 2/22/2024  Tx#:  6/10-12 Date: 2/27/2024  Tx #: 7/10-12 Date: 2/29/2024  Tx #: 8/10-12   There Ex:   Seated R LAQ x 10 reps (HEP)  R SAQ using bolster 15 x 3 sec each  Supine L/R modified across body piriformis stretch x 30 sec each (HEP)  Supine B hip ER using blue TB  x 30 reps   R knee flexion AAROM using ball PT A 4 x 30 sec each  Bridge on red ball x 12 reps  Sit - stand from plinth x 10 reps   Mini staircase (4, ~ 6 inch steps) up/down reciprocally x 2  Marching at bar 10 x 3 sec each L/R LE There Ex:   Supine L/R SAQ using bolster 10 x 3 sec  Supine L/R modified across body piriformis stretch x 30 sec each (HEP)  Supine B hip ER using black TB  x 30 reps   R knee flexion AAROM using ball PT A 4 x 30 sec each  Bridge on red ball x 12 reps  Sit - stand from plinth x 10 reps   Mini staircase (4, ~ 6 inch steps) up/down reciprocally x 2 - progress to 8 inch step next session  Standing alternate L/R hip abd x 10 reps each  Marching at bar 10 x 3 sec each L/R LE  AirEx marching - next session  HEP review; see below.  There Ex:   Stationary bike: level 1 x 3 min  R knee flexion AAROM using ball PT A 4 x 30 sec each  Bridge on red ball x ~ 20 reps  Sit - stand from plinth x 10 reps   Mini staircase (4, ~ 6 inch steps) up/down reciprocally x 2 - unilateral UE  8 inch step up over down x 5 reps reach L/R LE  HEP review; see below.  There Ex:   Stationary bike: level 1 x 3 min  6 and 8 inch step up over down x 5 reps reach L/R LE  Lateral steps with RTB prox to knees 4 x ~ 12 feet  Standing L/R hamstrings stretches x 30 sec each  R knee flexion AAROM using ball PT A 4 x 30 sec each  Bridge on red ball x ~ 20 reps  HEP review; see below.   Supine L/R hip flexor/quad stretch x 20 sec each (HEP)   Manual:   R patella sup/inf, med/lat mobilization  R knee scar mobilization  Gentle R knee ext ROM/stretch 3 x 20 sec each  STM L/R ITB using foam roller, L gluteal/greater trochanter region Manual:   R patella sup/inf, med/lat mobilization  R  knee scar mobilization  Gentle R knee ext ROM/stretch 3 x 20 sec each  STM L/R ITB using foam roller Manual:   R patella sup/inf, med/lat mobilization  R knee scar mobilization  Gentle R knee ext ROM/stretch 3 x 20 sec each  STM L/R ITB using foam roller Manual:   R knee scar mobilization  Gentle R knee ext ROM/stretch 3 x 20 sec each  STM L/R ITB using foam roller     NM Re-Ed (gait belt donned, PT SBA/contact guard):  Gait: fast/slow, stop/go, head nods yes/no  Heel to toe walking forward/backward at bar 3 x ~ 16 feet NM Re-Ed (gait belt donned, PT SBA/contact guard):  Gait:Air Ex and 1/2 bolster negotiation         HEP: Seated R knee ext stretch with overpressure ( with gentle overpressure, may attempt with ~ 4# wt prox and or distally to knee joint), seated R knee flexion stretch,  seated LAQ, sidesteps at countertop, gastroc stretch, piriformis stretch as needed, supine hip flexor/quad stretch    Charges: 2 TE (30 min), 1 manual (10 min), 0 NM RE (5 min)     Total Timed Treatment: 45 min  Total Treatment Time: 45 min

## 2024-03-05 ENCOUNTER — OFFICE VISIT (OUTPATIENT)
Dept: PHYSICAL THERAPY | Age: 84
End: 2024-03-05
Attending: PHYSICIAN ASSISTANT
Payer: MEDICARE

## 2024-03-05 PROCEDURE — 97140 MANUAL THERAPY 1/> REGIONS: CPT | Performed by: PHYSICAL THERAPIST

## 2024-03-05 PROCEDURE — 97110 THERAPEUTIC EXERCISES: CPT | Performed by: PHYSICAL THERAPIST

## 2024-03-06 ENCOUNTER — OFFICE VISIT (OUTPATIENT)
Dept: INTERNAL MEDICINE CLINIC | Facility: CLINIC | Age: 84
End: 2024-03-06
Payer: MEDICARE

## 2024-03-06 ENCOUNTER — LAB ENCOUNTER (OUTPATIENT)
Dept: LAB | Age: 84
End: 2024-03-06
Attending: INTERNAL MEDICINE
Payer: MEDICARE

## 2024-03-06 VITALS
HEIGHT: 69 IN | BODY MASS INDEX: 31.55 KG/M2 | OXYGEN SATURATION: 99 % | DIASTOLIC BLOOD PRESSURE: 82 MMHG | WEIGHT: 213 LBS | SYSTOLIC BLOOD PRESSURE: 136 MMHG | HEART RATE: 65 BPM

## 2024-03-06 DIAGNOSIS — M17.0 PRIMARY OSTEOARTHRITIS OF BOTH KNEES: ICD-10-CM

## 2024-03-06 DIAGNOSIS — I10 HYPERTENSION, UNSPECIFIED TYPE: Primary | ICD-10-CM

## 2024-03-06 DIAGNOSIS — S91.311A LACERATION OF DORSUM OF FOOT, RIGHT, INITIAL ENCOUNTER: ICD-10-CM

## 2024-03-06 DIAGNOSIS — D64.9 POSTOPERATIVE ANEMIA: ICD-10-CM

## 2024-03-06 PROBLEM — Z47.89 ORTHOPEDIC AFTERCARE: Status: RESOLVED | Noted: 2023-04-10 | Resolved: 2024-03-06

## 2024-03-06 PROBLEM — R00.1 BRADYCARDIA: Status: RESOLVED | Noted: 2024-01-09 | Resolved: 2024-03-06

## 2024-03-06 PROBLEM — L03.116 CELLULITIS OF LEFT LOWER EXTREMITY: Status: RESOLVED | Noted: 2017-03-16 | Resolved: 2024-03-06

## 2024-03-06 PROBLEM — Z01.818 PREOP TESTING: Status: RESOLVED | Noted: 2024-01-09 | Resolved: 2024-03-06

## 2024-03-06 PROCEDURE — 99214 OFFICE O/P EST MOD 30 MIN: CPT | Performed by: INTERNAL MEDICINE

## 2024-03-06 PROCEDURE — 85027 COMPLETE CBC AUTOMATED: CPT

## 2024-03-06 PROCEDURE — 36415 COLL VENOUS BLD VENIPUNCTURE: CPT

## 2024-03-06 PROCEDURE — 85060 BLOOD SMEAR INTERPRETATION: CPT

## 2024-03-06 RX ORDER — ACETAMINOPHEN 325 MG/1
2 TABLET ORAL EVERY 4 HOURS PRN
COMMUNITY
Start: 2024-01-22

## 2024-03-06 RX ORDER — ASPIRIN 325 MG
325 TABLET ORAL 2 TIMES DAILY
COMMUNITY
End: 2024-03-06

## 2024-03-06 RX ORDER — ASPIRIN 81 MG/1
81 TABLET ORAL DAILY
COMMUNITY

## 2024-03-06 NOTE — PROGRESS NOTES
Dedrick Norton is a 84 year old male.  Chief Complaint   Patient presents with    Post-Op     Surgery was on 1/9 for Right total knee repalcement     HPI:   84-year-old gentleman here for follow-up.  He reports that he is doing well.  He has a small cut in his big toe.  No fever no chills.  He was taking aspirin 325 mg 2 times a day.  We can reduce it to once a day as he is walking around and it has been more than a month since knee surgery.  Denies any chest pain.  Breathing is at baseline.  He is going to follow-up with cardiology next month.      Current Outpatient Medications   Medication Sig Dispense Refill    acetaminophen 325 MG Oral Tab Take 2 tablets (650 mg total) by mouth every 4 (four) hours as needed.      aspirin 81 MG Oral Tab EC Take 1 tablet (81 mg total) by mouth daily.      potassium chloride 20 MEQ Oral Tab CR Take 1 tablet (20 mEq total) by mouth daily. 90 tablet 0    omeprazole 20 MG Oral Capsule Delayed Release Take 1 capsule (20 mg total) by mouth before breakfast. 90 capsule 3    gabapentin 100 MG Oral Cap Take 1 capsule (100 mg total) by mouth 2 (two) times daily. 180 capsule 0    ergocalciferol 1.25 MG (43045 UT) Oral Cap Take 1 capsule (50,000 Units total) by mouth once a week. (Patient taking differently: Take 1 capsule (50,000 Units total) by mouth Every Friday.) 12 capsule 3    atorvastatin 80 MG Oral Tab Take 1 tablet (80 mg total) by mouth daily. (Patient taking differently: Take 1 tablet (80 mg total) by mouth nightly.) 90 tablet 3    finasteride 5 MG Oral Tab Take 1 tablet (5 mg total) by mouth daily. 90 tablet 3    furosemide 40 MG Oral Tab TAKE ONE TABLET BY MOUTH ONE TIME DAILY 90 tablet 3    ferrous sulfate 325 (65 FE) MG Oral Tab EC Take 1 tablet (325 mg total) by mouth daily with breakfast. 90 tablet 3    metoprolol succinate ER 25 MG Oral Tablet 24 Hr Take 1 tablet (25 mg total) by mouth Daily Beta Blocker. 90 tablet 3      Past Medical History:   Diagnosis Date    Back  problem     BPH (benign prostatic hyperplasia)     Cataract     Cellulitis     Cellulitis of left lower extremity 2017    Disorder of thyroid     Elevated prostate specific antigen (PSA) 2012    Esophageal reflux     Essential hypertension     Hearing impairment     no hearing aids    Heart attack (HCC)     High blood pressure     High cholesterol     History of blood transfusion     no reactions    History of prostate biopsy     History of stomach ulcers     Hyperlipidemia     Intractable back pain 10/16/2019    NSTEMI (non-ST elevated myocardial infarction) (HCC) 2022    Osteoarthritis     Postoperative nausea 2024    Sciatica of right side     Sleep apnea     no CPAP use    Visual impairment     readers      Past Surgical History:   Procedure Laterality Date    ANGIOPLASTY (CORONARY)      ARTHROSCOPY OF JOINT UNLISTED Left     knee    CATARACT SURGERY, COMPLEX      CATH BARE METAL STENT (BMS)      COLONOSCOPY      HC INJ EPIDURAL STEROID LUMBAR OR SACRAL W IMG GUIDANCE      TONSILLECTOMY      UPPER GI ENDOSCOPY,EXAM        Social History:  Social History     Socioeconomic History    Marital status:    Tobacco Use    Smoking status: Former     Packs/day: 1.50     Years: 20.00     Additional pack years: 0.00     Total pack years: 30.00     Types: Cigarettes     Quit date: 1988     Years since quittin.7     Passive exposure: Past    Smokeless tobacco: Never   Vaping Use    Vaping Use: Never used   Substance and Sexual Activity    Alcohol use: Not Currently     Alcohol/week: 1.0 standard drink of alcohol     Types: 1 Standard drinks or equivalent per week    Drug use: No   Other Topics Concern    Caffeine Concern Yes     Comment: Per NG: coffee 1 cup daily     Social Determinants of Health     Food Insecurity: No Food Insecurity (2024)    Food Insecurity     Food Insecurity: Never true   Transportation Needs: No Transportation Needs (2024)    Transportation Needs      Lack of Transportation: No   Housing Stability: Low Risk  (1/9/2024)    Housing Stability     Housing Instability: No      Family History   Problem Relation Age of Onset    Pulmonary Disease Father         Per NG: pneumonia ( cause of death)      Allergies   Allergen Reactions    Clarithromycin DIZZINESS and OTHER (SEE COMMENTS)     Other reaction(s): blurring in the eyes    Penicillins RASH and OTHER (SEE COMMENTS)     As a child, was told to have an allergic reaction; eyes became blurry        REVIEW OF SYSTEMS:   Review of Systems   Review of Systems   Constitutional: Negative for activity change, appetite change and fever.   HENT: Negative for congestion and voice change.    Respiratory: Negative for cough and shortness of breath.    Cardiovascular: Negative for chest pain.   Gastrointestinal: Negative for abdominal distention, abdominal pain and vomiting.   Genitourinary: Negative for hematuria.   Skin: Negative for wound.   Psychiatric/Behavioral: Negative for behavioral problems.   Wt Readings from Last 5 Encounters:   03/06/24 213 lb (96.6 kg)   01/09/24 210 lb (95.3 kg)   12/18/23 221 lb (100.2 kg)   10/25/23 231 lb 12.8 oz (105.1 kg)   10/23/23 229 lb (103.9 kg)     Body mass index is 31.45 kg/m².      EXAM:   /82   Pulse 65   Ht 5' 9\" (1.753 m)   Wt 213 lb (96.6 kg)   SpO2 99%   BMI 31.45 kg/m²   Physical Exam   Constitutional:       Appearance: Normal appearance.   HENT:      Head: Normocephalic.   Eyes:      Conjunctiva/sclera: Conjunctivae normal.   Cardiovascular:      Rate and Rhythm: Normal rate and regular rhythm.      Heart sounds: Normal heart sounds. No murmur heard.  Pulmonary:      Effort: Pulmonary effort is normal.      Breath sounds: Normal breath sounds. No rhonchi or rales.   Abdominal:      General: Bowel sounds are normal.      Palpations: Abdomen is soft.      Tenderness: There is no abdominal tenderness.   Musculoskeletal:      Cervical back: Neck supple.      Right lower  leg: No edema.      Left lower leg: No edema.   Skin:     General: Skin is warm and dry.   Neurological:      General: No focal deficit present.      Mental Status: He is alert and oriented to person, place, and time. Mental status is at baseline.   Psychiatric:         Mood and Affect: Mood normal.         Behavior: Behavior normal.   Small cut present next to right big toe.  No pain no erythema no warmth.    ASSESSMENT AND PLAN:   1. Hypertension, unspecified type  Blood pressure is optimally controlled.  We will continue the current medical regimen.  We will monitor kidney functions.  2. Primary osteoarthritis of both knees  Status post bilateral knee replacement surgery.  Last knee surgery was done 9/20/2024.  I will reduce aspirin to 81 mg daily.  He is ambulating now.    3. Postoperative anemia  Normal bleeding reported.  We will repeat CBC.  - CBC, Platelet; No Differential; Future    4. Laceration of dorsum of foot, right, initial encounter  I have cleaned the wound.  There is no need for oral antibiotic at this time.  Will continue with local wound care and topical antibiotics.  It is a small cut of approximately 1 cm.  No erythema no warmth no discharge.  No tenderness.    Plan: As above.  He will follow-up with podiatry next week.  He will follow-up with his orthopedics in couple of weeks.  He will follow-up with his cardiologist in couple of weeks.  I will see him back in 4 months.      The patient indicates understanding of these issues and agrees to the plan.  No follow-ups on file.    This note was prepared using Dragon Medical voice recognition dictation software. As a result errors may occur. When identified these errors have been corrected. While every attempt is made to correct errors during dictation discrepancies may still exist.

## 2024-03-07 ENCOUNTER — OFFICE VISIT (OUTPATIENT)
Dept: PHYSICAL THERAPY | Age: 84
End: 2024-03-07
Attending: PHYSICIAN ASSISTANT
Payer: MEDICARE

## 2024-03-07 LAB
DEPRECATED RDW RBC AUTO: 39.9 FL (ref 35.1–46.3)
ERYTHROCYTE [DISTWIDTH] IN BLOOD BY AUTOMATED COUNT: 19.8 % (ref 11–15)
HCT VFR BLD AUTO: 42.4 %
HGB BLD-MCNC: 12.2 G/DL
MCH RBC QN AUTO: 18.3 PG (ref 26–34)
MCHC RBC AUTO-ENTMCNC: 28.8 G/DL (ref 31–37)
MCV RBC AUTO: 63.8 FL
PLATELET # BLD AUTO: 262 10(3)UL (ref 150–450)
RBC # BLD AUTO: 6.65 X10(6)UL
WBC # BLD AUTO: 10 X10(3) UL (ref 4–11)

## 2024-03-07 PROCEDURE — 97110 THERAPEUTIC EXERCISES: CPT | Performed by: PHYSICAL THERAPIST

## 2024-03-07 PROCEDURE — 97140 MANUAL THERAPY 1/> REGIONS: CPT | Performed by: PHYSICAL THERAPIST

## 2024-03-07 NOTE — PROGRESS NOTES
ProgressSummary  Pt has attended 10 visits in Physical Therapy.     Diagnosis:   S/P TKR (total knee replacement), right (Z96.651); R knee pain   Referring Provider: EVELINE Multani Date of Evaluation:    2/5/2024    Precautions:   CAD, HTN Next MD visit:   3/2024   Date of Surgery: 1/9/2024   Insurance Primary/Secondary: MEDICARE / [employer] [place] INDEMNITY     # Auth Visits: POC every 10 visits          Subjective:  Had f/u with Dr. Galarza: no toe/foot infection, applied Neosporin, to see podiatrist, had blood test - anemic.  F/u with Dr. Vito PA-C next week for R knee. Would like to do 2-4 more PT sessions for R knee ROM, pending f/u with BASILIO Lombardi next week. Completing HEP.   Pain: 3-4/10      Objective:     AROM: (* denotes performed with pain)   Knee    Flexion: R 114 (114 deg post ROM); L 110   Extension: R -3 (- 2 deg post manual) ; L 0     Patellar Mobility/Accessory motion: L/R patella mobility WNL    Flexibility:   Hamstrings: R/L restriction  Quads/hip flexors: R/L restriction    Strength/MMT: (* denotes performed with pain)  Hip  Knee     Abduction: R 4+/5; L 4+/5   Flexion: R 4+/5; L 4+/5  Extension: R 4+/5; L 4+/5        Gait: pt ambulates on level ground WFL with/without SPC use.  Patient able to reciprocally negotiate 6 and 8 inch steps without c/o.     Assessment:  Patient has progressed well overall with PT, demonstrating consistent improvements in R knee AROM, strength, ADL tolerance, balance. Residual deficits include mild R knee ext ROM deficit, resolving c/o lateral knee c/o. HEP reviewed to address residual deficits. Pt likely appropriate to continue with HEP due to progress or 1-4 more sessions to address residual R knee ROM deficits, lateral knee c/o, pending f/u with BASILIO next week.     Goals:    (To be met in 10-12 visits)   Pt will improve knee extension ROM to 0 deg to allow proper heel strike during gait and terminal knee extension in stance  -PROGRESS  Pt will improve knee AROM  flexion to >110 degrees to improve ability to perform transfers, stair negotiation.- MET  Pt will improve quad strength to 5/5 to ascend 1 flight of stairs reciprocally - OVERALL MET  Pt will increase hip and knee strength to grossly 4+/5 to be able to get up and down from the floor safely - PARTIALLY MET  Pt will demonstrate increased hip ER/ABD strength to 4/5 to perform stepping and squatting activities without excessive femoral IR/ADD - MET  Pt will improve SLS to >2s to improve safety and independence with gait on uneven surfaces such as grass - NEARLY MET  Pt will be independent and compliant with comprehensive HEP to maintain progress achieved in PT - MET    Plan: Continue PT 2 x weekly for up to 11-14 sessions. Pt likely appropriate to continue with HEP due to progress OR 1-4 more sessions to address residual R knee ROM deficits, lateral knee c/o, pending f/u with PA-C next week.    Date: 2/27/2024  Tx #: 7/10-12 Date: 2/29/2024  Tx #: 8/10-12 Date: 3/5/2024  Tx #: 9/10-12 Date: 3/7/2024  Tx #: 10/12   There Ex:   Stationary bike: level 1 x 3 min  R knee flexion AAROM using ball PT A 4 x 30 sec each  Bridge on red ball x ~ 20 reps  Sit - stand from plinth x 10 reps   Mini staircase (4, ~ 6 inch steps) up/down reciprocally x 2 - unilateral UE  8 inch step up over down x 5 reps reach L/R LE  HEP review; see below.  There Ex:   Stationary bike: level 1 x 3 min  6 and 8 inch step up over down x 5 reps reach L/R LE  Lateral steps with RTB prox to knees 4 x ~ 12 feet  Standing L/R hamstrings stretches x 30 sec each  R knee flexion AAROM using ball PT A 4 x 30 sec each  Bridge on red ball x ~ 20 reps  HEP review; see below.   Supine L/R hip flexor/quad stretch x 20 sec each (HEP) There Ex:   Stationary bike: level 1 x 3 min  Attempted R knee flexion ROM/stretch using staircase - ineffective, stopped  Standing L/R hamstrings stretches/knee ext ROM x 45 sec each   Lateral steps and monster walk with RTB prox to knees  4 x ~ 12 feet each   Supine L/R hip flexor/quad stretch x 20 sec each (HEP)  Supine R knee flexion AAROM using ball PT A 4 x 30 sec each There Ex:   Stationary bike: level 1 x 3 min  HEP review/instruction; see below.  Wean from knee ROM over next 2-3 weeks, may continue or wean from other exercises if remains active. Discussed consideration of joining seniors exercise group.   Supine R knee flexion AAROM using ball PT A 4 x 30 sec each   Manual:   R patella sup/inf, med/lat mobilization  R knee scar mobilization  Gentle R knee ext ROM/stretch 3 x 20 sec each  STM L/R ITB using foam roller Manual:   R knee scar mobilization  Gentle R knee ext ROM/stretch 3 x 20 sec each  STM L/R ITB using foam roller Manual:   R knee scar mobilization  R knee ext ROM/stretch 4 x 20 sec each  STM L/R ITB using foam roller Manual:   R knee scar mobilization  R knee ext ROM/stretch 4 x 20 sec each  STM L/R ITB using foam roller   NM Re-Ed (gait belt donned, PT SBA/contact guard):  Gait: fast/slow, stop/go, head nods yes/no  Heel to toe walking forward/backward at bar 3 x ~ 16 feet NM Re-Ed (gait belt donned, PT SBA/contact guard):  Gait:Air Ex and 1/2 bolster negotiation NM Re-Ed (gait belt donned, PT SBA/contact guard):  AirEx: alternating forward  and side steps onto/over AirEx          HEP: Seated R knee ext stretch with overpressure ( with gentle overpressure, may attempt with ~ 4# wt prox and or distally to knee joint), seated R knee flexion stretch,  seated LAQ- may discontinue, sidesteps at countertop - as needed, gastroc stretch, piriformis stretch as needed, supine hip flexor/quad stretch    Charges: 2 TE (25 min), 1 manual (15 min)   Total Timed Treatment: 40 min  Total Treatment Time: 45 min  LEFS Score  LEFS Score: 65 % (2/7/2024 10:10 AM)    Post LEFS Score  Post LEFS Score: 81.25 % (3/7/2024 11:16 AM)    16.25 % improvement    Plan: Continue skilled Physical Therapy 2 x/week or a total of 11-14 visits over a 90 day  period. Treatment will include: there ex, there activities, manual therapy, NM Re-ed, and MHP, cold pack.   Pt likely appropriate to continue with HEP due to progress OR 1-4 more sessions to address residual R knee ROM deficits, lateral knee c/o, pending f/u with PA-C next week.     Patient/Family/Caregiver was advised of these findings, precautions, and treatment options and has agreed to actively participate in planning and for this course of care.    Thank you for your referral. If you have any questions, please contact me at Dept: [phone].    Sincerely,  Electronically signed by therapist: Laura Gandhi, BUBBA     Physician's certification required:  Yes  Please co-sign or sign and return this letter via fax as soon as possible to [phone].   I certify the need for these services furnished under this plan of treatment and while under my care.    X___________________________________________________ Date____________________    Certification From: 3/7/2024  To:6/5/2024

## 2024-03-11 ENCOUNTER — OFFICE VISIT (OUTPATIENT)
Dept: ORTHOPEDICS CLINIC | Facility: CLINIC | Age: 84
End: 2024-03-11
Payer: MEDICARE

## 2024-03-11 ENCOUNTER — HOSPITAL ENCOUNTER (OUTPATIENT)
Dept: GENERAL RADIOLOGY | Facility: HOSPITAL | Age: 84
Discharge: HOME | End: 2024-03-11
Attending: PHYSICIAN ASSISTANT
Payer: MEDICARE

## 2024-03-11 DIAGNOSIS — Z47.89 ORTHOPEDIC AFTERCARE: ICD-10-CM

## 2024-03-11 DIAGNOSIS — Z47.89 ORTHOPEDIC AFTERCARE: Primary | ICD-10-CM

## 2024-03-11 PROCEDURE — 73562 X-RAY EXAM OF KNEE 3: CPT | Performed by: PHYSICIAN ASSISTANT

## 2024-03-11 PROCEDURE — 99024 POSTOP FOLLOW-UP VISIT: CPT | Performed by: PHYSICIAN ASSISTANT

## 2024-03-11 NOTE — PROGRESS NOTES
NURSING INTAKE COMMENTS:   Chief Complaint   Patient presents with    Post-Op     R TKA sx on 1/09/2024- rates pain 2/10 only when going down stairs       HPI: This 84 year old male presents today for follow-up on his right knee.  Is been about 2 months since his surgery.  He continues to progress.  He has minimal discomfort in the knee mostly when he is descending stairs.  Soreness is mild in the lateral aspect of the knee.  He is ambulating with a cane but can walk without it.  He completed his course of outpatient therapy and has been diligent with his home exercises.  He feels that he has recovered quicker from his right knee replacement compared to his left knee replacement which was done a year ago.    Past Medical History:   Diagnosis Date    Back problem     BPH (benign prostatic hyperplasia)     Cataract     Cellulitis     Cellulitis of left lower extremity 03/16/2017    Disorder of thyroid     Elevated prostate specific antigen (PSA) 08/06/2012    Esophageal reflux     Essential hypertension     Hearing impairment     no hearing aids    Heart attack (HCC)     High blood pressure     High cholesterol     History of blood transfusion     no reactions    History of prostate biopsy     History of stomach ulcers     Hyperlipidemia     Intractable back pain 10/16/2019    NSTEMI (non-ST elevated myocardial infarction) (HCC) 02/04/2022    Osteoarthritis     Postoperative nausea 1/9/2024    Sciatica of right side     Sleep apnea     no CPAP use    Visual impairment     readers     Past Surgical History:   Procedure Laterality Date    ANGIOPLASTY (CORONARY)  2022    ARTHROSCOPY OF JOINT UNLISTED Left     knee    CATARACT SURGERY, COMPLEX      CATH BARE METAL STENT (BMS)      COLONOSCOPY      HC INJ EPIDURAL STEROID LUMBAR OR SACRAL W IMG GUIDANCE      TONSILLECTOMY      UPPER GI ENDOSCOPY,EXAM       Current Outpatient Medications   Medication Sig Dispense Refill    acetaminophen 325 MG Oral Tab Take 2 tablets (650  mg total) by mouth every 4 (four) hours as needed. (Patient not taking: Reported on 3/11/2024)      aspirin 81 MG Oral Tab EC Take 1 tablet (81 mg total) by mouth daily.      potassium chloride 20 MEQ Oral Tab CR Take 1 tablet (20 mEq total) by mouth daily. 90 tablet 0    omeprazole 20 MG Oral Capsule Delayed Release Take 1 capsule (20 mg total) by mouth before breakfast. 90 capsule 3    gabapentin 100 MG Oral Cap Take 1 capsule (100 mg total) by mouth 2 (two) times daily. 180 capsule 0    ergocalciferol 1.25 MG (09934 UT) Oral Cap Take 1 capsule (50,000 Units total) by mouth once a week. (Patient taking differently: Take 1 capsule (50,000 Units total) by mouth Every Friday.) 12 capsule 3    atorvastatin 80 MG Oral Tab Take 1 tablet (80 mg total) by mouth daily. (Patient taking differently: Take 1 tablet (80 mg total) by mouth nightly.) 90 tablet 3    finasteride 5 MG Oral Tab Take 1 tablet (5 mg total) by mouth daily. 90 tablet 3    furosemide 40 MG Oral Tab TAKE ONE TABLET BY MOUTH ONE TIME DAILY 90 tablet 3    ferrous sulfate 325 (65 FE) MG Oral Tab EC Take 1 tablet (325 mg total) by mouth daily with breakfast. 90 tablet 3    metoprolol succinate ER 25 MG Oral Tablet 24 Hr Take 1 tablet (25 mg total) by mouth Daily Beta Blocker. 90 tablet 3     Allergies   Allergen Reactions    Clarithromycin DIZZINESS and OTHER (SEE COMMENTS)     Other reaction(s): blurring in the eyes    Penicillins RASH and OTHER (SEE COMMENTS)     As a child, was told to have an allergic reaction; eyes became blurry     Family History   Problem Relation Age of Onset    Pulmonary Disease Father         Per NG: pneumonia ( cause of death)       Social History     Occupational History    Not on file   Tobacco Use    Smoking status: Former     Packs/day: 1.50     Years: 20.00     Additional pack years: 0.00     Total pack years: 30.00     Types: Cigarettes     Quit date: 1988     Years since quittin.8     Passive exposure: Past     Smokeless tobacco: Never   Vaping Use    Vaping Use: Never used   Substance and Sexual Activity    Alcohol use: Not Currently     Alcohol/week: 1.0 standard drink of alcohol     Types: 1 Standard drinks or equivalent per week    Drug use: No    Sexual activity: Not on file        Review of Systems:  GENERAL: feels generally well, no significant weight loss or weight gain  SKIN: no ulcerated or worrisome skin lesions  EYES:denies blurred vision or double vision  HEENT: denies new nasal congestion, sinus pain or ST  LUNGS: denies shortness of breath  CARDIOVASCULAR: denies chest pain  GI: no hematemesis, no worsening heartburn, no diarrhea  : no dysuria, no blood in urine, no difficulty urinating, no incontinence  MUSCULOSKELETAL: no other musculoskeletal complaints other than in HPI  NEURO: no numbness or tingling, no weakness or balance disorder  PSYCHE: no depression or anxiety  HEMATOLOGIC: no hx of blood dyscrasia  ENDOCRINE: no thyroid or diabetes issues  ALL/ASTHMA: no new hx of severe allergy or asthma    Physical Examination:    There were no vitals taken for this visit.  Constitutional: appears well hydrated, alert and responsive, no acute distress noted  Extremities: The incision is well-healed.  Nontender to palpation.  Full extension and 115 degrees of flexion.      Imaging: XR KNEE (3 VIEWS), RIGHT (CPT=73562)    Result Date: 2/12/2024  PROCEDURE: XR KNEE ROUTINE (3 VIEWS), RIGHT (CPT=73562)  COMPARISON: [facility], XR KNEE (1 OR 2 VIEWS), RIGHT (CPT=73560), 1/09/2024, 10:39 AM.  [facility] 2nd Floor, XR KNEE (3 VIEWS), RIGHT (CPT=73562), 10/25/2023, 10:31 AM.  [facility], XR KNEE, COMPLETE (4 OR MORE VIEWS), RIGHT (CPT=73564), 6/15/2021, 10:06 AM.  INDICATIONS: Follow-up for right knee surgery.  TECHNIQUE: 3 views were obtained.    FINDINGS/CONCLUSION:          Prior postsurgical changes of right knee total arthroplasty with no evidence of  hardware complication.  No acute fracture.  No joint effusion.  Soft tissues are unremarkable.  Unchanged calcification in the region of the medial aspect of the right femoral metaphysis, probably a benign bony exostosis.  Incidental note is made of left knee arthroplasty which is grossly normal.      Dictated by (CST): Stefan Chen MD on 2/12/2024 at 10:17 AM     Finalized by (CST): Stefan Chen MD on 2/12/2024 at 10:20 AM             Lab Results   Component Value Date    WBC 10.0 03/06/2024    HGB 12.2 (L) 03/06/2024    .0 03/06/2024      Lab Results   Component Value Date    GLU 90 12/19/2023    BUN 16 12/19/2023    CREATSERUM 0.65 (L) 12/19/2023    GFRNAA 87 08/13/2021    GFRAA 100 08/13/2021        Assessment and Plan:  Diagnoses and all orders for this visit:    Orthopedic aftercare  -     XR KNEE (3 VIEWS), RIGHT (CPT=73562); Future        Assessment: Status post right total knee arthroplasty    Plan: Mr. Norton is progressing well following his surgery.  He is going to continue with his home exercise program.  I encouraged him to remain active.  He will let me know if he wants to return to therapy at which point I would send an order.  We discussed the expected recovery time following surgery.  He is can come back in January for a 1 year follow-up on both of his knees.  I advised him to call if any questions or problems arise in the meantime.    The above note was creating using Dragon speech recognition technology. Please excuse any typos.    This visit was performed under the supervision of Dr. Michel Kendall who formulated the treatment plan and decision making.

## 2024-04-01 NOTE — TELEPHONE ENCOUNTER
Please review.  Protocol failed / Has no protocol.     Requested Prescriptions   Pending Prescriptions Disp Refills    levothyroxine 75 MCG Oral Tab 90 tablet 3     Sig: Take 1 tablet (75 mcg total) by mouth before breakfast.       Thyroid Medication Protocol Failed - 3/28/2024  3:12 PM        Failed - Last TSH value is normal     Lab Results   Component Value Date    TSH 4.840 (H) 10/18/2023                 Passed - TSH in past 12 months        Passed - In person appointment or virtual visit in the past 12 mos or appointment in next 3 mos     Recent Outpatient Visits              3 weeks ago Orthopedic aftercare    [facility] Everardo Multani PA-C    [facility]    [facility]    Office Visit    3 weeks ago Hypertension, unspecified type    [facility] Marquise Galarza MD    [facility]    [facility]    [facility]          Future Appointments         Provider Department Appt Notes    In 3 months Marquise Galarza MD [facility] 4 m    In 9 months Everardo Multain PA-C [facility] 2nd POV Right TKA 1/12/24, NEEDS TO RS 3/18 APPT.

## 2024-04-01 NOTE — TELEPHONE ENCOUNTER
7/1 Patient requesting PSA be added to labs. Please advise. Price (Do Not Change): 0.00 Instructions: This plan will send the code FBSE to the PM system.  DO NOT or CHANGE the price. Detail Level: Simple

## 2024-04-02 RX ORDER — LEVOTHYROXINE SODIUM 0.07 MG/1
75 TABLET ORAL
Qty: 90 TABLET | Refills: 3 | Status: SHIPPED | OUTPATIENT
Start: 2024-04-02

## 2024-04-03 NOTE — TELEPHONE ENCOUNTER
Patient is requesting a refill for ferrous sulfate. Patient was instructed to contact pcp for a refill by his pharmacy. Please advise     [id number] - [place] - [address]       Disp Refills Start End    ferrous sulfate 325 (65 FE) MG Oral Tab EC 90 tablet 3 3/19/2023 --   Sig - Route: Take 1 tablet (325 mg total) by mouth daily with breakfast. - Oral   Sent to pharmacy as: Ferrous Sulfate 325 (65 Fe) MG Oral Tablet Delayed Release   E-Prescribing Status: Receipt confirmed by pharmacy (3/18/2023  2:55 PM CDT)

## 2024-04-04 RX ORDER — FUROSEMIDE 40 MG/1
TABLET ORAL
Qty: 90 TABLET | Refills: 3 | Status: SHIPPED | OUTPATIENT
Start: 2024-04-04

## 2024-04-04 NOTE — TELEPHONE ENCOUNTER
REFILL PASSED PER [facility] PROTOCOLS    Requested Prescriptions   Pending Prescriptions Disp Refills    FUROSEMIDE 40 MG Oral Tab [Pharmacy Med Name: Furosemide 40 Mg Tab Risi] 90 tablet 0     Sig: TAKE ONE TABLET BY MOUTH ONE TIME DAILY       Hypertension Medications Protocol Passed - 4/3/2024  3:01 AM        Passed - CMP or BMP in past 12 months        Passed - Last BP reading less than 140/90     BP Readings from Last 1 Encounters:   03/06/24 136/82               Passed - In person appointment or virtual visit in the past 12 mos or appointment in next 3 mos     Recent Outpatient Visits              3 weeks ago Orthopedic aftercare    [facility] Everardo Multani PA-C    Office Visit    4 weeks ago     [facility]    Office Visit    4 weeks ago Hypertension, unspecified type    [facility] Marquise Galarza MD    Office Visit    1 month ago     [facility]    Office Visit    1 month ago     [facility]    [facility]          Future Appointments         Provider Department Appt Notes    In 3 months Marquise Galarza MD [facility] 4 m    In 9 months Everardo Multani PA-C [facility] 2nd POV Right TKA 1/12/24, NEEDS TO RS 3/18 APPT.               Passed - EGFRCR or GFRNAA > 50     GFR Evaluation  EGFRCR: 92 , resulted on 12/19/2023               Future Appointments         Provider Department Appt Notes    In 3 months Marquise Galarza MD [facility] 4 m    In 9 months Everardo Multani PA-C [facility] 2nd POV Right TKA 1/12/24,  NEEDS TO RS 3/18 APPT.          Recent Outpatient Visits              3 weeks ago Orthopedic aftercare    [facility] Everardo Multani PA-C    Office Visit    4 weeks ago     [facility]    Office Visit    4 weeks ago Hypertension, unspecified type    [facility] Marquise Galarza MD    Office Visit    1 month ago     [facility]    Office Visit    1 month ago     [facility]    Office Visit

## 2024-04-05 RX ORDER — MELATONIN
325
Qty: 90 TABLET | Refills: 3 | Status: SHIPPED | OUTPATIENT
Start: 2024-04-05

## 2024-04-05 NOTE — TELEPHONE ENCOUNTER
Please review. Protocol Failed or has No Protocol.    Requested Prescriptions   Pending Prescriptions Disp Refills    ferrous sulfate 325 (65 FE) MG Oral Tab EC 90 tablet 3     Sig: Take 1 tablet (325 mg total) by mouth daily with breakfast.       There is no refill protocol information for this order          Future Appointments         Provider Department Appt Notes    In 3 months Marquise Galarza MD [facility] 4 m    In 9 months Everardo Multani PA-C [facility] 2nd POV Right TKA 1/12/24, NEEDS TO RS 3/18 APPT.            Recent Outpatient Visits              3 weeks ago Orthopedic aftercare    [facility] Everardo Multani PA-C    Office Visit    4 weeks ago     [facility]    Office Visit    4 weeks ago Hypertension, unspecified type    [facility] Marquise Galarza MD    Office Visit    1 month ago     [facility]    Office Visit    1 month ago     [facility]    Office Visit

## 2024-04-16 ENCOUNTER — MED REC SCAN ONLY (OUTPATIENT)
Dept: INTERNAL MEDICINE CLINIC | Facility: CLINIC | Age: 84
End: 2024-04-16

## 2024-04-17 NOTE — TELEPHONE ENCOUNTER
Patient is calling and requesting a refill on the following medication:      gabapentin 100 MG Oral Cap       Per patient he has enough medication for 1 week.

## 2024-04-18 RX ORDER — GABAPENTIN 100 MG/1
100 CAPSULE ORAL 2 TIMES DAILY
Qty: 180 CAPSULE | Refills: 0 | Status: SHIPPED | OUTPATIENT
Start: 2024-04-18

## 2024-04-18 NOTE — TELEPHONE ENCOUNTER
Refill Passed Per Protocol    Requested Prescriptions   Pending Prescriptions Disp Refills    gabapentin 100 MG Oral Cap 180 capsule 3     Sig: Take 1 capsule (100 mg total) by mouth 2 (two) times daily.       Neurology Medications Passed - 4/17/2024  2:52 PM        Passed - In person appointment or virtual visit in the past 6 mos or appointment in next 3 mos     Recent Outpatient Visits              1 month ago Orthopedic aftercare    [facility] Everardo Multani PA-C    Office Visit    1 month ago     [facility]    Office Visit    1 month ago Hypertension, unspecified type    [facility] Marquise Galarza MD    Office Visit    1 month ago     [facility]    Office Visit    1 month ago     [facility]    [facility]          Future Appointments         Provider Department Appt Notes    In 2 months Marquise Galarza MD [facility] 4 m    In 9 months Everardo Multani PA-C [facility] 2nd POV Right TKA 1/12/24, NEEDS TO RS 3/18 APPT.                         Future Appointments         Provider Department Appt Notes    In 2 months Marquise Galarza MD [facility] 4 m    In 9 months Everardo Multani PA-C [facility] 2nd POV Right TKA 1/12/24, NEEDS TO RS 3/18 APPT.          Recent Outpatient Visits              1 month ago Orthopedic aftercare    [facility] Everardo Multani PA-C    Office Visit    1 month ago     [facility]     Office Visit    1 month ago Hypertension, unspecified type    [facility] [place] Marquise Galarza MD    Office Visit    1 month ago     [facility] Laura Gandhi PT    Office Visit    1 month ago     [facility] Laura Gandhi PT    Office Visit

## 2024-04-30 RX ORDER — METOPROLOL SUCCINATE 25 MG/1
25 TABLET, EXTENDED RELEASE ORAL
Qty: 90 TABLET | Refills: 3 | Status: SHIPPED | OUTPATIENT
Start: 2024-04-30

## 2024-04-30 NOTE — TELEPHONE ENCOUNTER
Refill passed per [facility] protocol.  Requested Prescriptions   Pending Prescriptions Disp Refills    METOPROLOL SUCCINATE ER 25 MG Oral Tablet 24 Hr [Pharmacy Med Name: Metoprolol Succinate Er 24hr 25 Mg Tab Nort] 90 tablet 0     Sig: Take 1 tablet (25 mg total) by mouth Daily Beta Blocker.       Hypertension Medications Protocol Passed - 4/29/2024  3:00 AM        Passed - CMP or BMP in past 12 months        Passed - Last BP reading less than 140/90     BP Readings from Last 1 Encounters:   03/06/24 136/82               Passed - In person appointment or virtual visit in the past 12 mos or appointment in next 3 mos     [facility]              [facility]    Office Visit    1 month ago     [facility]    Office Visit    1 month ago Hypertension, unspecified type    [facility] Marquise Galarza MD    Office Visit    1 month ago     [facility]    Office Visit    2 months ago     [facility]    [facility]          Future Appointments         Provider Department Appt Notes    In 2 months Marquise Galarza MD [facility] 4 m    In 8 months Everardo Multani PA-C [facility] 2nd POV Right TKA 1/12/24, NEEDS TO RS 3/18 APPT.                    Passed - EGFRCR or GFRNAA > 50     GFR Evaluation  EGFRCR: 92 , resulted on 12/19/2023             Recent Out[facility] Visits              [facility]    Office Visit    1 month ago     [facility]    Office Visit    1 month ago Hypertension, unspecified type    [facility] Marquise Galarza MD    Office Visit    1 month ago     [facility]    Office Visit    2 months ago     [facility]    Office Visit          Future Appointments         Provider Department Appt Notes    In 2 months Marquise Galarza MD [facility] 4 m    In 8 months Everardo Multani PA-C [facility] [place] 2nd POV Right TKA 1/12/24, NEEDS TO RS 3/18 APPT.

## 2024-05-25 NOTE — TELEPHONE ENCOUNTER
Please review. Protocol Failed; No Protocol    Requested Prescriptions   Pending Prescriptions Disp Refills    POTASSIUM CHLORIDE 20 MEQ Oral Tab CR [Pharmacy Med Name: Potassium Chloride Er 20 Meq Tab Amne] 90 tablet 0     Sig: Take 1 tablet  by mouth daily.       There is no refill protocol information for this order            Future Appointments         Provider Department Appt Notes    In 1 month Marquise Galarza MD [facility] 4 m    In 7 months Everardo Multani PA-C [facility] [place] 2nd POV Right TKA 1/12/24, NEEDS TO RS 3/18 APPT.          Recent Outpatient Visits              2 months ago Orthopedic aftercare    [facility] [place] Everardo Multani PA-C    Office Visit    2 months ago     [facility]    Office Visit    2 months ago Hypertension, unspecified type    [facility] Marquise Galarza MD    Office Visit    2 months ago     [facility]    Office Visit    2 months ago     [facility]    Office Visit

## 2024-05-26 RX ORDER — POTASSIUM CHLORIDE 20 MEQ/1
20 TABLET, EXTENDED RELEASE ORAL DAILY
Qty: 90 TABLET | Refills: 0 | Status: SHIPPED | OUTPATIENT
Start: 2024-05-26

## 2024-06-24 NOTE — TELEPHONE ENCOUNTER
Patient requesting refill     [id number] [place] [address]       Medication Detail    Medication Quantity Refills Start End   finasteride 5 MG Oral Tab 90 tablet 3 5/1/2023 --   Sig:   Take 1 tablet (5 mg total) by mouth daily.     Route:   Oral     Order #:   [id number]

## 2024-06-27 RX ORDER — FINASTERIDE 5 MG/1
5 TABLET, FILM COATED ORAL DAILY
Qty: 90 TABLET | Refills: 3 | Status: SHIPPED | OUTPATIENT
Start: 2024-06-27

## 2024-06-27 NOTE — TELEPHONE ENCOUNTER
Refill Per Protocol     Requested Prescriptions   Pending Prescriptions Disp Refills    finasteride 5 MG Oral Tab 90 tablet 3     Sig: Take 1 tablet (5 mg total) by mouth daily.       Genitourinary Medications Passed - 6/24/2024  3:33 PM        Passed - Patient does not have pulmonary hypertension on problem list        Passed - In person appointment or virtual visit in the past 12 mos or appointment in next 3 mos     Recent Outpatient Visits              3 months ago Orthopedic aftercare    [facility] Everardo Multani PA-C    Office Visit    3 months ago     [facility]    Office Visit    3 months ago Hypertension, unspecified type    [facility] Marquise Galarza MD    Office Visit    3 months ago     [facility]    Office Visit    3 months ago     [facility]    Office Visit          Future Appointments         Provider Department Appt Notes    In 1 week [facility]    In 1 week Marquise Galarza MD [facility] 4 m    In 6 months Everardo Multani PA-C [facility] 2nd POV Right TKA 1/12/24, NEEDS TO RS 3/18 APPT.                           Future Appointments         Provider Department Appt Notes    In 1 week [facility]    In 1 week Marquise Galarza MD [facility] 4 m    In 6 months Everardo Multani PA-C [facility] 2nd POV Right TKA 1/12/24, NEEDS TO RS 3/18 APPT.          Recent  Outpatient Visits              3 months ago Orthopedic aftercare    [facility]    Office Visit    3 months ago     [facility]    Office Visit    3 months ago Hypertension, unspecified type    [facility]    Office Visit    3 months ago     [facility] Laura Gandhi, PT    Office Visit    3 months ago     [facility]    Office Visit

## 2024-07-08 ENCOUNTER — OFFICE VISIT (OUTPATIENT)
Dept: ORTHOPEDICS CLINIC | Facility: CLINIC | Age: 84
End: 2024-07-08
Payer: MEDICARE

## 2024-07-08 ENCOUNTER — HOSPITAL ENCOUNTER (OUTPATIENT)
Dept: GENERAL RADIOLOGY | Facility: HOSPITAL | Age: 84
Discharge: HOME | End: 2024-07-08
Attending: ORTHOPAEDIC SURGERY
Payer: MEDICARE

## 2024-07-08 VITALS — HEIGHT: 69 IN | BODY MASS INDEX: 31.84 KG/M2 | WEIGHT: 215 LBS

## 2024-07-08 DIAGNOSIS — M25.552 LEFT HIP PAIN: Primary | ICD-10-CM

## 2024-07-08 DIAGNOSIS — M25.552 LEFT HIP PAIN: ICD-10-CM

## 2024-07-08 DIAGNOSIS — M16.12 PRIMARY OSTEOARTHRITIS OF LEFT HIP: ICD-10-CM

## 2024-07-08 PROCEDURE — 73502 X-RAY EXAM HIP UNI 2-3 VIEWS: CPT | Performed by: ORTHOPAEDIC SURGERY

## 2024-07-08 NOTE — PROGRESS NOTES
NURSING INTAKE COMMENTS:   Chief Complaint   Patient presents with    Hip Pain     Returning patient with new problem left hip pain 8/10 worse when standing. Pain travel  in groin area and to leg above knee, no numbness. Onset 2 weeks ago, no  injury.       HPI: This 84 year old male presents today with complaints of left hip pain.  The pain is both in the left groin and the left buttock radiating down the thigh to but not below the knee.  He reports that his knees feel great, and is been going for longer walks trying to get in shape.  Occasionally thinks that may be what aggravated the hip.  He had similar pain a couple of years ago though that time it radiated all the way down his leg and he had an epidural cortisone injection which alleviated the symptoms completely.    Past Medical History:    Back problem    BPH (benign prostatic hyperplasia)    Cataract    Cellulitis    Cellulitis of left lower extremity    Disorder of thyroid    Elevated prostate specific antigen (PSA)    Esophageal reflux    Essential hypertension    Hearing impairment    no hearing aids    Heart attack (HCC)    High blood pressure    High cholesterol    History of blood transfusion    no reactions    History of prostate biopsy    History of stomach ulcers    Hyperlipidemia    Intractable back pain    NSTEMI (non-ST elevated myocardial infarction) (HCC)    Osteoarthritis    Postoperative nausea    Sciatica of right side    Sleep apnea    no CPAP use    Visual impairment    readers     Past Surgical History:   Procedure Laterality Date    Angioplasty (coronary)  2022    Arthroscopy of joint unlisted Left     knee    Cataract surgery, complex      Cath bare metal stent (bms)      Colonoscopy      Hc inj epidural steroid lumbar or sacral w img guidance      Tonsillectomy      Upper gi endoscopy,exam       Current Outpatient Medications   Medication Sig Dispense Refill    finasteride 5 MG Oral Tab Take 1 tablet (5 mg total) by mouth daily. 90  tablet 3    potassium chloride 20 MEQ Oral Tab CR Take 1 tablet (20 mEq total) by mouth daily. 90 tablet 0    metoprolol succinate ER 25 MG Oral Tablet 24 Hr Take 1 tablet (25 mg total) by mouth Daily Beta Blocker. 90 tablet 3    gabapentin 100 MG Oral Cap Take 1 capsule (100 mg total) by mouth 2 (two) times daily. 180 capsule 0    ferrous sulfate 325 (65 FE) MG Oral Tab EC Take 1 tablet (325 mg total) by mouth daily with breakfast. 90 tablet 3    furosemide 40 MG Oral Tab TAKE ONE TABLET BY MOUTH ONE TIME DAILY 90 tablet 3    levothyroxine 75 MCG Oral Tab Take 1 tablet (75 mcg total) by mouth before breakfast. 90 tablet 3    acetaminophen 325 MG Oral Tab Take 2 tablets (650 mg total) by mouth every 4 (four) hours as needed.      aspirin 81 MG Oral Tab EC Take 1 tablet (81 mg total) by mouth daily.      omeprazole 20 MG Oral Capsule Delayed Release Take 1 capsule (20 mg total) by mouth before breakfast. 90 capsule 3    ergocalciferol 1.25 MG (68739 UT) Oral Cap Take 1 capsule (50,000 Units total) by mouth once a week. (Patient taking differently: Take 1 capsule (50,000 Units total) by mouth Every Friday.) 12 capsule 3    atorvastatin 80 MG Oral Tab Take 1 tablet (80 mg total) by mouth daily. (Patient taking differently: Take 1 tablet (80 mg total) by mouth nightly.) 90 tablet 3     Allergies   Allergen Reactions    Clarithromycin DIZZINESS and OTHER (SEE COMMENTS)     Other reaction(s): blurring in the eyes    Penicillins RASH and OTHER (SEE COMMENTS)     As a child, was told to have an allergic reaction; eyes became blurry     Family History   Problem Relation Age of Onset    Pulmonary Disease Father         Per NG: pneumonia ( cause of death)       Social History     Occupational History    Not on file   Tobacco Use    Smoking status: Former     Current packs/day: 0.00     Average packs/day: 1.5 packs/day for 20.0 years (30.0 ttl pk-yrs)     Types: Cigarettes     Start date: 6/1/1968     Quit date: 6/1/1988      Years since quittin.1     Passive exposure: Past    Smokeless tobacco: Never   Vaping Use    Vaping status: Never Used   Substance and Sexual Activity    Alcohol use: Not Currently     Alcohol/week: 1.0 standard drink of alcohol     Types: 1 Standard drinks or equivalent per week    Drug use: No    Sexual activity: Not on file        Review of Systems:  GENERAL: feels generally well, no significant weight loss or weight gain  SKIN: no ulcerated or worrisome skin lesions  EYES:denies blurred vision or double vision  HEENT: denies new nasal congestion, sinus pain or ST  LUNGS: denies shortness of breath  CARDIOVASCULAR: denies chest pain  GI: no hematemesis, no worsening heartburn, no diarrhea  : no dysuria, no blood in urine, no difficulty urinating, no incontinence  MUSCULOSKELETAL: no other musculoskeletal complaints other than in HPI  NEURO: no numbness or tingling, no weakness or balance disorder  PSYCHE: no depression or anxiety  HEMATOLOGIC: no hx of blood dyscrasia  ENDOCRINE: no thyroid or diabetes issues  ALL/ASTHMA: no new hx of severe allergy or asthma    Physical Examination:    Ht 5' 9\" (1.753 m)   Wt 215 lb (97.5 kg)   BMI 31.75 kg/m²   Constitutional: appears well hydrated, alert and responsive, no acute distress noted  Extremities: Mild discomfort with left hip range of motion.  Is able to walk without an assistive device.      Imaging: Moderate left hip osteoarthritis.    Lab Results   Component Value Date    WBC 10.0 2024    HGB 12.2 (L) 2024    .0 2024      Lab Results   Component Value Date    GLU 90 2023    BUN 16 2023    CREATSERUM 0.65 (L) 2023    GFRNAA 87 2021    GFRAA 100 2021        Assessment and Plan:  Diagnoses and all orders for this visit:    Left hip pain  -     Cancel: XR HIP + PELVIS MIN 4 VIEWS LEFT (CPT=73503); Future    Primary osteoarthritis of left hip  -     XR ASPIR/INJ MAJOR JOINT W/FLUORO LT(EOA=[id number]);  Future        Assessment: He has osteoarthritis of the left hip which seems to be symptomatic.  There may be some overlap with referred pain from his lumbar spine.    Plan: I recommended a cortisone injection in the left hip for both diagnostic and therapeutic intervention.  If he does not improve with this he may benefit from an epidural injection.  He will follow-up with me as needed.    The above note was creating using Dragon speech recognition technology. Please excuse any typos.    CORINNE SOLIS MD

## 2024-07-10 ENCOUNTER — OFFICE VISIT (OUTPATIENT)
Dept: INTERNAL MEDICINE CLINIC | Facility: CLINIC | Age: 84
End: 2024-07-10
Payer: MEDICARE

## 2024-07-10 VITALS
WEIGHT: 221 LBS | OXYGEN SATURATION: 95 % | HEIGHT: 69 IN | DIASTOLIC BLOOD PRESSURE: 58 MMHG | SYSTOLIC BLOOD PRESSURE: 124 MMHG | BODY MASS INDEX: 32.73 KG/M2 | HEART RATE: 83 BPM

## 2024-07-10 DIAGNOSIS — I10 HYPERTENSION, UNSPECIFIED TYPE: ICD-10-CM

## 2024-07-10 DIAGNOSIS — M16.10 PRIMARY OSTEOARTHRITIS OF HIP, UNSPECIFIED LATERALITY: ICD-10-CM

## 2024-07-10 DIAGNOSIS — I25.10 CORONARY ARTERY DISEASE INVOLVING NATIVE CORONARY ARTERY OF NATIVE HEART WITHOUT ANGINA PECTORIS: ICD-10-CM

## 2024-07-10 DIAGNOSIS — I48.91 ATRIAL FIBRILLATION, UNSPECIFIED TYPE (HCC): Primary | ICD-10-CM

## 2024-07-10 DIAGNOSIS — E78.00 HYPERCHOLESTEROLEMIA: ICD-10-CM

## 2024-07-10 PROBLEM — Z98.890 POSTOPERATIVE NAUSEA: Status: RESOLVED | Noted: 2024-01-09 | Resolved: 2024-07-10

## 2024-07-10 PROBLEM — R11.0 POSTOPERATIVE NAUSEA: Status: RESOLVED | Noted: 2024-01-09 | Resolved: 2024-07-10

## 2024-07-10 PROCEDURE — G2211 COMPLEX E/M VISIT ADD ON: HCPCS | Performed by: INTERNAL MEDICINE

## 2024-07-10 PROCEDURE — 99214 OFFICE O/P EST MOD 30 MIN: CPT | Performed by: INTERNAL MEDICINE

## 2024-07-12 ENCOUNTER — HOSPITAL ENCOUNTER (OUTPATIENT)
Dept: GENERAL RADIOLOGY | Facility: HOSPITAL | Age: 84
Discharge: HOME | End: 2024-07-12
Attending: ORTHOPAEDIC SURGERY
Payer: MEDICARE

## 2024-07-12 DIAGNOSIS — M16.12 PRIMARY OSTEOARTHRITIS OF LEFT HIP: ICD-10-CM

## 2024-07-12 PROCEDURE — 20610 DRAIN/INJ JOINT/BURSA W/O US: CPT | Performed by: ORTHOPAEDIC SURGERY

## 2024-07-12 PROCEDURE — 77002 NEEDLE LOCALIZATION BY XRAY: CPT | Performed by: ORTHOPAEDIC SURGERY

## 2024-07-12 RX ORDER — TRIAMCINOLONE ACETONIDE 40 MG/ML
40 INJECTION, SUSPENSION INTRA-ARTICULAR; INTRAMUSCULAR ONCE
Status: COMPLETED | OUTPATIENT
Start: 2024-07-12 | End: 2024-07-12

## 2024-07-12 RX ORDER — BUPIVACAINE HYDROCHLORIDE 5 MG/ML
10 INJECTION, SOLUTION EPIDURAL; INTRACAUDAL ONCE
Status: COMPLETED | OUTPATIENT
Start: 2024-07-12 | End: 2024-07-12

## 2024-07-12 RX ORDER — BUPIVACAINE HYDROCHLORIDE 5 MG/ML
INJECTION, SOLUTION EPIDURAL; INTRACAUDAL
Status: COMPLETED
Start: 2024-07-12 | End: 2024-07-12

## 2024-07-12 RX ORDER — LIDOCAINE HYDROCHLORIDE 10 MG/ML
INJECTION, SOLUTION EPIDURAL; INFILTRATION; INTRACAUDAL; PERINEURAL
Status: COMPLETED
Start: 2024-07-12 | End: 2024-07-12

## 2024-07-12 RX ORDER — TRIAMCINOLONE ACETONIDE 40 MG/ML
INJECTION, SUSPENSION INTRA-ARTICULAR; INTRAMUSCULAR
Status: COMPLETED
Start: 2024-07-12 | End: 2024-07-12

## 2024-07-12 RX ORDER — LIDOCAINE HYDROCHLORIDE 10 MG/ML
5 INJECTION, SOLUTION EPIDURAL; INFILTRATION; INTRACAUDAL; PERINEURAL ONCE
Status: COMPLETED | OUTPATIENT
Start: 2024-07-12 | End: 2024-07-12

## 2024-07-12 RX ADMIN — LIDOCAINE HYDROCHLORIDE 5 ML: 10 INJECTION, SOLUTION EPIDURAL; INFILTRATION; INTRACAUDAL; PERINEURAL at 12:30:00

## 2024-07-12 RX ADMIN — TRIAMCINOLONE ACETONIDE 40 MG: 40 INJECTION, SUSPENSION INTRA-ARTICULAR; INTRAMUSCULAR at 12:30:00

## 2024-07-12 RX ADMIN — BUPIVACAINE HYDROCHLORIDE 4 ML: 5 INJECTION, SOLUTION EPIDURAL; INTRACAUDAL at 12:30:00

## 2024-07-12 NOTE — PROGRESS NOTES
Dedrick Norton is a 84 year old male.  Chief Complaint   Patient presents with    Follow - Up     4 month follow up, has been having hip pain in left hip and is getting a shot on Friday to relieve the pain     HPI:   84-year-old gentleman with medical history significant for hypertension, coronary disease, atrial fibrillation on aspirin only, DJD here for follow-up.  He reports that he is having hip pain and was seen by Ortho.  If there is no improvement, then may have to consider epidural injections.  Denies any chest pain.  Breathing is at baseline.  Denies any abdominal pain nausea vomiting.  Appetite is fair.    Current Outpatient Medications   Medication Sig Dispense Refill    finasteride 5 MG Oral Tab Take 1 tablet (5 mg total) by mouth daily. 90 tablet 3    potassium chloride 20 MEQ Oral Tab CR Take 1 tablet (20 mEq total) by mouth daily. 90 tablet 0    metoprolol succinate ER 25 MG Oral Tablet 24 Hr Take 1 tablet (25 mg total) by mouth Daily Beta Blocker. 90 tablet 3    gabapentin 100 MG Oral Cap Take 1 capsule (100 mg total) by mouth 2 (two) times daily. 180 capsule 0    ferrous sulfate 325 (65 FE) MG Oral Tab EC Take 1 tablet (325 mg total) by mouth daily with breakfast. 90 tablet 3    furosemide 40 MG Oral Tab TAKE ONE TABLET BY MOUTH ONE TIME DAILY 90 tablet 3    levothyroxine 75 MCG Oral Tab Take 1 tablet (75 mcg total) by mouth before breakfast. 90 tablet 3    acetaminophen 325 MG Oral Tab Take 2 tablets (650 mg total) by mouth every 4 (four) hours as needed.      aspirin 81 MG Oral Tab EC Take 1 tablet (81 mg total) by mouth daily.      omeprazole 20 MG Oral Capsule Delayed Release Take 1 capsule (20 mg total) by mouth before breakfast. 90 capsule 3    ergocalciferol 1.25 MG (32349 UT) Oral Cap Take 1 capsule (50,000 Units total) by mouth once a week. (Patient taking differently: Take 1 capsule (50,000 Units total) by mouth Every Friday.) 12 capsule 3    atorvastatin 80 MG Oral Tab Take 1 tablet (80 mg  total) by mouth daily. (Patient taking differently: Take 1 tablet (80 mg total) by mouth nightly.) 90 tablet 3      Past Medical History:    Back problem    BPH (benign prostatic hyperplasia)    Cataract    Cellulitis    Cellulitis of left lower extremity    Disorder of thyroid    Elevated prostate specific antigen (PSA)    Esophageal reflux    Essential hypertension    Hearing impairment    no hearing aids    Heart attack (HCC)    High blood pressure    High cholesterol    History of blood transfusion    no reactions    History of prostate biopsy    History of stomach ulcers    Hyperlipidemia    Intractable back pain    NSTEMI (non-ST elevated myocardial infarction) (HCC)    Osteoarthritis    Postoperative nausea    Sciatica of right side    Sleep apnea    no CPAP use    Visual impairment    readers      Past Surgical History:   Procedure Laterality Date    Angioplasty (coronary)      Arthroscopy of joint unlisted Left     knee    Cataract surgery, complex      Cath bare metal stent (bms)      Colonoscopy      Hc inj epidural steroid lumbar or sacral w img guidance      Tonsillectomy      Upper gi endoscopy,exam        Social History:  Social History     Socioeconomic History    Marital status:    Tobacco Use    Smoking status: Former     Current packs/day: 0.00     Average packs/day: 1.5 packs/day for 20.0 years (30.0 ttl pk-yrs)     Types: Cigarettes     Start date: 1968     Quit date: 1988     Years since quittin.1     Passive exposure: Past    Smokeless tobacco: Never   Vaping Use    Vaping status: Never Used   Substance and Sexual Activity    Alcohol use: Not Currently     Alcohol/week: 1.0 standard drink of alcohol     Types: 1 Standard drinks or equivalent per week    Drug use: No   Other Topics Concern    Caffeine Concern Yes     Comment: Per NG: coffee 1 cup daily     Social Determinants of Health     Food Insecurity: No Food Insecurity (2024)    Received from [place]  [facility], [facility]    Hunger Vital Sign     Worried About Running Out of Food in the Last Year: Never true     Ran Out of Food in the Last Year: Never true   Transportation Needs: No Transportation Needs (1/11/2024)    Received from [facility]    PRAPARE - Transportation     Lack of Transportation (Medical): No     Lack of Transportation (Non-Medical): No   Housing Stability: Low Risk  (1/11/2024)    Received from [facility]    Housing Stability Vital Sign     Unable to Pay for Housing in the Last Year: No     Number of Places Lived in the Last Year: 1     Unstable Housing in the Last Year: No      Family History   Problem Relation Age of Onset    Pulmonary Disease Father         Per NG: pneumonia ( cause of death)      Allergies   Allergen Reactions    Clarithromycin DIZZINESS and OTHER (SEE COMMENTS)     Other reaction(s): blurring in the eyes    Penicillins RASH and OTHER (SEE COMMENTS)     As a child, was told to have an allergic reaction; eyes became blurry        REVIEW OF SYSTEMS:   Review of Systems   Review of Systems   Constitutional: Negative for activity change, appetite change and fever.   HENT: Negative for congestion and voice change.    Respiratory: Negative for cough and shortness of breath.    Cardiovascular: Negative for chest pain.   Gastrointestinal: Negative for abdominal distention, abdominal pain and vomiting.   Genitourinary: Negative for hematuria.   Skin: Negative for wound.   Psychiatric/Behavioral: Negative for behavioral problems.   Wt Readings from Last 5 Encounters:   07/10/24 221 lb (100.2 kg)   07/08/24 215 lb (97.5 kg)   03/06/24 213 lb (96.6 kg)   01/09/24 210 lb (95.3 kg)   12/18/23 221 lb (100.2 kg)     Body mass index is 32.64 kg/m².      EXAM:   /58   Pulse 83   Ht 5' 9\" (1.753 m)   Wt 221 lb (100.2 kg)   SpO2 95%   BMI 32.64 kg/m²   Physical  Exam   Constitutional:       Appearance: Normal appearance.   HENT:      Head: Normocephalic.   Eyes:      Conjunctiva/sclera: Conjunctivae normal.   Cardiovascular:      Rate and Rhythm: Normal rate and regular rhythm.      Heart sounds: Normal heart sounds. No murmur heard.  Pulmonary:      Effort: Pulmonary effort is normal.      Breath sounds: Normal breath sounds. No rhonchi or rales.   Abdominal:      General: Bowel sounds are normal.      Palpations: Abdomen is soft.      Tenderness: There is no abdominal tenderness.   Musculoskeletal:      Cervical back: Neck supple.      Right lower leg: No edema.      Left lower leg: No edema.   Skin:     General: Skin is warm and dry.   Neurological:      General: No focal deficit present.      Mental Status: He is alert and oriented to person, place, and time. Mental status is at baseline.   Psychiatric:         Mood and Affect: Mood normal.         Behavior: Behavior normal.   Complete discussed with    ASSESSMENT AND PLAN:   1. Atrial fibrillation, unspecified type (HCC)  Appears to be in sinus today.  Continue aspirin.  Continue beta-blockers.  Follows with cardiology.  - CBC, Platelet; No Differential; Future  - Comp Metabolic Panel (14); Future  - Lipid Panel; Future  - TSH W Reflex To Free T4; Future    2. Hypertension, unspecified type  Lab Results   Component Value Date    CREATSERUM 0.65 (L) 12/19/2023    TSH 4.840 (H) 10/18/2023     Will continue to monitor blood pressure.  Encouraged patient to avoid salt.  Continue current medical regimen.    - CBC, Platelet; No Differential; Future  - Comp Metabolic Panel (14); Future  - Lipid Panel; Future  - TSH W Reflex To Free T4; Future    3. Hypercholesterolemia  Lab Results   Component Value Date    LDL 53 07/20/2023    AST 17 12/19/2023    ALT <7 (L) 12/19/2023      Encouraged patient to eat healthy.  Avoid fat fried foods and increase activity as tolerated.  We will monitor lipid profile and liver function  test.    - CBC, Platelet; No Differential; Future  - Comp Metabolic Panel (14); Future  - Lipid Panel; Future  - TSH W Reflex To Free T4; Future    4. Coronary artery disease involving native coronary artery of native heart without angina pectoris  Status post PCI-continue aspirin and statins.  Follows up with cardiology.  He    5. Primary osteoarthritis of hip, unspecified laterality  Stable today.  Follows with orthopedics.  If no improvement, may need to consider injections.    Plan: Labs ordered.  He will follow-up with orthopedics.  He will contact me if there is any concerns.  If everything is good, I will see him back in 6 months      The patient indicates understanding of these issues and agrees to the plan.  No follow-ups on file.    This note was prepared using Dragon Medical voice recognition dictation software. As a result errors may occur. When identified these errors have been corrected. While every attempt is made to correct errors during dictation discrepancies may still exist.

## 2024-07-19 RX ORDER — GABAPENTIN 100 MG/1
100 CAPSULE ORAL 2 TIMES DAILY
Qty: 180 CAPSULE | Refills: 3 | Status: SHIPPED | OUTPATIENT
Start: 2024-07-19

## 2024-07-19 NOTE — TELEPHONE ENCOUNTER
Refill Per Protocol     Requested Prescriptions   Pending Prescriptions Disp Refills    GABAPENTIN 100 MG Oral Cap [Pharmacy Med Name: Gabapentin 100 Mg Cap Nort] 180 capsule 0     Sig: TAKE ONE CAPSULE BY MOUTH TWO TIMES DAILY       Neurology Medications Passed - 7/16/2024  9:12 PM        Passed - In person appointment or virtual visit in the past 6 mos or appointment in next 3 mos     Recent Outpatient Visits              1 week ago Atrial fibrillation, unspecified type (HCC)    [facility] Marquise Galarza MD    Office Visit    1 week ago Left hip pain    [facility] Michel Kendall MD    Office Visit    4 months ago Orthopedic aftercare    [facility] Everardo Multani PA-C    Office Visit    4 months ago     [facility]    Office Visit    4 months ago [facility]pertension, unspecified type    [facility]    Office Visit          Future Appointments         Provider Department Appt Notes    In 3 months Marquise Galarza MD [facility] 4 month    In 5 months Everardo Multani PA-C [facility] 2nd POV Right TKA 1/12/24, NEEDS TO RS 3/18 APPT.                           Future Appointments         Provider Department Appt Notes    In 3 months Marquise Galarza MD [facility] 4 month    In 5 months Everardo Multani PA-C [facility] 2nd POV Right TKA 1/12/24, NEEDS TO RS 3/18 APPT.          Recent Outpatient Visits              1 week ago Atrial fibrillation, unspecified type ([facility])    [facility]     Office Visit    1 week ago [place] hip pain    [place] [place]    Office Visit    4 months ago Orthopedic aftercare    [place], Everardo Meza PA-C    Office Visit    4 months ago     [facility] Laura Gandhi PT    Office Visit    4 months ago Hypertension, unspecified type    [facility] [place]    Office Visit

## 2024-07-25 NOTE — TELEPHONE ENCOUNTER
Patient calling to check status of Rx: Atorvastatin. Advised it can take up to 72 business hours to get a refill. [email or web address]

## 2024-07-26 RX ORDER — ATORVASTATIN CALCIUM 80 MG/1
80 TABLET, FILM COATED ORAL NIGHTLY
Qty: 90 TABLET | Refills: 1 | Status: SHIPPED | OUTPATIENT
Start: 2024-07-26

## 2024-07-26 NOTE — TELEPHONE ENCOUNTER
Please review; protocol failed/ has no protocol      Routing to pod mate as Marquise Mary  is out of office.      Shayan Dang (9:39 AM)       Patient calling to check status of Rx: Atorvastatin. Advised it can take up to 72 business hours to get a refill. fyi          Please see message below for upcoming appointment.    Future Appointments   Date Time Provider Department Center   11/11/2024  9:15 AM Marquise Galarza MD [facility]              Requested Prescriptions   Pending Prescriptions Disp Refills    ATORVASTATIN 80 MG Oral Tab [Pharmacy Med Name: Atorvastatin Calcium 80 Mg Tab Nort] 90 tablet 0     Sig: TAKE ONE TABLET BY MOUTH ONE TIME DAILY       Cholesterol Medication Protocol Failed - 7/25/2024  9:39 AM        Failed - Lipid panel within past 12 months     Lab Results   Component Value Date    CHOLEST 111 07/20/2023    TRIG 138 07/20/2023    HDL 34 (L) 07/20/2023    LDL 53 07/20/2023    VLDL 20 07/20/2023    NONHDLC 77 07/20/2023             Passed - ALT < 80     Lab Results   Component Value Date    ALT <7 (L) 12/19/2023             Passed - ALT resulted within past year        Passed - In person appointment or virtual visit in the past 12 mos or appointment in next 3 mos     Recent Outpatient Visits              2 weeks ago [facility], [facility]CC[facility]    [facility] Visit    2 weeks ago Left hip pain    [facility] Michel Kendall MD    Office Visit    4 months ago Orthopedic aftercare    [facility]    Office Visit    4 months ago     [facility] Laura Gandhi PT    Office Visit    4 months ago [facility]pertension, unspecified type    [facility]    Office  Visit          Future Appointments         Provider Department Appt Notes    In 3 months [facility] 4 month    In 5 months Everardo Multani PA-C [facility] 2nd POV Right TKA 1/12/24, NEEDS TO RS 3/18 APPT.                       Recent Outpatient Visits              2 weeks ago Atrial fibrillation, unspecified type (HCC)    [facility] Marquise Galarza MD    Office Visit    2 weeks ago Left hip pain    [facility] Michel Kendall MD    Office Visit    4 months ago Orthopedic aftercare    [facility] Everardo Multani PA-C    Office Visit    4 months ago     [facility] Laura,     Office Visit    4 months ago Hypertension, unspecified type    [facility] Marquise Galarza MD    Office Visit          Future Appointments         Provider Department Appt Notes    In 3 months [facility] 4 month    In 5 months Everardo Multani PA-C [facility] 2nd POV Right TKA 1/12/24, NEEDS TO RS 3/18 APPT.

## 2024-08-06 ENCOUNTER — TELEPHONE (OUTPATIENT)
Dept: INTERNAL MEDICINE CLINIC | Facility: CLINIC | Age: 84
End: 2024-08-06

## 2024-08-06 DIAGNOSIS — M16.10 PRIMARY OSTEOARTHRITIS OF HIP, UNSPECIFIED LATERALITY: Primary | ICD-10-CM

## 2024-08-06 NOTE — TELEPHONE ENCOUNTER
Patient notified of referral via [employer].    Referred to Provider Information:  Provider Address Phone   Flynn Arce MD [facility] [address] [phone]

## 2024-08-06 NOTE — TELEPHONE ENCOUNTER
Patient is requesting referral.     Name of specialist and specialty department :   Dr. Flynn Arce, Pain Medicine    Reason for visit with the specialist:  pain in left buttock and leg and might be associated with the hip as the injection on the hip did not help     patient notes he addressed pain with PCP at Last Office Visit on 7/10/24 and follow up with an appointment with Dr. Michel Kendall, Orthopedic and was advised to follow up with this provider .    Address of the specialist office:    [address] [place] [address]    Phone # [phone]    Appointment date:   8/8/2024       [facility] informed patient the turnaround time for referral is 5-7 business days.  Patient was informed to check their [facility] account for referral status.       Call transferred to PCP's RN as patient mentions he is having pain in the hip and having hard time getting around.

## 2024-08-08 ENCOUNTER — TELEPHONE (OUTPATIENT)
Dept: PAIN CLINIC | Facility: HOSPITAL | Age: 84
End: 2024-08-08

## 2024-08-08 ENCOUNTER — OFFICE VISIT (OUTPATIENT)
Dept: PAIN CLINIC | Facility: HOSPITAL | Age: 84
End: 2024-08-08
Attending: ANESTHESIOLOGY
Payer: MEDICARE

## 2024-08-08 VITALS
WEIGHT: 215 LBS | DIASTOLIC BLOOD PRESSURE: 71 MMHG | BODY MASS INDEX: 32 KG/M2 | SYSTOLIC BLOOD PRESSURE: 136 MMHG | OXYGEN SATURATION: 96 % | HEART RATE: 55 BPM

## 2024-08-08 DIAGNOSIS — M54.16 LUMBAR RADICULOPATHY: ICD-10-CM

## 2024-08-08 DIAGNOSIS — M47.26 OSTEOARTHRITIS OF SPINE WITH RADICULOPATHY, LUMBAR REGION: Primary | ICD-10-CM

## 2024-08-08 DIAGNOSIS — M43.16 SPONDYLOLISTHESIS AT L4-L5 LEVEL: ICD-10-CM

## 2024-08-08 PROCEDURE — 99211 OFF/OP EST MAY X REQ PHY/QHP: CPT

## 2024-08-08 NOTE — PROGRESS NOTES
Patient presents in office today with reported pain in bilateral hip and buttocks, L>R, lower back    Current pain level reported = 3-4/10    Last reported dose of Tylenol and Gabapentin  this morning.       Narcotic Contract renewal NA          Pain increases with going from sit to stand and when twisting,turning.  Unable to walk long more than 4 blocks. Lower back pain with standing. Can be 7-8/10    LTK 03/23  RTK 01/24

## 2024-08-08 NOTE — TELEPHONE ENCOUNTER
Order Questions    Question Answer Comment   Anesthesia Type Local    Location [facility]    Procedure Lumbar CLAUDIA L4/5   CPT (Hit enter after each entry) NJX INTERLAMINAR LMBR/SAC    C-ARM Yes    Implants No    Medical clearance requested (will send to Pain Navigator) No    Patient has Medicare coverage? Yes      Patient has Medicare - No Prior Authorization Required if injection is done at the [facility].

## 2024-08-08 NOTE — CHRONIC PAIN
[facility]  Pain Consultation     HISTORY OF PRESENT ILLNESS:  Dedrick Norton is a 84 year old old male referred to the pain clinic by Dr. Hawk ref. provider found for   1. Osteoarthritis of spine with radiculopathy, lumbar region    2. Lumbar radiculopathy    3. Spondylolisthesis at L4-L5 level       Mr. Norton was a previous patient of the pain center in 2021 at which time he presented with low back bilateral radicular leg pain which was successfully treated with a lumbar epidural steroid injection L4-5 and a right SI injection.  Since that time he has been active and underwent a total right knee arthroplasty in January 2024 as well as a left knee arthroplasty in March 2023.  Unfortunately approximately 2 to 3 months ago he began to develop the lower back and left radicular leg pain which is now also on the right.  He did undergo a left hip injection without any relief of his symptoms.  The pain radiates into the bilateral buttocks and down the left leg.  No specific inciting event recalled. No recent injury/trauma. The pain is sharp and crampy in character, and 4/10 usually. Pt reports that the pain is worse with activity especially walking. The pain is better with rest. The pain has been getting  worse steadily.  Pt denies numbness/tingling/weakness.  There is no incontinence of bowel/bladder.  No saddle anesthesia.     PAIN COURSE AND PREVIOUS INTERVENTIONS:  Medications: Gabapentin  Interventions: Lumbar epidural steroid injection in 2019 and 2021 right SI in 2021  Adjuvants: HEP he walks up to 2 miles a day    BLOOD THINNING MEDICATIONS:  None    CURRENT MEDICATIONS:  Current Outpatient Medications   Medication Sig Dispense Refill    atorvastatin 80 MG Oral Tab Take 1 tablet (80 mg total) by mouth nightly. 90 tablet 1    gabapentin 100 MG Oral Cap Take 1 capsule (100 mg total) by mouth 2 (two) times daily. 180 capsule 3    finasteride 5 MG Oral Tab Take 1 tablet (5 mg total) by mouth daily. 90  tablet 3    potassium chloride 20 MEQ Oral Tab CR Take 1 tablet (20 mEq total) by mouth daily. 90 tablet 0    metoprolol succinate ER 25 MG Oral Tablet 24 Hr Take 1 tablet (25 mg total) by mouth Daily Beta Blocker. 90 tablet 3    ferrous sulfate 325 (65 FE) MG Oral Tab EC Take 1 tablet (325 mg total) by mouth daily with breakfast. 90 tablet 3    furosemide 40 MG Oral Tab TAKE ONE TABLET BY MOUTH ONE TIME DAILY 90 tablet 3    levothyroxine 75 MCG Oral Tab Take 1 tablet (75 mcg total) by mouth before breakfast. 90 tablet 3    acetaminophen 325 MG Oral Tab Take 2 tablets (650 mg total) by mouth every 4 (four) hours as needed.      aspirin 81 MG Oral Tab EC Take 1 tablet (81 mg total) by mouth daily.      omeprazole 20 MG Oral Capsule Delayed Release Take 1 capsule (20 mg total) by mouth before breakfast. 90 capsule 3    ergocalciferol 1.25 MG (39716 UT) Oral Cap Take 1 capsule (50,000 Units total) by mouth once a week. (Patient taking differently: Take 1 capsule (50,000 Units total) by mouth Every Friday.) 12 capsule 3     Scheduled Meds:  Continuous Infusions:  PRN Meds:.        ALLERGIES:  Allergies   Allergen Reactions    Clarithromycin DIZZINESS and OTHER (SEE COMMENTS)     Other reaction(s): blurring in the eyes    Penicillins RASH and OTHER (SEE COMMENTS)     As a child, was told to have an allergic reaction; eyes became blurry       SURGICAL HISTORY:  Past Surgical History:   Procedure Laterality Date    Angioplasty (coronary)  2022    Arthroscopy of joint unlisted Left     knee    Cataract surgery, complex      Cath bare metal stent (bms)      Colonoscopy      Hc inj epidural steroid lumbar or sacral w img guidance      Tonsillectomy      Upper gi endoscopy,exam         REVIEW OF SYSTEMS:   Bowel/Bladder Incontinence: as above  Coughing/sneezing/straining does not exacerbate the pain.  Numbness/tingling: as above  Weakness: as above  Weight Loss: Negative   Fever: Negative   Cardiovascular:  No current chest  pain or palpitations   Respiratory:  No current shortness of breath   Gastrointestinal:  No active ulcer, no change in B/B habits  Genitourinary:  Negative, no changes  Integumentary :  Negative  Psychiatric:  Negative  Hematologic: No active bleeding  Lymphatic: No current lymphedema  Allergic/Immunologic:  Negative  Musculoskeletal: As above  Neurological: As above    MEDICAL HISTORY:  Patient Active Problem List   Diagnosis    Hypertension    Hypercholesterolemia    Lumbar radiculopathy    Primary osteoarthritis of right knee    Coronary artery disease involving native coronary artery    Gastroesophageal reflux disease without esophagitis    Primary osteoarthritis of both knees    Difficult Matt catheter placement (HCC)    Osteoarthritis of left knee    Atrial fibrillation (HCC)    Orthopedic aftercare    Acquired hypothyroidism    Primary osteoarthritis of hip     Past Medical History:    Back problem    BPH (benign prostatic hyperplasia)    Cataract    Cellulitis    Cellulitis of left lower extremity    Disorder of thyroid    Elevated prostate specific antigen (PSA)    Esophageal reflux    Essential hypertension    Hearing impairment    no hearing aids    Heart attack (HCC)    High blood pressure    High cholesterol    History of blood transfusion    no reactions    History of prostate biopsy    History of stomach ulcers    Hyperlipidemia    Intractable back pain    NSTEMI (non-ST elevated myocardial infarction) ([facility])    Osteoarthritis    Postoperative nausea    Sciatica of right side    Sleep apnea    no CPAP use    Visual impairment    readers       FAMILY HISTORY:  Family History   Problem Relation Age of Onset    Pulmonary Disease Father         Per NG: pneumonia ( cause of death)       SOCIAL HISTORY:  Social History     Socioeconomic History    Marital status:      Spouse name: Not on file    Number of children: Not on file    Years of education: Not on file    Highest education level: Not on file    Occupational History    Not on file   Tobacco Use    Smoking status: Former     Current packs/day: 0.00     Average packs/day: 1.5 packs/day for 20.0 years (30.0 ttl pk-yrs)     Types: Cigarettes     Start date: 1968     Quit date: 1988     Years since quittin.2     Passive exposure: Past    Smokeless tobacco: Never   Vaping Use    Vaping status: Never Used   Substance and Sexual Activity    Alcohol use: Not Currently     Alcohol/week: 1.0 standard drink of alcohol     Types: 1 Standard drinks or equivalent per week    Drug use: No    Sexual activity: Not on file   Other Topics Concern     Service Not Asked    Blood Transfusions Not Asked    Caffeine Concern Yes     Comment: Per NG: coffee 1 cup daily    Occupational Exposure Not Asked    Hobby Hazards Not Asked    Sleep Concern Not Asked    Stress Concern Not Asked    Weight Concern Not Asked    Special Diet Not Asked    Back Care Not Asked    Exercise Yes    Bike Helmet Not Asked    Seat Belt Not Asked    Self-Exams Not Asked   Social History Narrative    Not on file     Social Determinants of Health     Financial Resource Strain: Not on file   Food Insecurity: No Food Insecurity (2024)    [facility]    Hunger Vital Sign     Worried About Running Out of Food in the Last Year: Never true     Ran Out of Food in the Last Year: Never true   Transportation Needs: No Transportation Needs (2024)    Received from [facility]    PRAPARE - Transportation     Lack of Transportation (Medical): No     Lack of Transportation (Non-Medical): No   Physical Activity: Not on file   Stress: Not on file   Social Connections: Not on file   Housing Stability: Low Risk  (2024)    Received from [facility]    Housing Stability Vital Sign     Unable to Pay for Housing in the Last Year: No     Number  of Places Lived in the Last Year: 1     Unstable Housing in the Last Year: No       ADVANCE CARE PLANNING:  Advance Care Plan NOT discussed.    PHYSICAL EXAMINATION:  Vitals:    08/08/24 0731   BP: 136/71   BP Location: Left arm   Patient Position: Sitting   Cuff Size: adult   Pulse: 55   SpO2: 96%   Weight: 215 lb (97.5 kg)     General: Alert and oriented x3  Affect:  NAD  Head: normocephalic, atraumatic  Eyes: anicteric; no injection  Chest: S1, S2, RRR  Respiratory: CTAB  Abdomen: soft, non-tender  Joint Exam: Crepitus    Gait: Antalgic; cane user - Yes  Spine: Normal  TPs:  Absent within lumbo-sacral paraspinal muscles    Skin - normal     Temperature:  normal to touch bilateral upper and lower extremities  Edema - Absent  Right Lower Extremity:  Normal  Left Lower Extremity: Normal  MOTOR EXAMINATION:  LOWER EXTREMITY      LEFT RIGHT   Iliopsoas 5/5 5/5   Quadriceps 5/5 5/5   Foot DF 5/5 5/5   Foot EHL 5/5 5/5   Gastrocnemius 5/5 5/5     PULSES      LEFT RIGHT   Dorsalis Pedis 2/4 2/4   Posterior Tibial 2/4 2/4          SIJ tenderness positive right/left negative    SLR: negative       IMAGING:  PROCEDURE: XR LUMBAR SPINE (MIN 2 VIEWS) (CPT=72100)       COMPARISON: MRI SPINE LUMBAR (CPT=72148), 10/17/2019, 4:09 PM.  CT ABDOMEN + PELVIS KIDNEYSTONE 2D RNDR (NO IV NO ORAL) (CPT=741, 10/16/2019, 12:46 PM.       INDICATIONS: Lower back pain radiates up the back and down to buttocks x1 week. No known injury.       TECHNIQUE: Lumbar spine radiographs (2-3 views)         FINDINGS:       ALIGNMENT: The normal lumbar lordosis and alignment is maintained.   VERTEBRAL BODIES:   Lumbar vertebral body heights are maintained.  There is minimal anterior wedge deformity of the T11 and T12 vertebral bodies which are new from the 2019 comparisons.  Otherwise, no evidence for acute fracture.  There is   mild-to-moderate multilevel facet arthropathy, worst at the lumbosacral junction.   DISC SPACES: Moderate to severe multilevel  degenerative disc changes, with large disc osteophyte complexes, worst at T12-L1 and L2-L3.   SACROILIAC JOINTS: Normal.     OTHER: Mild osteoarthritis of the partially visualized right hip and moderate of the left hip                   Impression   CONCLUSION:       1. Moderate to severe multilevel degenerative disc changes.       2. Mild anterior wedge deformities at T11 and T12, new since 2019, age indeterminate, could be chronic.  Correlate for any acute lower thoracic back pain.       3. Mild-to-moderate multilevel facet arthropathy, worst at the lower lumbar levels.                 Dictated by (CST): Lisbet Draper MD on 7/28/2021 at 10:00 AM       Finalized by (CST): Lisbet Draper MD on 7/28/2021 at 10:06 AM              PROCEDURE: MRI SPINE LUMBAR (CPT=72148)       COMPARISON: None.       INDICATIONS: Lumbar radiculopathy.       TECHNIQUE: A variety of imaging planes and parameters were utilized for visualization of suspected pathology.       FINDINGS:   PARASPINAL AREA: Normal with no visible mass.     BONES: No fracture, pars defect, or osseous lesion.     CORD/CAUDA EQUINA: Normal caliber, contour, and signal intensity.     OTHER: Negative.       LUMBAR DISC LEVELS:   L1-L2: Desiccated disc without collapse.  No evidence of disc herniation, central spinal or neural foraminal stenosis.   L2-L3: Desiccated disc with mild collapse.  No evidence of disc herniation, central spinal or neural foraminal stenosis.  Marrow changes of DJD along the endplates.   L3-L4: Desiccated disc with mild collapse.  Marrow changes of DJD adjacent to the endplates.  No evidence of herniated disc, central spinal or neural foraminal stenosis.  Moderate facet arthrosis.   L4-L5: Desiccated disc without collapse.  Minimal L4-5 spondylolisthesis.  No evidence of herniated disc or central spinal stenosis.  Extensive bilateral facet arthrosis with bilateral neural foraminal stenosis.   L5-S1: Desiccated disc without significant  collapse.  There are annular tear of the disc with minimal posterior central bulging.  No significant central spinal or neural foraminal stenosis.  Extensive bilateral facet arthrosis.                   Impression   CONCLUSION:   1. DJD lumbosacral spine.   2. No evidence of herniated lumbar disc or significant central spinal stenosis.               Dictated by (CST): Benjamin Alcala MD on 10/17/2019 at 16:55       Approved by (CST): Benjamin Alcala MD on 10/17/2019 at 17:02                     LABS:  Lab Results   Component Value Date    WBC 10.0 03/06/2024    RBC 6.65 (H) 03/06/2024    HGB 12.2 (L) 03/06/2024    HCT 42.4 03/06/2024    MCV 63.8 (L) 03/06/2024    MCH 18.3 (L) 03/06/2024    MCHC 28.8 (L) 03/06/2024    RDW 19.8 (H) 03/06/2024    .0 03/06/2024    MPV 9.7 11/20/2017     Lab Results   Component Value Date     12/19/2023    K 3.8 12/19/2023     12/19/2023    CO2 33.0 (H) 12/19/2023    BUN 16 12/19/2023    GLU 90 12/19/2023    CA 9.7 12/19/2023     Lab Results   Component Value Date    INR 1.07 03/11/2023       [place] [facility] MONITORING PROGRAM REVIEWED  Yes      ASSESSMENT AND PLAN:    84 year old old male with history of previous radiculopathy in 2019 and 2021 successfully treated in 2021 with an epidural steroid injection L4-5 and a right SI injection.  Patient has osteoarthritis of the spine with radiculopathy     PLAN:  RECOMMENDATIONS:  1) LESI L4-5 with fluoroscopic guidance  I have informed Dedrick Norton  of the risks of neuraxial anesthesia including, but not limited to: failure, headache, backache, spinal, unilateral/patchy block, difficulty breathing, infection, bleeding, nerve damage, and paralysis. The patient desires the proposed injection as planned.      Comprehensive analgesic plan was formulated. Conservative vs. Aggressive measures were discussed at length including pharmacotherapy (eg. Anti- inflammatories, muscle relaxants, neuropathic medications,  oral steroids, analgesics), injections, and further testing. Risks and benefits of all options were discussed at length to patients satisfaction during a comprehensive interactive discussion. All questions were answered during extended questions and answer session. Patient agreeable to discussion plan. Greater than 50% of the time was spent with counseling (nature of discussion centered around pain, therapy, and treatment options), face to face time, time spent reviewing data, obtaining patient information and discussing the care with the patients health care providers.     Pt will return to clinic 2 weeks postinjection  Total time: 32 minutes    AFTAB MARTÍNEZ MD  8/8/2024  [facility]

## 2024-08-08 NOTE — PATIENT INSTRUCTIONS
Refill policies:    Allow 2-3 business days for refills; controlled substances may take longer.  Contact your pharmacy at least 5 days prior to running out of medication and have them send an electronic request or submit request through the “request refill” option in your [employer] account.  Refills are not addressed on weekends; covering physicians do not authorize routine medications on weekends.  No narcotics or controlled substances are refilled after noon on Fridays or by on call physicians.  By law, narcotics must be electronically prescribed.  A 30 day supply with no refills is the maximum allowed.  If your prescription is due for a refill, you may be due for a follow up appointment.  To best provide you care, patients receiving routine medications need to be seen at least once a year.  Patients receiving narcotic/controlled substance medications need to be seen at least once every 3 months.  In the event that your preferred pharmacy does not have the requested medication in stock (e.g. Backordered), it is your responsibility to find another pharmacy that has the requested medication available.  We will gladly send a new prescription to that pharmacy at your request.    Scheduling Tests:    If your physician has ordered radiology tests such as MRI or CT scans, please contact Central Scheduling at [phone] right away to schedule the test.  Once scheduled, the [facility] Centralized Referral Team will work with your insurance carrier to obtain pre-certification or prior authorization.  Depending on your insurance carrier, approval may take 3-10 days.  It is highly recommended patients assure they have received an authorization before having a test performed.  If test is done without insurance authorization, patient may be responsible for the entire amount billed.      Precertification and Prior Authorizations:  If your physician has recommended that you have a procedure or additional testing performed the [facility]  Centralized Referral Team will contact your insurance carrier to obtain pre-certification or prior authorization.    You are strongly encouraged to contact your insurance carrier to verify that your procedure/test has been approved and is a COVERED benefit.  Although the [facility] Centralized Referral Team does its due diligence, the insurance carrier gives the disclaimer that \"Although the procedure is authorized, this does not guarantee payment.\"    Ultimately the patient is responsible for payment.   Thank you for your understanding in this matter.  Paperwork Completion:  If you require FMLA or disability paperwork for your recovery, please make sure to either drop it off or have it faxed to our office at [phone]. Be sure the form has your name and date of birth on it.  The form will be faxed to our Forms Department and they will complete it for you.  There is a 25$ fee for all forms that need to be filled out.  Please be aware there is a 10-14 day turnaround time.  You will need to sign a release of information (SUMMER) form if your paperwork does not come with one.  You may call the Forms Department with any questions at [phone].  Their fax number is [phone].

## 2024-08-19 ENCOUNTER — LAB ENCOUNTER (OUTPATIENT)
Dept: LAB | Age: 84
End: 2024-08-19
Attending: INTERNAL MEDICINE
Payer: MEDICARE

## 2024-08-19 DIAGNOSIS — I48.91 ATRIAL FIBRILLATION, UNSPECIFIED TYPE (HCC): ICD-10-CM

## 2024-08-19 DIAGNOSIS — I10 HYPERTENSION, UNSPECIFIED TYPE: ICD-10-CM

## 2024-08-19 DIAGNOSIS — E78.00 HYPERCHOLESTEROLEMIA: ICD-10-CM

## 2024-08-19 LAB
ALBUMIN SERPL-MCNC: 4.3 G/DL (ref 3.2–4.8)
ALBUMIN/GLOB SERPL: 1.7 {RATIO} (ref 1–2)
ALP LIVER SERPL-CCNC: 93 U/L
ALT SERPL-CCNC: <7 U/L
ANION GAP SERPL CALC-SCNC: 6 MMOL/L (ref 0–18)
AST SERPL-CCNC: 19 U/L (ref ?–34)
BILIRUB SERPL-MCNC: 0.7 MG/DL (ref 0.2–1.1)
BUN BLD-MCNC: 18 MG/DL (ref 9–23)
BUN/CREAT SERPL: 25 (ref 10–20)
CALCIUM BLD-MCNC: 9.9 MG/DL (ref 8.7–10.4)
CHLORIDE SERPL-SCNC: 105 MMOL/L (ref 98–112)
CHOLEST SERPL-MCNC: 113 MG/DL (ref ?–200)
CO2 SERPL-SCNC: 32 MMOL/L (ref 21–32)
CREAT BLD-MCNC: 0.72 MG/DL
DEPRECATED RDW RBC AUTO: 39.1 FL (ref 35.1–46.3)
EGFRCR SERPLBLD CKD-EPI 2021: 89 ML/MIN/1.73M2 (ref 60–?)
ERYTHROCYTE [DISTWIDTH] IN BLOOD BY AUTOMATED COUNT: 21.6 % (ref 11–15)
FASTING PATIENT LIPID ANSWER: YES
FASTING STATUS PATIENT QL REPORTED: YES
GLOBULIN PLAS-MCNC: 2.6 G/DL (ref 2–3.5)
GLUCOSE BLD-MCNC: 93 MG/DL (ref 70–99)
HCT VFR BLD AUTO: 43 %
HDLC SERPL-MCNC: 36 MG/DL (ref 40–59)
HGB BLD-MCNC: 13.3 G/DL
LDLC SERPL CALC-MCNC: 58 MG/DL (ref ?–100)
MCH RBC QN AUTO: 18.8 PG (ref 26–34)
MCHC RBC AUTO-ENTMCNC: 30.9 G/DL (ref 31–37)
MCV RBC AUTO: 60.8 FL
NONHDLC SERPL-MCNC: 77 MG/DL (ref ?–130)
OSMOLALITY SERPL CALC.SUM OF ELEC: 298 MOSM/KG (ref 275–295)
PLATELET # BLD AUTO: 297 10(3)UL (ref 150–450)
POTASSIUM SERPL-SCNC: 4.2 MMOL/L (ref 3.5–5.1)
PROT SERPL-MCNC: 6.9 G/DL (ref 5.7–8.2)
RBC # BLD AUTO: 7.07 X10(6)UL
SODIUM SERPL-SCNC: 143 MMOL/L (ref 136–145)
TRIGL SERPL-MCNC: 101 MG/DL (ref 30–149)
TSI SER-ACNC: 0.93 MIU/ML (ref 0.55–4.78)
VLDLC SERPL CALC-MCNC: 15 MG/DL (ref 0–30)
WBC # BLD AUTO: 8.2 X10(3) UL (ref 4–11)

## 2024-08-19 PROCEDURE — 85027 COMPLETE CBC AUTOMATED: CPT

## 2024-08-19 PROCEDURE — 80061 LIPID PANEL: CPT

## 2024-08-19 PROCEDURE — 80053 COMPREHEN METABOLIC PANEL: CPT

## 2024-08-19 PROCEDURE — 36415 COLL VENOUS BLD VENIPUNCTURE: CPT

## 2024-08-19 PROCEDURE — 84443 ASSAY THYROID STIM HORMONE: CPT

## 2024-08-21 PROBLEM — L03.213 PERIORBITAL CELLULITIS: Status: ACTIVE | Noted: 2023-03-28

## 2024-08-21 PROBLEM — I21.4 ACUTE NON-ST SEGMENT ELEVATION MYOCARDIAL INFARCTION (HCC): Status: ACTIVE | Noted: 2024-08-21

## 2024-08-23 RX ORDER — POTASSIUM CHLORIDE 1500 MG/1
20 TABLET, EXTENDED RELEASE ORAL DAILY
Qty: 90 TABLET | Refills: 3 | Status: SHIPPED | OUTPATIENT
Start: 2024-08-23

## 2024-08-23 NOTE — TELEPHONE ENCOUNTER
Protocol Failed/ No Protocol    Requested Prescriptions   Pending Prescriptions Disp Refills    POTASSIUM CHLORIDE 20 MEQ Oral Tab CR [Pharmacy Med Name: Potassium Chloride Er 20 Meq Tab Amne] 90 tablet 0     Sig: TAKE ONE TABLET BY MOUTH ONE TIME DAILY       There is no refill protocol information for this order          Future Appointments         Provider Department Appt Notes    In 2 months Marquise Galarza MD [facility] 4 month    In 4 months Everardo Multani PA-C [facility] 2nd POV Right TKA 1/12/24, NEEDS TO RS 3/18 APPT.          Recent Outpatient Visits              2 weeks ago Osteoarthritis of spine with radiculopathy, lumbar region    [facility] Flynn Arce MD    Office Visit    1 month ago Atrial fibrillation, unspecified type (HCC)    [facility] Marquise Galarza MD    Office Visit    1 month ago Left hip pain    [facility] Michel Kendall MD    Office Visit    5 months ago Orthopedic aftercare    [facility] Everardo Multani PA-C    Office Visit    5 months ago     [facility] Laura Gandhi PT    Office Visit

## 2024-10-02 ENCOUNTER — MED REC SCAN ONLY (OUTPATIENT)
Dept: INTERNAL MEDICINE CLINIC | Facility: CLINIC | Age: 84
End: 2024-10-02

## 2024-10-16 NOTE — TELEPHONE ENCOUNTER
Please review. Protocol Failed; No Protocol    Medication(s) to Refill:   Requested Prescriptions     Pending Prescriptions Disp Refills    ergocalciferol 1.25 MG (20114 UT) Oral Cap 12 capsule 3     Sig: Take 1 capsule (50,000 Units total) by mouth once a week.         Reason for Medication Refill being sent to Provider / Reason Protocol Failed:  [x] Non-Protocol Medication        Recent Labs:  No results found for: \"VITD\", \"QVITD\", \"[id number]\"           Requested Prescriptions   Pending Prescriptions Disp Refills    ergocalciferol 1.25 MG (53323 UT) Oral Cap 12 capsule 3     Sig: Take 1 capsule (50,000 Units total) by mouth once a week.       There is no refill protocol information for this order            Future Appointments         Provider Department Appt Notes    In 5 days Marquise Galarza MD [facility] Follow up    In 2 months Everardo Multani PA-C [facility] 2nd POV Right TKA 1/12/24, NEEDS TO RS 3/18 APPT.          Recent Outpatient Visits              2 months ago Osteoarthritis of spine with radiculopathy, lumbar region    [facility] Flynn Arce MD    Office Visit    3 months ago Atrial fibrillation, unspecified type (HCC)    [facility] Marquise Galarza MD    Office Visit    3 months ago Left hip pain    [facility] Michel Kendall MD    Office Visit    7 months ago Orthopedic aftercare    [facility] Everardo Multani PA-C    Office Visit    7 months ago     [facility] Laura Gandhi PT    Office Visit

## 2024-10-17 RX ORDER — ERGOCALCIFEROL 1.25 MG/1
50000 CAPSULE, LIQUID FILLED ORAL WEEKLY
Qty: 12 CAPSULE | Refills: 3 | Status: SHIPPED | OUTPATIENT
Start: 2024-10-17

## 2024-10-17 NOTE — TELEPHONE ENCOUNTER
Patient called and is requesting medication. Is completely out of medication and always take on every Friday. Needs it tomorrow

## 2024-10-21 ENCOUNTER — OFFICE VISIT (OUTPATIENT)
Dept: INTERNAL MEDICINE CLINIC | Facility: CLINIC | Age: 84
End: 2024-10-21
Payer: MEDICARE

## 2024-10-21 VITALS
WEIGHT: 210 LBS | BODY MASS INDEX: 31.1 KG/M2 | HEART RATE: 82 BPM | DIASTOLIC BLOOD PRESSURE: 78 MMHG | SYSTOLIC BLOOD PRESSURE: 128 MMHG | HEIGHT: 69 IN | OXYGEN SATURATION: 99 %

## 2024-10-21 DIAGNOSIS — I48.91 ATRIAL FIBRILLATION, UNSPECIFIED TYPE (HCC): ICD-10-CM

## 2024-10-21 DIAGNOSIS — I25.10 CORONARY ARTERY DISEASE INVOLVING NATIVE CORONARY ARTERY OF NATIVE HEART WITHOUT ANGINA PECTORIS: Primary | ICD-10-CM

## 2024-10-21 DIAGNOSIS — E78.00 HYPERCHOLESTEROLEMIA: ICD-10-CM

## 2024-10-21 DIAGNOSIS — I10 HYPERTENSION, UNSPECIFIED TYPE: ICD-10-CM

## 2024-10-21 PROBLEM — Z47.89 ORTHOPEDIC AFTERCARE: Status: RESOLVED | Noted: 2023-04-10 | Resolved: 2024-10-21

## 2024-10-21 PROBLEM — M17.11 PRIMARY OSTEOARTHRITIS OF RIGHT KNEE: Status: RESOLVED | Noted: 2021-08-23 | Resolved: 2024-10-21

## 2024-10-21 PROBLEM — I21.4 ACUTE NON-ST SEGMENT ELEVATION MYOCARDIAL INFARCTION (HCC): Status: RESOLVED | Noted: 2024-08-21 | Resolved: 2024-10-21

## 2024-10-21 PROCEDURE — 99214 OFFICE O/P EST MOD 30 MIN: CPT | Performed by: INTERNAL MEDICINE

## 2024-10-21 PROCEDURE — G2211 COMPLEX E/M VISIT ADD ON: HCPCS | Performed by: INTERNAL MEDICINE

## 2024-10-21 NOTE — PROGRESS NOTES
Dedrick Norton is a 84 year old male.  Chief Complaint   Patient presents with    Follow - Up     HPI:   84-year-old gentleman here for follow-up.  He report that he is doing well.  He had an injection from pain clinic that helped him a lot.  He is still walking with a cane.  Denies any chest pain.  Breathing is at baseline.  He denies any palpitations.  He denies any abdominal pain nausea vomiting.  He has not blood in the stool or blood in the urine.  He was seen by cardiologist outside of [facility].  I have reviewed his note.      Current Outpatient Medications   Medication Sig Dispense Refill    ergocalciferol 1.25 MG (62442 UT) Oral Cap Take 1 capsule (50,000 Units total) by mouth once a week. 12 capsule 3    potassium chloride 20 MEQ Oral Tab CR Take 1 tablet (20 mEq total) by mouth daily. 90 tablet 3    atorvastatin 80 MG Oral Tab Take 1 tablet (80 mg total) by mouth nightly. 90 tablet 1    gabapentin 100 MG Oral Cap Take 1 capsule (100 mg total) by mouth 2 (two) times daily. 180 capsule 3    finasteride 5 MG Oral Tab Take 1 tablet (5 mg total) by mouth daily. 90 tablet 3    metoprolol succinate ER 25 MG Oral Tablet 24 Hr Take 1 tablet (25 mg total) by mouth Daily Beta Blocker. 90 tablet 3    ferrous sulfate 325 (65 FE) MG Oral Tab EC Take 1 tablet (325 mg total) by mouth daily with breakfast. 90 tablet 3    furosemide 40 MG Oral Tab TAKE ONE TABLET BY MOUTH ONE TIME DAILY 90 tablet 3    levothyroxine 75 MCG Oral Tab Take 1 tablet (75 mcg total) by mouth before breakfast. 90 tablet 3    acetaminophen 325 MG Oral Tab Take 2 tablets (650 mg total) by mouth every 4 (four) hours as needed.      aspirin 81 MG Oral Tab EC Take 1 tablet (81 mg total) by mouth daily.      omeprazole 20 MG Oral Capsule Delayed Release Take 1 capsule (20 mg total) by mouth before breakfast. 90 capsule 3      Past Medical History:    Back problem    BPH (benign prostatic hyperplasia)    Cataract    Cellulitis    Cellulitis of left lower  extremity    Disorder of thyroid    Elevated prostate specific antigen (PSA)    Esophageal reflux    Essential hypertension    Hearing impairment    no hearing aids    Heart attack (HCC)    High blood pressure    High cholesterol    History of blood transfusion    no reactions    History of prostate biopsy    History of stomach ulcers    Hyperlipidemia    Intractable back pain    NSTEMI (non-ST elevated myocardial infarction) (HCC)    Osteoarthritis    Postoperative nausea    Sciatica of right side    Sleep apnea    no CPAP use    Visual impairment    readers      Past Surgical History:   Procedure Laterality Date    Angioplasty (coronary)      Arthroscopy of joint unlisted Left     knee    Cataract surgery, complex      Cath bare metal stent (bms)      Colonoscopy      Hc inj epidural steroid lumbar or sacral w img guidance      Tonsillectomy      Upper gi endoscopy,exam        Social History:  Social History     Socioeconomic History    Marital status:    Tobacco Use    Smoking status: Former     Current packs/day: 0.00     Average packs/day: 1.5 packs/day for 20.0 years (30.0 ttl pk-yrs)     Types: Cigarettes     Start date: 1968     Quit date: 1988     Years since quittin.4     Passive exposure: Past    Smokeless tobacco: Never   Vaping Use    Vaping status: Never Used   Substance and Sexual Activity    Alcohol use: Not Currently     Alcohol/week: 1.0 standard drink of alcohol     Types: 1 Standard drinks or equivalent per week    Drug use: No   Other Topics Concern    Caffeine Concern Yes     Comment: Per NG: coffee 1 cup daily    Exercise Yes     Social Drivers of Health     Food Insecurity: No Food Insecurity (2024)    Received from [facility], [facility]    Hunger Vital Sign     Worried About Running Out of Food in the Last Year: Never true     Ran Out of Food in the Last Year: Never true   Transportation Needs: No Transportation Needs  (1/11/2024)    Received from [facility]    PRAPARE - Transportation     Lack of Transportation (Medical): No     Lack of Transportation (Non-Medical): No   Housing Stability: Low Risk  (1/11/2024)    Received from [facility]    Housing Stability Vital Sign     Unable to Pay for Housing in the Last Year: No     Number of Places Lived in the Last Year: 1     Unstable Housing in the Last Year: No      Family History   Problem Relation Age of Onset    Pulmonary Disease Father         Per NG: pneumonia ( cause of death)      Allergies[1]     REVIEW OF SYSTEMS:   Review of Systems   Review of Systems   Constitutional: Negative for activity change, appetite change and fever.   HENT: Negative for congestion and voice change.    Respiratory: Negative for cough and shortness of breath.    Cardiovascular: Negative for chest pain.   Gastrointestinal: Negative for abdominal distention, abdominal pain and vomiting.   Genitourinary: Negative for hematuria.   Skin: Negative for wound.   Psychiatric/Behavioral: Negative for behavioral problems.   Wt Readings from Last 5 Encounters:   10/21/24 210 lb (95.3 kg)   08/21/24 215 lb (97.5 kg)   08/08/24 215 lb (97.5 kg)   07/10/24 221 lb (100.2 kg)   07/08/24 215 lb (97.5 kg)     Body mass index is 31.01 kg/m².      EXAM:   /78   Pulse 82   Ht 5' 9\" (1.753 m)   Wt 210 lb (95.3 kg)   SpO2 99%   BMI 31.01 kg/m²   Physical Exam   Constitutional:       Appearance: Normal appearance.   HENT:      Head: Normocephalic.   Eyes:      Conjunctiva/sclera: Conjunctivae normal.   Cardiovascular:      Rate and Rhythm: Normal rate and regular rhythm.      Heart sounds: Normal heart sounds. No murmur heard.  Pulmonary:      Effort: Pulmonary effort is normal.      Breath sounds: Normal breath sounds. No rhonchi or rales.   Abdominal:      General: Bowel sounds are normal.      Palpations: Abdomen is  soft.      Tenderness: There is no abdominal tenderness.   Musculoskeletal:      Cervical back: Neck supple.      Right lower leg: No edema.      Left lower leg: No edema.   Skin:     General: Skin is warm and dry.   Neurological:      General: No focal deficit present.      Mental Status: He is alert and oriented to person, place, and time. Mental status is at baseline.   Psychiatric:         Mood and Affect: Mood normal.         Behavior: Behavior normal.   Crepitus present    ASSESSMENT AND PLAN:   1. Coronary artery disease involving native coronary artery of native heart without angina pectoris  *Stable with unremarkable cardiac review systems.  Continue aspirin and statin.    2. Hypertension, unspecified type  Lab Results   Component Value Date    CREATSERUM 0.72 08/19/2024    TSH 0.932 08/19/2024     Will continue to monitor blood pressure.  Encouraged patient to avoid salt.  Continue current medical regimen.      3. Hypercholesterolemia  Lab Results   Component Value Date    LDL 58 08/19/2024    AST 19 08/19/2024    ALT <7 (L) 08/19/2024      Encouraged patient to eat healthy.  Avoid fat fried foods and increase activity as tolerated.  We will monitor lipid profile and liver function test.      4. Atrial fibrillation, unspecified type (HCC)  In sinus rhythm.  Follows with cardiology.  Zio patch did not reveal any atrial fibrillation and subsequently his systemic anticoagulation has been stopped.  Today on exam, he appears in sinus.  Continue aspirin.    Plan: As above.  I will see him back in 6 months.  Meanwhile, if there is any concerns he will contact me.      The patient indicates understanding of these issues and agrees to the plan.  No follow-ups on file.    This note was prepared using Dragon Medical voice recognition dictation software. As a result errors may occur. When identified these errors have been corrected. While every attempt is made to correct errors during dictation discrepancies may still  exist.       [1]   Allergies  Allergen Reactions    Clarithromycin DIZZINESS and OTHER (SEE COMMENTS)     Other reaction(s): blurring in the eyes    Penicillins RASH and OTHER (SEE COMMENTS)     As a child, was told to have an allergic reaction; eyes became blurry

## 2024-10-31 NOTE — TELEPHONE ENCOUNTER
Pt was supposed to f/u with podiatrist next week - he is booked out until December - asking to be referred to different Dr atorvastatin 20 mg oral tablet: 1 tab(s) orally once a day (at bedtime)  metFORMIN 1000 mg oral tablet: 1 tab(s) orally 2 times a day  metoprolol succinate 25 mg oral tablet, extended release: 1 tab(s) orally once a day  pantoprazole 40 mg oral delayed release tablet: 1 tab(s) orally once a day before breakfast  ramipril 10 mg oral tablet: 1 tab(s) orally once a day   amoxicillin-clavulanate 875 mg-125 mg oral tablet: 1 tab(s) orally 2 times a day  atorvastatin 20 mg oral tablet: 1 tab(s) orally once a day (at bedtime)  metFORMIN 1000 mg oral tablet: 1 tab(s) orally 2 times a day  metoprolol succinate 25 mg oral tablet, extended release: 1 tab(s) orally once a day  pantoprazole 40 mg oral delayed release tablet: 1 tab(s) orally once a day before breakfast  ramipril 10 mg oral tablet: 1 tab(s) orally once a day

## 2024-12-24 NOTE — TELEPHONE ENCOUNTER
Refilled per protocol     Gastrointestional Medication Protocol Passed   OMEPRAZOLE 20 MG Oral Capsule Delayed Release [Pharmacy Med Name: Omeprazole Dr 20 Mg Cap Nort]  12/20/2024 10:13 AM    In person appointment or virtual visit in the past 12 mos or appointment in next 3 mos

## 2025-01-13 ENCOUNTER — HOSPITAL ENCOUNTER (OUTPATIENT)
Dept: GENERAL RADIOLOGY | Facility: HOSPITAL | Age: 85
Discharge: HOME | End: 2025-01-13
Attending: PHYSICIAN ASSISTANT
Payer: MEDICARE

## 2025-01-13 ENCOUNTER — OFFICE VISIT (OUTPATIENT)
Dept: ORTHOPEDICS CLINIC | Facility: CLINIC | Age: 85
End: 2025-01-13
Payer: MEDICARE

## 2025-01-13 VITALS — WEIGHT: 206 LBS | BODY MASS INDEX: 30.51 KG/M2 | HEIGHT: 69 IN

## 2025-01-13 DIAGNOSIS — Z47.89 ORTHOPEDIC AFTERCARE: ICD-10-CM

## 2025-01-13 DIAGNOSIS — Z47.89 ORTHOPEDIC AFTERCARE: Primary | ICD-10-CM

## 2025-01-13 PROCEDURE — 73562 X-RAY EXAM OF KNEE 3: CPT | Performed by: PHYSICIAN ASSISTANT

## 2025-01-13 NOTE — PROGRESS NOTES
NURSING INTAKE COMMENTS:   Chief Complaint   Patient presents with    Knee Pain     S/p Right TKA from 1/9/24 f/u - states he is well - no pain and no other c/o regarding the knee        HPI: This 84 year old male presents today for follow-up on his bilateral knee replacements.  His right knee was replaced a year ago.  His left knee was replaced in March 2023.  He is doing well.  He has no complaints in either knee.  He exercises regularly.  He does stretching exercises daily.  He ambulates with a cane when outside of the home.    Past Medical History:    Back problem    BPH (benign prostatic hyperplasia)    Cataract    Cellulitis    Cellulitis of left lower extremity    Disorder of thyroid    Elevated prostate specific antigen (PSA)    Esophageal reflux    Essential hypertension    Hearing impairment    no hearing aids    Heart attack (HCC)    High blood pressure    High cholesterol    History of blood transfusion    no reactions    History of prostate biopsy    History of stomach ulcers    Hyperlipidemia    Intractable back pain    NSTEMI (non-ST elevated myocardial infarction) (HCC)    Osteoarthritis    Postoperative nausea    Sciatica of right side    Sleep apnea    no CPAP use    Visual impairment    readers     Past Surgical History:   Procedure Laterality Date    Angioplasty (coronary)  2022    Arthroscopy of joint unlisted Left     knee    Cataract surgery, complex      Cath bare metal stent (bms)      Colonoscopy      Hc inj epidural steroid lumbar or sacral w img guidance      Tonsillectomy      Upper gi endoscopy,exam       Current Outpatient Medications   Medication Sig Dispense Refill    omeprazole 20 MG Oral Capsule Delayed Release TAKE ONE CAPSULE BY MOUTH ONE TIME DAILY BEFORE BREAKFAST 90 capsule 3    ergocalciferol 1.25 MG (70652 UT) Oral Cap Take 1 capsule (50,000 Units total) by mouth once a week. 12 capsule 3    potassium chloride 20 MEQ Oral Tab CR Take 1 tablet (20 mEq total) by mouth daily.  90 tablet 3    atorvastatin 80 MG Oral Tab Take 1 tablet (80 mg total) by mouth nightly. 90 tablet 1    gabapentin 100 MG Oral Cap Take 1 capsule (100 mg total) by mouth 2 (two) times daily. 180 capsule 3    finasteride 5 MG Oral Tab Take 1 tablet (5 mg total) by mouth daily. 90 tablet 3    metoprolol succinate ER 25 MG Oral Tablet 24 Hr Take 1 tablet (25 mg total) by mouth Daily Beta Blocker. 90 tablet 3    ferrous sulfate 325 (65 FE) MG Oral Tab EC Take 1 tablet (325 mg total) by mouth daily with breakfast. 90 tablet 3    furosemide 40 MG Oral Tab TAKE ONE TABLET BY MOUTH ONE TIME DAILY 90 tablet 3    levothyroxine 75 MCG Oral Tab Take 1 tablet (75 mcg total) by mouth before breakfast. 90 tablet 3    acetaminophen 325 MG Oral Tab Take 2 tablets (650 mg total) by mouth every 4 (four) hours as needed.      aspirin 81 MG Oral Tab EC Take 1 tablet (81 mg total) by mouth daily.       Allergies[1]  Family History   Problem Relation Age of Onset    Pulmonary Disease Father         Per NG: pneumonia ( cause of death)       Social History     Occupational History    Not on file   Tobacco Use    Smoking status: Former     Current packs/day: 0.00     Average packs/day: 1.5 packs/day for 20.0 years (30.0 ttl pk-yrs)     Types: Cigarettes     Start date: 1968     Quit date: 1988     Years since quittin.6     Passive exposure: Past    Smokeless tobacco: Never   Vaping Use    Vaping status: Never Used   Substance and Sexual Activity    Alcohol use: Not Currently     Alcohol/week: 1.0 standard drink of alcohol     Types: 1 Standard drinks or equivalent per week    Drug use: No    Sexual activity: Not on file        Review of Systems:  GENERAL: feels generally well, no significant weight loss or weight gain  SKIN: no ulcerated or worrisome skin lesions  EYES:denies blurred vision or double vision  HEENT: denies new nasal congestion, sinus pain or ST  LUNGS: denies shortness of breath  CARDIOVASCULAR: denies chest  pain  GI: no hematemesis, no worsening heartburn, no diarrhea  : no dysuria, no blood in urine, no difficulty urinating, no incontinence  MUSCULOSKELETAL: no other musculoskeletal complaints other than in HPI  NEURO: no numbness or tingling, no weakness or balance disorder  PSYCHE: no depression or anxiety  HEMATOLOGIC: no hx of blood dyscrasia  ENDOCRINE: no thyroid or diabetes issues  ALL/ASTHMA: no new hx of severe allergy or asthma    Physical Examination:    Ht 5' 9\" (1.753 m)   Wt 206 lb (93.4 kg)   BMI 30.42 kg/m²   Constitutional: appears well hydrated, alert and responsive, no acute distress noted  Extremities: Examination of both knees demonstrate well-healed incisions.  Full extension 125 degrees of flexion bilaterally.  No instability.      Imaging: Standing AP both knees with lateral and skyline views of both knees demonstrate stable appearance following bilateral total knee arthroplasties.  Redemonstration of a healed inferior pole fracture of the patella on the right.    Lab Results   Component Value Date    WBC 8.2 08/19/2024    HGB 13.3 08/19/2024    .0 08/19/2024      Lab Results   Component Value Date    GLU 93 08/19/2024    BUN 18 08/19/2024    CREATSERUM 0.72 08/19/2024    GFRNAA 87 08/13/2021    GFRAA 100 08/13/2021        Assessment and Plan:  Diagnoses and all orders for this visit:    Orthopedic aftercare  -     Cancel: XR KNEE (3 VIEWS), RIGHT (CPT=73562); Future        Assessment: Status post bilateral total knee arthroplasties    Plan: Mr. Loza is doing well following his surgery.  We discussed his x-ray findings.  I encouraged him remain diligent with his home exercises.  I advised him to contact us if he had any questions or problems, otherwise he will follow-up with us in a year.    The above note was creating using Dragon speech recognition technology. Please excuse any typos.    This visit was performed under the supervision of Dr. Michel Kendall who formulated the  treatment plan and decision making.        [1]   Allergies  Allergen Reactions    Clarithromycin DIZZINESS and OTHER (SEE COMMENTS)     Other reaction(s): blurring in the eyes    Penicillins RASH and OTHER (SEE COMMENTS)     As a child, was told to have an allergic reaction; eyes became blurry

## 2025-01-16 RX ORDER — ATORVASTATIN CALCIUM 80 MG/1
80 TABLET, FILM COATED ORAL NIGHTLY
Qty: 90 TABLET | Refills: 3 | Status: SHIPPED | OUTPATIENT
Start: 2025-01-16

## 2025-01-16 NOTE — TELEPHONE ENCOUNTER
Refill passed per [facility] protocol.     Requested Prescriptions   Pending Prescriptions Disp Refills    ATORVASTATIN 80 MG Oral Tab [Pharmacy Med Name: Atorvastatin Calcium 80 Mg Tab Nort] 90 tablet 0     Sig: TAKE ONE TABLET BY MOUTH NIGHTLY AT BEDTIME       Cholesterol Medication Protocol Passed - 1/16/2025  2:30 PM        Passed - ALT < 80     Lab Results   Component Value Date    ALT <7 (L) 08/19/2024             Passed - ALT resulted within past year        Passed - Lipid panel within past 12 months     Lab Results   Component Value Date    CHOLEST 113 08/19/2024    TRIG 101 08/19/2024    HDL 36 (L) 08/19/2024    LDL 58 08/19/2024    VLDL 15 08/19/2024    NONHDLC 77 08/19/2024             Passed - In person appointment or virtual visit in the past 12 mos or appointment in next 3 mos     Recent Outpatient Visits              3 days ago Orthopedic aftercare    [facility] Everardo Multani PA-C    Office Visit    2 months ago Coronary artery disease involving native coronary artery of native heart without angina pectoris    [facility] Marquise Galarza MD    Office Visit    5 months ago Osteoarthritis of spine with radiculopathy, lumbar region    [facility] Flynn Arce MD    Office Visit    6 months ago Atrial fibrillation, unspecified type (HCC)    [facility] Marquise Galarza MD    Office Visit    6 months ago Left hip pain    [facility] Michel Kendall MD    Office Visit          Future Appointments         Provider Department Appt Notes    In 2 months Marquise Galarza MD [facility] MEDICARE P[facility]                    Passed - Medication is active on med list             [unfilled]      [unfilled]

## 2025-03-10 ENCOUNTER — OFFICE VISIT (OUTPATIENT)
Dept: INTERNAL MEDICINE CLINIC | Facility: CLINIC | Age: 85
End: 2025-03-10
Payer: MEDICARE

## 2025-03-10 VITALS
BODY MASS INDEX: 28.83 KG/M2 | RESPIRATION RATE: 16 BRPM | HEIGHT: 69 IN | TEMPERATURE: 97 F | DIASTOLIC BLOOD PRESSURE: 70 MMHG | SYSTOLIC BLOOD PRESSURE: 120 MMHG | HEART RATE: 60 BPM | OXYGEN SATURATION: 97 % | WEIGHT: 194.63 LBS

## 2025-03-10 DIAGNOSIS — R21 RASH: ICD-10-CM

## 2025-03-10 DIAGNOSIS — I10 HYPERTENSION, UNSPECIFIED TYPE: Primary | ICD-10-CM

## 2025-03-10 DIAGNOSIS — R07.89 OTHER CHEST PAIN: ICD-10-CM

## 2025-03-10 PROCEDURE — 99214 OFFICE O/P EST MOD 30 MIN: CPT | Performed by: NURSE PRACTITIONER

## 2025-03-10 RX ORDER — TRIAMCINOLONE ACETONIDE 1 MG/G
1 OINTMENT TOPICAL 2 TIMES DAILY
Qty: 80 G | Refills: 1 | Status: SHIPPED | OUTPATIENT
Start: 2025-03-10

## 2025-03-10 NOTE — ASSESSMENT & PLAN NOTE
Patient went to [facility] via ambulance.  He was hypertensive in the emergency room and was admitted for angina pain.  Patient states cardiac workup was unremarkable and there was no further intervention

## 2025-03-10 NOTE — ASSESSMENT & PLAN NOTE
In review of the cardiologist note at [facility].  It said that he was switched to lisinopril 40 and metoprolol was discontinued.  I called the patient at home this evening because that was not what was reported today at the office visit.  Patient told me that his pressure went low with the lisinopril in the hospital and they switched him back to metoprolol ER 25 mg daily.

## 2025-03-10 NOTE — PROGRESS NOTES
HPI:    Patient ID: Dedrick Norton is a 85 year old male.    HPI  Follow up on Hospitalization at [facility]  85 year old male with hx of bilateral knee replacements.  He walks with a cane when he is outside the home.  Hx of AFIB- Zio patch did not reveal any atrial fib.    Angina  Dedrick had to call the parametrics on 3/2/2025 for chest pain that was radiating down both arms. He was hypertensive and was brought [facility] [place]. ([facility]).    Cardiology assessment below    Assessment/Plan Visit Diagnoses   1. Chest pain R07.9 Check 2 D echo and MPI. Check TnI and ECG in AM 2. Hypertensive disorder I10 better controlled this AM. Increase ACE-I and D/C Metoprolol in view of bradycardia - THIS WAS CHANGED ACCORDING TO PATIENT>  3. Hyperlipidemia E78.5 check lipids, A1c. Statin   4. CAD in native artery I25.10 s/p Stenting Orders: Lovenox, 89.6 mg,      Lexiscan stress agent, dose and frequency per Cardiology Nuclear Medication CS, Standard Precautions, Written NM Myocardial SPECT Mlt Pharm Cardiolite, 03/02/25  Written Troponin I QuaNT High Sensitivity, Blood, Routine collect, 03/02/25 11:24:00 CST, AM (Every morning) for 1 day, Default     Blood pressure 120/70, pulse 60, temperature 97.1 °F (36.2 °C), temperature source Temporal, resp. rate 16, height 5' 9\" (1.753 m), weight 194 lb 9.6 oz (88.3 kg), SpO2 97%.   Immunization History   Administered Date(s) Administered    Covid-19 Vaccine Pfizer 30 mcg/0.3 ml 04/24/2021, 05/15/2021, 09/02/2021    Covid-19 Vaccine Pfizer Bivalent 30mcg/0.3mL 11/10/2022    Covid-19 Vaccine Pfizer Anthony-Sucrose 30 mcg/0.3 ml 04/22/2022    FLU VAC High Dose 65 YRS & Older PRSV Free (25902) 09/02/2021, 09/30/2022, 10/23/2023    Flublok Quad Influenza Vaccine (02473) 12/16/2020    Fluzone Vaccine Medicare () 10/19/2019    High Dose Fluzone Influenza Vaccine, 65yr+ PF 0.5mL (02373) 09/02/2021, 09/30/2022, 09/24/2024    Influenza 09/20/2024    Pfizer  Covid-19 Vaccine 30mcg/0.3ml 12yrs+ 10/20/2023, 2024, 2024    Pneumococcal Conjugate PCV20 2023    Pneumovax 23 2022       Past Medical History:    Back problem    BPH (benign prostatic hyperplasia)    Cataract    Cellulitis    Cellulitis of left lower extremity    Disorder of thyroid    Elevated prostate specific antigen (PSA)    Esophageal reflux    Essential hypertension    Hearing impairment    no hearing aids    Heart attack (HCC)    High blood pressure    High cholesterol    History of blood transfusion    no reactions    History of prostate biopsy    History of stomach ulcers    Hyperlipidemia    Intractable back pain    NSTEMI (non-ST elevated myocardial infarction) (HCC)    Osteoarthritis    Postoperative nausea    Sciatica of right side    Sleep apnea    no CPAP use    Visual impairment    readers      Past Surgical History:   Procedure Laterality Date    Angioplasty (coronary)      Arthroscopy of joint unlisted Left     knee    Cataract surgery, complex      Cath bare metal stent (bms)      Colonoscopy      Hc inj epidural steroid lumbar or sacral w img guidance      Tonsillectomy      Upper gi endoscopy,exam        Social History     Socioeconomic History    Marital status:    Tobacco Use    Smoking status: Former     Current packs/day: 0.00     Average packs/day: 1.5 packs/day for 20.0 years (30.0 ttl pk-yrs)     Types: Cigarettes     Start date: 1968     Quit date: 1988     Years since quittin.7     Passive exposure: Past    Smokeless tobacco: Never   Vaping Use    Vaping status: Never Used   Substance and Sexual Activity    Alcohol use: Not Currently     Alcohol/week: 1.0 standard drink of alcohol     Types: 1 Standard drinks or equivalent per week    Drug use: No   Other Topics Concern    Caffeine Concern Yes     Comment: Per NG: coffee 1 cup daily    Exercise Yes          Review of Systems   Constitutional:  Negative for chills, fatigue and fever.    HENT:  Negative for ear pain, hearing loss, sinus pain, sore throat and trouble swallowing.    Eyes:  Negative for pain and visual disturbance.   Respiratory:  Negative for cough, chest tightness and shortness of breath.    Cardiovascular:  Negative for chest pain, palpitations and leg swelling.   Gastrointestinal:  Negative for abdominal pain, constipation, diarrhea, nausea and vomiting.   Endocrine: Negative for cold intolerance and heat intolerance.   Genitourinary:  Negative for dysuria and hematuria.   Musculoskeletal:  Negative for back pain and joint swelling.   Skin:  Negative for rash.   Allergic/Immunologic: Negative for environmental allergies.   Neurological:  Negative for weakness, numbness and headaches.   Hematological:  Does not bruise/bleed easily.   Psychiatric/Behavioral:  Negative for dysphoric mood and sleep disturbance. The patient is not nervous/anxious.               Current Outpatient Medications   Medication Sig Dispense Refill    triamcinolone 0.1 % External Ointment Apply 1 Application topically 2 (two) times daily. 80 g 1    atorvastatin 80 MG Oral Tab Take 1 tablet (80 mg total) by mouth nightly. 90 tablet 3    omeprazole 20 MG Oral Capsule Delayed Release TAKE ONE CAPSULE BY MOUTH ONE TIME DAILY BEFORE BREAKFAST 90 capsule 3    ergocalciferol 1.25 MG (49931 UT) Oral Cap Take 1 capsule (50,000 Units total) by mouth once a week. 12 capsule 3    potassium chloride 20 MEQ Oral Tab CR Take 1 tablet (20 mEq total) by mouth daily. 90 tablet 3    gabapentin 100 MG Oral Cap Take 1 capsule (100 mg total) by mouth 2 (two) times daily. 180 capsule 3    finasteride 5 MG Oral Tab Take 1 tablet (5 mg total) by mouth daily. 90 tablet 3    metoprolol succinate ER 25 MG Oral Tablet 24 Hr Take 1 tablet (25 mg total) by mouth Daily Beta Blocker. 90 tablet 3    ferrous sulfate 325 (65 FE) MG Oral Tab EC Take 1 tablet (325 mg total) by mouth daily with breakfast. 90 tablet 3    furosemide 40 MG Oral Tab  TAKE ONE TABLET BY MOUTH ONE TIME DAILY 90 tablet 3    levothyroxine 75 MCG Oral Tab Take 1 tablet (75 mcg total) by mouth before breakfast. 90 tablet 3    acetaminophen 325 MG Oral Tab Take 2 tablets (650 mg total) by mouth every 4 (four) hours as needed.      aspirin 81 MG Oral Tab EC Take 1 tablet (81 mg total) by mouth daily.       Allergies:Allergies[1]   PHYSICAL EXAM:   Physical Exam  Constitutional:       Appearance: Normal appearance. He is well-developed.   HENT:      Head: Normocephalic.      Right Ear: Tympanic membrane normal.      Left Ear: Tympanic membrane normal.      Nose: Nose normal.      Mouth/Throat:      Mouth: Mucous membranes are moist.      Pharynx: No oropharyngeal exudate or posterior oropharyngeal erythema.   Eyes:      General:         Right eye: No discharge.         Left eye: No discharge.      Pupils: Pupils are equal, round, and reactive to light.   Cardiovascular:      Rate and Rhythm: Normal rate and regular rhythm.      Heart sounds: Normal heart sounds. No murmur heard.     No friction rub. No gallop.   Pulmonary:      Effort: Pulmonary effort is normal. No respiratory distress.      Breath sounds: Normal breath sounds. No wheezing, rhonchi or rales.   Abdominal:      General: Bowel sounds are normal. There is no distension.      Palpations: Abdomen is soft. There is no mass.      Tenderness: There is no abdominal tenderness. There is no right CVA tenderness, left CVA tenderness or guarding.   Musculoskeletal:         General: No tenderness.      Cervical back: Normal range of motion and neck supple. No tenderness.      Right lower leg: No edema.      Left lower leg: No edema.   Lymphadenopathy:      Cervical: No cervical adenopathy.   Skin:     General: Skin is warm and dry.      Findings: No rash.   Neurological:      Mental Status: He is alert and oriented to person, place, and time.      Coordination: Coordination normal.      Gait: Gait normal.   Psychiatric:         Mood  and Affect: Mood normal.         Behavior: Behavior normal.         Thought Content: Thought content normal.         Judgment: Judgment normal.       /70 (BP Location: Right arm, Patient Position: Sitting, Cuff Size: adult)   Pulse 60   Temp 97.1 °F (36.2 °C) (Temporal)   Resp 16   Ht 5' 9\" (1.753 m)   Wt 194 lb 9.6 oz (88.3 kg)   SpO2 97%   BMI 28.74 kg/m²   Wt Readings from Last 2 Encounters:   03/10/25 194 lb 9.6 oz (88.3 kg)   01/13/25 206 lb (93.4 kg)     Body mass index is 28.74 kg/m².(2)  Lab Results   Component Value Date    WBC 8.2 08/19/2024    RBC 7.07 (H) 08/19/2024    HGB 13.3 08/19/2024    HCT 43.0 08/19/2024    MCV 60.8 (L) 08/19/2024    MCH 18.8 (L) 08/19/2024    MCHC 30.9 (L) 08/19/2024    RDW 21.6 (H) 08/19/2024    .0 08/19/2024    MPV 9.7 11/20/2017      Lab Results   Component Value Date    GLU 93 08/19/2024    BUN 18 08/19/2024    BUNCREA 25.0 (H) 08/19/2024    CREATSERUM 0.72 08/19/2024    ANIONGAP 6 08/19/2024    GFRNAA 87 08/13/2021    GFRAA 100 08/13/2021    CA 9.9 08/19/2024    OSMOCALC 298 (H) 08/19/2024    ALKPHO 93 08/19/2024    AST 19 08/19/2024    ALT <7 (L) 08/19/2024    ALKPHOS 62 08/23/2016    BILT 0.7 08/19/2024    TP 6.9 08/19/2024    ALB 4.3 08/19/2024    GLOBULIN 2.6 08/19/2024    AGRATIO 1.1 08/23/2016     08/19/2024    K 4.2 08/19/2024     08/19/2024    CO2 32.0 08/19/2024      Lab Results   Component Value Date    A1C 6.0 12/03/2016      Lab Results   Component Value Date    CHOLEST 113 08/19/2024    TRIG 101 08/19/2024    HDL 36 (L) 08/19/2024    LDL 58 08/19/2024    VLDL 15 08/19/2024    NONHDLC 77 08/19/2024    CALCNONHDL 104 08/23/2016      Lab Results   Component Value Date    T4F 1.0 10/18/2023    TSH 0.932 08/19/2024                ASSESSMENT/PLAN:     Problem List Items Addressed This Visit       Hypertension - Primary     In review of the cardiologist note at [facility].  It said that he was switched to lisinopril 40 and  metoprolol was discontinued.  I called the patient at home this evening because that was not what was reported today at the office visit.  Patient told me that his pressure went low with the lisinopril in the hospital and they switched him back to metoprolol ER 25 mg daily.         Other chest pain     Patient went to [facility] via ambulance.  He was hypertensive in the emergency room and was admitted for angina pain.  Patient states cardiac workup was unremarkable and there was no further intervention         Rash    Relevant Medications    triamcinolone 0.1 % External Ointment          No orders of the defined types were placed in this encounter.      Meds This Visit:  Requested Prescriptions     Signed Prescriptions Disp Refills    triamcinolone 0.1 % External Ointment 80 g 1     Sig: Apply 1 Application topically 2 (two) times daily.       Imaging & Referrals:  None         GUILLERMO Bledsoe          [1]   Allergies  Allergen Reactions    Clarithromycin DIZZINESS and OTHER (SEE COMMENTS)     Other reaction(s): blurring in the eyes    Penicillins RASH and OTHER (SEE COMMENTS)     As a child, was told to have an allergic reaction; eyes became blurry

## 2025-03-19 RX ORDER — LEVOTHYROXINE SODIUM 75 UG/1
75 TABLET ORAL
Qty: 90 TABLET | Refills: 3 | Status: SHIPPED | OUTPATIENT
Start: 2025-03-19

## 2025-03-19 NOTE — TELEPHONE ENCOUNTER
Refill Per Protocol     Requested Prescriptions   Pending Prescriptions Disp Refills    LEVOTHYROXINE 75 MCG Oral Tab [Pharmacy Med Name: Levothyroxine Sodium 75 Mcg Tab Lupi] 90 tablet 0     Sig: Take 1 tablet (75 mcg total) by mouth before breakfast.       Thyroid Medication Protocol Passed - 3/19/2025  9:56 AM        Passed - TSH in past 12 months        Passed - Last TSH value is normal     Lab Results   Component Value Date    TSH 0.932 08/19/2024                 Passed - In person appointment or virtual visit in the past 12 mos or appointment in next 3 mos     Recent Outpatient Visits              1 week ago Hypertension, unspecified type    [facility] Christen Johnson APRN    Office Visit    2 months ago Orthopedic aftercare    [facility] Everardo Multani PA-C    Office Visit    4 months ago Coronary artery disease involving native coronary artery of native heart without angina pectoris    [facility] Marquise Galarza MD    Office Visit    7 months ago Osteoarthritis of spine with radiculopathy, lumbar region    [facility] Flynn Arce MD    Office Visit    8 months ago Atrial fibrillation, unspecified type (HCC)    [facility] Marquise Galarza MD    Office Visit          Future Appointments         Provider Department Appt Notes    In 1 week Marquise Galarza MD [facility] MEDICARE PX                    Passed - Medication is active on med list

## 2025-03-20 RX ORDER — FERROUS SULFATE 325(65) MG
325 TABLET, DELAYED RELEASE (ENTERIC COATED) ORAL
Qty: 90 TABLET | Refills: 0 | Status: SHIPPED | OUTPATIENT
Start: 2025-03-20

## 2025-03-26 NOTE — PROGRESS NOTES
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 Deny c/o following PT sessions. Completing HEP.  Less and less lateral knee c/o.  C/o some foot issues, concerned may have an infection, to see Dr. Galarza tomorrow.   Pain: 2-3/10      Objective:     AROM: (* denotes performed with pain)   Knee    Flexion: R 112 (114 deg post ROM); L 110   Extension: R -5 (- 2 deg post manual) ; L 0     Patellar Mobility/Accessory motion: L/R patella mobility WNL    Flexibility:   Hamstrings: R/L restriction  Quads: R/L restriction    Strength/MMT: (* denotes performed with pain)  Hip  Knee     Abduction: R 4+/5; L 4+/5   Flexion: R 4+/5; L 4+/5  Extension: R 4+/5; L 4+/5        Gait: pt ambulates on level ground WFL with/without SPC use.     Assessment: Patient progressing well overall, approaching discharge due to progress. Mild R knee ROM deficits; reviewed HEP to address.  Pt with concern re: appearance of R foot. Pt with dry, flaky skin R foot, some possible light bleeding in between digits 1 and 2, no covert signs/symptoms of infection.  Advised pt to consult PCP as scheduled on 3/6/2024 or immediate care sooner if any worsening signs/symptoms.     Goals:    (To be met in 10-12 visits)   Pt will improve knee extension ROM to 0 deg to allow proper heel strike during gait and terminal knee extension in stance  Pt will improve knee AROM flexion to >110 degrees to improve ability to perform transfers, stair negotiation.  Pt will improve quad strength to 5/5 to ascend 1 flight of stairs reciprocally   Pt will increase hip and knee strength to grossly 4+/5 to be able to get up and down from the floor safely  Pt will demonstrate  [facility] Progress Note    Date: 2025    Name: Alea Lindsay    MRN: [id number]         : 1991      :  Pt arrived ambulatory and in no distress, for lab visit. Pre-Treatment labs drawn peripherally per order and sent for processing. , patient tolerated well.        There were no vitals filed for this visit.    Lab results were obtained and reviewed.  Recent Results (from the past 12 hours)   Extra Tubes Hold    Collection Time: 25  8:11 AM   Result Value Ref Range    Specimen HOld 1SST     Comment:        Add-on orders for these samples will be processed based on acceptable specimen integrity and analyte stability, which may vary by analyte.   Comprehensive metabolic panel    Collection Time: 25  8:11 AM   Result Value Ref Range    Sodium 136 136 - 145 mmol/L    Potassium 4.3 3.5 - 5.1 mmol/L    Chloride 106 97 - 108 mmol/L    CO2 26 21 - 32 mmol/L    Anion Gap 4 2 - 12 mmol/L    Glucose 99 65 - 100 mg/dL    BUN 12 6 - 20 MG/DL    Creatinine 0.65 0.55 - 1.02 MG/DL    BUN/Creatinine Ratio 18 12 - 20      Est, Glom Filt Rate >90 >60 ml/min/1.73m2    Calcium 8.8 8.5 - 10.1 MG/DL    Total Bilirubin 0.7 0.2 - 1.0 MG/DL     (H) 12 - 78 U/L    AST 61 (H) 15 - 37 U/L    Alk Phosphatase 114 45 - 117 U/L    Total Protein 7.4 6.4 - 8.2 g/dL    Albumin 3.8 3.5 - 5.0 g/dL    Globulin 3.6 2.0 - 4.0 g/dL    Albumin/Globulin Ratio 1.1 1.1 - 2.2     CBC With Auto Differential    Collection Time: 25  8:11 AM   Result Value Ref Range    WBC 2.9 (L) 3.6 - 11.0 K/uL    RBC 2.72 (L) 3.80 - 5.20 M/uL    Hemoglobin 8.6 (L) 11.5 - 16.0 g/dL    Hematocrit 26.9 (L) 35.0 - 47.0 %    MCV 98.9 80.0 - 99.0 FL    MCH 31.6 26.0 - 34.0 PG    MCHC 32.0 30.0 - 36.5 g/dL    RDW 16.5 (H) 11.5 - 14.5 %    Platelets 165 150 - 400 K/uL    Nucleated RBCs 0.7 (H) 0  WBC    nRBC 0.02 (H) 0.00 - 0.01 K/uL    Neutrophils % 48.6 32.0 - 75.0 %    Lymphocytes % 45.5 12.0 - 49.0 %    Monocytes % 1.4 (L) 5.0 - 13.0 %  increased hip ER/ABD strength to 4/5 to perform stepping and squatting activities without excessive femoral IR/ADD  Pt will improve SLS to >2s to improve safety and independence with gait on uneven surfaces such as grass  Pt will be independent and compliant with comprehensive HEP to maintain progress achieved in PT    Plan: Continue PT 2-3 x weekly for 10-12 visits. Discharge in 1-3 sessions. POC next session. See Assessment.  Progress knee ROM, knee/hip strength, balance, gait with/without SPC, steps.  Date: 2/22/2024  Tx#: 6/10-12 Date: 2/27/2024  Tx #: 7/10-12 Date: 2/29/2024  Tx #: 8/10-12 Date: 3/5/2024  Tx #: 9/10-12   There Ex:   Supine L/R SAQ using bolster 10 x 3 sec  Supine L/R modified across body piriformis stretch x 30 sec each (HEP)  Supine B hip ER using black TB  x 30 reps   R knee flexion AAROM using ball PT A 4 x 30 sec each  Bridge on red ball x 12 reps  Sit - stand from plinth x 10 reps   Mini staircase (4, ~ 6 inch steps) up/down reciprocally x 2 - progress to 8 inch step next session  Standing alternate L/R hip abd x 10 reps each  Marching at bar 10 x 3 sec each L/R LE  AirEx marching - next session  HEP review; see below.  There Ex:   Stationary bike: level 1 x 3 min  R knee flexion AAROM using ball PT A 4 x 30 sec each  Bridge on red ball x ~ 20 reps  Sit - stand from plinth x 10 reps   Mini staircase (4, ~ 6 inch steps) up/down reciprocally x 2 - unilateral UE  8 inch step up over down x 5 reps reach L/R LE  HEP review; see below.  There Ex:   Stationary bike: level 1 x 3 min  6 and 8 inch step up over down x 5 reps reach L/R LE  Lateral steps with RTB prox to knees 4 x ~ 12 feet  Standing L/R hamstrings stretches x 30 sec each  R knee flexion AAROM using ball PT A 4 x 30 sec each  Bridge on red ball x ~ 20 reps  HEP review; see below.   Supine L/R hip flexor/quad stretch x 20 sec each (HEP) There Ex:   Stationary bike: level 1 x 3 min  Attempted R knee flexion ROM/stretch using staircase -  ineffective, stopped  Standing L/R hamstrings stretches/knee ext ROM x 45 sec each   Lateral steps and monster walk with RTB prox to knees 4 x ~ 12 feet each   Supine L/R hip flexor/quad stretch x 20 sec each (HEP)  Supine R knee flexion AAROM using ball PT A 4 x 30 sec each   Manual:   R patella sup/inf, med/lat mobilization  R knee scar mobilization  Gentle R knee ext ROM/stretch 3 x 20 sec each  STM L/R ITB using foam roller Manual:   R patella sup/inf, med/lat mobilization  R knee scar mobilization  Gentle R knee ext ROM/stretch 3 x 20 sec each  STM L/R ITB using foam roller Manual:   R knee scar mobilization  Gentle R knee ext ROM/stretch 3 x 20 sec each  STM L/R ITB using foam roller Manual:   R knee scar mobilization  R knee ext ROM/stretch 4 x 20 sec each  STM L/R ITB using foam roller    NM Re-Ed (gait belt donned, PT SBA/contact guard):  Gait: fast/slow, stop/go, head nods yes/no  Heel to toe walking forward/backward at bar 3 x ~ 16 feet NM Re-Ed (gait belt donned, PT SBA/contact guard):  Gait:Air Ex and 1/2 bolster negotiation NM Re-Ed (gait belt donned, PT SBA/contact guard):  AirEx: alternating forward  and side steps onto/over AirEx         HEP: Seated R knee ext stretch with overpressure ( with gentle overpressure, may attempt with ~ 4# wt prox and or distally to knee joint), seated R knee flexion stretch,  seated LAQ, sidesteps at countertop, gastroc stretch, piriformis stretch as needed, supine hip flexor/quad stretch    Charges: 2 TE (30 min), 1 manual (10 min), 0 NM RE (5 min)     Total Timed Treatment: 45 min  Total Treatment Time: 45 min

## 2025-03-27 ENCOUNTER — TELEPHONE (OUTPATIENT)
Dept: INTERNAL MEDICINE CLINIC | Facility: CLINIC | Age: 85
End: 2025-03-27

## 2025-03-27 NOTE — TELEPHONE ENCOUNTER
When he was in [facility] his blood pressure went low on Lisinopril  So this is not advised.  Have  him follow a low sodium diet and he has a appointment on Monday.    Christen Johnson, DNP

## 2025-03-27 NOTE — TELEPHONE ENCOUNTER
COMPREHENSIVE MEDICATION REVIEW         Dedrick Norton MRN [id number]    3/6/1940 PCP Marquise Galarza MD     Comments: Medication history completed by Ambulatory Clinic Pharmacist over the phone on 3/27/25. Patient has upcoming AWV with PCP on 3/31/25.     After thorough medication review, no discrepancies have been identified or corrected on patient's medication list. See updated list below:     Outpatient Encounter Medications as of 3/27/2025   Medication Sig    ferrous sulfate 325 (65 FE) MG Oral Tab EC Take 1 tablet (325 mg total) by mouth daily with breakfast.    levothyroxine 75 MCG Oral Tab Take 1 tablet (75 mcg total) by mouth before breakfast.    triamcinolone 0.1 % External Ointment Apply 1 Application topically 2 (two) times daily.    atorvastatin 80 MG Oral Tab Take 1 tablet (80 mg total) by mouth nightly.    omeprazole 20 MG Oral Capsule Delayed Release TAKE ONE CAPSULE BY MOUTH ONE TIME DAILY BEFORE BREAKFAST    ergocalciferol 1.25 MG (19581 UT) Oral Cap Take 1 capsule (50,000 Units total) by mouth once a week.    potassium chloride 20 MEQ Oral Tab CR Take 1 tablet (20 mEq total) by mouth daily.    gabapentin 100 MG Oral Cap Take 1 capsule (100 mg total) by mouth 2 (two) times daily.    finasteride 5 MG Oral Tab Take 1 tablet (5 mg total) by mouth daily.    metoprolol succinate ER 25 MG Oral Tablet 24 Hr Take 1 tablet (25 mg total) by mouth Daily Beta Blocker.    furosemide 40 MG Oral Tab TAKE ONE TABLET BY MOUTH ONE TIME DAILY    acetaminophen 325 MG Oral Tab Take 2 tablets (650 mg total) by mouth every 4 (four) hours as needed.    aspirin 81 MG Oral Tab EC Take 1 tablet (81 mg total) by mouth daily.     Medication Assessment:   Reviewed all medications in detail with patient including dose, indication, timing of administration, monitoring parameters, and potential side effects of medications.     Patient reports he is taking furosemide 40 mg daily and metoprolol succinate ER 25 mg daily as  prescribed. Patient expresses concern about blood pressure starting to increase. He tells me he was recently hospitalized at outside facility. He states he was taking lisinopril when in the hospital then his blood pressure was dropping so they stopped giving him the lisinopril. Now that he has been home and got a new blood pressure machine he is concerned his blood pressures are creeping up.     Patient reports the following blood pressure readings:    3/27 Thurs /69 HR 53 - no meds yet  3/26 Wed /73 HR 59   3/26 Wed /63 HR 51   3/25 Tues /67 HR 58  3/25 Tues afternoon 128/58 HR 61    Patient denies any chest pain, headaches, dizziness, weakness, etc. Patient does have an old bottle of lisinopril 10 mg tablets from  and is wondering if he should start taking those. Did educate patient that those pills are . Instructed patient to continue checking his blood pressure twice daily until appointment on Monday. Will discuss with PCP and reach back out to patient with further instructions. Patient does also mention that he has a cardiologist appointment scheduled for .     Did provide education and stressed the importance of taking medication just like prescribed to get the most benefit. Patient denies forgetting or missing medication doses.     Thank you,    Maggie Devi, PharmD, 3/27/2025, 9:53 AM

## 2025-03-28 NOTE — TELEPHONE ENCOUNTER
Reached patient to check in on blood pressure this morning:    3/28 Fri /75 HR 51  3/27 Thurs /58 HR 52     Let patient know Christen LI advised he follow a low sodium diet over the weekend and bring in blood pressure readings to review on Monday. Again instructed patient not to re-start lisinopril as these most recent blood pressures are within normal limits and his blood pressure had previously dropped with the lisinopril. Patient confirmed understanding and denies any other questions at this time.

## 2025-03-31 ENCOUNTER — OFFICE VISIT (OUTPATIENT)
Dept: INTERNAL MEDICINE CLINIC | Facility: CLINIC | Age: 85
End: 2025-03-31
Payer: MEDICARE

## 2025-03-31 VITALS
WEIGHT: 192 LBS | TEMPERATURE: 98 F | HEART RATE: 62 BPM | DIASTOLIC BLOOD PRESSURE: 57 MMHG | BODY MASS INDEX: 28.44 KG/M2 | HEIGHT: 69 IN | SYSTOLIC BLOOD PRESSURE: 126 MMHG

## 2025-03-31 DIAGNOSIS — E03.9 ACQUIRED HYPOTHYROIDISM: ICD-10-CM

## 2025-03-31 DIAGNOSIS — E78.00 HYPERCHOLESTEROLEMIA: ICD-10-CM

## 2025-03-31 DIAGNOSIS — K21.9 GASTROESOPHAGEAL REFLUX DISEASE WITHOUT ESOPHAGITIS: ICD-10-CM

## 2025-03-31 DIAGNOSIS — I10 HYPERTENSION, UNSPECIFIED TYPE: ICD-10-CM

## 2025-03-31 DIAGNOSIS — I48.91 ATRIAL FIBRILLATION, UNSPECIFIED TYPE (HCC): Primary | ICD-10-CM

## 2025-03-31 DIAGNOSIS — I25.10 CORONARY ARTERY DISEASE INVOLVING NATIVE CORONARY ARTERY OF NATIVE HEART WITHOUT ANGINA PECTORIS: ICD-10-CM

## 2025-03-31 DIAGNOSIS — M54.16 LUMBAR RADICULOPATHY: ICD-10-CM

## 2025-03-31 PROBLEM — L03.213 PERIORBITAL CELLULITIS: Status: RESOLVED | Noted: 2023-03-28 | Resolved: 2025-03-31

## 2025-03-31 PROBLEM — R07.89 OTHER CHEST PAIN: Status: RESOLVED | Noted: 2025-03-10 | Resolved: 2025-03-31

## 2025-03-31 PROBLEM — R21 RASH: Status: RESOLVED | Noted: 2025-03-10 | Resolved: 2025-03-31

## 2025-03-31 NOTE — PROGRESS NOTES
Subjective:   Dedrick Norton is a 85 year old male who presents for a Medicare Wellness Visit charge within the last 11 months and Patient may not meet criteria for AWV: Please evaluate for correct coding and scheduled follow up of multiple significant but stable problems.   History of Present Illness    85-year-old gentleman here for Medicare annual visit.  He brought his blood pressure machine.  He checked her blood pressure with his machine and our machine it is pretty much the same.  Palpitations he went to his machine shows a skipped beats.  He is going to see his cardiologist this week.  No abuse no depression reported.  We had a lengthy discussion regarding fall prevention.  He will get his Shingrix vaccine from the pharmacy.  History/Other:   Fall Risk Assessment:   He has been screened for Falls and is low risk.      Cognitive Assessment:   He had a completely normal cognitive assessment - see flowsheet entries     Functional Ability/Status:   Dedrick Norton has some abnormal functions as listed below:  He has difficulties Shopping for Groceries based on screening of functional status. He has Hearing problems based on screening of functional status.      Depression Screening (PHQ):  PHQ-2 SCORE: 0  , done 3/19/2025        <5 minutes spent screening and counseling for depression    Advanced Directives:   He does NOT have a Living Will. [Do you have a living will?: (Patient-Rptd) No]  He does NOT have a Power of  for Health Care. [Do you have a healthcare power of ?: (Patient-Rptd) No]  Discussed Advance Care Planning with patient (and family/surrogate if present). Standard forms made available to patient in After Visit Summary.      Patient Active Problem List   Diagnosis    Hypertension    Hypercholesterolemia    Lumbar radiculopathy    Coronary artery disease involving native coronary artery    Gastroesophageal reflux disease without esophagitis    Primary osteoarthritis of both knees     Difficult Matt catheter placement    Atrial fibrillation (HCC)    Orthopedic aftercare    Acquired hypothyroidism    Primary osteoarthritis of hip    Closed fracture of clavicle    Disease of lung    External hemorrhoids    Mitral valve disorder    Nodular prostate with urinary obstruction    Sleep apnea     Allergies:  He is allergic to clarithromycin and penicillins.    Current Medications:  Outpatient Medications Marked as Taking for the 3/31/25 encounter (Office Visit) with Marquise Galarza MD   Medication Sig    ferrous sulfate 325 (65 FE) MG Oral Tab EC Take 1 tablet (325 mg total) by mouth daily with breakfast.    levothyroxine 75 MCG Oral Tab Take 1 tablet (75 mcg total) by mouth before breakfast.    triamcinolone 0.1 % External Ointment Apply 1 Application topically 2 (two) times daily.    atorvastatin 80 MG Oral Tab Take 1 tablet (80 mg total) by mouth nightly.    omeprazole 20 MG Oral Capsule Delayed Release TAKE ONE CAPSULE BY MOUTH ONE TIME DAILY BEFORE BREAKFAST    ergocalciferol 1.25 MG (75781 UT) Oral Cap Take 1 capsule (50,000 Units total) by mouth once a week.    potassium chloride 20 MEQ Oral Tab CR Take 1 tablet (20 mEq total) by mouth daily.    gabapentin 100 MG Oral Cap Take 1 capsule (100 mg total) by mouth 2 (two) times daily.    finasteride 5 MG Oral Tab Take 1 tablet (5 mg total) by mouth daily.    metoprolol succinate ER 25 MG Oral Tablet 24 Hr Take 1 tablet (25 mg total) by mouth Daily Beta Blocker.    furosemide 40 MG Oral Tab TAKE ONE TABLET BY MOUTH ONE TIME DAILY    acetaminophen 325 MG Oral Tab Take 2 tablets (650 mg total) by mouth every 4 (four) hours as needed.    aspirin 81 MG Oral Tab EC Take 1 tablet (81 mg total) by mouth daily.       Medical History:  He  has a past medical history of Back problem, BPH (benign prostatic hyperplasia), Cataract, Cellulitis, Cellulitis of left lower extremity (03/16/2017), Disorder of thyroid, Elevated prostate specific antigen (PSA)  (2012), Esophageal reflux, Essential hypertension, Hearing impairment, Heart attack (HCC), High blood pressure, High cholesterol, History of blood transfusion, History of prostate biopsy, History of stomach ulcers, Hyperlipidemia, Intractable back pain (10/16/2019), NSTEMI (non-ST elevated myocardial infarction) (HCC) (2022), Osteoarthritis, Postoperative nausea (2024), Sciatica of right side, Sleep apnea, and Visual impairment.  Surgical History:  He  has a past surgical history that includes tonsillectomy; arthroscopy of joint unlisted (Left); hc inj epidural steroid lumbar or sacral w img guidance; angioplasty (coronary) (); cataract surgery, complex; cath bare metal stent (bms); colonoscopy; and upper gi endoscopy,exam.   Family History:  His family history includes Pulmonary Disease in his father.  Social History:  He  reports that he quit smoking about 36 years ago. His smoking use included cigarettes. He started smoking about 56 years ago. He has a 30 pack-year smoking history. He has been exposed to tobacco smoke. He has never used smokeless tobacco. He reports that he does not currently use alcohol after a past usage of about 1.0 standard drink of alcohol per week. He reports that he does not use drugs.    Tobacco:  He smoked tobacco in the past but quit greater than 12 months ago.  Social History     Tobacco Use   Smoking Status Former    Current packs/day: 0.00    Average packs/day: 1.5 packs/day for 20.0 years (30.0 ttl pk-yrs)    Types: Cigarettes    Start date: 1968    Quit date: 1988    Years since quittin.8    Passive exposure: Past   Smokeless Tobacco Never        CAGE Alcohol Screen:   CAGE screening score of 0 on 3/19/2025, showing low risk of alcohol abuse.      Patient Care Team:  Marquise Galarza MD as PCP - General (Internal Medicine)  Flynn Arce MD (Anesthesiology)  William Taylor MD (Interventional, Cardiology)    Review of Systems   Constitutional:   Negative for activity change, appetite change and fever.   HENT:  Negative for congestion and voice change.    Respiratory:  Negative for cough and shortness of breath.    Cardiovascular:  Negative for chest pain.   Gastrointestinal:  Negative for abdominal distention, abdominal pain and vomiting.   Genitourinary:  Negative for hematuria.   Musculoskeletal:  Positive for arthralgias.   Skin:  Negative for wound.   Psychiatric/Behavioral:  Negative for behavioral problems.          Objective:   Physical Exam  Constitutional:       Appearance: Normal appearance.   HENT:      Head: Normocephalic.   Eyes:      Conjunctiva/sclera: Conjunctivae normal.   Cardiovascular:      Rate and Rhythm: Normal rate and regular rhythm.      Heart sounds: Normal heart sounds. No murmur heard.  Pulmonary:      Effort: Pulmonary effort is normal.      Breath sounds: Normal breath sounds. No rhonchi or rales.   Abdominal:      General: Bowel sounds are normal.      Palpations: Abdomen is soft.      Tenderness: There is no abdominal tenderness.   Musculoskeletal:      Cervical back: Neck supple.      Right lower leg: No edema.      Left lower leg: No edema.   Skin:     General: Skin is warm and dry.   Neurological:      General: No focal deficit present.      Mental Status: He is alert and oriented to person, place, and time. Mental status is at baseline.   Psychiatric:         Mood and Affect: Mood normal.         Behavior: Behavior normal.         /57 (BP Location: Left arm, Patient Position: Sitting, Cuff Size: adult)   Pulse 62   Temp 97.7 °F (36.5 °C) (Oral)   Ht 5' 9\" (1.753 m)   Wt 192 lb (87.1 kg)   BMI 28.35 kg/m²  Estimated body mass index is 28.35 kg/m² as calculated from the following:    Height as of this encounter: 5' 9\" (1.753 m).    Weight as of this encounter: 192 lb (87.1 kg).    Medicare Hearing Assessment:   Hearing Screening    Entry User: Mildred Goode CMA  Screening Method: Questionnaire  I have a problem  hearing over the telephone: No I have trouble following the conversations when two or more people are talking at the same time: Sometimes   I have trouble understanding things on the TV: Yes I have to strain to understand conversations: Yes   I have to worry about missing the telephone ring or doorbell: No I have trouble hearing conversations in a noisy background such as a crowded room or restaurant: No   I get confused about where sounds come from: No I misunderstand some words in a sentence and need to ask people to repeat themselves: Sometimes   I especially have trouble understanding the speech of women and children: No I have trouble understanding the speaker in a large room such as at a meeting or place of [employer]: No   Many people I talk to seem to mumble (or don't speak clearly): No People get annoyed because I misunderstand what they say: No   I misunderstand what others are saying and make inappropriate responses: No I avoid social activities because I cannot hear well and fear I will reply improperly: No   Family members and friends have told me they think I may have hearing loss: No                   Assessment & Plan:   Dedrick Norton is a 85 year old male who presents for a Medicare Assessment.     1. Atrial fibrillation, unspecified type (HCC) (Primary) he appears to be in sinus.  He will follow-up with cardiology.  Overview:  Only on ASA- follows up with cards at [facility] patch/monitor - no recurrent Afib  2. Hypertension, unspecified type blood pressure is controlled   3. Hypercholesterolemia continue statins  4. Coronary artery disease involving native coronary artery of native heart without angina pectoris stable with unremarkable cardiac review of systems.  Patient informed me that he had a recent angiogram that was unremarkable.  Overview:  S/p PCI - Follows up with cards in [email or web address]  5. Acquired hypothyroidism continue thyroid supplements.  Check TSH  6. Lumbar  radiculopathy stable.  Continue gabapentin.  He uses cane to ambulate.  7. Gastroesophageal reflux disease without esophagitis stable    Vitamin D deficiency-continue ergocalciferol BPH-continue finasteride    Assessment & Plan    The patient indicates understanding of these issues and agrees to the plan.  Reinforced healthy diet, lifestyle, and exercise.      No follow-ups on file.     Marquise Galarza MD, 3/31/2025     Supplementary Documentation:   General Health:  In the past six months, have you lost more than 10 pounds without trying?: (Patient-Rptd) 2 - No  Has your appetite been poor?: (Patient-Rptd) No  Type of Diet: (Patient-Rptd) Balanced, Low Carb  How does the patient maintain a good energy level?: (Patient-Rptd) Appropriate Exercise, Stretching  How would you describe your daily physical activity?: (Patient-Rptd) Light  How would you describe your current health state?: (Patient-Rptd) Good  How do you maintain positive mental well-being?: (Patient-Rptd) Games, Visiting Family  On a scale of 0 to 10, with 0 being no pain and 10 being severe pain, what is your pain level?: (Patient-Rptd) 3 - (Mild)  In the past six months, have you experienced urine leakage?: (Patient-Rptd) 0-No  At any time do you feel concerned for the safety/well-being of yourself and/or your children, in your home or elsewhere?: No  Have you had any immunizations at another office such as Influenza, Hepatitis B, Tetanus, or Pneumococcal?: (Patient-Rptd) No    Health Maintenance   Topic Date Due    Zoster Vaccines (1 of 2) Never done    PSA  05/07/2019    Annual Physical  11/09/2023    COVID-19 Vaccine (9 - 2024-25 season) 03/24/2025    Influenza Vaccine  Completed    Annual Depression Screening  Completed    Fall Risk Screening (Annual)  Completed    Pneumococcal Vaccine: 50+ Years  Completed    Meningococcal B Vaccine  Aged Out

## 2025-04-03 NOTE — ED PROVIDER NOTES
Colorectal Surgery Progress Note      HPI: Roma Corral is a 68 y.o. male with a past medical history of CAD (NSTEMI 2021), DMII (last A1c 5.4 normal 1/17/25), HLD, HTN, gout, anxiety, metastatic rectal cancer to liver s/p loop colostomy July 2024 & FAWN c/b persistent disease and rectal stricture, now hospital day 17 s/p robotic low anterior resection with transition from loop to end colostomy & ileocecetomy 2/2 ileorectal fistula found intra-op by Dr. Vasquez, and laparoscopic MWA x3 with liver biopsy by Dr. Erazo on 3/18/25 c/b post-operative lymphatic leak and high drain output s/p IR embolization x2 and NPO/TPN.      Subjective  Early this morning, developed diffuse moderate abdominal pain, generalized chest pain (points to epigastric region), & shortness of breath; denies anxiety  Has not used prn pain medications.  Denies nausea/vomiting/bloating with bites of food for comfort.  Voiding without issues  Reports drain pouch is full with bloody output  Endorses colostomy output      Objective  Physical Exam:  Gen: in no physical distress and alert, deconditioned and weak appearing, cachectic, uncomfortable appearing sitting up in bed.  HENT: Head normocephalic, atraumatic.  Mucous membranes moist.    Neuro: Alert and oriented x3.  Quiet and withdrawn appearing. Moves all extremities spontaneously x4.  CV: Normal rate, normotensive per chart review.  No BLE edema.  Resp: No acute respiratory distress.  Normal effort on room air. Chest expansion symmetrical.   GI: Abdomen is soft, nontender, and nondistended. Laparoscopic incisions are clean, dry and intact with glue.  Stoma is pink and healthy, well pouched, with gas and pudding stool in colostomy pouch. Abdominal drain to bile bag with darker serosanguinous output, without erythema or drainage at insertion site  Skin: Warm and dry, pale, without rashes or lesions.  Coccyx/sacral pressure wound with mepalex covering site.    Visit Vitals  BP 93/61 (BP Location:  Patient Seen in: [facility] Emergency Department      History   Patient presents with:  Musculoskeletal Problem    Stated Complaint:     HPI    Patient is an 69-year-old male who lives at home with his wife and arrives by EMS for worsening left bu - Abnormal; Notable for the following components:       Result Value    Glucose 144 (*)     Sodium 134 (*)     BUN 29 (*)     BUN/CREA Ratio 27.9 (*)     All other components within normal limits   POCT GLUCOSE - Abnormal; Notable for the following compone Right arm, Patient Position: Lying)   Pulse 98   Temp 36.6 °C (97.9 °F)   Resp 16        I/O last 3 completed shifts:  In: 2893.7 (43.5 mL/kg) [P.O.:40; IV Piggyback:500]  Out: 2900 (43.6 mL/kg) [Urine:1625 (0.7 mL/kg/hr); Drains:1275]  Weight: 66.5 kg   No intake/output data recorded.         Data Review:            8.8     6.5>-----<122              28.3   137  105  26                  ----------------<139     4.6  23  0.78          Ca 7.1 Phos 3.6 Mg 2.21       ALT 35 AST 90 AlkPhos 910 tBili 0.8         Labs reviewed and personally interpreted as: CBC with down trend in H&H (suspect some element of dilution), without  leukocytosis. Mild thrombocytopenia, similar to baseline.  RFP without electrolyte derangements, ORALIA, or hypo/hyperglycemia.    Imaging:  No recent imaging to review.    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 Vasquez, and laparoscopic MWA x3 with liver biopsy by Dr. Erazo on 3/18/25. Post-op course c/b high pelvic drain output 2/2 persistent chyle leak s/p low fat diet & IR lymphoscintigraphy 3/25 & 3/28 with down trend in drainage (~700ml/day) after made NPO with TPN. Received 1 unit RBCs yesterday 4/2 for anemia and incremented appropriately. S/p IR portal/hepatic vein embolization 4/2 now with large volume, serosanguinous output from abdominal drain.    Plan:  -NPO/hold Lovenox/trend CBC Q6 in setting of large serosanguinous drain output  -CT liver per surg onc  -CXR/EKG/continuous tele & pulse ox in setting of new chest pain/SOB  -DC miralax in setting of liquid/pudding colostomy output  -OOB during day/PT  -Continue NPO/cycled TPN & monitoring drain output  -Coccyx/sacral wound care    Neuro: Post-op pain controlled, history of anxiety  -Continue  10/16/2019 at 13:07     Approved by (CST): Mathew Ulrich MD on 10/16/2019 at 13:16          Pt states pain continues. Will give morphine and admit for pain control. D/w dr Mariam Finch, requests pain service consult. Pain notified.    Admission disposition: 10/1 current pain regimen with robaxin,prn tylenol, prn dilaudid  -Continue home MS Contin, hold home ativan  -Sleep hygiene/OOB during day/PT  -Pet/music/art therapy  -Patient declined psych consult at this time but will think about it for ongoing psychosocial support  -Melatonin HS    CV: mild intermittent hypotension, new chest pain/sob this AM; hx of CAD (NSTEMI 2021), HTN, HLD  -Q4 vital signs  -EKG showing NSR without ectopy  -CXR ordered, follow up  -Continuous tele/pulse ox monitoring  -EKG prn for ACS symptoms  -Continue home metoprolol with hold parameters  -Hold home ASA 81, lipitor in setting of elevated LFTs/thrombocytopenia    Resp: new SOB on room air without increase in O2 requirement, no significant pulmonary history  -CXR ordered, follow up  -ICS 10x every hour  -OOB/ambulation    GI: s/p robotic LAR/loop to end colostomy, ileocecectomy & MWA c/b persistent lymph leak s/p IR embol x2 & s/p portal/hepatic vein embol 4/2 with dark serosang abd drain output; hx of rectal CA with mets to liver  -NPO, cycled TPN (running during day 9a-9p)  -Thiamine daily  -CT liver per surg onc; Octreotide & Midodrine TID for lymph leak; appreciate surg onc support & expertise  -Zofran prn for nausea  -DC miralax in setting of liquid colostomy output  -continue assessment of stoma and output, stoma not new for patient.  WOCN for supplies & support. Continue to monitor drain output/replace fluid prn  -Daily CMP to monitor LFTs    : adequate urine output; no significant urological hx  -Continue flomax, needs to stand when voiding to completely empty.   - Strict intake and output, monitoring fluid & electrolyte balance closely in setting of high output drain  - Daily RFP/replace electrolytes as needed    Heme: H&H down trend s/p 1u RBCs 4/2, darker serosang abd drain output; history of pancytopenia with platelets similar to baseline  -T&S sent 4/2  -Hold lovenox  -Trend CBC q6 today  -CBC daily; monitor for s/s of  bleeding    ID: afebrile, no leukocytosis  -Q4 temp  -monitor for s/s of infection    Endo: no acute issues, no significant endocrine history   -hypoglycemia protocol  -Q6 BG on TPN  -SSI #1 while on TPN    DVT Prophy: SCDs;  Lovenox (held)    Dispo:   -Continue care on RNF  -Awaiting SNF acceptance with cycled TPN. In setting of serosang abd drain output, will wait for this to resolve before medically ready for DC    Plan of care discussed with Dr. Connell, colorectal surgery team, surg onc team, and patient.    MELISSA Ramirez-CNP  Colorectal Surgery  [facility] Service Pager #[phone]

## 2025-04-10 ENCOUNTER — MED REC SCAN ONLY (OUTPATIENT)
Dept: INTERNAL MEDICINE CLINIC | Facility: CLINIC | Age: 85
End: 2025-04-10

## 2025-04-16 NOTE — TELEPHONE ENCOUNTER
Per patient he needs refill on his Furosemide and need to send to his pharmacy [facility] on file verified.    Current Medications[1]    furosemide 40 MG Oral Tab TAKE ONE TABLET BY MOUTH ONE TIME DAILY 90 tablet 3          [1]   Current Outpatient Medications   Medication Sig Dispense Refill    ferrous sulfate 325 (65 FE) MG Oral Tab EC Take 1 tablet (325 mg total) by mouth daily with breakfast. 90 tablet 0    levothyroxine 75 MCG Oral Tab Take 1 tablet (75 mcg total) by mouth before breakfast. 90 tablet 3    triamcinolone 0.1 % External Ointment Apply 1 Application topically 2 (two) times daily. 80 g 1    atorvastatin 80 MG Oral Tab Take 1 tablet (80 mg total) by mouth nightly. 90 tablet 3    omeprazole 20 MG Oral Capsule Delayed Release TAKE ONE CAPSULE BY MOUTH ONE TIME DAILY BEFORE BREAKFAST 90 capsule 3    ergocalciferol 1.25 MG (10885 UT) Oral Cap Take 1 capsule (50,000 Units total) by mouth once a week. 12 capsule 3    potassium chloride 20 MEQ Oral Tab CR Take 1 tablet (20 mEq total) by mouth daily. 90 tablet 3    gabapentin 100 MG Oral Cap Take 1 capsule (100 mg total) by mouth 2 (two) times daily. 180 capsule 3    finasteride 5 MG Oral Tab Take 1 tablet (5 mg total) by mouth daily. 90 tablet 3    metoprolol succinate ER 25 MG Oral Tablet 24 Hr Take 1 tablet (25 mg total) by mouth Daily Beta Blocker. 90 tablet 3    furosemide 40 MG Oral Tab TAKE ONE TABLET BY MOUTH ONE TIME DAILY 90 tablet 3    acetaminophen 325 MG Oral Tab Take 2 tablets (650 mg total) by mouth every 4 (four) hours as needed.      aspirin 81 MG Oral Tab EC Take 1 tablet (81 mg total) by mouth daily.

## 2025-04-18 RX ORDER — FUROSEMIDE 40 MG/1
TABLET ORAL
Qty: 90 TABLET | Refills: 1 | Status: SHIPPED | OUTPATIENT
Start: 2025-04-18

## 2025-04-18 NOTE — TELEPHONE ENCOUNTER
Refill passed per [facility] protocol.    Requested Prescriptions   Pending Prescriptions Disp Refills    furosemide 40 MG Oral Tab 90 tablet 3     Sig: TAKE ONE TABLET BY MOUTH ONE TIME DAILY       Hypertension Medications Protocol Passed - 4/18/2025  3:49 PM        Passed - CMP or BMP in past 12 months        Passed - Last BP reading less than 140/90     BP Readings from Last 1 Encounters:   03/31/25 126/57               Passed - In person appointment or virtual visit in the past 12 mos or appointment in next 3 mos     Recent Outpatient Visits              2 weeks ago Atrial fibrillation, unspecified type ([facility])    [facility] Marquise Galarza MD    Office Visit    1 month ago Hypertension, unspecified type    [facility] Christen Johnson APRN    Office Visit    3 months ago Orthopedic aftercare    [facility] Everardo Multani PA-C    Office Visit    5 months ago Coronary artery disease involving native coronary artery of native heart without angina pectoris    [facility] Marquise Galarza MD    Office Visit    8 months ago Osteoarthritis of spine with radiculopathy, lumbar region    [facility] Flynn Arce MD    Office Visit          Future Appointments         Provider Department Appt Notes    In 5 months Marquise Galarza MD [facility] 6 mo F/U                    Passed - EGFRCR or GFRNAA > 50     GFR Evaluation  EGFRCR: 89 , resulted on 8/19/2024          Passed - Medication is active on med list           Future Appointments         Provider Department Appt Notes    In 5 months Marquise Galarza MD [facility] 6 mo F/U          Recent Outpatient Visits              2 weeks ago Atrial  fibrillation, unspecified type (HCC)    [facility] Marquise Galarza MD    Office Visit    1 month ago Hypertension, unspecified type    [facility] Christen Johnson APRN    Office Visit    3 months ago Orthopedic aftercare    [facility] Everardo Multani PA-C    Office Visit    5 months ago Coronary artery disease involving native coronary artery of native heart without angina pectoris    [facility] Marquise Galarza MD    Office Visit    8 months ago Osteoarthritis of spine with radiculopathy, lumbar region    [facility] Flynn Arce MD    Office Visit

## 2025-05-12 NOTE — TELEPHONE ENCOUNTER
Current Outpatient Medications:       metoprolol succinate ER 25 MG Oral Tablet 24 Hr, Take 1 tablet (25 mg total) by mouth Daily Beta Blocker., Disp: 90 tablet, Rfl: 3

## 2025-05-13 RX ORDER — METOPROLOL SUCCINATE 25 MG/1
25 TABLET, EXTENDED RELEASE ORAL
Qty: 90 TABLET | Refills: 3 | Status: SHIPPED | OUTPATIENT
Start: 2025-05-13

## 2025-05-13 NOTE — TELEPHONE ENCOUNTER
Refill passed per [facility] protocol.      Last Office Visit: 03/31/2025  Last written by : Dr. Andre   Please advise if refill is appropriate.  Please see message below for upcoming appointment.    Future Appointments   Date Time Provider Department Center   9/23/2025 10:30 AM Marquise Galarza MD [facility]

## 2025-05-15 ENCOUNTER — TELEPHONE (OUTPATIENT)
Dept: INTERNAL MEDICINE CLINIC | Facility: CLINIC | Age: 85
End: 2025-05-15

## 2025-06-16 RX ORDER — FINASTERIDE 5 MG/1
5 TABLET, FILM COATED ORAL DAILY
Qty: 90 TABLET | Refills: 3 | Status: SHIPPED | OUTPATIENT
Start: 2025-06-16

## 2025-06-16 RX ORDER — FERROUS SULFATE 325(65) MG
325 TABLET, DELAYED RELEASE (ENTERIC COATED) ORAL
Qty: 90 TABLET | Refills: 3 | Status: SHIPPED | OUTPATIENT
Start: 2025-06-16

## 2025-07-16 RX ORDER — GABAPENTIN 100 MG/1
100 CAPSULE ORAL 2 TIMES DAILY
Qty: 180 CAPSULE | Refills: 3 | Status: SHIPPED | OUTPATIENT
Start: 2025-07-16

## 2025-07-17 RX ORDER — GABAPENTIN 100 MG/1
100 CAPSULE ORAL 2 TIMES DAILY
Qty: 180 CAPSULE | Refills: 0 | OUTPATIENT
Start: 2025-07-17

## 2025-07-17 NOTE — TELEPHONE ENCOUNTER
Refill passed per [facility] protocol.      Requested Prescriptions   Pending Prescriptions Disp Refills    gabapentin 100 MG Oral Cap 180 capsule 3     Sig: Take 1 capsule (100 mg total) by mouth 2 (two) times daily.       Neurology Medications Passed - 7/16/2025  9:33 PM        Passed - In person appointment or virtual visit in the past 6 mos or appointment in next 3 mos     Recent Outpatient Visits              3 months ago Atrial fibrillation, unspecified type (HCC)    [facility] Marquise Galarza MD    Office Visit    4 months ago Hypertension, unspecified type    [facility] Christen Johnson APRN    Office Visit    6 months ago Orthopedic aftercare    [facility] Everardo Multani PA-C    Office Visit    8 months ago Coronary artery disease involving native coronary artery of native heart without angina pectoris    [facility] Marquise Galarza MD    Office Visit    11 months ago Osteoarthritis of spine with radiculopathy, lumbar region    [facility] Flynn Arce MD    Office Visit          Future Appointments         Provider Department Appt Notes    In 2 months Marquise Galarza MD [facility] 6 mo F/U                    Passed - Medication is active on med list              Future Appointments         Provider Department Appt Notes    In 2 months Marquise Galarza MD [facility] 6 mo F/U            Recent Outpatient Visits              3 months ago Atrial fibrillation, unspecified type (HCC)    [facility] Marquise Galarza MD    Office Visit    4 months ago Hypertension, unspecified type    [facility] Alex  GUILLERMO Velásquez    Office Visit    6 months ago Orthopedic aftercare    [facility] Everardo Multani PA-C    Office Visit    8 months ago Coronary artery disease involving native coronary artery of native heart without angina pectoris    [facility] Marquise Galarza MD    Office Visit    11 months ago Osteoarthritis of spine with radiculopathy, lumbar region    [facility] Flynn Arce MD    Office Visit                Non-Graft Cartilage Fenestration Text: The cartilage was fenestrated with a 2mm punch biopsy to help facilitate healing.

## 2025-08-11 RX ORDER — POTASSIUM CHLORIDE 1500 MG/1
20 TABLET, EXTENDED RELEASE ORAL DAILY
Qty: 90 TABLET | Refills: 0 | Status: SHIPPED | OUTPATIENT
Start: 2025-08-11

## 2025-08-13 ENCOUNTER — LAB ENCOUNTER (OUTPATIENT)
Dept: LAB | Age: 85
End: 2025-08-13
Attending: INTERNAL MEDICINE

## 2025-08-13 DIAGNOSIS — I48.91 ATRIAL FIBRILLATION, UNSPECIFIED TYPE (HCC): ICD-10-CM

## 2025-08-13 DIAGNOSIS — I10 HYPERTENSION, UNSPECIFIED TYPE: ICD-10-CM

## 2025-08-13 DIAGNOSIS — E78.00 HYPERCHOLESTEROLEMIA: ICD-10-CM

## 2025-08-13 LAB
ALBUMIN SERPL-MCNC: 4.2 G/DL (ref 3.2–4.8)
ALBUMIN/GLOB SERPL: 1.9 (ref 1–2)
ALP LIVER SERPL-CCNC: 83 U/L (ref 45–117)
ALT SERPL-CCNC: 8 U/L (ref 10–49)
ANION GAP SERPL CALC-SCNC: 5 MMOL/L (ref 0–18)
AST SERPL-CCNC: 19 U/L (ref ?–34)
BILIRUB SERPL-MCNC: 0.6 MG/DL (ref 0.2–1.1)
BUN BLD-MCNC: 16 MG/DL (ref 9–23)
BUN/CREAT SERPL: 21.1 (ref 10–20)
CALCIUM BLD-MCNC: 9.3 MG/DL (ref 8.7–10.4)
CHLORIDE SERPL-SCNC: 102 MMOL/L (ref 98–112)
CHOLEST SERPL-MCNC: 92 MG/DL (ref ?–200)
CO2 SERPL-SCNC: 34 MMOL/L (ref 21–32)
CREAT BLD-MCNC: 0.76 MG/DL (ref 0.7–1.3)
DEPRECATED RDW RBC AUTO: 39.7 FL (ref 35.1–46.3)
EGFRCR SERPLBLD CKD-EPI 2021: 87 ML/MIN/1.73M2 (ref 60–?)
ERYTHROCYTE [DISTWIDTH] IN BLOOD BY AUTOMATED COUNT: 19.3 % (ref 11–15)
FASTING PATIENT LIPID ANSWER: YES
FASTING STATUS PATIENT QL REPORTED: YES
GLOBULIN PLAS-MCNC: 2.2 G/DL (ref 2–3.5)
GLUCOSE BLD-MCNC: 85 MG/DL (ref 70–99)
HCT VFR BLD AUTO: 36.2 % (ref 39–53)
HDLC SERPL-MCNC: 40 MG/DL (ref 40–59)
HGB BLD-MCNC: 11.2 G/DL (ref 13–17.5)
LDLC SERPL CALC-MCNC: 37 MG/DL (ref ?–100)
MCH RBC QN AUTO: 19 PG (ref 26–34)
MCHC RBC AUTO-ENTMCNC: 30.9 G/DL (ref 31–37)
MCV RBC AUTO: 61.3 FL (ref 80–100)
NONHDLC SERPL-MCNC: 52 MG/DL (ref ?–130)
OSMOLALITY SERPL CALC.SUM OF ELEC: 292 MOSM/KG (ref 275–295)
PLATELET # BLD AUTO: 188 10(3)UL (ref 150–450)
PLATELETS.RETICULATED NFR BLD AUTO: 15.1 % (ref 0–7)
POTASSIUM SERPL-SCNC: 3.6 MMOL/L (ref 3.5–5.1)
PROT SERPL-MCNC: 6.4 G/DL (ref 5.7–8.2)
RBC # BLD AUTO: 5.91 X10(6)UL (ref 3.8–5.8)
SODIUM SERPL-SCNC: 141 MMOL/L (ref 136–145)
TRIGL SERPL-MCNC: 68 MG/DL (ref 30–149)
TSI SER-ACNC: 1.48 UIU/ML (ref 0.55–4.78)
VLDLC SERPL CALC-MCNC: 9 MG/DL (ref 0–30)
WBC # BLD AUTO: 8.2 X10(3) UL (ref 4–11)

## 2025-08-13 PROCEDURE — 85027 COMPLETE CBC AUTOMATED: CPT

## 2025-08-13 PROCEDURE — 80053 COMPREHEN METABOLIC PANEL: CPT

## 2025-08-13 PROCEDURE — 84443 ASSAY THYROID STIM HORMONE: CPT

## 2025-08-13 PROCEDURE — 85060 BLOOD SMEAR INTERPRETATION: CPT

## 2025-08-13 PROCEDURE — 80061 LIPID PANEL: CPT

## 2025-08-13 PROCEDURE — 36415 COLL VENOUS BLD VENIPUNCTURE: CPT

## (undated) DEVICE — 450 ML BOTTLE OF 0.05% CHLORHEXIDINE GLUCONATE IN 99.95% STERILE WATER FOR IRRIGATION, USP AND APPLICATOR.: Brand: IRRISEPT ANTIMICROBIAL WOUND LAVAGE

## (undated) DEVICE — GMK SPHERE KNEE: Type: IMPLANTABLE DEVICE

## (undated) DEVICE — WRAP THERAPEUTIC KNEE COLD SMI

## (undated) DEVICE — ADHESIVE LIQ 2/3ML VI MASTISOL

## (undated) DEVICE — Device: Brand: DEFENDO AIR/WATER/SUCTION AND BIOPSY VALVE

## (undated) DEVICE — GAMMEX® PI HYBRID SIZE 9, STERILE POWDER-FREE SURGICAL GLOVE, POLYISOPRENE AND NEOPRENE BLEND: Brand: GAMMEX

## (undated) DEVICE — SMOOTH PINS PACK: Brand: KNEE INSTRUMENTS

## (undated) DEVICE — DRAPE SHEET LARGE 76X55

## (undated) DEVICE — SOLUTION  .9 3000ML

## (undated) DEVICE — SCREWS PACK: Brand: KNEE INSTRUMENTS

## (undated) DEVICE — GAUZE SPONGES,12 PLY: Brand: CURITY

## (undated) DEVICE — 35 ML SYRINGE REGULAR TIP: Brand: MONOJECT

## (undated) DEVICE — MEDI-VAC NON-CONDUCTIVE SUCTION TUBING 6MM X 1.8M (6FT.) L: Brand: CARDINAL HEALTH

## (undated) DEVICE — DERMABOND CLOSURE 0.7ML TOPICL

## (undated) DEVICE — COTTON ROLL: Brand: DEROYAL

## (undated) DEVICE — Device: Brand: STABLECUT®

## (undated) DEVICE — SPONGE GZ 4XL4IN 100% COT 12 PLY TYP VII WVN

## (undated) DEVICE — MYKNEE PPS MIS TIBCUTBLOCK-MRI-GMK-LM-#6: Brand: MYKNEE PPS

## (undated) DEVICE — TOTAL KNEE: Brand: MEDLINE INDUSTRIES, INC.

## (undated) DEVICE — TRAY CATH 16FR F INCLUDE BARDX IC COMPLT CARE

## (undated) DEVICE — CEMENT MIXING SYSTEM WITH FEMORAL BREAKWAY NOZZLE: Brand: REVOLUTION

## (undated) DEVICE — VIOLET BRAIDED (POLYGLACTIN 910), SYNTHETIC ABSORBABLE SUTURE: Brand: COATED VICRYL

## (undated) DEVICE — 60 ML SYRINGE LUER-LOCK TIP: Brand: MONOJECT

## (undated) DEVICE — SUT VICRYL 2-0 FS-1 J443H

## (undated) DEVICE — 3M™ STERI-DRAPE™ U-DRAPE 1015: Brand: STERI-DRAPE™

## (undated) DEVICE — GAMMEX® PI HYBRID SIZE 8, STERILE POWDER-FREE SURGICAL GLOVE, POLYISOPRENE AND NEOPRENE BLEND: Brand: GAMMEX

## (undated) DEVICE — MYKNEE PATIENT SPECIFIC GUIDES

## (undated) DEVICE — DRESSING 10X4IN ANMC SAFETAC

## (undated) DEVICE — MYKNEE MIS TIBIAL BONE MODEL LEFT MEDIAL: Brand: MY KNEE BONE MODELS

## (undated) DEVICE — NEEDLE SPNL 20GA L3.5IN YEL HUB SS RW FIT

## (undated) DEVICE — WRAP COOLING KNEE W/ICE PILLOW

## (undated) DEVICE — GAUZE TRAY STERILE 4X4 12PLY

## (undated) DEVICE — Device: Brand: CUSTOM PROCEDURE KIT

## (undated) DEVICE — 2DE14 2-0 PDO 24 X 24: Brand: 2DE14 2-0 PDO 24 X 24

## (undated) DEVICE — MYKNEE PPS STD FEMDISCUTBLOCK-MRI-GMK-LM-#5: Brand: MYKNEE PPS

## (undated) DEVICE — 6 ML SYRINGE LUER-LOCK TIP: Brand: MONOJECT

## (undated) DEVICE — HOOD: Brand: FLYTE

## (undated) DEVICE — DRAPE SHEET LG

## (undated) DEVICE — CATH URTH LBRCTH 22FR LNG

## (undated) DEVICE — STERILE COTTON ROLL 1LB 1X8.5

## (undated) DEVICE — BANDAGE,GAUZE,BULKEE II,4.5"X4.1YD,STRL: Brand: MEDLINE

## (undated) DEVICE — GAMMEX® NON-LATEX PI ORTHO SIZE 9, STERILE POLYISOPRENE POWDER-FREE SURGICAL GLOVE: Brand: GAMMEX

## (undated) DEVICE — STERILE POLYISOPRENE POWDER-FREE SURGICAL GLOVES: Brand: PROTEXIS

## (undated) DEVICE — 2T11 #2 PDO 36 X 36: Brand: 2T11 #2 PDO 36 X 36

## (undated) DEVICE — PERIFIX® 18 GA. X 3-1/2 IN. (90 MM) TUOHY, PERIFIX 20 GA. CLOSED TIP CATHETER, 5 ML GLASS LUER LOCK LOR TRAY (KIT): Brand: PERIFIX®

## (undated) DEVICE — NEEDLE SPINAL BD 20GX3.5

## (undated) DEVICE — APPLICATOR SKIN PREP 26ML HI LT ORNG 2% CHG

## (undated) DEVICE — Device

## (undated) DEVICE — MYKNEE FEMUR BONE MODEL LEFT: Brand: MY KNEE BONE MODELS

## (undated) DEVICE — CONMED SCOPE SAVER BITE BLOCK, 20X27 MM: Brand: SCOPE SAVER

## (undated) DEVICE — CHLORAPREP ORANGE TINT 10.5ML

## (undated) DEVICE — ADHESIVE SKIN TOP FOR WND CLSR DERMBND ADV

## (undated) DEVICE — BANDAGE COMPR W6INXL11YD WVN COT/E CLP CLSR

## (undated) DEVICE — SUTURE VCRL SZ 2-0 L27IN ABSRB UD L24MM FS-1

## (undated) DEVICE — MYKNEE PPS MIS TIBCUTBLOCK-MRI-GMK-RM-#6: Brand: MYKNEE PPS

## (undated) DEVICE — TRAY SURESTEP 16 BARDEX DRAIN

## (undated) DEVICE — FORCEP RADIAL JAW 4

## (undated) DEVICE — MYKNEE MIS TIBIAL BONE MODEL RIGHT MEDIAL: Brand: MY KNEE BONE MODELS

## (undated) DEVICE — SHORT THREADED PINS PACK: Brand: KNEE INSTRUMENTS

## (undated) DEVICE — DISPOSABLE TOURNIQUET CUFF SINGLE BLADDER, DUAL PORT AND QUICK CONNECT CONNECTOR: Brand: COLOR CUFF

## (undated) DEVICE — MYKNEE FEMUR BONE MODEL RIGHT: Brand: MY KNEE BONE MODELS

## (undated) DEVICE — LINE MNTR ADLT SET O2 INTMD

## (undated) DEVICE — 60 ML SYRINGE,TOOMEY TYPE: Brand: MONOJECT

## (undated) DEVICE — GAMMEX® NON-LATEX PI ORTHO SIZE 8.5, STERILE POLYISOPRENE POWDER-FREE SURGICAL GLOVE: Brand: GAMMEX

## (undated) DEVICE — BANDAGE FLXMSTR 11YDX6IN STRL

## (undated) DEVICE — 3 ML SYRINGE LUER-LOCK TIP: Brand: MONOJECT

## (undated) DEVICE — SOLUTION IRRIG 3000ML 0.9% NACL FLX CONT

## (undated) DEVICE — APPLICATOR CHLORAPREP 26ML

## (undated) NOTE — LETTER
AUTHORIZATION FOR SURGICAL OPERATION OR OTHER PROCEDURE    1.  I hereby authorize  ____________________, and [facility] staff assigned to my case to perform the following operation and/or procedure at the [facility]:    Durolane injection Time:  ________ A. M.  P.M.        Patient Name:  ______________________________________________________  (please print)      Patient signature:  ___________________________________________________             Relationship to Patient:           []

## (undated) NOTE — LETTER
AUTHORIZATION FOR SURGICAL OPERATION OR OTHER PROCEDURE    1. I hereby authorize EVELINE Hart, and [place] [employer] [facility] staff assigned to my case to perform the following operation and/or procedure at the [facility]:    Cortisone Injection right knee___________________________________      _______________________________________________________________________________________________    2. My physician has explained the nature and purpose of the operation or other procedure, possible alternative methods of treatment, the risks involved, and the possibility of complication to me. I acknowledge that no guarantee has been made as to the result that may be obtained. 3.  I recognize that, during the course of this operation, or other procedure, unforseen conditions may necessitate additional or different procedure than those listed above. I, therefore, further authorize and request that the above named physician, his/her physician assistants or designees perform such procedures as are, in his/her professional opinion, necessary and desirable. 4.  Any tissue or organs removed in the operation or other procedure may be disposed of by and at the discretion of the [facility] and [facility] AT [facility]. 5.  I understand that in the event of a medical emergency, I will be transported by local paramedics to [facility] or other hospital emergency department. 6.  I certify that I have read and fully understand the above consent to operation and/or other procedure. 7.  I acknowledge that my physician has explained sedation/analgesia administration to me including the risks and benefits. I consent to the administration of sedation/analgesia as may be necessary or desirable in the judgement of my physician. Witness signature: ___________________________________________________ Date:  ______/______/_____                    Time:  ________ A. M.  P.M.        Patient Name: ______________________________________________________  (please print)      Patient signature:  ___________________________________________________             Relationship to Patient:           []  Parent    Responsible person                          []  Spouse  In case of minor or                    [] Other  _____________   Incompetent name:  __________________________________________________                               (please print)      _____________      Responsible person  In case of minor or  Incompetent signature:  _______________________________________________    Statement of Physician  My signature below affirms that prior to the time of the procedure, I have explained to the patient and/or his/her guardian, the risks and benefits involved in the proposed treatment and any reasonable alternative to the proposed treatment. I have also explained the risks and benefits involved in the refusal of the proposed treatment and have answered the patient's questions.                         Date:  ______/______/_______  Provider                      Signature:  __________________________________________________________       Time:  ___________ A.M    P.M.

## (undated) NOTE — LETTER
[address] [place] [address] Rd, [place], [place]     AUTHORIZATION FOR SURGICAL OPERATION OR PROCEDURE    I hereby authorize Dr. Greg Benavidez, my Physician(s) and whomever may be designated as the doctor's Assistant, to perform the following opera 4. I consent to the photographing of procedure(s) to be performed for the purposes of advancing medicine, science and/or education, provided my identity is not revealed.  If the procedure has been videotaped, the physician/surgeon will obtain the original v (Witness signature)                                                                                                  (Date)                                (Time)  STATEMENT OF PHYSICIAN My signature below affirms that prior to the time of the procedure;  I

## (undated) NOTE — LETTER
AUTHORIZATION FOR SURGICAL OPERATION OR OTHER PROCEDURE    1. I hereby authorize Dr. Will Carlson and [place] [employer] [facility] staff assigned to my case to perform the following operation and/or procedure at the [facility]:    _____________________________________Right knee cortisone injection  __________________________________________________________      _______________________________________________________________________________________________    2. My physician has explained the nature and purpose of the operation or other procedure, possible alternative methods of treatment, the risks involved, and the possibility of complication to me. I acknowledge that no guarantee has been made as to the result that may be obtained. 3.  I recognize that, during the course of this operation, or other procedure, unforseen conditions may necessitate additional or different procedure than those listed above. I, therefore, further authorize and request that the above named physician, his/her physician assistants or designees perform such procedures as are, in his/her professional opinion, necessary and desirable. 4.  Any tissue or organs removed in the operation or other procedure may be disposed of by and at the discretion of the [facility] and [facility] AT [facility]. 5.  I understand that in the event of a medical emergency, I will be transported by local paramedics to [facility] or other [facility] emergency department. 6.  I certify that I have read and fully understand the above consent to operation and/or other procedure. 7.  I acknowledge that my physician has explained sedation/analgesia administration to me including the risks and benefits. I consent to the administration of sedation/analgesia as may be necessary or desirable in the judgement of my physician.     Witness signature: ___________________________________________________ Date:  ______/______/_____ Time:  ________ A. M.  P.M. Patient Name:  ______________________________________________________  (please print)      Patient signature:  ___________________________________________________             Relationship to Patient:           []  Parent    Responsible person                          []  Spouse  In case of minor or                    [] Other  _____________   Incompetent name:  __________________________________________________                               (please print)      _____________      Responsible person  In case of minor or  Incompetent signature:  _______________________________________________    Statement of Physician  My signature below affirms that prior to the time of the procedure, I have explained to the patient and/or his/her guardian, the risks and benefits involved in the proposed treatment and any reasonable alternative to the proposed treatment. I have also explained the risks and benefits involved in the refusal of the proposed treatment and have answered the patient's questions.                         Date:  ______/______/_______  Provider                      Signature:  __________________________________________________________       Time:  ___________ A.M    P.M.

## (undated) NOTE — ED AVS SNAPSHOT
Pat    MRN: [id number]    Department:  [facility] Emergency Department   Date of Visit:  2/19/2019           Disclosure     Insurance plans vary and the physician(s) referred by the ER may not be covered by your plan.  Please contact your within the next three months to obtain basic health screening including reassessment of your blood pressure.     IF THERE IS ANY CHANGE OR WORSENING OF YOUR CONDITION, CALL YOUR PRIMARY CARE PHYSICIAN AT ONCE OR RETURN IMMEDIATELY TO THE EMERGENCY DEPARTMEN

## (undated) NOTE — LETTER
Hospital Discharge Documentation  From: [address] Hospitalist's Office  Phone: [phone]    Patient discharged time/date: 3/20/2017  4:24 PM  Patient discharge disposition:  [address] [place] [facility]   Patient has Qty: 30 tablet Refills: 1    furosemide 40 MG Oral Tab  Take 1 tablet (40 MG) by mouth daily. **Hold if BP is below 110/70.   Qty: 30 tablet Refills: 1    METOPROLOL TARTRATE 25 MG Oral Tab  TAKE ONE TABLET BY MOUTH TWO TIMES A DAY  Qty: 60 tablet Refills: HEENT:  Atraumatic.  Oropharynx clear with moist mucous membranes.  Normal hard and soft palate. NECK:  Trachea midline.  Full range of motion, supple.  No thyromegaly or lymphadenopathy.   LUNGS:  Clear to auscultation bilaterally.  Normal respiratory eff

## (undated) NOTE — LETTER
11/1/2018              Fadi Hess. [address]         Dear Moni Lozano,    [address] [facility] records indicate that the xrays ordered for you by Mychal Ogden MD  have not been done.   If you have, in fact, already completed the tests or you

## (undated) NOTE — MR AVS SNAPSHOT
[address]  [phone]               Thank you for choosing us for your health care visit with Tyrese De Santiago MD.  We are glad to serve you and happy to provide you with this summary of your visit. Take 1 tablet (40 MG) by mouth daily. **Hold if BP is below 110/70. Commonly known as:  LASIX           Lisinopril-Hydrochlorothiazide 10-12.5 MG Tabs   Take 1 tablet by mouth once daily.            metoprolol Tartrate 25 MG Tabs   TAKE ONE TABLET BY MOUT Make half your plate fruits and vegetables Highly refined, white starches including white bread, rice and pasta   Eat plenty of protein, keep the fat content low Sugars:  sodas and sports drinks, candies and desserts   Eat plenty of low-fat dairy products

## (undated) NOTE — Clinical Note
[facility], There were a couple of unique differential dx on the CT scan. When you have a moment, please review my note and CT scan. Given pt's presentation, response to PPI/Carafate, hx H Pylori, I would favor drawing lipase and scheduling EGD.

## (undated) NOTE — IP AVS SNAPSHOT
[address]  [address] [facility] [place] ~ [phone]                Discharge Summary   3/16/2017    [facility]           Admission Information        Provider Department    3/16/2017 Lenin Rooney MD [facility] 5sw/Se Last time this was given:  5 mg on 3/20/2017  8:47 AM   Commonly known as:  PROSCAR   Next dose due: Tomorrow morning 3/21/17        TAKE 1 TABLET (5MG)  BY ORAL ROUTE  EVERY DAY.     Tatyana Pineda                           furosemide 40 MG Tabs   Last t Follow up with Amber Hogan MD In 1 week. Specialties:  NEPHROLOGY, Internal Medicine    Contact information:    [address] [phone]          Follow up with [place] DO Lorena In 1 week.     Specialty:  INFECTIOUS 4 (03/19/17)  1  (03/19/17)  6.0 (03/19/17)  2.3 (03/19/17)  0.9 (03/19/17)  0.4 (03/19/17)  0.1    (03/17/17)  64 (03/17/17)  24 (03/17/17)  9 (03/17/17)  3 (03/17/17)  1  (03/17/17)  6.1 (03/17/17)  2.3 (03/17/17)  0.8 (03/17/17)  0.3 (03/17/17)  0.1 [address]. MyChart     Visit [employer]  You can access your MyChart to more actively manage your health care and view more details from this visit by going to [email or web address].   If you've recently had a stay at the [facility] yo Most common side effects: Dizziness or feeling lightheaded (especially with standing), heart rate changes, headaches, nausea/vomiting   What to report to your healthcare team:  Dizziness, nausea, chest pain, weakness, numbness           Water [place]ills     fur

## (undated) NOTE — LETTER
04/01/19        Nayeli Burch  [address]   [facility] [address]      Dear Ernesto Velizmsted,    [address] [facility] records indicate that you have outstanding lab work and or testing that was ordered for you and has not yet been completed:    Lipase    To provide you with the best poss

## (undated) NOTE — MR AVS SNAPSHOT
[address]  [phone]               Thank you for choosing us for your health care visit with Justin Deng MD.  We are glad to serve you and happy to provide you with this summar Apply 1 Application topically 2 (two) times daily. Commonly known as:  VALISONE           finasteride 5 MG Tabs   TAKE 1 TABLET (5MG)  BY ORAL ROUTE  EVERY DAY.    Commonly known as:  PROSCAR           furosemide 40 MG Tabs   Take 1 tablet (40 MG) by mout

## (undated) NOTE — LETTER
AUTHORIZATION FOR SURGICAL OPERATION OR OTHER PROCEDURE    1.  I hereby authorize  , and [facility] staff assigned to my case to perform the following operation and/or procedure at the [facility]:    _______________________________________ ________ A. M.  P.M.        Patient Name:  ______________________________________________________  (please print)      Patient signature:  ___________________________________________________             Relationship to Patient:           []  Parent    Respon

## (undated) NOTE — LETTER
AUTHORIZATION FOR SURGICAL OPERATION OR OTHER PROCEDURE    1. I hereby authorize Michael Nettles PA-C, and [place] [employer] staff assigned to my case to perform the following operation and/or procedure at the [place] [employer] [facility]:    _______________________________________________________________________________________________    Cortisone injection of the left knee  _______________________________________________________________________________________________    2. My physician has explained the nature and purpose of the operation or other procedure, possible alternative methods of treatment, the risks involved, and the possibility of complication to me. I acknowledge that no guarantee has been made as to the result that may be obtained. 3.  I recognize that, during the course of this operation, or other procedure, unforseen conditions may necessitate additional or different procedure than those listed above. I, therefore, further authorize and request that the above named physician, his/her physician assistants or designees perform such procedures as are, in his/her professional opinion, necessary and desirable. 4.  Any tissue or organs removed in the operation or other procedure may be disposed of by and at the discretion of the [place] [employer] and [address]. 5.  I understand that in the event of a medical emergency, I will be transported by local paramedics to [facility] or other hospital emergency department. 6.  I certify that I have read and fully understand the above consent to operation and/or other procedure. 7.  I acknowledge that my physician has explained sedation/analgesia administration to me including the risks and benefits. I consent to the administration of sedation/analgesia as may be necessary or desirable in the judgement of my physician.     Witness signature: ___________________________________________________ Date:  ______/______/_____ Time:  ________ A. M.  P.M. Patient Name:  ______________________________________________________  (please print)      Patient signature:  ___________________________________________________             Relationship to Patient:           []  Parent    Responsible person                          []  Spouse  In case of minor or                    [] Other  _____________   Incompetent name:  __________________________________________________                               (please print)      _____________      Responsible person  In case of minor or  Incompetent signature:  _______________________________________________    Statement of Physician  My signature below affirms that prior to the time of the procedure, I have explained to the patient and/or his/her guardian, the risks and benefits involved in the proposed treatment and any reasonable alternative to the proposed treatment. I have also explained the risks and benefits involved in the refusal of the proposed treatment and have answered the patient's questions.                         Date:  ______/______/_______  Provider                      Signature:  __________________________________________________________       Time:  ___________ A.M    P.M.

## (undated) NOTE — LETTER
AUTHORIZATION FOR SURGICAL OPERATION OR OTHER PROCEDURE    1.  I hereby authorize Dr. Yodit Castelan [facility] and [address] [place] [address] staff assigned to my case to perform the following operation and/or procedure at the [address] [place] [address]:    ________________ Time:  ________ A. M.  P.M.        Patient Name:  ______________________________________________________  (please print)      Patient signature:  ___________________________________________________             Relationship to Patient:           []  P

## (undated) NOTE — IP AVS SNAPSHOT
Patient Demographics     Address  [address] [place] [address] [address] [place] [address] Phone  [phone] (Home)  [phone] (Mobile) *Preferred* E-mail Address  [email or web address]      Patient Contacts     Name Relation Home Work Mobile    Fernando Norton [phone]        Allergies as of 1/11/2024  Review status set to In Progress on 1/9/2024       Noted Reaction Type Reactions    DELETED: Aspirin 07/27/2019    DIZZINESS    Clarithromycin 10/01/2014   Intolerance DIZZINESS, OTHER (SEE COMMENTS)    Other reaction(s): blurring in the eyes    Penicillins 10/01/2014   Side Effect RASH, OTHER (SEE COMMENTS)    As a child, was told to have an allergic reaction; eyes became blurry      Code Status Information     Code Status    Full Code        Patient Instructions       Keep incision dry for 2 weeks.  Change Mepilex dressing every 3-5 days.  Ambulate with walker and assist, weight bearing as tolerated to right leg.  May ice incision to prevent swelling or help reduce pain.  Monitor incision for any signs infection or bleeding. Continue to encourage use of incentive spirometry to prevent Pneumonia.       Follow-up Information     Everardo Clancy PA-C Follow up on 2/12/2024.    Specialties: Physician Assistant, SURGERY, ORTHOPEDIC  Contact information:  [address] [facility] [address]  [facility] [address] [phone]                        Your Home Meds List      TAKE these medications       Instructions Authorizing Provider Morning Afternoon Evening As Needed   aspirin 325 MG Tbec      Take 1 tablet (325 mg total) by mouth in the morning and 1 tablet (325 mg total) before bedtime.   EVERARDO CLANCY         atorvastatin 80 MG Tabs  Commonly known as: Lipitor      Take 1 tablet (80 mg total) by mouth daily.   Marquise Galarza         docusate sodium 100 MG Caps  Commonly known as: COLACE      Take 100 mg by mouth 2 (two) times daily.   EVERARDO CLANCY         ergocalciferol 1.25 MG (98158 UT)  Caps  Commonly known as: Vitamin D2      Take 1 capsule (50,000 Units total) by mouth once a week.   Marquise Galarza         ferrous sulfate 325 (65 FE) MG Tbec      Take 1 tablet (325 mg total) by mouth daily with breakfast.   Alysa Andre         finasteride 5 MG Tabs  Commonly known as: Proscar      Take 1 tablet (5 mg total) by mouth daily.   Marquise Galarza         furosemide 40 MG Tabs  Commonly known as: Lasix      TAKE ONE TABLET BY MOUTH ONE TIME DAILY   Marquise Galarza         gabapentin 100 MG Caps  Commonly known as: Neurontin      Take 1 capsule (100 mg total) by mouth 2 (two) times daily.   Marquise Galarza         HYDROcodone-acetaminophen 7.5-325 MG Tabs  Commonly known as: Norco      Take 1 tablet by mouth every 6 (six) hours as needed.   SHERI CLANCY         levothyroxine 75 MCG Tabs  Commonly known as: Synthroid      Take 1 tablet (75 mcg total) by mouth before breakfast.   Christen Johnson         metoprolol succinate ER 25 MG Tb24  Commonly known as: Toprol XL      Take 1 tablet (25 mg total) by mouth Daily Beta Blocker.   Alysa Andre         omeprazole 20 MG Cpdr  Commonly known as: PriLOSEC      Take 1 capsule (20 mg total) by mouth before breakfast.   Marquise Galarza         potassium chloride 20 MEQ Tbcr  Commonly known as: K-Dur      Take 1 tablet (20 mEq total) by mouth daily.   Marquise Galarza               Where to Get Your Medications      These medications were sent to [id number] - [place], [place] - [address] [phone], [phone]  [address], [facility] [address]    Phone: [phone]   aspirin 325 MG Tbec  docusate sodium 100 MG Caps  potassium chloride 20 MEQ Tbcr     Please  your prescriptions at the location directed by your doctor or nurse    Bring a paper prescription for each of these medications  HYDROcodone-acetaminophen 7.5-325 MG Tabs           [phone] - MAR ACTION REPORT  (last 48 hrs)    ** SITE UNKNOWN **     Order ID  Medication Name Action Time Action Reason Comments    [id number] HYDROcodone-acetaminophen (Norco) 7.5-325 MG per tab 1 tablet 01/09/24 2036 Given      [id number] HYDROcodone-acetaminophen (Norco) 7.5-325 MG per tab 1 tablet 01/10/24 0302 Given      [id number] HYDROcodone-acetaminophen (Norco) 7.5-325 MG per tab 1 tablet 01/10/24 0954 Given      [id number] HYDROcodone-acetaminophen (Norco) 7.5-325 MG per tab 1 tablet 01/10/24 1644 Given      [id number] HYDROcodone-acetaminophen (Norco) 7.5-325 MG per tab 1 tablet 01/10/24 2324 Given      [id number] HYDROcodone-acetaminophen (Norco) 7.5-325 MG per tab 1 tablet 01/11/24 0630 Given      [id number] Perflutren Lipid Microsphere (DEFINITY) 6.52 MG/ML injection 1.5 mL 01/10/24 1202 Given      [id number] aspirin DR tab 325 mg 01/09/24 2036 Given      [id number] aspirin DR tab 325 mg 01/10/24 0954 Given      [id number] aspirin DR tab 325 mg 01/10/24 2107 Given      [id number] aspirin DR tab 325 mg 01/11/24 0834 Given      [id number] atorvastatin (Lipitor) tab 80 mg 01/10/24 2107 Given      [id number] ceFAZolin (Ancef) 2 g in 20mL IV syringe premix 01/09/24 1630 Given      [id number] ceFAZolin (Ancef) 2 g in 20mL IV syringe premix 01/09/24 2342 Given      [id number] docusate sodium (Colace) cap 100 mg 01/09/24 2037 Given      [id number] docusate sodium (Colace) cap 100 mg 01/10/24 0954 Given      [id number] docusate sodium (Colace) cap 100 mg 01/10/24 2107 Given      [id number] docusate sodium (Colace) cap 100 mg 01/11/24 0834 Given      [id number] famotidine (Pepcid) 20 mg/2mL injection 20 mg (Or Linked Group #1) 01/09/24 1657 Given      [id number] famotidine (Pepcid) tab 20 mg (Or Linked Group #1) 01/10/24 0954 Given      [id number] famotidine (Pepcid) tab 20 mg 01/10/24 2107 Given      [id number] famotidine (Pepcid) tab 20 mg 01/11/24 0835 Given      [id number] ferrous sulfate DR tab 325 mg 01/10/24 0954 Given      [id number] ferrous sulfate DR tab 325 mg 01/11/24 0835 Given      [id number]  finasteride (Proscar) tab 5 mg 01/09/24 2037 Given      [id number] finasteride (Proscar) tab 5 mg 01/10/24 0954 Given      [id number] finasteride (Proscar) tab 5 mg 01/11/24 0834 Given      [id number] gabapentin (Neurontin) cap 100 mg 01/09/24 2037 Given      [id number] gabapentin (Neurontin) cap 100 mg 01/10/24 0954 Given      [id number] gabapentin (Neurontin) cap 100 mg 01/10/24 2107 Given      [id number] gabapentin (Neurontin) cap 100 mg 01/11/24 0834 Given      [id number] levothyroxine (Synthroid) tab 75 mcg 01/10/24 0642 Given      [id number] levothyroxine (Synthroid) tab 75 mcg 01/11/24 0630 Given      [id number] meclizine (Antivert) tab 25 mg 01/09/24 1630 Given      [id number] metoclopramide (Reglan) 5 mg/mL injection 10 mg 01/09/24 1255 Given      [id number] ondansetron (Zofran) 4 MG/2ML injection 4 mg 01/09/24 1657 Given      [id number] ondansetron (Zofran) 4 MG/2ML injection 4 mg 01/10/24 1007 Given      [id number] sennosides (Senokot) tab 17.2 mg 01/09/24 2037 Given      [id number] sennosides (Senokot) tab 17.2 mg 01/10/24 2107 Given              Recent Vital Signs    Flowsheet Row Most Recent Value   /61 Filed at 01/11/2024 1145   Pulse 73 Filed at 01/11/2024 1145   Resp 14 Filed at 01/11/2024 0832   Temp 98.7 °F (37.1 °C) Filed at 01/11/2024 0832   SpO2 93 % Filed at 01/11/2024 0832      Patient's Most Recent Weight    Flowsheet Row Most Recent Value   Patient Weight 95.3 kg (210 lb)      Lab Results Last 24 Hours    No matching results found     Microbiology Results (All)     None         H&P - H&P Note      H&P signed by Everardo Multani PA-C at 1/11/2024  8:08 AM  Version 1 of 1    Author: Everardo Multani PA-C Service: Orthopedics Author Type: Physician Assistant    Filed: 1/11/2024  8:08 AM Date of Service: 1/11/2024  8:03 AM Status: Signed    : Everardo Multani PA-C (Physician Assistant)       [facility]    Progress Note    Derdick Norton Patient  Status:  Inpatient    3/6/1940 MRN [id number]   Location [facility] 4W/SW/SE Attending Marquise Galarza MD   Hosp Day # 2 PCP Marquise Galarza MD       Subjective:   Dedrick Norton is a(n) 83 year old male with a arthroplasty.  He is bed.  He is comfortable.  He is progressing with therapy.  He uses [facility] last night.  Awaiting placement at acute rehab    Objective:   Blood pressure 120/64, pulse 69, temperature 97.9 °F (36.6 °C), temperature source Oral, resp. rate 16, height 5' 9\" (1.753 m), weight 210 lb (95.3 kg), SpO2 95%.    General appearance: alert, appears stated age and cooperative  INCISION/WOUND: clean, dry and intact, dressing intact and in place and no evidence of infection  Extremities: No calf pain  Pulses: 2+ and symmetric  Neurologic: Grossly normal    Assessment and Plan:   Postop day #2 status post right total knee arthroplasty.  He is progressing well.  Discharge planning to acute rehab when bed available possibly today.  Follow-up as scheduled.        Results:     Lab Results   Component Value Date    WBC 11.2 (H) 01/10/2024    HGB 9.6 (L) 01/10/2024    HCT 31.7 (L) 01/10/2024    .0 01/10/2024    CREATSERUM 0.65 (L) 2023    BUN 16 2023     2023    K 3.8 2023     2023    CO2 33.0 (H) 2023    GLU 90 2023    CA 9.7 2023    ALB 4.3 2023    ALKPHO 91 2023    BILT 0.9 2023    TP 6.8 2023    AST 17 2023    ALT <7 (L) 2023    PTT 34.6 2023    INR 1.07 2023    T4F 1.0 10/18/2023    TSH 4.840 (H) 10/18/2023     2019    PSA 0.7 2018    MG 2.0 2023    TROP <0.045 2019       XR KNEE (1 OR 2 VIEWS), RIGHT (CPT=73560)    Result Date: 2024  CONCLUSION:  1. Recent postop changes following total knee arthroplasty.  2. Prosthetic components in anatomic position.  No acute fracture dislocation. 3. Soft tissue emphysema related to recent surgery.    Dictated by  (CST): Jay Cruz MD on 1/09/2024 at 11:00 AM     Finalized by (CST): Jay Cruz MD on 1/09/2024 at 11:02 AM         EKG 12 Lead    Result Date: 1/10/2024  Sinus bradycardia with Premature supraventricular complexes Left axis deviation Nonspecific ST and T wave abnormality Abnormal ECG When compared with ECG of 15-MAR-2023 11:16, ST now depressed in Inferior leads Inverted T waves have replaced nonspecific T wave abnormality in Inferior leads Confirmed by MANDEEP NARANJO STEVEN ([id number]) on 1/10/2024 10:06:09 AM       EVERARDO CLANCY PA-C  1/11/2024    Electronically signed by Everardo Clancy PA-C on 1/11/2024  8:08 AM              Consults - MD Consult Notes      Consults signed by Manuel Gutierrez MD at 1/10/2024  5:01 PM     Author: Manuel Gutierrez MD Service: Physical Medicine and Rehabilitation Author Type: Physician    Filed: 1/10/2024  5:01 PM Date of Service: 1/10/2024  3:04 PM Status: Signed    : Manuel Gutierrez MD (Physician)     Consult Orders    1. Consult to Physical Medicine Rehab [[id number]] ordered by Marquise Galarza MD at 01/10/24 1028    2. Consult to Physical Medicine Rehab [[id number]] ordered by Marquise Galarza MD at 03/15/23 1545             Physician Medicine & Rehabilitation Consult Note         SUBJECTIVE:    Chief Complaint: To assess rehabilitation needs following Mobility and ADL dysfunction s/p <principal problem not specified> as per request of PCP wants to be admitting physician  .    History of Present Illness: Review of the records show that this  83 year old, male  with PMH significant for NSTEMI, CAD, MI (2022) s/p stent, gastric ulcers, thyroid disorder, s/p left TKA (3/2023) with progressive right knee OA that did not improve with conservative measures. He was admitted to [facility] for right TKA on 1/9/2024.  Patient taken to the operating room 1/9/2024 by Dr. Kendall s/p right TKA for primary osteoarthritis of the right knee.   Neurology consulted for postop bradycardia with heart rate in the 40s PACs associated with nausea and emesis.  It is noted that patient had similar issue postop following left TKA in March 2023.  Cardiology sinus bradycardia with PACs likely secondary to vagal contribution postanesthesia.  Metoprolol on hold continue aspirin and statin, DVT prophylaxis per Ortho.  Echo shows LVEF 50-55% grade 2 diastolic dysfunction, moderate pulmonary hypertension, mild aortic regurgitation.. Patient is Weight bearing to tolerance on RLE extremity.      Functional status:  1/10 PT  Functional Mobility:  - bed mobility: supine to/from sit SBA   - transfers: sit to/from stand transfers with CGA from EOB And chair with arms  - seated EX LAQ 2 x 10 reps, seated marches, ankle pumps    - gait: ambulation with RW 3' and 25' with RW     FUNCTIONAL TRANSFER ASSESSMENT  Supine to Sit : Contact Guard Assist  Sit to stand: min-CGA  Toilet Transfer: min-CGA        FUNCTIONAL ADL ASSESSMENT  Overall max A      Past Medical History:   Diagnosis Date    Back problem     BPH (benign prostatic hyperplasia)     Cataract     Cellulitis     Cellulitis of left lower extremity 03/16/2017    Disorder of thyroid     Elevated prostate specific antigen (PSA) 08/06/2012    Esophageal reflux     Essential hypertension     Hearing impairment     no hearing aids    Heart attack (HCC)     High blood pressure     High cholesterol     History of blood transfusion     no reactions    History of prostate biopsy     History of stomach ulcers     Hyperlipidemia     Intractable back pain 10/16/2019    NSTEMI (non-ST elevated myocardial infarction) (HCC) 02/04/2022    Osteoarthritis     Postoperative nausea 1/9/2024    Sciatica of right side     Sleep apnea     no CPAP use    Visual impairment     readers       Past Surgical History:   Procedure Laterality Date    ANGIOPLASTY (CORONARY)  2022    ARTHROSCOPY OF JOINT UNLISTED Left     knee    CATARACT SURGERY, COMPLEX       CATH BARE METAL STENT (BMS)      COLONOSCOPY      HC INJ EPIDURAL STEROID LUMBAR OR SACRAL W IMG GUIDANCE      TONSILLECTOMY      UPPER GI ENDOSCOPY,EXAM          Medications:   sennosides  17.2 mg Oral Nightly    docusate sodium  100 mg Oral BID    aspirin  325 mg Oral BID    famotidine  20 mg Oral BID    Or    famotidine  20 mg Intravenous BID    ferrous sulfate  325 mg Oral Daily with breakfast    atorvastatin  80 mg Oral Nightly    finasteride  5 mg Oral Daily    gabapentin  100 mg Oral BID    levothyroxine  75 mcg Oral Before breakfast       sodium chloride, polyethylene glycol (PEG 3350), magnesium hydroxide, bisacodyl, fleet enema, metoclopramide, diphenhydrAMINE **OR** diphenhydrAMINE, HYDROmorphone **OR** HYDROmorphone, HYDROcodone-acetaminophen, meclizine, ondansetron    Allergies   Allergen Reactions    Clarithromycin DIZZINESS and OTHER (SEE COMMENTS)     Other reaction(s): blurring in the eyes    Penicillins RASH and OTHER (SEE COMMENTS)     As a child, was told to have an allergic reaction; eyes became blurry        Family History   Problem Relation Age of Onset    Pulmonary Disease Father         Per NG: pneumonia ( cause of death)        Social History     Socioeconomic History    Marital status:    Tobacco Use    Smoking status: Former     Packs/day: 1.50     Years: 20.00     Additional pack years: 0.00     Total pack years: 30.00     Types: Cigarettes     Quit date: 1988     Years since quittin.6     Passive exposure: Past    Smokeless tobacco: Never   Vaping Use    Vaping Use: Never used   Substance and Sexual Activity    Alcohol use: Not Currently     Alcohol/week: 1.0 standard drink of alcohol     Types: 1 Standard drinks or equivalent per week    Drug use: No   Other Topics Concern    Caffeine Concern Yes     Comment: Per NG: coffee 1 cup daily     Social Determinants of Health     Food Insecurity: No Food Insecurity (2024)    Food Insecurity     Food Insecurity: Never  true   Transportation Needs: No Transportation Needs (1/9/2024)    Transportation Needs     Lack of Transportation: No   Housing Stability: Low Risk  (1/9/2024)    Housing Stability     Housing Instability: No       Social History/Living Situation & Prior Functional Status:  Patient lives alone in two-level home with 7 stairs to upper and lower level  Indept PTA    Review of systems:   Constitutional systems: Denies fever chills change in weight fatigue  Eyes: Denies vision change  Ears nose mouth and throat: Denies sore throat  Cardiovascular: Denies chest pain, palpitations  Respiratory: Denies SOB, cough hemoptysis  Gastrointestinal: Denies nausea vomiting abdominal pain diarrhea constipation bright red blood per rectum, melena  Genitourinary: Denies dysuria to increased urinary frequency or hematuria  Musculoskeletal: Denies myalgias or arthralgias  Integumentary: Denies rash  Neurological: Denies weakness , denies headache +dizzy \"room spinning\" earlier during therapy  Psychiatric: Denies depression or anxiety  Endocrine denies any creased thirst  Hematologic lymphatic: Denies bleeding or easy bruising  Allergic or immunologic denies current allergic symptoms     OBJECTIVE:    Vitals:    01/10/24 0728 01/10/24 0829 01/10/24 0830 01/10/24 1202   BP: 114/61 118/56 118/56 119/63   BP Location: Right arm Right arm  Right arm   Pulse:       Resp: 16   17   Temp: 98.5 °F (36.9 °C)   97.8 °F (36.6 °C)   TempSrc: Oral   Oral   SpO2: 90%   93%   Weight:       Height:         Body mass index is 31.01 kg/m².     Physical Exam:  General: awake, no distress, appears stated age, obese, supine in hospital bed  HEENT: normocephalic, normal conjunctiva  Cardiovascular: warm, well perfused, RRR  Pulm: breathing comfortably, no respiratory distress, bilateral lungs clear in all lung fields  GI: abdomen soft, non distended   Skin: right knee surgical incision with dressing in place no strikethrough  Extremities: RLE edema  MSK:  unable to hold RLE against gravity, can fully extend right knee, DF, PF at least antigravity in strength  LLE at least antigravity in strength  Neuro: alert, face grossly symmetrical  Speech-fluent  Psych: Appropriate affect, cooperative    Data Review:   Recent Labs   Lab 01/09/24  0958 01/10/24  0715   RBC  --  5.27   HGB 10.7* 9.6*   HCT 34.3* 31.7*   MCV  --  60.2*   MCH  --  18.2*   MCHC  --  30.3*   RDW  --  20.4*   WBC  --  11.2*   PLT  --  175.0     No results for input(s): \"GLU\", \"BUN\", \"CREATSERUM\", \"GFRAA\", \"GFRNAA\", \"CA\", \"NA\", \"K\", \"CL\", \"CO2\" in the last 168 hours.  Lab Results   Component Value Date    INR 1.07 03/11/2023         REHAB DIAGNOSES:  Impaired mobility and self-care secondary to Primary osteoarthritis of right knee [M17.11]  Preop testing.    Patient Active Problem List   Diagnosis    BPH with urinary obstruction    Hypertension    Hypercholesterolemia    Cellulitis of left lower extremity    Lumbar radiculopathy    Primary osteoarthritis of right knee    Coronary artery disease involving native coronary artery    Gastroesophageal reflux disease without esophagitis    Primary osteoarthritis of both knees    Difficult Matt catheter placement (HCC)    Gross hematuria    Osteoarthritis of left knee    Hypomagnesemia    Atrial fibrillation (HCC)    Orthopedic aftercare    Acquired hypothyroidism    Preop testing    Postoperative nausea    Bradycardia         IMPAIRMENTS:      Activities of daily living, functional mobility, balance, cognition, strength, endurance, self care, transfers, hygiene     RECOMMENDATIONS:      Patient has been accepted at [facility] facility.   Patient is reporting vertigo symptoms during therapy. Recommend further monitoring and evaluation.  Monitor for further episodes of hypotension.   Prior to transfer to acute inpatient rehab, the following to be addressed:  - Off all IV push medications and continuous IV fluids  - Pain  and behavior controlled with PO and transdermal meds.  - Pt should be participating with PT/OT and able to tolerate therapy for 3 hours a day.    - Weight bearing or ROM precautions clarified.  - Labs stable, including electrolytes, Hgb (no less than 7.0 and stable), with no signs of infection.  - Encourage OOB to chair as often as possible.    - Encourage patient to transfer to commode or toilet for toileting   -- Please have PT/OT continue to follow the patient while in house.   - We will continue to follow along with this patient's course during their hospitalization.    This treatment can not be provided at a lower level of care. Requires closer medical supervision than available in other levels of rehab care to manage concomitant medical issues, maintain medical stability and prevent complications, assure adequate pain control, rehabilitation team coordination for an efficient program and optimal outcomeRisks if patient is transferred to a lower level of care include: anemia, hypotension, thrombus, intractable pain  Goal directed therapy with rehabilitation potential will be provided in the following domains. Bed mobility or transfers , Cognitive, speech, language or swallowing retraining, Range of motion and strengthening and Ambulation  There are at least two functional impairments requiring at least minimum assistance. Physical Therapy , Speech Therapy  and Occupational Therapy   This patient should be able to tolerate at least 3 hours per day of skilled therapy, at least 5 days a week, in one or more of the functional domains documented above.        24 hour rehabilitation nursing care also beneficial for medication management, pressure sore prevention, bladder and bowel management. Physiatric medical oversight to monitor anemia, cardiac function, neurologic status, deep vein thrombosis prevention. Estimated length of stay is 10-14 days. Rehabilitation potential is good. Discharge goal is home with family  at a supervision to modified independent level.    Will provide family/caregiver education with appropriate discharge plan of care.     Will follow.    PQRI: Advanced care plan documented    Thank you for involving us with this patient's care.  Manuel Gutierrez MD, 01/10/24, 5:01 PM           Electronically signed by Manuel Gutierrez MD on 1/10/2024  5:01 PM           D/C Summary    No notes of this type exist for this encounter.        Physical Therapy Notes (last 72 hours)      Physical Therapy Note signed by Sally Fan PT at 1/10/2024  1:28 PM  Version 1 of 1    Author: Sally Fan PT Service: Rehab Author Type: Physical Therapist    Filed: 1/10/2024  1:28 PM Date of Service: 1/10/2024 12:45 PM Status: Signed    : Sally Fan PT (Physical Therapist)       PHYSICAL THERAPY KNEE TREATMENT NOTE - INPATIENT     Room Number: 432/432-A             Presenting Problem: R TKA performed 1/10  Co-Morbidities : arthritis    Problem List  Active Problems:    Preop testing    Postoperative nausea    Bradycardia      PHYSICAL THERAPY ASSESSMENT     Patient seen for therapy treatment this date with clearance of RN, pt agreeable to treatment.      Functional Mobility:  - bed mobility: supine to/from sit SBA   - transfers: sit to/from stand transfers with CGA from EOB And chair with arms  - seated EX LAQ 2 x 10 reps, seated marches, ankle pumps    - gait: ambulation with RW 3' and 25' with RW      At end of session patient in chair with all needs in reach, RN aware.      The patient's Approx Degree of Impairment: 54.16% has been calculated based on documentation in the [facility] '6 clicks' Inpatient Basic Mobility Short Form.  Research supports that patients with this level of impairment may benefit from EJ.    DISCHARGE RECOMMENDATIONS  PT Discharge Recommendations: Acute rehabilitation (Pt wants [facility] for rehab - did this for other TKA)    PLAN  PT Treatment Plan: Bed mobility;Body  mechanics;Endurance;Energy conservation;Family education;Gait training;Range of motion;Stair training;Transfer training;Balance training  Frequency (Obs): Daily      SUBJECTIVE  \"I do feel better now.\"     OBJECTIVE  Precautions: Bed/chair alarm    WEIGHT BEARING STATUS        R Lower Extremity: Weight Bearing as Tolerated       PAIN ASSESSMENT              BALANCE  Static Sitting: Fair +  Dynamic Sitting: Fair  Static Standing: Fair -  Dynamic Standing: Fair -    ACTIVITY TOLERANCE           BP: 118/56  BP Location: Right arm  BP Method: Automatic  Patient Position: Semi-Elliott    O2 WALK       AM-PAC '6-Clicks' INPATIENT SHORT FORM - BASIC MOBILITY  How much difficulty does the patient currently have...  Patient Difficulty: Turning over in bed (including adjusting bedclothes, sheets and blankets)?: A Little   Patient Difficulty: Sitting down on and standing up from a chair with arms (e.g., wheelchair, bedside commode, etc.): A Little   Patient Difficulty: Moving from lying on back to sitting on the side of the bed?: A Little   How much help from another person does the patient currently need...   Help from Another: Moving to and from a bed to a chair (including a wheelchair)?: A Little   Help from Another: Need to walk in hospital room?: A Little   Help from Another: Climbing 3-5 steps with a railing?: Total     AM-PAC Score:  Raw Score: 16   Approx Degree of Impairment: 54.16%   Standardized Score (AM-PAC Scale): 40.78   CMS Modifier (G-Code): CK    FUNCTIONAL ABILITY STATUS  Functional Mobility/Gait Assessment  Gait Assistance: Contact guard assist  Distance (ft): 4', 25'  Assistive Device: Rolling walker  Pattern: Shuffle      Patient End of Session: Up in chair;Call light within reach;Needs met;RN aware of session/findings;All patient questions and concerns addressed;Discussed recommendations with /    CURRENT GOALS  Goals to be met by: 1/20  Patient Goal Patient's self-stated goal is:  to get stronger   Goal #1 Patient is able to demonstrate supine - sit EOB @ level: supervision      Goal #1   Current Status  in progress    Goal #2 Patient is able to demonstrate transfers Sit to/from Stand at assistance level: supervision with walker - rolling      Goal #2  Current Status  in progress    Goal #3 Patient is able to ambulate 150 feet with assist device: walker - rolling at assistance level: supervision   Goal #3   Current Status  in progress    Goal #4 Patient will negotiate 2 stairs/one curb w/ assistive device and supervision   Goal #4   Current Status  in progress    Goal #5 Patient to demonstrate independence with home activity/exercise instructions provided to patient in preparation for discharge.   Goal #5   Current Status  in progress    Goal #6     Goal #6  Current Status         Theract 10 min   Gait training 14 min        Physical Therapy Note signed by Sally Fan PT at 1/10/2024  9:15 AM  Version 1 of 1    Author: Sally Fan PT Service: Rehab Author Type: Physical Therapist    Filed: 1/10/2024  9:15 AM Date of Service: 1/10/2024  8:30 AM Status: Signed    : Sally Fan PT (Physical Therapist)        PHYSICAL THERAPY EVALUATION - INPATIENT     Room Number: 432/432-A  Evaluation Date: 1/10/2024  Type of Evaluation: Initial   Physician Order: PT Eval and Treat    Presenting Problem: R TKA performed 1/10  Co-Morbidities : arthritis  Reason for Therapy: Mobility Dysfunction and Discharge Planning    PHYSICAL THERAPY ASSESSMENT     Patient is a 83 year old male admitted 1/9/2024 for R TKA performed 1/10/2024 .  Patient's current functional deficits include bed mobility, transfers, and gait, which are below the patient's pre-admission status.  Patient was independent prior to this admission.      Functional Mobility: Gait belt used throughout mobility.   - bed mobility: supine to/from sit SBA   - transfers: sit to/from stand transfers with CGA   - gait:  ambulation with CGA with RW 10' and 2' limited by dizziness not orthostatic      At end of session patient in chair with feet up with all needs in reach, RN aware.      The patient's Approx Degree of Impairment: 54.16% has been calculated based on documentation in the [facility] '6 clicks' Inpatient Basic Mobility Short Form.  Research supports that patients with this level of impairment may benefit from EJ.    Patient will benefit from continued IP PT services to address these deficits in preparation for discharge.    DISCHARGE RECOMMENDATIONS  PT Discharge Recommendations: Acute rehabilitation (Pt wants [facility] for rehab - did this for other TKA)    PLAN  PT Treatment Plan: Bed mobility;Body mechanics;Endurance;Energy conservation;Family education;Gait training;Range of motion;Stair training;Transfer training;Balance training  Rehab Potential : Good  Frequency (Obs): Daily       PHYSICAL THERAPY MEDICAL/SOCIAL HISTORY     History related to current admission: bradycardia      Problem List  Active Problems:    Preop testing    Postoperative nausea    Bradycardia      HOME SITUATION  Home Situation  Type of Home: House  Home Layout: Multi-level  Stairs to Bedroom: 6  Railing: Yes  Lives With: Alone     Prior Level of [place]: independent     SUBJECTIVE  \"I might have a harder time since this is my driving leg.\"     PHYSICAL THERAPY EXAMINATION     OBJECTIVE  Precautions: Bed/chair alarm  Fall Risk: High fall risk    WEIGHT BEARING RESTRICTION  Weight Bearing Restriction: R lower extremity        R Lower Extremity: Weight Bearing as Tolerated       PAIN ASSESSMENT      Surgical unrated        COGNITION  Overall Cognitive Status:  WFL - within functional limits    BALANCE  Static Sitting: Fair +  Dynamic Sitting: Fair  Static Standing: Fair -  Dynamic Standing: Poor +    ACTIVITY TOLERANCE           BP: 118/56  BP Location: Right arm  BP Method: Automatic  Patient Position: Semi-Elliott    O2 WALK        AM-PAC '6-Clicks' INPATIENT SHORT FORM - BASIC MOBILITY  How much difficulty does the patient currently have...  Patient Difficulty: Turning over in bed (including adjusting bedclothes, sheets and blankets)?: A Little   Patient Difficulty: Sitting down on and standing up from a chair with arms (e.g., wheelchair, bedside commode, etc.): A Little   Patient Difficulty: Moving from lying on back to sitting on the side of the bed?: A Little   How much help from another person does the patient currently need...   Help from Another: Moving to and from a bed to a chair (including a wheelchair)?: A Little   Help from Another: Need to walk in hospital room?: A Little   Help from Another: Climbing 3-5 steps with a railing?: Total     AM-PAC Score:  Raw Score: 16   Approx Degree of Impairment: 54.16%   Standardized Score (AM-PAC Scale): 40.78   CMS Modifier (G-Code): CK    FUNCTIONAL ABILITY STATUS  Functional Mobility/Gait Assessment  Gait Assistance: Contact guard assist (min VC for walker management)  Distance (ft): 10', 2'  Assistive Device: Rolling walker  Pattern: Shuffle    Bed Mobility: SBA    Transfers: CGA    Exercise/Education Provided:  Bed mobility  Body mechanics  Functional activity tolerated  Gait training  Transfer training    Patient End of Session: Up in chair;Needs met;Call light within reach;RN aware of session/findings;All patient questions and concerns addressed;Discussed recommendations with /    CURRENT GOALS    Goals to be met by: 1/20  Patient Goal Patient's self-stated goal is: to get stronger   Goal #1 Patient is able to demonstrate supine - sit EOB @ level: supervision     Goal #1   Current Status    Goal #2 Patient is able to demonstrate transfers Sit to/from Stand at assistance level: supervision with walker - rolling     Goal #2  Current Status    Goal #3 Patient is able to ambulate 150 feet with assist device: walker - rolling at assistance level: supervision   Goal  #3   Current Status    Goal #4 Patient will negotiate 2 stairs/one curb w/ assistive device and supervision   Goal #4   Current Status    Goal #5 Patient to demonstrate independence with home activity/exercise instructions provided to patient in preparation for discharge.   Goal #5   Current Status    Goal #6    Goal #6  Current Status      Patient Evaluation Complexity Level:  History Moderate - 1 or 2 personal factors and/or co-morbidities   Examination of body systems Moderate - addressing a total of 3 or more elements   Clinical Presentation Moderate - Evolving   Clinical Decision Making Moderate Complexity       Gait Training: 15 minutes         Physical Therapy Note signed by Haase, Jenna, PT at 1/9/2024  4:17 PM  Version 1 of 1    Author: Haase, Jenna, PT Service: Rehab Author Type: Physical Therapist    Filed: 1/9/2024  4:17 PM Date of Service: 1/9/2024  3:20 PM Status: Signed    : Haase, Jenna, PT (Physical Therapist)       PT orders received and chart reviewed. RN approved activity. Patient is POD #0 for R TKA WBAT. Patient resting in bed upon arrival. Patient reporting dizziness and nausea, stating \"The room is rolling.\" Vitals assessed as SpO2 on room air 99%, /59 mmHg, HR 51 bpm. Communicated with RN who reports that this is a change in earlier reported dizziness. Defer PT evaluation at this time. Will re-schedule PT evaluation for tomorrow, 1/10/24.     Jenna Haase, PT, DPT  [facility]  Ext: [phone]                  Occupational Therapy Notes (last 72 hours)      Occupational Therapy Note signed by Kathryn Velazquez OT at 1/10/2024 12:58 PM  Version 1 of 1    Author: Kathryn Velazquez OT Service: Rehab Author Type: Occupational Therapist    Filed: 1/10/2024 12:58 PM Date of Service: 1/10/2024  8:30 AM Status: Signed    : Kathryn Velazquez OT (Occupational Therapist)       OCCUPATIONAL THERAPY EVALUATION - INPATIENT     Room Number: 432/432-A  Evaluation Date:  1/10/2024  Type of Evaluation: Initial  Presenting Problem: s/p R TKA    Physician Order: IP Consult to Occupational Therapy  Reason for Therapy: ADL/IADL Dysfunction and Discharge Planning    OCCUPATIONAL THERAPY ASSESSMENT     Patient is a 83 year old male admitted 1/9/2024 for R TKA; WBAT.       RN cleared pt for participation in occupational therapy session, which was completed in collaboration with PT. Upon arrival, pt was supine in bed and agreeable to activity. No visitors present during session. Introduced self and role of OT to pt. Pt verbalized understanding. Gait belt used.    Education provided  Educated pt about proper safety techniques and TKA protocol. Pt verbalized/demonstrated good carryover.    Analysis of occupational performance  Pt required up to max A for ADLs overall along with CGA for supine to sit, SBA for sitting at EOB, CGA-min A for STS, and CGA-min A for functional transfer at RW level. Pt tolerated about 1 minutes of standing in supported standing. Pt was limited by pain. Pt was left in chair and alarm was activated. Call light and all needs left in reach. Handoff given to RN.    Discharge recommendation  The patient is below baseline and would benefit from continued skilled inpatient OT to address the above deficits, maximizing patient's ability to return to prior level of function. Recommend EJ    The patient's Approx Degree of Impairment: 53.32% has been calculated based on documentation in the [facility] '6 clicks' Inpatient Daily Activity Short Form.  Research supports that patients with this level of impairment may benefit from EJ.      PLAN OT Treatment Plan: Balance activities;Energy conservation/work simplification techniques;Continued evaluation;Compensatory technique education;Fine motor coordination activities;Neuromuscluar reeducation;Equipment eval/education;Patient/Family training;Patient/Family education;Cognitive reorientation;Endurance training;UE  strengthening/ROM;Functional transfer training;Visual perceptual training;IADL training;ADL training    OCCUPATIONAL THERAPY MEDICAL/SOCIAL HISTORY     Problem List  Active Problems:    Preop testing    Postoperative nausea    Bradycardia      Past Medical History  Past Medical History:   Diagnosis Date    Back problem     BPH (benign prostatic hyperplasia)     Cataract     Cellulitis     Cellulitis of left lower extremity 2017    Disorder of thyroid     Elevated prostate specific antigen (PSA) 2012    Esophageal reflux     Essential hypertension     Hearing impairment     no hearing aids    Heart attack (HCC)     High blood pressure     High cholesterol     History of blood transfusion     no reactions    History of prostate biopsy     History of stomach ulcers     Hyperlipidemia     Intractable back pain 10/16/2019    NSTEMI (non-ST elevated myocardial infarction) (HCC) 2022    Osteoarthritis     Postoperative nausea 2024    Sciatica of right side     Sleep apnea     no CPAP use    Visual impairment     readers       Past Surgical History  Past Surgical History:   Procedure Laterality Date    ANGIOPLASTY (CORONARY)      ARTHROSCOPY OF JOINT UNLISTED Left     knee    CATARACT SURGERY, COMPLEX      CATH BARE METAL STENT (BMS)      COLONOSCOPY      HC INJ EPIDURAL STEROID LUMBAR OR SACRAL W IMG GUIDANCE      TONSILLECTOMY      UPPER GI ENDOSCOPY,EXAM         HOME SITUATION  Type of Home: House  Home Layout: Multi-level  Lives With: Alone                              Use of Assistive Device(s):     Prior Level of [place]: independent    SUBJECTIVE  \"I really need to go to rehab.\"    OCCUPATIONAL THERAPY EXAMINATION      OBJECTIVE  Precautions: Bed/chair alarm  Fall Risk: High fall risk    PAIN ASSESSMENT  Ratin  Location: R knee         O2 SATURATIONS  Oxygen Therapy  SPO2% Ambulation on Room Air: 91    RANGE OF MOTION   Upper extremity ROM is within functional limits     STRENGTH  ASSESSMENT  Upper extremity strength is within functional limits     COORDINATION  Gross Motor: WFL   Fine Motor: WFL          ACTIVITIES OF DAILY LIVING ASSESSMENT  AM-PAC ‘6-Clicks’ Inpatient Daily Activity Short Form  How much help from another person does the patient currently need…  -   Putting on and taking off regular lower body clothing?: A Lot  -   Bathing (including washing, rinsing, drying)?: A Lot  -   Toileting, which includes using toilet, bedpan or urinal? : A Lot  -   Putting on and taking off regular upper body clothing?: A Little  -   Taking care of personal grooming such as brushing teeth?: A Little  -   Eating meals?: None    AM-PAC Score:  Score: 16  Approx Degree of Impairment: 53.32%  Standardized Score (AM-PAC Scale): 35.96  CMS Modifier (G-Code): CK    FUNCTIONAL TRANSFER ASSESSMENT  Supine to Sit : Contact Guard Assist     Sit to stand: min-CGA  Toilet Transfer: min-CGA       FUNCTIONAL ADL ASSESSMENT  Overall max A    OT Goals  Patients self stated goal is: to go to rehab     Patient will complete functional transfer with SBA  Comment:     Patient will complete toileting with min A  Comment:     Patient will tolerate standing for 1 minutes in prep for adls with SBA   Comment:    Patient will complete item retrieval with SBA  Comment:          Goals  on:   Frequency:     Patient Evaluation Complexity Level:   Occupational Profile/Medical History MODERATE - Expanded review of history including review of medical or therapy record   Specific performance deficits impacting engagement in ADL/IADL MODERATE  3 - 5 performance deficits   Client Assessment/Performance Deficits MODERATE - Comorbidities and min to mod modifications of tasks    Clinical Decision Making MODERATE - Analysis of occupational profile, detailed assessments, several treatment options    Overall Complexity MODERATE     OT Session Time: 25 minutes  Self-Care Home Management: 15 minutes       Kathryn Velazquez  OT  [facility]  Inpatient Rehabilitation  Occupational Therapy  [phone]               Video Swallow Study Notes    No notes of this type exist for this encounter.     SLP Notes    No notes of this type exist for this encounter.     Immunizations     Name Date      Covid-19 Pfizer 04/22/22     Covid-19 Pfizer 09/02/21     Covid-19 Pfizer 05/15/21     Covid-19 Pfizer 04/24/21     Covid-19 Pfizer Bivalent 11/10/22     INFLUENZA 10/23/23     INFLUENZA 09/30/22     INFLUENZA 09/30/22     INFLUENZA 09/02/21     INFLUENZA 09/02/21     INFLUENZA 10/19/19     Influenza 12/16/20     Pfizer Covid-19 Vaccine 30mcg/0.3mL 12yrs+ 10/20/23     Pneumococcal Vaccine (Prevnar 20) 07/19/23     Pneumovax 23 02/04/22       Future Appointments        Provider Department Center    2/12/2024 9:00 AM Everardo Multani PA-C [facility]    2/14/2024 11:00 AM Marquise Galarza MD [facility]    3/18/2024 9:20 AM Michel Kendall MD [facility]      Multidisciplinary Problems     Active Goals     Not on file          Resolved Goals        Problem: Patient/Family Goals    Goal Priority Disciplines Outcome Interventions   Patient/Family Long Term Goal   (Resolved)     Interdisciplinary Adequate for Discharge    Description: Patient's Long Term Goal: Will be able to return home from rehab and will be able to walk well with a walker and will have no complications from surgery.     Interventions:  - Up with walker, WBAT to right leg.  - Monitor incision for any signs of infection/bleeding.  - Pain management with oral medications.  - Oral anticoagulation to prevent blood clots.  - May apply ice to incision to prevent swelling or help reduce pain.  - Follow up with ortho as recommended.  - See additional Care Plan goals for specific interventions   Patient/Family Short  Term Goal   (Resolved)     Interdisciplinary Adequate for Discharge    Description: Patient's Short Term Goal: Transfer to Acute Rehab when stable.    Interventions:   - Up with walker, WBAT to right leg.  - Monitor incision for any signs of infection/bleeding.  - Pain management with oral medications.  - Bilateral SCD's and oral anticoagulation to prevent blood clots.  - May apply ice to incision to prevent swelling or help reduce pain.  - CPM to right knee to promote better mobility.  - See additional Care Plan goals for specific interventions               Discharge Treatment Preferences    Flowsheet Row Most Recent Value   Preferences    PMR Consult Requested Consult ordered

## (undated) NOTE — LETTER
AUTHORIZATION FOR SURGICAL OPERATION OR OTHER PROCEDURE    1.  I hereby authorize [facility] Estelita Diane [facility] assigned to my case to perform the following operation and/or procedure at the [facility]:    ________________ Time:  ________ A. M.  P.M.        Patient Name:  ______________________________________________________  (please print)      Patient signature:  ___________________________________________________             Relationship to Patient:           []  Pa